# Patient Record
Sex: MALE | Race: WHITE | NOT HISPANIC OR LATINO | Employment: OTHER | URBAN - METROPOLITAN AREA
[De-identification: names, ages, dates, MRNs, and addresses within clinical notes are randomized per-mention and may not be internally consistent; named-entity substitution may affect disease eponyms.]

---

## 2018-05-21 ENCOUNTER — PATIENT MESSAGE (OUTPATIENT)
Dept: INTERNAL MEDICINE | Facility: CLINIC | Age: 63
End: 2018-05-21

## 2018-05-23 ENCOUNTER — PATIENT MESSAGE (OUTPATIENT)
Dept: INTERNAL MEDICINE | Facility: CLINIC | Age: 63
End: 2018-05-23

## 2018-05-23 DIAGNOSIS — Z00.00 ANNUAL PHYSICAL EXAM: Primary | ICD-10-CM

## 2018-05-25 ENCOUNTER — LAB VISIT (OUTPATIENT)
Dept: LAB | Facility: HOSPITAL | Age: 63
End: 2018-05-25
Attending: INTERNAL MEDICINE
Payer: MEDICARE

## 2018-05-25 DIAGNOSIS — Z00.00 ANNUAL PHYSICAL EXAM: ICD-10-CM

## 2018-05-25 LAB
ALBUMIN SERPL BCP-MCNC: 3.9 G/DL
ALP SERPL-CCNC: 89 U/L
ALT SERPL W/O P-5'-P-CCNC: 18 U/L
ANION GAP SERPL CALC-SCNC: 9 MMOL/L
AST SERPL-CCNC: 16 U/L
BASOPHILS # BLD AUTO: 0.07 K/UL
BASOPHILS NFR BLD: 0.7 %
BILIRUB SERPL-MCNC: 0.7 MG/DL
BUN SERPL-MCNC: 18 MG/DL
CALCIUM SERPL-MCNC: 9.6 MG/DL
CHLORIDE SERPL-SCNC: 103 MMOL/L
CHOLEST SERPL-MCNC: 263 MG/DL
CHOLEST/HDLC SERPL: 6.4 {RATIO}
CO2 SERPL-SCNC: 26 MMOL/L
COMPLEXED PSA SERPL-MCNC: 1.6 NG/ML
CREAT SERPL-MCNC: 1.1 MG/DL
DIFFERENTIAL METHOD: NORMAL
EOSINOPHIL # BLD AUTO: 0.2 K/UL
EOSINOPHIL NFR BLD: 2.6 %
ERYTHROCYTE [DISTWIDTH] IN BLOOD BY AUTOMATED COUNT: 12.6 %
EST. GFR  (AFRICAN AMERICAN): >60 ML/MIN/1.73 M^2
EST. GFR  (NON AFRICAN AMERICAN): >60 ML/MIN/1.73 M^2
GLUCOSE SERPL-MCNC: 206 MG/DL
HCT VFR BLD AUTO: 50.6 %
HDLC SERPL-MCNC: 41 MG/DL
HDLC SERPL: 15.6 %
HGB BLD-MCNC: 16.4 G/DL
IMM GRANULOCYTES # BLD AUTO: 0.04 K/UL
IMM GRANULOCYTES NFR BLD AUTO: 0.4 %
LDLC SERPL CALC-MCNC: 171.8 MG/DL
LYMPHOCYTES # BLD AUTO: 3.3 K/UL
LYMPHOCYTES NFR BLD: 35.2 %
MCH RBC QN AUTO: 29.7 PG
MCHC RBC AUTO-ENTMCNC: 32.4 G/DL
MCV RBC AUTO: 92 FL
MONOCYTES # BLD AUTO: 0.8 K/UL
MONOCYTES NFR BLD: 8.3 %
NEUTROPHILS # BLD AUTO: 5 K/UL
NEUTROPHILS NFR BLD: 52.8 %
NONHDLC SERPL-MCNC: 222 MG/DL
NRBC BLD-RTO: 0 /100 WBC
PLATELET # BLD AUTO: 342 K/UL
PMV BLD AUTO: 9.9 FL
POTASSIUM SERPL-SCNC: 4 MMOL/L
PROT SERPL-MCNC: 7.4 G/DL
RBC # BLD AUTO: 5.52 M/UL
SODIUM SERPL-SCNC: 138 MMOL/L
TRIGL SERPL-MCNC: 251 MG/DL
TSH SERPL DL<=0.005 MIU/L-ACNC: 1.01 UIU/ML
WBC # BLD AUTO: 9.4 K/UL

## 2018-05-25 PROCEDURE — 80053 COMPREHEN METABOLIC PANEL: CPT

## 2018-05-25 PROCEDURE — 36415 COLL VENOUS BLD VENIPUNCTURE: CPT | Mod: PO

## 2018-05-25 PROCEDURE — 84443 ASSAY THYROID STIM HORMONE: CPT

## 2018-05-25 PROCEDURE — 80061 LIPID PANEL: CPT

## 2018-05-25 PROCEDURE — 84153 ASSAY OF PSA TOTAL: CPT

## 2018-05-25 PROCEDURE — 85025 COMPLETE CBC W/AUTO DIFF WBC: CPT

## 2018-05-29 ENCOUNTER — OFFICE VISIT (OUTPATIENT)
Dept: INTERNAL MEDICINE | Facility: CLINIC | Age: 63
End: 2018-05-29
Payer: MEDICARE

## 2018-05-29 ENCOUNTER — HOSPITAL ENCOUNTER (OUTPATIENT)
Dept: RADIOLOGY | Facility: HOSPITAL | Age: 63
Discharge: HOME | End: 2018-05-29
Attending: INTERNAL MEDICINE
Payer: MEDICARE

## 2018-05-29 VITALS
WEIGHT: 193.13 LBS | BODY MASS INDEX: 31.04 KG/M2 | SYSTOLIC BLOOD PRESSURE: 152 MMHG | HEIGHT: 66 IN | HEART RATE: 99 BPM | DIASTOLIC BLOOD PRESSURE: 93 MMHG | TEMPERATURE: 98 F | RESPIRATION RATE: 18 BRPM

## 2018-05-29 DIAGNOSIS — R73.9 BLOOD GLUCOSE ELEVATED: ICD-10-CM

## 2018-05-29 DIAGNOSIS — I10 ESSENTIAL HYPERTENSION: ICD-10-CM

## 2018-05-29 DIAGNOSIS — E78.00 HYPERCHOLESTEREMIA: ICD-10-CM

## 2018-05-29 DIAGNOSIS — E66.9 OBESITY (BMI 30-39.9): ICD-10-CM

## 2018-05-29 DIAGNOSIS — M25.552 PAIN OF LEFT HIP JOINT: ICD-10-CM

## 2018-05-29 DIAGNOSIS — Z00.00 ANNUAL PHYSICAL EXAM: Primary | ICD-10-CM

## 2018-05-29 PROCEDURE — 90732 PPSV23 VACC 2 YRS+ SUBQ/IM: CPT | Mod: S$GLB,,, | Performed by: INTERNAL MEDICINE

## 2018-05-29 PROCEDURE — G0009 ADMIN PNEUMOCOCCAL VACCINE: HCPCS | Mod: 59,S$GLB,, | Performed by: INTERNAL MEDICINE

## 2018-05-29 PROCEDURE — 3080F DIAST BP >= 90 MM HG: CPT | Mod: CPTII,S$GLB,, | Performed by: INTERNAL MEDICINE

## 2018-05-29 PROCEDURE — 99202 OFFICE O/P NEW SF 15 MIN: CPT | Mod: 25,S$GLB,, | Performed by: INTERNAL MEDICINE

## 2018-05-29 PROCEDURE — 99999 PR PBB SHADOW E&M-EST. PATIENT-LVL III: CPT | Mod: PBBFAC,,, | Performed by: INTERNAL MEDICINE

## 2018-05-29 PROCEDURE — 73502 X-RAY EXAM HIP UNI 2-3 VIEWS: CPT | Mod: 26,LT,, | Performed by: RADIOLOGY

## 2018-05-29 PROCEDURE — 90471 IMMUNIZATION ADMIN: CPT | Mod: S$GLB,,, | Performed by: INTERNAL MEDICINE

## 2018-05-29 PROCEDURE — 3077F SYST BP >= 140 MM HG: CPT | Mod: CPTII,S$GLB,, | Performed by: INTERNAL MEDICINE

## 2018-05-29 PROCEDURE — 73502 X-RAY EXAM HIP UNI 2-3 VIEWS: CPT | Mod: TC,PO,LT

## 2018-05-29 PROCEDURE — 90714 TD VACC NO PRESV 7 YRS+ IM: CPT | Mod: S$GLB,,, | Performed by: INTERNAL MEDICINE

## 2018-05-29 RX ORDER — LOSARTAN POTASSIUM AND HYDROCHLOROTHIAZIDE 12.5; 5 MG/1; MG/1
1 TABLET ORAL EVERY MORNING
Qty: 30 TABLET | Refills: 0 | Status: SHIPPED | OUTPATIENT
Start: 2018-05-29 | End: 2018-06-12 | Stop reason: SDUPTHER

## 2018-05-29 RX ORDER — IBUPROFEN 200 MG
200 TABLET ORAL EVERY 6 HOURS PRN
COMMUNITY
End: 2018-08-13

## 2018-05-29 RX ORDER — ASPIRIN 325 MG
81 TABLET ORAL DAILY
COMMUNITY
End: 2018-07-20

## 2018-05-29 RX ORDER — ATORVASTATIN CALCIUM 40 MG/1
40 TABLET, FILM COATED ORAL NIGHTLY
Qty: 90 TABLET | Refills: 3 | Status: SHIPPED | OUTPATIENT
Start: 2018-05-29 | End: 2018-11-14 | Stop reason: SDUPTHER

## 2018-05-29 RX ORDER — MELOXICAM 15 MG/1
15 TABLET ORAL DAILY
Qty: 30 TABLET | Refills: 3 | Status: SHIPPED | OUTPATIENT
Start: 2018-05-29 | End: 2018-06-28

## 2018-05-29 NOTE — PROGRESS NOTES
Subjective:       Patient ID: Mario Sanford is a 63 y.o. male.    Chief Complaint: Annual Exam and Hip Pain (left)    HPI   63 y.o. Male here for annual exam.     Cholesterol: (elevated TG's)  Vaccines: Influenza (declined); Tetanus (2018); Pneumovax (2018); Zoster (2018)  Eye exam: declined  Colonoscopy: needs    Exercise: walks   Diet: regular    Past Medical History:  No date: Obesity (BMI 30-39.9)  No date: HTN  No date: HLD  No past surgical history on file.  Social History    Marital status:              Spouse name:                       Years of education:                 Number of children: 1             Occupational History  Occupation          Employer            Comment               Retired from sales                          Social History Main Topics    Smoking status: Never Smoker                                                                Smokeless tobacco: Never Used                        Alcohol use: No              Drug use: No              Sexual activity: Yes               Partners with: Female    Review of patient's allergies indicates:  No Known Allergies  Mr. Sanford had no medications administered during this visit.    Review of Systems   Constitutional: Positive for activity change. Negative for appetite change, chills, diaphoresis, fatigue, fever and unexpected weight change.   HENT: Positive for hearing loss. Negative for congestion, mouth sores, postnasal drip, rhinorrhea, sinus pressure, sneezing, sore throat, trouble swallowing and voice change.    Eyes: Negative for discharge, itching and visual disturbance.   Respiratory: Negative for cough, chest tightness, shortness of breath and wheezing.    Cardiovascular: Negative for chest pain, palpitations and leg swelling.   Gastrointestinal: Negative for abdominal pain, blood in stool, constipation, diarrhea, nausea and vomiting.   Endocrine: Positive for polyuria. Negative for cold intolerance, heat intolerance and polydipsia.    Genitourinary: Positive for urgency. Negative for difficulty urinating, dysuria, flank pain and hematuria.   Musculoskeletal: Positive for arthralgias. Negative for back pain, joint swelling, myalgias and neck pain.   Skin: Negative for rash and wound.   Allergic/Immunologic: Negative for environmental allergies and food allergies.   Neurological: Negative for dizziness, tremors, seizures, syncope, weakness and headaches.   Hematological: Negative for adenopathy. Does not bruise/bleed easily.   Psychiatric/Behavioral: Negative for confusion, dysphoric mood, sleep disturbance and suicidal ideas. The patient is not nervous/anxious.        Objective:      Physical Exam   Constitutional: He is oriented to person, place, and time. He appears well-developed and well-nourished. No distress.   HENT:   Head: Normocephalic and atraumatic.   Right Ear: External ear normal.   Left Ear: External ear normal.   Nose: Nose normal.   Mouth/Throat: Oropharynx is clear and moist. No oropharyngeal exudate.   Eyes: Conjunctivae and EOM are normal. Pupils are equal, round, and reactive to light. Right eye exhibits no discharge. Left eye exhibits no discharge. No scleral icterus.   Neck: Normal range of motion. Neck supple. No JVD present. No thyromegaly present.   Cardiovascular: Normal rate, regular rhythm, normal heart sounds and intact distal pulses.    No murmur heard.  Pulmonary/Chest: Effort normal and breath sounds normal. No respiratory distress. He has no wheezes. He has no rales.   Abdominal: Soft. Bowel sounds are normal. He exhibits no distension. There is no tenderness. There is no guarding.   Musculoskeletal: He exhibits no edema.        Left hip: He exhibits tenderness (lateral hip).   Lymphadenopathy:     He has no cervical adenopathy.   Neurological: He is alert and oriented to person, place, and time. No cranial nerve deficit. Coordination normal.   Skin: Skin is warm and dry. No rash noted. He is not diaphoretic. No  pallor.   Psychiatric: He has a normal mood and affect. Judgment normal.       Assessment:       1. Annual physical exam    2. Hypercholesteremia    3. Essential hypertension    4. Blood glucose elevated    5. Obesity (BMI 30-39.9)    6. Pain of left hip joint        Plan:    1. Blood work reviewed with pt       Vaccines: Influenza (declined); Tetanus (2018); Pneumovax (2018); Zoster (2018)       Eye exam: declined       Colonoscopy: needs   2. HLD- Rx Lipitor 40 mg daily       Lipids in 3 months    3. HTN- Rx Hyzaar 50-12.5 mg daily     4. Elevated blood glucose- check HA1C   5. Obesity- pt advised on proper diet/exercise for weight loss   6. X-ray hip       Rx Mobic 15 mg daily   7. F/u in 2 weeks

## 2018-05-31 ENCOUNTER — TELEPHONE (OUTPATIENT)
Dept: ENDOSCOPY | Facility: HOSPITAL | Age: 63
End: 2018-05-31

## 2018-05-31 ENCOUNTER — TELEPHONE (OUTPATIENT)
Dept: INTERNAL MEDICINE | Facility: CLINIC | Age: 63
End: 2018-05-31

## 2018-05-31 DIAGNOSIS — E11.9 TYPE 2 DIABETES MELLITUS WITHOUT COMPLICATION, WITH LONG-TERM CURRENT USE OF INSULIN: Primary | ICD-10-CM

## 2018-05-31 DIAGNOSIS — Z79.4 TYPE 2 DIABETES MELLITUS WITHOUT COMPLICATION, WITH LONG-TERM CURRENT USE OF INSULIN: Primary | ICD-10-CM

## 2018-05-31 RX ORDER — DEXTROSE 4 G
TABLET,CHEWABLE ORAL
Qty: 1 EACH | Refills: 0 | Status: SHIPPED | OUTPATIENT
Start: 2018-05-31 | End: 2020-02-12 | Stop reason: SDUPTHER

## 2018-05-31 RX ORDER — METFORMIN HYDROCHLORIDE 1000 MG/1
1000 TABLET ORAL 2 TIMES DAILY WITH MEALS
Qty: 180 TABLET | Refills: 3 | Status: SHIPPED | OUTPATIENT
Start: 2018-05-31 | End: 2018-06-08

## 2018-05-31 RX ORDER — LANCETS
1 EACH MISCELLANEOUS DAILY
Qty: 200 EACH | Refills: 3 | Status: SHIPPED | OUTPATIENT
Start: 2018-05-31 | End: 2020-02-12 | Stop reason: SDUPTHER

## 2018-05-31 NOTE — TELEPHONE ENCOUNTER
Moris Whalen MD You 3 minutes ago (11:39 AM)     Yes (Routing comment)             You routed conversation to Moris Whalen MD 17 minutes ago (11:25 AM)          You 18 minutes ago (11:23 AM)        Dr. Whalen,   Patient is scheduled for a colonoscopy with you 6/19/18. Patient's wife stated she is a patient of yours and has done a Miralax prep, and requesting that for patient. Is Miralax prep okay? Please advise.   Thank you,   Dalila

## 2018-05-31 NOTE — TELEPHONE ENCOUNTER
----- Message from Bereket Rivera DO sent at 5/31/2018 12:32 PM CDT -----  Labs conforms you have diabetes  I will start you on a mew medication and will refer you to get diabetic education  We will send you a glucometer to measure your blood sugars 2x/daily(may an appt with my nurse if you need to be shown how to use it)  I want to see you back in 2 weeks to review the readings

## 2018-05-31 NOTE — TELEPHONE ENCOUNTER
Dr. Whalen,    Patient is scheduled for a colonoscopy with you 6/19/18. Patient's wife stated she is a patient of yours and has done a Miralax prep, and requesting that for patient. Is Miralax prep okay? Please advise.    Thank you,  Dalila

## 2018-05-31 NOTE — TELEPHONE ENCOUNTER
Wife has seen mychart msg.  They are booked with diab education already and 2 wk fol up..  Will be scheduling eye appt too.    She is diabetic and will work with him with diet and getting glucose checked.  I told her we'd send order to [employer] for the meter and supplies and they will ship out to them.    Please approve order. For meter./ to print so can fax to [employer]    Thanks charlie

## 2018-05-31 NOTE — TELEPHONE ENCOUNTER
Metformin sent  Referral for eye exam, re: diabetes and referral to Diabetic education   F/u in 2 weeks to review blood sugars  See result note

## 2018-06-08 ENCOUNTER — PATIENT MESSAGE (OUTPATIENT)
Dept: INTERNAL MEDICINE | Facility: CLINIC | Age: 63
End: 2018-06-08

## 2018-06-08 ENCOUNTER — TELEPHONE (OUTPATIENT)
Dept: INTERNAL MEDICINE | Facility: CLINIC | Age: 63
End: 2018-06-08

## 2018-06-08 RX ORDER — GLIMEPIRIDE 2 MG/1
2 TABLET ORAL
Qty: 30 TABLET | Refills: 0 | Status: ON HOLD | OUTPATIENT
Start: 2018-06-08 | End: 2018-07-05 | Stop reason: SDUPTHER

## 2018-06-08 NOTE — TELEPHONE ENCOUNTER
----- Message from Breanna Cerna sent at 6/7/2018  3:55 PM CDT -----  Contact: Wife Brittaney [phone]  The patients wife is calling in regards to a few medications thats making the patient sick. Patient symptoms are nausea, fatigue, vomiting and diarrhea. Patient would like a call back as soon as possible.  They are unaware of which medication is making the patient have these symptoms.

## 2018-06-12 ENCOUNTER — OFFICE VISIT (OUTPATIENT)
Dept: INTERNAL MEDICINE | Facility: CLINIC | Age: 63
End: 2018-06-12
Payer: MEDICARE

## 2018-06-12 ENCOUNTER — CLINICAL SUPPORT (OUTPATIENT)
Dept: DIABETES | Facility: CLINIC | Age: 63
End: 2018-06-12
Payer: MEDICARE

## 2018-06-12 VITALS
BODY MASS INDEX: 29.8 KG/M2 | HEIGHT: 66 IN | TEMPERATURE: 99 F | HEART RATE: 70 BPM | WEIGHT: 185.44 LBS | SYSTOLIC BLOOD PRESSURE: 108 MMHG | RESPIRATION RATE: 16 BRPM | DIASTOLIC BLOOD PRESSURE: 64 MMHG

## 2018-06-12 DIAGNOSIS — Z79.4 TYPE 2 DIABETES MELLITUS WITHOUT COMPLICATION, WITH LONG-TERM CURRENT USE OF INSULIN: ICD-10-CM

## 2018-06-12 DIAGNOSIS — E11.9 DIABETES MELLITUS, NEW ONSET: Primary | ICD-10-CM

## 2018-06-12 DIAGNOSIS — I10 ESSENTIAL HYPERTENSION: Primary | ICD-10-CM

## 2018-06-12 DIAGNOSIS — E11.9 TYPE 2 DIABETES MELLITUS WITHOUT COMPLICATION, WITH LONG-TERM CURRENT USE OF INSULIN: ICD-10-CM

## 2018-06-12 PROCEDURE — 3045F PR MOST RECENT HEMOGLOBIN A1C LEVEL 7.0-9.0%: CPT | Mod: CPTII,S$GLB,, | Performed by: INTERNAL MEDICINE

## 2018-06-12 PROCEDURE — G0108 DIAB MANAGE TRN  PER INDIV: HCPCS | Mod: S$GLB,,, | Performed by: INTERNAL MEDICINE

## 2018-06-12 PROCEDURE — 99999 PR PBB SHADOW E&M-EST. PATIENT-LVL II: CPT | Mod: PBBFAC,,,

## 2018-06-12 PROCEDURE — 3074F SYST BP LT 130 MM HG: CPT | Mod: CPTII,S$GLB,, | Performed by: INTERNAL MEDICINE

## 2018-06-12 PROCEDURE — 3008F BODY MASS INDEX DOCD: CPT | Mod: CPTII,S$GLB,, | Performed by: INTERNAL MEDICINE

## 2018-06-12 PROCEDURE — 99999 PR PBB SHADOW E&M-EST. PATIENT-LVL III: CPT | Mod: PBBFAC,,, | Performed by: INTERNAL MEDICINE

## 2018-06-12 PROCEDURE — 99499 UNLISTED E&M SERVICE: CPT | Mod: S$GLB,,, | Performed by: INTERNAL MEDICINE

## 2018-06-12 PROCEDURE — 3078F DIAST BP <80 MM HG: CPT | Mod: CPTII,S$GLB,, | Performed by: INTERNAL MEDICINE

## 2018-06-12 PROCEDURE — 99214 OFFICE O/P EST MOD 30 MIN: CPT | Mod: S$GLB,,, | Performed by: INTERNAL MEDICINE

## 2018-06-12 RX ORDER — LOSARTAN POTASSIUM AND HYDROCHLOROTHIAZIDE 12.5; 5 MG/1; MG/1
1 TABLET ORAL EVERY MORNING
Qty: 90 TABLET | Refills: 1 | Status: ON HOLD | OUTPATIENT
Start: 2018-06-12 | End: 2018-07-09 | Stop reason: HOSPADM

## 2018-06-12 NOTE — PROGRESS NOTES
Subjective:       Patient ID: Mario Sanford is a 63 y.o. male.    Chief Complaint: Follow-up    HPI   Pt with T2DM/HTN is here for 2 week f/u. He was started on Hyzaar which has controlled his BP at home without any SE's. He was also started on Metformin which he had to stop 2/2 N/V/D. He was changed to Amaryl. Pt's blood sugars are running < 200s.   Review of Systems   Constitutional: Positive for fatigue. Negative for activity change, appetite change, chills, diaphoresis, fever and unexpected weight change.   HENT: Negative for postnasal drip, rhinorrhea, sinus pressure, sneezing, sore throat, trouble swallowing and voice change.    Respiratory: Negative for cough, shortness of breath and wheezing.    Cardiovascular: Negative for chest pain, palpitations and leg swelling.   Gastrointestinal: Negative for abdominal pain, blood in stool, constipation, diarrhea, nausea and vomiting.   Endocrine: Negative for polydipsia, polyphagia and polyuria.   Genitourinary: Negative for dysuria.   Musculoskeletal: Negative for arthralgias and myalgias.   Skin: Negative for pallor, rash and wound.   Allergic/Immunologic: Negative for environmental allergies and food allergies.   Neurological: Positive for weakness. Negative for dizziness, tremors, seizures, speech difficulty and headaches.   Hematological: Negative for adenopathy. Does not bruise/bleed easily.   Psychiatric/Behavioral: Negative for confusion. The patient is not nervous/anxious.        Objective:      Physical Exam   Constitutional: He is oriented to person, place, and time. He appears well-developed and well-nourished. No distress.   HENT:   Head: Normocephalic and atraumatic.   Eyes: Conjunctivae and EOM are normal. Pupils are equal, round, and reactive to light. Right eye exhibits no discharge. Left eye exhibits no discharge. No scleral icterus.   Neck: Normal range of motion. Neck supple. No JVD present.   Cardiovascular: Normal rate, regular rhythm and normal  heart sounds.    No murmur heard.  Pulses:       Dorsalis pedis pulses are 2+ on the right side, and 2+ on the left side.        Posterior tibial pulses are 2+ on the right side, and 2+ on the left side.   Pulmonary/Chest: Effort normal and breath sounds normal. No respiratory distress. He has no wheezes. He has no rales.   Abdominal: Soft. Bowel sounds are normal. There is no tenderness.   Musculoskeletal: He exhibits no edema.   Feet:   Right Foot:   Protective Sensation: 4 sites tested. 4 sites sensed.   Skin Integrity: Negative for ulcer, blister or skin breakdown.   Left Foot:   Protective Sensation: 4 sites tested. 4 sites sensed.   Skin Integrity: Negative for ulcer, blister or skin breakdown.   Lymphadenopathy:     He has no cervical adenopathy.   Neurological: He is alert and oriented to person, place, and time.   Skin: Skin is warm and dry. No rash noted. He is not diaphoretic. No pallor.       Assessment:       1. Essential hypertension    2. Type 2 diabetes mellitus without complication, with long-term current use of insulin        Plan:    1. HTN- controlled on Hyzaar 50-12.5 mg daily   2. T2DM- last HA1C of 8.7(5/18)       Continue Amaryl 2 mg qam       Monitor sugars BID(Will send results in 1 week)       HA1C in 3 months   3. F/u in 6 months

## 2018-06-12 NOTE — PROGRESS NOTES
Diabetes Education  Author: Pita Nugent RD, CDE  Date: 6/12/2018    Diabetes Education Visit  Diabetes Education Record Assessment/Progress: Initial    Diabetes Type  Diabetes Type : Type II    Diabetes History  Diabetes Diagnosis: 0-1 year (New Onset)    Nutrition  Meal Planning: 3 meals per day, skipping meals, snacks between meal, water, diet drinks, artificial sweeteners, eats out seldom  What type of sweetener do you use?: Stevia/Truvia  What type of beverages do you drink?: juice, water, other (see comments), diet soda/tea (Coffee w/ stevia)  Meal Plan 24 Hour Recall - Breakfast:  (Eggs, oatmeal, water, coffee w/ stevia, juice)  Meal Plan 24 Hour Recall - Lunch:  ([place])  Meal Plan 24 Hour Recall - Dinner:  (Homecooked)  Meal Plan 24 Hour Recall - Snack:  (Cut back on snacking on sweets)    Monitoring   Monitoring:  (None - waiting on meter and supplies from )  Blood Glucose Logs: No  Do you use a personal glucose monitor?: No  In the last month, how often have you had a low blood sugar reaction?: never  What are your symptoms of low blood sugar?:  (N/A)  How do you treat low blood sugar?:  (N/A)  Can you tell when your blood sugar is too high?: no  How do you treat high blood sugar?:  (N/A)    Exercise   Exercise Type: none (Arthritis in left hip so is somewhat restricted)    Current Diabetes Treatment   Current Treatment: Oral Medication (Glimepiride daily (had tried Metformin, but was not able to tolerate))    Social History  Preferred Learning Method: Face to Face  Primary Support: Self, Spouse  Smoking Status: Never a Smoker  Alcohol Use: Never    Barriers to Change  Barriers to Change: None  Learning Challenges : None    Readiness to Learn   Readiness to Learn : Acceptance    Cultural Influences  Cultural Influences: No    Diabetes Education Assessment/Progress  Diabetes Disease Process (diabetes disease process and treatment options): Instructed, Discussion, Individual Session, Written Materials  Provided  Nutrition (Incorporating nutritional management into one's lifestyle): Instructed, Discussion, Individual Session, Written Materials Provided (Instructed patient on CHO counting, label reading and addt'l resources to assist w/ CHO counting; plate method discussed as well)  Physical Activity (incorporating physical activity into one's lifestyle): Instructed, Discussion, Individual Session, Written Materials Provided (Reviewed goals and benefits)  Medications (states correct name, dose, onset, peak, duration, side effects & timing of meds): Instructed, Discussion, Individual Session, Written Materials Provided (Reviewed medication regimen)  Monitoring (monitoring blood glucose/other parameters & using results): Instructed, Discussion, Individual Session, Written Materials Provided (Reviewed SMBG schedule and BG goals)  Acute Complications (preventing, detecting, and treating acute complications): Instructed, Discussion, Individual Session, Written Materials Provided (Reviewed s/s and treatment of hypoglycemia)  Chronic Complications (preventing, detecting, and treating chronic complications): Instructed, Discussion, Individual Session, Written Materials Provided (Reviewed standards of care; did not wish to schedule eye exam (states only 2 eye MDs under insurance plan))  Clinical (diabetes, other pertinent medical history, and relevant comorbidities reviewed during visit): Instructed, Discussion, Individual Session  Cognitive (knowledge of self-management skills, functional health literacy): Instructed, Discussion, Individual Session  Psychosocial (emotional response to diabetes): Instructed, Discussion, Individual Session  Diabetes Distress and Support Systems: Instructed, Discussion, Individual Session (Distress Scale Score 1.23)  Behavioral (readiness for change, lifestyle practices, self-care behaviors): Instructed, Discussion, Individual Session    Goals  Patient has selected/evaluated goals during  today's session: Yes, selected  Monitoring: Set (Check BG once a day)    Diabetes Care Plan/Intervention  Education Plan/Intervention: Individual Follow-Up DSMT    Diabetes Meal Plan  Carbohydrate Per Meal: 45-60g  Carbohydrate Per Snack : 7-15g    Education Units of Time   Time Spent: 60 min    Health Maintenance was reviewed today with patient. Discussed with patient importance of routine eye exams, foot exams/foot care, blood work (i.e.: A1c, microalbumin, and lipid), dental visits, yearly flu vaccine, and pneumonia vaccine as indicated by PCP. Patient verbalized understanding.     Health Maintenance Topics with due status: Not Due       Topic Last Completion Date    Lipid Panel 05/25/2018    TETANUS VACCINE 05/29/2018    Pneumococcal (65+) 05/29/2018    Hemoglobin A1c 05/29/2018    Low Dose Statin 06/12/2018    Influenza Vaccine Not Due     Health Maintenance Due   Topic Date Due    Hepatitis C Screening  1955    Foot Exam  05/22/1963    Eye Exam  05/22/1963    Colonoscopy  05/22/2003    Zoster Vaccine  05/22/2013

## 2018-06-19 ENCOUNTER — SURGERY (OUTPATIENT)
Age: 63
End: 2018-06-19

## 2018-06-19 ENCOUNTER — HOSPITAL ENCOUNTER (OUTPATIENT)
Facility: HOSPITAL | Age: 63
Discharge: HOME | End: 2018-06-19
Attending: INTERNAL MEDICINE | Admitting: INTERNAL MEDICINE
Payer: MEDICARE

## 2018-06-19 ENCOUNTER — ANESTHESIA EVENT (OUTPATIENT)
Dept: ENDOSCOPY | Facility: HOSPITAL | Age: 63
End: 2018-06-19
Payer: MEDICARE

## 2018-06-19 ENCOUNTER — ANESTHESIA (OUTPATIENT)
Dept: ENDOSCOPY | Facility: HOSPITAL | Age: 63
End: 2018-06-19
Payer: MEDICARE

## 2018-06-19 VITALS
SYSTOLIC BLOOD PRESSURE: 140 MMHG | OXYGEN SATURATION: 99 % | RESPIRATION RATE: 18 BRPM | DIASTOLIC BLOOD PRESSURE: 86 MMHG | HEART RATE: 95 BPM | TEMPERATURE: 98 F | HEIGHT: 66 IN | WEIGHT: 180 LBS | BODY MASS INDEX: 28.93 KG/M2

## 2018-06-19 DIAGNOSIS — E11.9 TYPE 2 DIABETES MELLITUS WITHOUT COMPLICATION, WITH LONG-TERM CURRENT USE OF INSULIN: Primary | ICD-10-CM

## 2018-06-19 DIAGNOSIS — Z12.11 COLON CANCER SCREENING: ICD-10-CM

## 2018-06-19 DIAGNOSIS — Z79.4 TYPE 2 DIABETES MELLITUS WITHOUT COMPLICATION, WITH LONG-TERM CURRENT USE OF INSULIN: Primary | ICD-10-CM

## 2018-06-19 LAB
GLUCOSE SERPL-MCNC: 157 MG/DL (ref 70–110)
POCT GLUCOSE: 157 MG/DL (ref 70–110)

## 2018-06-19 PROCEDURE — 27201012 HC FORCEPS, HOT/COLD, DISP: Performed by: INTERNAL MEDICINE

## 2018-06-19 PROCEDURE — 27201089 HC SNARE, DISP (ANY): Performed by: INTERNAL MEDICINE

## 2018-06-19 PROCEDURE — 25000003 PHARM REV CODE 250: Performed by: INTERNAL MEDICINE

## 2018-06-19 PROCEDURE — E9220 PRA ENDO ANESTHESIA: HCPCS | Mod: PT,,, | Performed by: NURSE ANESTHETIST, CERTIFIED REGISTERED

## 2018-06-19 PROCEDURE — 37000009 HC ANESTHESIA EA ADD 15 MINS: Performed by: INTERNAL MEDICINE

## 2018-06-19 PROCEDURE — 45385 COLONOSCOPY W/LESION REMOVAL: CPT | Performed by: INTERNAL MEDICINE

## 2018-06-19 PROCEDURE — 82962 GLUCOSE BLOOD TEST: CPT | Performed by: INTERNAL MEDICINE

## 2018-06-19 PROCEDURE — 88305 TISSUE EXAM BY PATHOLOGIST: CPT | Mod: 26,,, | Performed by: PATHOLOGY

## 2018-06-19 PROCEDURE — 88305 TISSUE EXAM BY PATHOLOGIST: CPT | Mod: 59 | Performed by: PATHOLOGY

## 2018-06-19 PROCEDURE — 37000008 HC ANESTHESIA 1ST 15 MINUTES: Performed by: INTERNAL MEDICINE

## 2018-06-19 PROCEDURE — 63600175 PHARM REV CODE 636 W HCPCS: Performed by: NURSE ANESTHETIST, CERTIFIED REGISTERED

## 2018-06-19 PROCEDURE — 45385 COLONOSCOPY W/LESION REMOVAL: CPT | Mod: PT,,, | Performed by: INTERNAL MEDICINE

## 2018-06-19 PROCEDURE — 45380 COLONOSCOPY AND BIOPSY: CPT | Mod: 59,,, | Performed by: INTERNAL MEDICINE

## 2018-06-19 PROCEDURE — 45380 COLONOSCOPY AND BIOPSY: CPT | Performed by: INTERNAL MEDICINE

## 2018-06-19 RX ORDER — SODIUM CHLORIDE 9 MG/ML
INJECTION, SOLUTION INTRAVENOUS CONTINUOUS
Status: DISCONTINUED | OUTPATIENT
Start: 2018-06-19 | End: 2018-06-19 | Stop reason: HOSPADM

## 2018-06-19 RX ORDER — PROPOFOL 10 MG/ML
VIAL (ML) INTRAVENOUS CONTINUOUS PRN
Status: DISCONTINUED | OUTPATIENT
Start: 2018-06-19 | End: 2018-06-19

## 2018-06-19 RX ORDER — PROPOFOL 10 MG/ML
VIAL (ML) INTRAVENOUS
Status: DISCONTINUED | OUTPATIENT
Start: 2018-06-19 | End: 2018-06-19

## 2018-06-19 RX ORDER — LIDOCAINE HCL/PF 100 MG/5ML
SYRINGE (ML) INTRAVENOUS
Status: DISCONTINUED | OUTPATIENT
Start: 2018-06-19 | End: 2018-06-19

## 2018-06-19 RX ADMIN — LIDOCAINE HYDROCHLORIDE 100 MG: 20 INJECTION, SOLUTION INTRAVENOUS at 09:06

## 2018-06-19 RX ADMIN — PROPOFOL 150 MCG/KG/MIN: 10 INJECTION, EMULSION INTRAVENOUS at 09:06

## 2018-06-19 RX ADMIN — SODIUM CHLORIDE: 0.9 INJECTION, SOLUTION INTRAVENOUS at 09:06

## 2018-06-19 RX ADMIN — PROPOFOL 80 MG: 10 INJECTION, EMULSION INTRAVENOUS at 09:06

## 2018-06-19 NOTE — ANESTHESIA PREPROCEDURE EVALUATION
06/19/2018  Mario Sanford is a 63 y.o., male.  Past Medical History:   Diagnosis Date    Hyperlipidemia     Hypertension     Obesity (BMI 30-39.9)    History reviewed. No pertinent surgical history.      Anesthesia Evaluation    I have reviewed the Patient Summary Reports.    I have reviewed the Nursing Notes.   I have reviewed the Medications.     Review of Systems  Anesthesia Hx:  No problems with previous Anesthesia  Denies Family Hx of Anesthesia complications.   Denies Personal Hx of Anesthesia complications.   Hematology/Oncology:  Hematology Normal   Oncology Normal     EENT/Dental:EENT/Dental Normal   Cardiovascular:   Exercise tolerance: good Hypertension    Pulmonary:  Pulmonary Normal    Renal/:  Renal/ Normal     Hepatic/GI:  Hepatic/GI Normal    Musculoskeletal:  Musculoskeletal Normal    Neurological:  Neurology Normal    Endocrine:   Diabetes, poorly controlled, type 2  Diabetes, Type 2 Diabetes    Dermatological:  Skin Normal    Psych:  Psychiatric Normal           Physical Exam  General:  Well nourished, Obesity    Airway/Jaw/Neck:  Airway Findings: Mouth Opening: Normal Tongue: Normal  General Airway Assessment: Adult  Mallampati: II  Improves to I with phonation.  TM Distance: Normal, at least 6 cm      Dental:  Dental Findings: In tact   Chest/Lungs:  Chest/Lungs Findings: Clear to auscultation, Normal Respiratory Rate     Heart/Vascular:  Heart Findings: Rate: Normal  Rhythm: Regular Rhythm  Sounds: Normal             Anesthesia Plan  Type of Anesthesia, risks & benefits discussed:  Anesthesia Type:  general  Patient's Preference:   Intra-op Monitoring Plan: standard ASA monitors  Intra-op Monitoring Plan Comments:   Post Op Pain Control Plan: per primary service following discharge from PACU  Post Op Pain Control Plan Comments:   Induction:   IV  Beta Blocker:  Patient is not  currently on a Beta-Blocker (No further documentation required).       Informed Consent: Patient understands risks and agrees with Anesthesia plan.  Questions answered. Anesthesia consent signed with patient.  ASA Score: 3     Day of Surgery Review of History & Physical: I have interviewed and examined the patient. I have reviewed the patient's H&P dated:    H&P update referred to the provider.         Ready For Surgery From Anesthesia Perspective.

## 2018-06-19 NOTE — [employer] PATIENT INSTRUCTIONS
Discharge Summary/Instructions after an Endoscopic Procedure  Patient Name: Mario Sanford  Patient MRN: [id number]  Patient YOB: 1955 Tuesday, June 19, 2018  Moris Whalen MD  RESTRICTIONS:  During your procedure today, you received medications for sedation.  These   medications may affect your judgment, balance and coordination.  Therefore,   for 24 hours, you have the following restrictions:   - DO NOT drive a car, operate machinery, make legal/financial decisions,   sign important papers or drink alcohol.    ACTIVITY:  Today: no heavy lifting, straining or running due to procedural   sedation/anesthesia.  The following day: return to full activity including work.  DIET:  Eat and drink normally unless instructed otherwise.     TREATMENT FOR COMMON SIDE EFFECTS:  - Mild abdominal pain, nausea, belching, bloating or excessive gas:  rest,   eat lightly and use a heating pad.  - Sore Throat: treat with throat lozenges and/or gargle with warm salt   water.  - Because air was used during the procedure, expelling large amounts of air   from your rectum or belching is normal.  - If a bowel prep was taken, you may not have a bowel movement for 1-3 days.    This is normal.  SYMPTOMS TO WATCH FOR AND REPORT TO YOUR PHYSICIAN:  1. Abdominal pain or bloating, other than gas cramps.  2. Chest pain.  3. Back pain.  4. Signs of infection such as: chills or fever occurring within 24 hours   after the procedure.  5. Rectal bleeding, which would show as bright red, maroon, or black stools.   (A tablespoon of blood from the rectum is not serious, especially if   hemorrhoids are present.)  6. Vomiting.  7. Weakness or dizziness.  GO DIRECTLY TO THE NEAREST EMERGENCY ROOM IF YOU HAVE ANY OF THE FOLLOWING:      Difficulty breathing              Chills and/or fever over 101 F   Persistent vomiting and/or vomiting blood   Severe abdominal pain   Severe chest pain   Black, tarry stools   Bleeding- more than one tablespoon   Any  other symptom or condition that you feel may need urgent attention  Your doctor recommends these additional instructions:  If any biopsies were taken, your doctors clinic will contact you in 1 to 2   weeks with any results.  - Patient has a contact number available for emergencies.  The signs and   symptoms of potential delayed complications were discussed with the   patient.  Return to normal activities tomorrow.  Written discharge   instructions were provided to the patient.   - Discharge patient to home.   - Await pathology results.   - Repeat colonoscopy in 5 years for surveillance. Sooner if IC valve shows   adenoma (possible AES referral)  - The findings and recommendations were discussed with the designated   responsible adult.   For questions, problems or results please call your physician - Moris Whalen MD at Work:  [phone].  [facility], EMERGENCY ROOM PHONE NUMBER: [phone]  IF A COMPLICATION OR EMERGENCY SITUATION ARISES AND YOU ARE UNABLE TO REACH   YOUR PHYSICIAN - GO DIRECTLY TO THE EMERGENCY ROOM.  Moris Whalen MD  6/19/2018 9:55:15 AM  This report has been verified and signed electronically.  [employer]

## 2018-06-19 NOTE — ANESTHESIA POSTPROCEDURE EVALUATION
"Anesthesia Post Evaluation    Patient: Mario Sanford    Procedure(s) Performed: Procedure(s) (LRB):  COLONOSCOPY (N/A)    Final Anesthesia Type: general  Patient location during evaluation: PACU  Patient participation: Yes- Able to Participate  Level of consciousness: awake and alert  Post-procedure vital signs: reviewed and stable  Pain management: adequate  Airway patency: patent  PONV status at discharge: No PONV  Anesthetic complications: no      Cardiovascular status: blood pressure returned to baseline  Respiratory status: unassisted  Hydration status: euvolemic  Follow-up not needed.        Visit Vitals  BP (!) 140/86 (BP Location: Left arm, Patient Position: Lying)   Pulse 95   Temp 36.8 °C (98.2 °F) (Temporal)   Resp 18   Ht 5' 6" (1.676 m)   Wt 81.6 kg (180 lb)   SpO2 99%   BMI 29.05 kg/m²       Pain/Renate Score: Pain Assessment Performed: Yes (6/19/2018 10:26 AM)  Presence of Pain: denies (6/19/2018 10:26 AM)  Renate Score: 10 (6/19/2018  9:58 AM)      "

## 2018-06-19 NOTE — INTERVAL H&P NOTE
The patient has been examined and the H&P has been reviewed:    I concur with the findings and no changes have occurred since H&P was written.     History and Exam unchanged from visit.    Procedure - Colonoscopy  Neck - supple  Plan of anesthesia - General  ASA - per anesthesia  Mallampati - per anesthesia  Anesthesia problems - no  Family history of anesthesia problems - no      Anesthesia/Surgery risks, benefits and alternative options discussed and understood by patient/family.          Active Hospital Problems    Diagnosis  POA    Colon cancer screening [Z12.11]  Not Applicable      Resolved Hospital Problems    Diagnosis Date Resolved POA   No resolved problems to display.     
Patent

## 2018-06-19 NOTE — TRANSFER OF CARE
"Anesthesia Transfer of Care Note    Patient: Mario Sanford    Procedure(s) Performed: Procedure(s) (LRB):  COLONOSCOPY (N/A)    Patient location: PACU    Anesthesia Type: general    Transport from OR: Transported from OR on room air with adequate spontaneous ventilation    Post pain: adequate analgesia    Post assessment: no apparent anesthetic complications    Post vital signs: stable    Level of consciousness: sedated    Nausea/Vomiting: no nausea/vomiting    Complications: none    Transfer of care protocol was followed      Last vitals:   Visit Vitals  BP (!) 140/80 (BP Location: Left arm, Patient Position: Lying)   Pulse 102   Temp 36.6 °C (97.8 °F)   Resp 14   Ht 5' 6" (1.676 m)   Wt 81.6 kg (180 lb)   SpO2 98%   BMI 29.05 kg/m²     "

## 2018-06-21 ENCOUNTER — PATIENT MESSAGE (OUTPATIENT)
Dept: GASTROENTEROLOGY | Facility: CLINIC | Age: 63
End: 2018-06-21

## 2018-06-26 ENCOUNTER — TELEPHONE (OUTPATIENT)
Dept: ENDOSCOPY | Facility: HOSPITAL | Age: 63
End: 2018-06-26

## 2018-06-29 ENCOUNTER — PATIENT MESSAGE (OUTPATIENT)
Dept: INTERNAL MEDICINE | Facility: CLINIC | Age: 63
End: 2018-06-29

## 2018-06-30 ENCOUNTER — HOSPITAL ENCOUNTER (INPATIENT)
Facility: HOSPITAL | Age: 63
LOS: 11 days | Discharge: HOME | DRG: 378 | End: 2018-07-11
Attending: EMERGENCY MEDICINE | Admitting: HOSPITALIST
Payer: MEDICARE

## 2018-06-30 ENCOUNTER — NURSE TRIAGE (OUTPATIENT)
Dept: ADMINISTRATIVE | Facility: CLINIC | Age: 63
End: 2018-06-30

## 2018-06-30 DIAGNOSIS — K85.90 ACUTE PANCREATITIS, UNSPECIFIED COMPLICATION STATUS, UNSPECIFIED PANCREATITIS TYPE: ICD-10-CM

## 2018-06-30 DIAGNOSIS — R10.11 RUQ ABDOMINAL PAIN: Primary | ICD-10-CM

## 2018-06-30 DIAGNOSIS — N17.9 AKI (ACUTE KIDNEY INJURY): ICD-10-CM

## 2018-06-30 DIAGNOSIS — I10 ESSENTIAL HYPERTENSION: ICD-10-CM

## 2018-06-30 DIAGNOSIS — N40.1 BPH WITH URINARY OBSTRUCTION: ICD-10-CM

## 2018-06-30 DIAGNOSIS — K92.1 MELENA: ICD-10-CM

## 2018-06-30 DIAGNOSIS — D62 ACUTE POSTHEMORRHAGIC ANEMIA: ICD-10-CM

## 2018-06-30 DIAGNOSIS — E78.00 HYPERCHOLESTEREMIA: ICD-10-CM

## 2018-06-30 DIAGNOSIS — R07.9 CHEST PAIN: ICD-10-CM

## 2018-06-30 DIAGNOSIS — D64.9 NORMOCYTIC ANEMIA: ICD-10-CM

## 2018-06-30 DIAGNOSIS — R74.8 ELEVATED LIVER ENZYMES: ICD-10-CM

## 2018-06-30 DIAGNOSIS — E11.9 TYPE 2 DIABETES MELLITUS WITHOUT COMPLICATION, WITHOUT LONG-TERM CURRENT USE OF INSULIN: ICD-10-CM

## 2018-06-30 DIAGNOSIS — K26.4 DUODENAL ULCER WITH HEMORRHAGE BUT WITHOUT OBSTRUCTION: ICD-10-CM

## 2018-06-30 DIAGNOSIS — N13.8 BPH WITH URINARY OBSTRUCTION: ICD-10-CM

## 2018-06-30 PROBLEM — R65.10 SIRS (SYSTEMIC INFLAMMATORY RESPONSE SYNDROME): Status: ACTIVE | Noted: 2018-06-30

## 2018-06-30 LAB
ALBUMIN SERPL BCP-MCNC: 2.6 G/DL
ALBUMIN SERPL BCP-MCNC: 3.3 G/DL
ALP SERPL-CCNC: 101 U/L
ALP SERPL-CCNC: 186 U/L
ALT SERPL W/O P-5'-P-CCNC: 115 U/L
ALT SERPL W/O P-5'-P-CCNC: 20 U/L
AMYLASE SERPL-CCNC: 49 U/L
ANION GAP SERPL CALC-SCNC: 11 MMOL/L
ANION GAP SERPL CALC-SCNC: 18 MMOL/L
APTT BLDCRRT: 24 SEC
AST SERPL-CCNC: 20 U/L
AST SERPL-CCNC: 203 U/L
BASOPHILS # BLD AUTO: 0.06 K/UL
BASOPHILS # BLD AUTO: 0.07 K/UL
BASOPHILS NFR BLD: 0.4 %
BASOPHILS NFR BLD: 0.5 %
BILIRUB SERPL-MCNC: 1.2 MG/DL
BILIRUB SERPL-MCNC: 1.6 MG/DL
BILIRUB UR QL STRIP: NEGATIVE
BNP SERPL-MCNC: <10 PG/ML
BUN SERPL-MCNC: 36 MG/DL
BUN SERPL-MCNC: 41 MG/DL
CALCIUM SERPL-MCNC: 8.7 MG/DL
CALCIUM SERPL-MCNC: 9.9 MG/DL
CHLORIDE SERPL-SCNC: 102 MMOL/L
CHLORIDE SERPL-SCNC: 95 MMOL/L
CLARITY UR REFRACT.AUTO: ABNORMAL
CO2 SERPL-SCNC: 21 MMOL/L
CO2 SERPL-SCNC: 25 MMOL/L
COLOR UR AUTO: YELLOW
CREAT SERPL-MCNC: 1.7 MG/DL
CREAT SERPL-MCNC: 2 MG/DL
DIFFERENTIAL METHOD: ABNORMAL
DIFFERENTIAL METHOD: ABNORMAL
EOSINOPHIL # BLD AUTO: 0.1 K/UL
EOSINOPHIL # BLD AUTO: 0.1 K/UL
EOSINOPHIL NFR BLD: 0.6 %
EOSINOPHIL NFR BLD: 0.7 %
ERYTHROCYTE [DISTWIDTH] IN BLOOD BY AUTOMATED COUNT: 12.3 %
ERYTHROCYTE [DISTWIDTH] IN BLOOD BY AUTOMATED COUNT: 12.3 %
EST. GFR  (AFRICAN AMERICAN): 39.3 ML/MIN/1.73 M^2
EST. GFR  (AFRICAN AMERICAN): 47.9 ML/MIN/1.73 M^2
EST. GFR  (NON AFRICAN AMERICAN): 34 ML/MIN/1.73 M^2
EST. GFR  (NON AFRICAN AMERICAN): 41.4 ML/MIN/1.73 M^2
GLUCOSE SERPL-MCNC: 125 MG/DL
GLUCOSE SERPL-MCNC: 134 MG/DL
GLUCOSE UR QL STRIP: NEGATIVE
HCT VFR BLD AUTO: 39.2 %
HCT VFR BLD AUTO: 43.3 %
HGB BLD-MCNC: 13 G/DL
HGB BLD-MCNC: 14.3 G/DL
HGB UR QL STRIP: ABNORMAL
IMM GRANULOCYTES # BLD AUTO: 0.09 K/UL
IMM GRANULOCYTES # BLD AUTO: 0.09 K/UL
IMM GRANULOCYTES NFR BLD AUTO: 0.5 %
IMM GRANULOCYTES NFR BLD AUTO: 0.7 %
INR PPP: 1.1
KETONES UR QL STRIP: ABNORMAL
LACTATE SERPL-SCNC: 0.7 MMOL/L
LACTATE SERPL-SCNC: 1.5 MMOL/L
LEUKOCYTE ESTERASE UR QL STRIP: ABNORMAL
LIPASE SERPL-CCNC: 56 U/L
LIPASE SERPL-CCNC: 61 U/L
LYMPHOCYTES # BLD AUTO: 2.1 K/UL
LYMPHOCYTES # BLD AUTO: 2.7 K/UL
LYMPHOCYTES NFR BLD: 14.8 %
LYMPHOCYTES NFR BLD: 16.9 %
MAGNESIUM SERPL-MCNC: 1.7 MG/DL
MCH RBC QN AUTO: 29.6 PG
MCH RBC QN AUTO: 29.7 PG
MCHC RBC AUTO-ENTMCNC: 33 G/DL
MCHC RBC AUTO-ENTMCNC: 33.2 G/DL
MCV RBC AUTO: 89 FL
MCV RBC AUTO: 90 FL
MICROSCOPIC COMMENT: ABNORMAL
MONOCYTES # BLD AUTO: 1 K/UL
MONOCYTES # BLD AUTO: 1.4 K/UL
MONOCYTES NFR BLD: 7.8 %
MONOCYTES NFR BLD: 8.4 %
NEUTROPHILS # BLD AUTO: 13.6 K/UL
NEUTROPHILS # BLD AUTO: 8.9 K/UL
NEUTROPHILS NFR BLD: 72.9 %
NEUTROPHILS NFR BLD: 75.8 %
NITRITE UR QL STRIP: NEGATIVE
NRBC BLD-RTO: 0 /100 WBC
NRBC BLD-RTO: 0 /100 WBC
PH UR STRIP: 5 [PH] (ref 5–8)
PHOSPHATE SERPL-MCNC: 3.7 MG/DL
PLATELET # BLD AUTO: 359 K/UL
PLATELET # BLD AUTO: 484 K/UL
PMV BLD AUTO: 9.4 FL
PMV BLD AUTO: 9.7 FL
POCT GLUCOSE: 102 MG/DL (ref 70–110)
POCT GLUCOSE: 105 MG/DL (ref 70–110)
POCT GLUCOSE: 84 MG/DL (ref 70–110)
POTASSIUM SERPL-SCNC: 3.5 MMOL/L
POTASSIUM SERPL-SCNC: 3.6 MMOL/L
PROCALCITONIN SERPL IA-MCNC: 0.22 NG/ML
PROT SERPL-MCNC: 6.2 G/DL
PROT SERPL-MCNC: 7.8 G/DL
PROT UR QL STRIP: NEGATIVE
PROTHROMBIN TIME: 11.7 SEC
RBC # BLD AUTO: 4.39 M/UL
RBC # BLD AUTO: 4.82 M/UL
RBC #/AREA URNS AUTO: 3 /HPF (ref 0–4)
SODIUM SERPL-SCNC: 134 MMOL/L
SODIUM SERPL-SCNC: 138 MMOL/L
SP GR UR STRIP: 1.01 (ref 1–1.03)
SQUAMOUS #/AREA URNS AUTO: 0 /HPF
TROPONIN I SERPL DL<=0.01 NG/ML-MCNC: <0.006 NG/ML
URN SPEC COLLECT METH UR: ABNORMAL
UROBILINOGEN UR STRIP-ACNC: 4 EU/DL
WBC # BLD AUTO: 12.2 K/UL
WBC # BLD AUTO: 17.93 K/UL
WBC #/AREA URNS AUTO: 31 /HPF (ref 0–5)

## 2018-06-30 PROCEDURE — 85610 PROTHROMBIN TIME: CPT

## 2018-06-30 PROCEDURE — 84484 ASSAY OF TROPONIN QUANT: CPT

## 2018-06-30 PROCEDURE — G0378 HOSPITAL OBSERVATION PER HR: HCPCS

## 2018-06-30 PROCEDURE — 25000003 PHARM REV CODE 250: Performed by: INTERNAL MEDICINE

## 2018-06-30 PROCEDURE — 83880 ASSAY OF NATRIURETIC PEPTIDE: CPT

## 2018-06-30 PROCEDURE — 81001 URINALYSIS AUTO W/SCOPE: CPT

## 2018-06-30 PROCEDURE — 93010 ELECTROCARDIOGRAM REPORT: CPT | Mod: ,,, | Performed by: INTERNAL MEDICINE

## 2018-06-30 PROCEDURE — 11000001 HC ACUTE MED/SURG PRIVATE ROOM

## 2018-06-30 PROCEDURE — 85025 COMPLETE CBC W/AUTO DIFF WBC: CPT | Mod: 91

## 2018-06-30 PROCEDURE — 93005 ELECTROCARDIOGRAM TRACING: CPT

## 2018-06-30 PROCEDURE — 63600175 PHARM REV CODE 636 W HCPCS: Performed by: EMERGENCY MEDICINE

## 2018-06-30 PROCEDURE — 83605 ASSAY OF LACTIC ACID: CPT | Mod: 91

## 2018-06-30 PROCEDURE — 83690 ASSAY OF LIPASE: CPT | Mod: 91

## 2018-06-30 PROCEDURE — 82150 ASSAY OF AMYLASE: CPT

## 2018-06-30 PROCEDURE — 83735 ASSAY OF MAGNESIUM: CPT

## 2018-06-30 PROCEDURE — 84100 ASSAY OF PHOSPHORUS: CPT

## 2018-06-30 PROCEDURE — 83605 ASSAY OF LACTIC ACID: CPT

## 2018-06-30 PROCEDURE — 96374 THER/PROPH/DIAG INJ IV PUSH: CPT

## 2018-06-30 PROCEDURE — 96375 TX/PRO/DX INJ NEW DRUG ADDON: CPT

## 2018-06-30 PROCEDURE — 99285 EMERGENCY DEPT VISIT HI MDM: CPT | Mod: ,,, | Performed by: EMERGENCY MEDICINE

## 2018-06-30 PROCEDURE — 25000003 PHARM REV CODE 250: Performed by: EMERGENCY MEDICINE

## 2018-06-30 PROCEDURE — 99285 EMERGENCY DEPT VISIT HI MDM: CPT | Mod: 25

## 2018-06-30 PROCEDURE — 87040 BLOOD CULTURE FOR BACTERIA: CPT | Mod: 59

## 2018-06-30 PROCEDURE — 87086 URINE CULTURE/COLONY COUNT: CPT

## 2018-06-30 PROCEDURE — 80053 COMPREHEN METABOLIC PANEL: CPT | Mod: 91

## 2018-06-30 PROCEDURE — 63600175 PHARM REV CODE 636 W HCPCS: Performed by: INTERNAL MEDICINE

## 2018-06-30 PROCEDURE — 85730 THROMBOPLASTIN TIME PARTIAL: CPT

## 2018-06-30 PROCEDURE — 83690 ASSAY OF LIPASE: CPT

## 2018-06-30 PROCEDURE — 84145 PROCALCITONIN (PCT): CPT

## 2018-06-30 PROCEDURE — 96361 HYDRATE IV INFUSION ADD-ON: CPT

## 2018-06-30 PROCEDURE — 99220 PR INITIAL OBSERVATION CARE,LEVL III: CPT | Mod: ,,, | Performed by: INTERNAL MEDICINE

## 2018-06-30 PROCEDURE — 36415 COLL VENOUS BLD VENIPUNCTURE: CPT

## 2018-06-30 PROCEDURE — 80053 COMPREHEN METABOLIC PANEL: CPT

## 2018-06-30 RX ORDER — HYDROMORPHONE HYDROCHLORIDE 1 MG/ML
0.5 INJECTION, SOLUTION INTRAMUSCULAR; INTRAVENOUS; SUBCUTANEOUS EVERY 4 HOURS PRN
Status: DISCONTINUED | OUTPATIENT
Start: 2018-06-30 | End: 2018-07-04

## 2018-06-30 RX ORDER — HYDROMORPHONE HYDROCHLORIDE 1 MG/ML
1 INJECTION, SOLUTION INTRAMUSCULAR; INTRAVENOUS; SUBCUTANEOUS EVERY 4 HOURS PRN
Status: DISCONTINUED | OUTPATIENT
Start: 2018-06-30 | End: 2018-07-04

## 2018-06-30 RX ORDER — MORPHINE SULFATE 4 MG/ML
4 INJECTION, SOLUTION INTRAMUSCULAR; INTRAVENOUS
Status: COMPLETED | OUTPATIENT
Start: 2018-06-30 | End: 2018-06-30

## 2018-06-30 RX ORDER — IBUPROFEN 200 MG
16 TABLET ORAL
Status: DISCONTINUED | OUTPATIENT
Start: 2018-06-30 | End: 2018-07-03

## 2018-06-30 RX ORDER — ASPIRIN 325 MG
325 TABLET ORAL DAILY
Status: CANCELLED | OUTPATIENT
Start: 2018-06-30

## 2018-06-30 RX ORDER — IBUPROFEN 200 MG
24 TABLET ORAL
Status: DISCONTINUED | OUTPATIENT
Start: 2018-06-30 | End: 2018-07-03

## 2018-06-30 RX ORDER — ONDANSETRON 2 MG/ML
4 INJECTION INTRAMUSCULAR; INTRAVENOUS EVERY 12 HOURS PRN
Status: DISCONTINUED | OUTPATIENT
Start: 2018-06-30 | End: 2018-06-30

## 2018-06-30 RX ORDER — GLUCAGON 1 MG
1 KIT INJECTION
Status: DISCONTINUED | OUTPATIENT
Start: 2018-06-30 | End: 2018-07-03

## 2018-06-30 RX ORDER — ONDANSETRON 4 MG/1
8 TABLET, ORALLY DISINTEGRATING ORAL EVERY 8 HOURS PRN
Status: DISCONTINUED | OUTPATIENT
Start: 2018-06-30 | End: 2018-06-30

## 2018-06-30 RX ORDER — ACETAMINOPHEN 500 MG
500 TABLET ORAL EVERY 6 HOURS PRN
Status: DISCONTINUED | OUTPATIENT
Start: 2018-06-30 | End: 2018-07-11 | Stop reason: HOSPADM

## 2018-06-30 RX ORDER — ATORVASTATIN CALCIUM 20 MG/1
40 TABLET, FILM COATED ORAL NIGHTLY
Status: DISCONTINUED | OUTPATIENT
Start: 2018-06-30 | End: 2018-06-30

## 2018-06-30 RX ORDER — SODIUM CHLORIDE, SODIUM LACTATE, POTASSIUM CHLORIDE, CALCIUM CHLORIDE 600; 310; 30; 20 MG/100ML; MG/100ML; MG/100ML; MG/100ML
INJECTION, SOLUTION INTRAVENOUS CONTINUOUS
Status: DISCONTINUED | OUTPATIENT
Start: 2018-06-30 | End: 2018-07-01

## 2018-06-30 RX ORDER — ONDANSETRON 2 MG/ML
4 INJECTION INTRAMUSCULAR; INTRAVENOUS
Status: COMPLETED | OUTPATIENT
Start: 2018-06-30 | End: 2018-06-30

## 2018-06-30 RX ORDER — MORPHINE SULFATE 4 MG/ML
4 INJECTION, SOLUTION INTRAMUSCULAR; INTRAVENOUS EVERY 4 HOURS PRN
Status: DISCONTINUED | OUTPATIENT
Start: 2018-06-30 | End: 2018-06-30

## 2018-06-30 RX ORDER — INSULIN ASPART 100 [IU]/ML
0-5 INJECTION, SOLUTION INTRAVENOUS; SUBCUTANEOUS
Status: DISCONTINUED | OUTPATIENT
Start: 2018-06-30 | End: 2018-07-03

## 2018-06-30 RX ORDER — HEPARIN SODIUM 5000 [USP'U]/ML
5000 INJECTION, SOLUTION INTRAVENOUS; SUBCUTANEOUS EVERY 12 HOURS
Status: DISCONTINUED | OUTPATIENT
Start: 2018-06-30 | End: 2018-07-02

## 2018-06-30 RX ORDER — SODIUM CHLORIDE 9 MG/ML
INJECTION, SOLUTION INTRAVENOUS CONTINUOUS
Status: DISCONTINUED | OUTPATIENT
Start: 2018-06-30 | End: 2018-06-30

## 2018-06-30 RX ORDER — HYDRALAZINE HYDROCHLORIDE 25 MG/1
25 TABLET, FILM COATED ORAL EVERY 8 HOURS PRN
Status: DISCONTINUED | OUTPATIENT
Start: 2018-06-30 | End: 2018-07-11 | Stop reason: HOSPADM

## 2018-06-30 RX ORDER — ONDANSETRON 2 MG/ML
4 INJECTION INTRAMUSCULAR; INTRAVENOUS EVERY 8 HOURS PRN
Status: DISCONTINUED | OUTPATIENT
Start: 2018-06-30 | End: 2018-07-04

## 2018-06-30 RX ORDER — ONDANSETRON 4 MG/1
8 TABLET, ORALLY DISINTEGRATING ORAL EVERY 8 HOURS PRN
Status: DISCONTINUED | OUTPATIENT
Start: 2018-06-30 | End: 2018-07-11 | Stop reason: HOSPADM

## 2018-06-30 RX ORDER — SODIUM CHLORIDE 0.9 % (FLUSH) 0.9 %
5 SYRINGE (ML) INJECTION
Status: DISCONTINUED | OUTPATIENT
Start: 2018-06-30 | End: 2018-07-11 | Stop reason: HOSPADM

## 2018-06-30 RX ORDER — LOSARTAN POTASSIUM AND HYDROCHLOROTHIAZIDE 12.5; 5 MG/1; MG/1
1 TABLET ORAL EVERY MORNING
Status: CANCELLED | OUTPATIENT
Start: 2018-06-30

## 2018-06-30 RX ADMIN — SODIUM CHLORIDE 1000 ML: 0.9 INJECTION, SOLUTION INTRAVENOUS at 03:06

## 2018-06-30 RX ADMIN — HYDROMORPHONE HYDROCHLORIDE 0.5 MG: 1 INJECTION, SOLUTION INTRAMUSCULAR; INTRAVENOUS; SUBCUTANEOUS at 06:06

## 2018-06-30 RX ADMIN — SODIUM CHLORIDE, SODIUM LACTATE, POTASSIUM CHLORIDE, AND CALCIUM CHLORIDE: .6; .31; .03; .02 INJECTION, SOLUTION INTRAVENOUS at 08:06

## 2018-06-30 RX ADMIN — ONDANSETRON 4 MG: 2 INJECTION INTRAMUSCULAR; INTRAVENOUS at 03:06

## 2018-06-30 RX ADMIN — HEPARIN SODIUM 5000 UNITS: 5000 INJECTION, SOLUTION INTRAVENOUS; SUBCUTANEOUS at 09:06

## 2018-06-30 RX ADMIN — SODIUM CHLORIDE, SODIUM LACTATE, POTASSIUM CHLORIDE, AND CALCIUM CHLORIDE: .6; .31; .03; .02 INJECTION, SOLUTION INTRAVENOUS at 06:06

## 2018-06-30 RX ADMIN — MORPHINE SULFATE 4 MG: 4 INJECTION INTRAVENOUS at 04:06

## 2018-06-30 RX ADMIN — HEPARIN SODIUM 5000 UNITS: 5000 INJECTION, SOLUTION INTRAVENOUS; SUBCUTANEOUS at 01:06

## 2018-06-30 RX ADMIN — SODIUM CHLORIDE, PRESERVATIVE FREE 1000 ML: 5 INJECTION INTRAVENOUS at 05:06

## 2018-06-30 RX ADMIN — HYDROMORPHONE HYDROCHLORIDE 0.5 MG: 1 INJECTION, SOLUTION INTRAMUSCULAR; INTRAVENOUS; SUBCUTANEOUS at 08:06

## 2018-06-30 NOTE — ED PROVIDER NOTES
Encounter Date: 6/30/2018    SCRIBE #1 NOTE: I, Nazario Horowitz, am scribing for, and in the presence of, Dr. Duke.       History     Chief Complaint   Patient presents with    Chest Pain     right chest wall pain started tonight at rest, NV x 1 week, unable to tolerate PO. denies pain similar in the past.      Time patient was seen by the provider: 2:51 AM    The patient is a 63 y.o. male with co-morbidities including: HTN, obesity, and colonoscopy (6/19/2018) who presents to the ED with a complaint of midepigastric and RUQ abdominal pain that has been constant and worsening for three hours. He reports severe pain that is burning and throbbing. He has had similar pain intermittently since undergoing routine screen colonoscopy on 6/19.  The pain is worse with lying supine. Associated symptoms include: chills, vomiting, and lightheadedness. Denies headaches, urinary complications, and SOB.         The history is provided by the patient and medical records.     Review of patient's allergies indicates:  No Known Allergies  Past Medical History:   Diagnosis Date    Hyperlipidemia     Hypertension     Obesity (BMI 30-39.9)      Past Surgical History:   Procedure Laterality Date    COLONOSCOPY N/A 6/19/2018    Procedure: COLONOSCOPY;  Surgeon: Moris Whalen MD;  Location: [facility] ([address] [place] [address]);  Service: Endoscopy;  Laterality: N/A;  Miralax prep, per Dr. Whalen    TONSILLECTOMY       Family History   Problem Relation Age of Onset    Heart disease Mother     Hypertension Mother     Hyperlipidemia Mother     Diabetes Father     Hypertension Father     Hyperlipidemia Sister     Cancer Neg Hx     Stroke Neg Hx      Social History   Substance Use Topics    Smoking status: Never Smoker    Smokeless tobacco: Never Used    Alcohol use No     Review of Systems   Constitutional: Positive for chills and fever. Negative for diaphoresis and fatigue.   HENT: Negative for sore throat and trouble swallowing.    Respiratory:  Negative for cough and shortness of breath.    Cardiovascular: Negative for chest pain and palpitations.   Gastrointestinal: Positive for abdominal pain (RUQ) and vomiting. Negative for nausea.   Genitourinary: Negative for dysuria.   Musculoskeletal: Negative for back pain.   Skin: Negative for rash.   Neurological: Positive for light-headedness. Negative for weakness and headaches.   Hematological: Does not bruise/bleed easily.       Physical Exam     Initial Vitals [06/30/18 0159]   BP Pulse Resp Temp SpO2   109/62 (!) 120 18 98.4 °F (36.9 °C) 99 %      MAP       --         Physical Exam    Nursing note and vitals reviewed.  Constitutional: He appears well-developed and well-nourished. He is not diaphoretic.  Non-toxic appearance. He appears distressed.   HENT:   Head: Normocephalic and atraumatic.   Mouth/Throat: Oropharynx is clear and moist. No oropharyngeal exudate.   Eyes: Conjunctivae and EOM are normal. Pupils are equal, round, and reactive to light. No scleral icterus.   Neck: Normal range of motion and full passive range of motion without pain. Neck supple. No tracheal deviation present. No neck rigidity. No JVD present.   Cardiovascular: Regular rhythm and normal heart sounds. Tachycardia present.  Exam reveals no gallop and no friction rub.    No murmur heard.  Pulses:       Radial pulses are 2+ on the right side, and 2+ on the left side.        Dorsalis pedis pulses are 2+ on the right side, and 2+ on the left side.        Posterior tibial pulses are 2+ on the right side, and 2+ on the left side.   Pulmonary/Chest: Breath sounds normal. No stridor. No respiratory distress. He has no decreased breath sounds. He has no wheezes. He has no rhonchi. He has no rales. He exhibits no tenderness.   Abdominal: Soft. Bowel sounds are normal. He exhibits no distension, no pulsatile midline mass and no mass. There is tenderness in the right upper quadrant and epigastric area. There is guarding. There is no  rigidity, no rebound and no CVA tenderness.   Musculoskeletal: Normal range of motion. He exhibits no edema or tenderness.   Lymphadenopathy:     He has no cervical adenopathy.   Neurological: He is alert and oriented to person, place, and time. He has normal strength. No cranial nerve deficit or sensory deficit. Coordination normal. GCS eye subscore is 4. GCS verbal subscore is 5. GCS motor subscore is 6.   Skin: Skin is warm and dry. No pallor.         ED Course   Procedures  Labs Reviewed   CBC W/ AUTO DIFFERENTIAL - Abnormal; Notable for the following:        Result Value    WBC 17.93 (*)     Platelets 484 (*)     Gran # (ANC) 13.6 (*)     Immature Grans (Abs) 0.09 (*)     Mono # 1.4 (*)     Gran% 75.8 (*)     Lymph% 14.8 (*)     All other components within normal limits   COMPREHENSIVE METABOLIC PANEL - Abnormal; Notable for the following:     Sodium 134 (*)     CO2 21 (*)     Glucose 134 (*)     BUN, Bld 41 (*)     Creatinine 2.0 (*)     Albumin 3.3 (*)     Total Bilirubin 1.2 (*)     Anion Gap 18 (*)     eGFR if  39.3 (*)     eGFR if non  34.0 (*)     All other components within normal limits   LIPASE - Abnormal; Notable for the following:     Lipase 61 (*)     All other components within normal limits   CULTURE, BLOOD   CULTURE, BLOOD   LACTIC ACID, PLASMA   PROTIME-INR   APTT   TROPONIN I   B-TYPE NATRIURETIC PEPTIDE   URINALYSIS, REFLEX TO URINE CULTURE          Imaging Results          US Abdomen Limited (Final result)  Result time 06/30/18 04:47:58    Final result by Mario Sanchez MD (06/30/18 04:47:58)                 Impression:      No significant sonographic abnormality.    Electronically signed by resident: Albert Johnson  Date:    06/30/2018  Time:    04:40    Electronically signed by: Mario Sanchez MD  Date:    06/30/2018  Time:    04:47             Narrative:    EXAMINATION:  US ABDOMEN LIMITED    CLINICAL HISTORY:  Abdominal pain;    TECHNIQUE:  Limited  ultrasound of the right upper quadrant of the abdomen focused on the biliary system was performed.    COMPARISON:  None    FINDINGS:  Gallbladder: No calculi, wall thickening, or pericholecystic fluid.  No sonographic Mccall's sign.    Biliary system: The common duct is not dilated, measuring 3 mm.  No intrahepatic ductal dilatation.    Pancreas: The visualized portions of pancreas appear normal.    Miscellaneous: No upper abdominal ascites and no abnormalities of the visualized liver.                               X-Ray Chest AP Portable (Final result)  Result time 06/30/18 03:44:53    Final result by Mario Sanchez MD (06/30/18 03:44:53)                 Impression:      No acute findings in the chest.      Electronically signed by: Mario Sanchez MD  Date:    06/30/2018  Time:    03:44             Narrative:    EXAMINATION:  XR CHEST AP PORTABLE    CLINICAL HISTORY:  Chest Pain;    TECHNIQUE:  Single frontal view of the chest was performed.    COMPARISON:  None    FINDINGS:  No consolidation, pleural effusion or pneumothorax.    Cardiomediastinal silhouette is unremarkable.                                 Medical Decision Making:   History:   Old Medical Records: I decided to obtain old medical records.  Clinical Tests:   Lab Tests: Ordered and Reviewed  Radiological Study: Ordered and Reviewed  Medical Tests: Ordered and Reviewed  ED Management:  6/30/18 05:50 - Consult - Dr. Bob.  We discussed patient's presentation, exam, lab and imaging findings.  Agrees to admission.  Medical decision making:  This was an emergent evaluation of this patient presented with acute abdominal pain, nausea vomiting.  The patient is afebrile.  Blood pressure is normal. He was significantly tachycardic on arrival.  He was found to have significant leukocytosis.  He also has acute kidney injury. The patient had significant midepigastric and right upper quadrant tenderness which were very concerning for pancreatitis and possibly  acute cholecystitis.  Right upper quadrant ultrasound is negative for acute processes.  Lipase was also elevated on lab review and consistent with acute pancreatitis.  The patient was treated with aggressive IV fluid hydration, pain medication, and nausea medication responded well to these therapies.  He is being admitted for further evaluation and management.            Scribe Attestation:   Scribe #1: I performed the above scribed service and the documentation accurately describes the services I performed. I attest to the accuracy of the note.              Clinical Impression:   The primary encounter diagnosis was Acute pancreatitis, unspecified complication status, unspecified pancreatitis type.Disposition:   Disposition: Placed in Observation  Condition: Grecia Duke III, MD  06/30/18 0606

## 2018-06-30 NOTE — PROGRESS NOTES
Pt was out of the bed using the bathroom and received a call from tele room that pt's HR is up to 150 , looking to the monitor screen pt's HR was 136 . Pt is back to his bed resting and HR comes down to 113 and now it is 107 . Pt denies any chest pain he reported he feels weak . Spoke with Dr. Hayes and notified her of the pt's situation and she ordered stat EKG . No more orders at this time will keep monitoring .

## 2018-06-30 NOTE — H&P
Hospital Medicine  History and Physical Exam    Patient Name; Mario Sanford  MRN: [id number]  Team: [facility] [place] [facility] MED D Kacey Hayes MD  Admit Date: 6/30/2018  NAA: 7/4/2018  Principal Problem:  Acute pancreatitis   Patient information was obtained from patient, past medical records and ER records.   Primary care Physician: Bereket Rivera DO  Code status: Full Code    HPI: 63 y.o. male presented to the ER with past medical history of hypertension, dyslipidemia, and recently diagnosed DM-2.  He was in his usual state of stable health until the acute onset of severe non-radiating RUQ pain beginning a few hours prior to admission with unchanged course. Pertinent associated symptoms include progressively worsening nausea and vomiting x 1 week. Patient had a routine colonoscopy on 6/19 and has not felt well since that time. He has had pain similar to that today, intermittently for the past week.      Past Medical History: Patient has a past medical history of Hyperlipidemia; Hypertension; and Obesity (BMI 30-39.9).    Past Surgical History: Patient has a past surgical history that includes Tonsillectomy and Colonoscopy (N/A, 6/19/2018).    Social History: Patient reports that he has never smoked. He has never used smokeless tobacco. He reports that he does not drink alcohol or use drugs.    Family History: family history includes Diabetes in his father; Heart disease in his mother; Hyperlipidemia in his mother and sister; Hypertension in his father and mother.    Medications:   No current facility-administered medications on file prior to encounter.      Current Outpatient Prescriptions on File Prior to Encounter   Medication Sig Dispense Refill    aspirin 325 MG tablet Take 81 mg by mouth once daily.       atorvastatin (LIPITOR) 40 MG tablet Take 1 tablet (40 mg total) by mouth nightly. 90 tablet 3    glimepiride (AMARYL) 2 MG tablet Take 1 tablet (2 mg total) by mouth before breakfast. 30 tablet 0    ibuprofen  (ADVIL,MOTRIN) 200 MG tablet Take 200 mg by mouth every 6 (six) hours as needed for Pain.      losartan-hydrochlorothiazide 50-12.5 mg (HYZAAR) 50-12.5 mg per tablet Take 1 tablet by mouth every morning. 90 tablet 1    blood sugar diagnostic Strp 1 strip by Misc.(Non-Drug; Combo Route) route once daily. Test strips for glucometer insurance covers to check blood sugar twice daily. 200 strip 3    blood-glucose meter Misc Glucometer that insurance covers to check blood sugar twice daily 1 each 0    lancets Misc 1 Device by Misc.(Non-Drug; Combo Route) route once daily. Lancets for glucometer insurance covers to check blood sugar twice daily. 200 each 3       Allergies: Patient has No Known Allergies.    Review of Systems   Constitutional: Positive for chills and malaise/fatigue. Negative for fever.   HENT: Negative.    Eyes: Negative.    Respiratory: Negative.  Negative for shortness of breath.    Cardiovascular: Negative.  Negative for leg swelling.   Gastrointestinal: Positive for abdominal pain, heartburn, nausea and vomiting.   Genitourinary: Negative.  Negative for dysuria.   Musculoskeletal: Negative.    Skin: Negative.    Neurological: Negative.        Physical Exam:  Temp:  [98.4 °F (36.9 °C)-99 °F (37.2 °C)]   Pulse:  [102-120]   Resp:  [18-20]   BP: (109-159)/(62-86)   SpO2:  [98 %-99 %]   Body mass index is 29.05 kg/m².     Physical Exam   Constitutional: He is well-developed, well-nourished, and in no distress.  Non-toxic appearance.   HENT:   Head: Normocephalic.   Eyes: Conjunctivae and lids are normal. Pupils are equal.   Neck: Neck supple.   Cardiovascular: Normal rate, regular rhythm, S1 normal and S2 normal.    Pulmonary/Chest: Effort normal and breath sounds normal.   Abdominal: Soft. Normal appearance and bowel sounds are normal. There is tenderness in the right upper quadrant.   Musculoskeletal: He exhibits no edema.   Neurological: He is alert. He is not disoriented.   Skin: Skin is warm and  dry. He is not diaphoretic. No cyanosis.   Psychiatric: Mood and affect normal.       Significant Labs and Imaging:      Recent Labs  Lab 06/30/18  0302 06/30/18  0907   WBC 17.93* 12.20   HGB 14.3 13.0*   HCT 43.3 39.2*   * 359*       Recent Labs  Lab 06/30/18  0302 06/30/18  0907   * 138   K 3.5 3.6   CL 95 102   CO2 21* 25   BUN 41* 36*   CREATININE 2.0* 1.7*   * 125*   CALCIUM 9.9 8.7   MG  --  1.7   PHOS  --  3.7   ALKPHOS 101 186*   ALT 20 115*   AST 20 203*   ALBUMIN 3.3* 2.6*   PROT 7.8 6.2   BILITOT 1.2* 1.6*   INR 1.1  --    LIPASE 61* 56   AMYLASE  --  49     A1C:   Recent Labs  Lab 05/29/18  0958   HGBA1C 8.7*       Recent Labs  Lab 06/30/18  0302   TROPONINI <0.006     Lactic Acid:   Recent Labs  Lab 06/30/18  0302 06/30/18  0907   LACTATE 1.5 0.7     TSH:   Recent Labs  Lab 05/25/18  0842   TSH 1.009     Baselines:  Hemoglobin   Date Value Ref Range Status   06/30/2018 13.0 (L) 14.0 - 18.0 g/dL Final   06/30/2018 14.3 14.0 - 18.0 g/dL Final   05/25/2018 16.4 14.0 - 18.0 g/dL Final     Creatinine   Date Value Ref Range Status   06/30/2018 1.7 (H) 0.5 - 1.4 mg/dL Final   06/30/2018 2.0 (H) 0.5 - 1.4 mg/dL Final   05/25/2018 1.1 0.5 - 1.4 mg/dL Final       Radiology/Cardiac:  CXR (personally interpreted): No infiltrate  Radiographic tests reviewed chest xray and RUQ US  EKG (personally interpreted): sinus tachycardia, nonischemic    Inpatient Medications prescribed for management of current Problems:   Scheduled Meds: HOLDING ALL HOME MEDICATIONS    Continuous Infusions:    lactated ringers 125 mL/hr at 06/30/18 0858     As Needed: acetaminophen, dextrose 50%, dextrose 50%, glucagon (human recombinant), glucose, glucose, hydrALAZINE, HYDROmorphone, HYDROmorphone, insulin aspart U-100, ondansetron, ondansetron, sodium chloride 0.9%    Active Hospital Problems    Diagnosis  POA    *RUQ abdominal pain [R10.11]  Yes    SIRS (systemic inflammatory response syndrome) [R65.10]  Yes     Elevated liver enzymes [R74.8]  Yes    PREM (acute kidney injury) [N17.9]  Yes    Essential hypertension [I10]  Yes    Type 2 diabetes mellitus without complication, without long-term current use of insulin [E11.9]  Yes    Hypercholesteremia [E78.00]  Yes      Resolved Hospital Problems    Diagnosis Date Resolved POA   No resolved problems to display.       Overview:  Patient is a 63 y.o. male with past medical history of hypertension, dyslipidemia, and recently diagnosed DM-2 admitted to hospital for RUQ pain associated with SIRS and worsening liver enzymes and T.bili.       RUQ abdominal pain  SIRS (systemic inflammatory response syndrome)  Elevated liver enzymes  Acute pancreatitis was initially suspected but lipase and RUQ US are normal. Pain persists and T.bili / liver enzymes are trending upward. SIRS rapidly resolved with fluid resuscitation. Consulting Hepatology.     PREM  Probably due to poor oral intake / volume depletion. Continuing IV fluids.  Holding ARB/HCTZ, NSAIDs.     Essential hypertension  Holding home medications for now. May need hydralazine PRN.     Type 2 diabetes mellitus  Hypercholesteremia  Non-insulin diabetes medications are held and the patient's hyperglycemia is managed with a SQ correction dose insulin regimen. Holding ASA and statin for now.      DVT Prophylaxis:   Anticoagulants   Medication Route Frequency    heparin (porcine) injection 5,000 Units Subcutaneous Q12H       Discharge plan and follow up  Home or Self Care      Provider  Kacey Hayes MD  Department of Hospital Medicine

## 2018-06-30 NOTE — TELEPHONE ENCOUNTER
Reason for Disposition   Already left for the hospital/clinic.    Protocols used: ST NO CONTACT OR DUPLICATE CONTACT CALL-A-AH  Patient is in the ED

## 2018-06-30 NOTE — ED TRIAGE NOTES
Pt arrived to ED from waiting area c/o c/p and upper abdominal pain, that start around the 19th of June after the pt had a colonoscopy, but has increased in severity around midnight tonight. Pt states he was laying down when symptoms started. Pt states to having c/p but on exam points to more of an upper abdominal location. States abdominal pain to be constant stinging in quality. Rates abdominal pain 6/10. Pt denies taking any medication for GI symptoms. Pt states last BM to be about 2x days ago. Endorses n/v/d, states to have vomited several times with last emesis being greenish brown in color. Endorses chills. Denies, HA, fever, constipation, trauma.

## 2018-07-01 LAB
ALBUMIN SERPL BCP-MCNC: 2.6 G/DL
ALP SERPL-CCNC: 299 U/L
ALT SERPL W/O P-5'-P-CCNC: 319 U/L
ANION GAP SERPL CALC-SCNC: 13 MMOL/L
AST SERPL-CCNC: 290 U/L
BACTERIA #/AREA URNS AUTO: ABNORMAL /HPF
BACTERIA UR CULT: NORMAL
BACTERIA UR CULT: NORMAL
BASOPHILS # BLD AUTO: 0.06 K/UL
BASOPHILS NFR BLD: 0.5 %
BILIRUB SERPL-MCNC: 3.8 MG/DL
BILIRUB UR QL STRIP: NEGATIVE
BUN SERPL-MCNC: 27 MG/DL
CALCIUM SERPL-MCNC: 9 MG/DL
CHLORIDE SERPL-SCNC: 102 MMOL/L
CLARITY UR REFRACT.AUTO: ABNORMAL
CO2 SERPL-SCNC: 23 MMOL/L
COLOR UR AUTO: ABNORMAL
CREAT SERPL-MCNC: 1.4 MG/DL
DIFFERENTIAL METHOD: ABNORMAL
EOSINOPHIL # BLD AUTO: 0.1 K/UL
EOSINOPHIL NFR BLD: 1.2 %
ERYTHROCYTE [DISTWIDTH] IN BLOOD BY AUTOMATED COUNT: 12.3 %
EST. GFR  (AFRICAN AMERICAN): >60 ML/MIN/1.73 M^2
EST. GFR  (NON AFRICAN AMERICAN): 52.4 ML/MIN/1.73 M^2
GLUCOSE SERPL-MCNC: 89 MG/DL
GLUCOSE UR QL STRIP: NEGATIVE
HCT VFR BLD AUTO: 37.5 %
HGB BLD-MCNC: 12.5 G/DL
HGB UR QL STRIP: ABNORMAL
HIV1+2 IGG SERPL QL IA.RAPID: NEGATIVE
HYALINE CASTS UR QL AUTO: 0 /LPF
IMM GRANULOCYTES # BLD AUTO: 0.06 K/UL
IMM GRANULOCYTES NFR BLD AUTO: 0.5 %
KETONES UR QL STRIP: ABNORMAL
LEUKOCYTE ESTERASE UR QL STRIP: ABNORMAL
LIPASE SERPL-CCNC: 58 U/L
LYMPHOCYTES # BLD AUTO: 2.3 K/UL
LYMPHOCYTES NFR BLD: 21.4 %
MAGNESIUM SERPL-MCNC: 1.4 MG/DL
MCH RBC QN AUTO: 30 PG
MCHC RBC AUTO-ENTMCNC: 33.3 G/DL
MCV RBC AUTO: 90 FL
MICROSCOPIC COMMENT: ABNORMAL
MONOCYTES # BLD AUTO: 0.9 K/UL
MONOCYTES NFR BLD: 8.1 %
NEUTROPHILS # BLD AUTO: 7.5 K/UL
NEUTROPHILS NFR BLD: 68.3 %
NITRITE UR QL STRIP: NEGATIVE
NRBC BLD-RTO: 0 /100 WBC
PH UR STRIP: 5 [PH] (ref 5–8)
PHOSPHATE SERPL-MCNC: 3 MG/DL
PLATELET # BLD AUTO: 351 K/UL
PMV BLD AUTO: 9.8 FL
POCT GLUCOSE: 110 MG/DL (ref 70–110)
POCT GLUCOSE: 141 MG/DL (ref 70–110)
POCT GLUCOSE: 87 MG/DL (ref 70–110)
POCT GLUCOSE: 96 MG/DL (ref 70–110)
POTASSIUM SERPL-SCNC: 3.5 MMOL/L
PROT SERPL-MCNC: 6 G/DL
PROT UR QL STRIP: ABNORMAL
RBC # BLD AUTO: 4.17 M/UL
RBC #/AREA URNS AUTO: 47 /HPF (ref 0–4)
SODIUM SERPL-SCNC: 138 MMOL/L
SP GR UR STRIP: 1.01 (ref 1–1.03)
SQUAMOUS #/AREA URNS AUTO: 0 /HPF
URN SPEC COLLECT METH UR: ABNORMAL
UROBILINOGEN UR STRIP-ACNC: 4 EU/DL
WBC # BLD AUTO: 10.94 K/UL
WBC #/AREA URNS AUTO: 17 /HPF (ref 0–5)

## 2018-07-01 PROCEDURE — 87086 URINE CULTURE/COLONY COUNT: CPT

## 2018-07-01 PROCEDURE — 85025 COMPLETE CBC W/AUTO DIFF WBC: CPT

## 2018-07-01 PROCEDURE — 83735 ASSAY OF MAGNESIUM: CPT

## 2018-07-01 PROCEDURE — 11000001 HC ACUTE MED/SURG PRIVATE ROOM

## 2018-07-01 PROCEDURE — 81001 URINALYSIS AUTO W/SCOPE: CPT

## 2018-07-01 PROCEDURE — 86703 HIV-1/HIV-2 1 RESULT ANTBDY: CPT

## 2018-07-01 PROCEDURE — 84100 ASSAY OF PHOSPHORUS: CPT

## 2018-07-01 PROCEDURE — 25000003 PHARM REV CODE 250: Performed by: INTERNAL MEDICINE

## 2018-07-01 PROCEDURE — 99204 OFFICE O/P NEW MOD 45 MIN: CPT | Mod: GC,,, | Performed by: INTERNAL MEDICINE

## 2018-07-01 PROCEDURE — 36415 COLL VENOUS BLD VENIPUNCTURE: CPT

## 2018-07-01 PROCEDURE — 80053 COMPREHEN METABOLIC PANEL: CPT

## 2018-07-01 PROCEDURE — 63600175 PHARM REV CODE 636 W HCPCS: Performed by: EMERGENCY MEDICINE

## 2018-07-01 PROCEDURE — 63600175 PHARM REV CODE 636 W HCPCS: Performed by: INTERNAL MEDICINE

## 2018-07-01 PROCEDURE — 25000003 PHARM REV CODE 250: Performed by: HOSPITALIST

## 2018-07-01 PROCEDURE — 87088 URINE BACTERIA CULTURE: CPT

## 2018-07-01 PROCEDURE — 86706 HEP B SURFACE ANTIBODY: CPT

## 2018-07-01 PROCEDURE — 80074 ACUTE HEPATITIS PANEL: CPT

## 2018-07-01 PROCEDURE — 83690 ASSAY OF LIPASE: CPT

## 2018-07-01 PROCEDURE — G0378 HOSPITAL OBSERVATION PER HR: HCPCS

## 2018-07-01 PROCEDURE — 99225 PR SUBSEQUENT OBSERVATION CARE,LEVEL II: CPT | Mod: ,,, | Performed by: INTERNAL MEDICINE

## 2018-07-01 RX ORDER — CEFTRIAXONE 1 G/1
1 INJECTION, POWDER, FOR SOLUTION INTRAMUSCULAR; INTRAVENOUS
Status: DISCONTINUED | OUTPATIENT
Start: 2018-07-01 | End: 2018-07-05

## 2018-07-01 RX ORDER — ALPRAZOLAM 1 MG/1
1 TABLET ORAL ONCE
Status: COMPLETED | OUTPATIENT
Start: 2018-07-01 | End: 2018-07-01

## 2018-07-01 RX ORDER — MAGNESIUM SULFATE HEPTAHYDRATE 40 MG/ML
2 INJECTION, SOLUTION INTRAVENOUS ONCE
Status: COMPLETED | OUTPATIENT
Start: 2018-07-01 | End: 2018-07-01

## 2018-07-01 RX ORDER — TAMSULOSIN HYDROCHLORIDE 0.4 MG/1
0.4 CAPSULE ORAL DAILY
Status: DISCONTINUED | OUTPATIENT
Start: 2018-07-01 | End: 2018-07-01

## 2018-07-01 RX ORDER — SODIUM CHLORIDE 9 MG/ML
INJECTION, SOLUTION INTRAVENOUS CONTINUOUS
Status: DISCONTINUED | OUTPATIENT
Start: 2018-07-01 | End: 2018-07-02

## 2018-07-01 RX ORDER — LORAZEPAM 2 MG/ML
1 INJECTION INTRAMUSCULAR
Status: COMPLETED | OUTPATIENT
Start: 2018-07-01 | End: 2018-07-09

## 2018-07-01 RX ADMIN — HEPARIN SODIUM 5000 UNITS: 5000 INJECTION, SOLUTION INTRAVENOUS; SUBCUTANEOUS at 09:07

## 2018-07-01 RX ADMIN — LORAZEPAM 1 MG: 2 INJECTION INTRAMUSCULAR; INTRAVENOUS at 12:07

## 2018-07-01 RX ADMIN — HEPARIN SODIUM 5000 UNITS: 5000 INJECTION, SOLUTION INTRAVENOUS; SUBCUTANEOUS at 08:07

## 2018-07-01 RX ADMIN — SODIUM CHLORIDE: 0.9 INJECTION, SOLUTION INTRAVENOUS at 09:07

## 2018-07-01 RX ADMIN — TAMSULOSIN HYDROCHLORIDE 0.4 MG: 0.4 CAPSULE ORAL at 08:07

## 2018-07-01 RX ADMIN — HYDROMORPHONE HYDROCHLORIDE 1 MG: 1 INJECTION, SOLUTION INTRAMUSCULAR; INTRAVENOUS; SUBCUTANEOUS at 12:07

## 2018-07-01 RX ADMIN — CEFTRIAXONE SODIUM 1 G: 1 INJECTION, POWDER, FOR SOLUTION INTRAMUSCULAR; INTRAVENOUS at 06:07

## 2018-07-01 RX ADMIN — HYDROMORPHONE HYDROCHLORIDE 1 MG: 1 INJECTION, SOLUTION INTRAMUSCULAR; INTRAVENOUS; SUBCUTANEOUS at 04:07

## 2018-07-01 RX ADMIN — ALPRAZOLAM 1 MG: 1 TABLET ORAL at 10:07

## 2018-07-01 RX ADMIN — MAGNESIUM SULFATE IN WATER 2 G: 40 INJECTION, SOLUTION INTRAVENOUS at 06:07

## 2018-07-01 RX ADMIN — HYDRALAZINE HYDROCHLORIDE 25 MG: 25 TABLET, FILM COATED ORAL at 03:07

## 2018-07-01 NOTE — PROGRESS NOTES
Spoke with Dr. Hayes and notified her that pt's HR is 120 , pt is resting in bed denies chest pain or SOB . MD order stat EKG . Will keep monitoring .

## 2018-07-01 NOTE — PROGRESS NOTES
Physician Attestation for Scribe:  I, Kacey Hayes MD, personally performed the services described in this documentation. All medical record entries made by the scribe were at my direction and in my presence.  I have reviewed this note and agree that the record reflects my personal performance and is accurate and complete.     Hospital Medicine  Progress Note     Patient Name: Mario Sanford  MRN: [id number]  Team: [facility] D Kacey Hayes MD  Admit Date: 6/30/2018  NAA 7/4/2018  Code status: Full Code     Principal Problem:  RUQ abdominal pain     Interval history: Continued to have abdominal pain overnight; liver enzymes and T. Bili worsening. Required anxiolytics for MRCP.       Review of Systems   Constitutional: Negative for fever.   Respiratory: Negative for shortness of breath.          Physical Exam:  Temp:  [98 °F (36.7 °C)-99 °F (37.2 °C)]   Pulse:  []   Resp:  [14-20]   BP: (130-182)/(75-93)   SpO2:  [95 %-98 %]       Temp: 99 °F (37.2 °C) (07/01/18 0725)  Pulse: 109 (07/01/18 0725)  Resp: 17 (07/01/18 0725)  BP: 130/75 (07/01/18 0725)  SpO2: 95 % (07/01/18 0725)     Intake/Output Summary (Last 24 hours) at 07/01/18 0839  Last data filed at 07/01/18 0329    Gross per 24 hour   Intake              440 ml   Output              465 ml   Net              -25 ml      Weight: 83.5 kg (184 lb)  Body mass index is 27.17 kg/m².     Physical Exam   Eyes: Conjunctivae and lids are normal.   Cardiovascular: S1 normal and S2 normal.    Pulmonary/Chest: Effort normal and breath sounds normal.   Abdominal: Soft. Bowel sounds are normal. There is tenderness in the right upper quadrant.   Musculoskeletal: He exhibits no edema.         Significant Labs:     Recent Labs  Lab 06/30/18  0302 06/30/18  0907 07/01/18  0359   WBC 17.93* 12.20 10.94   HGB 14.3 13.0* 12.5*   HCT 43.3 39.2* 37.5*   * 359* 351*         Recent Labs  Lab 06/30/18  0302 06/30/18  0907 07/01/18  0359   * 138 138   K 3.5 3.6  3.5   CL 95 102 102   CO2 21* 25 23   BUN 41* 36* 27*   CREATININE 2.0* 1.7* 1.4   * 125* 89   CALCIUM 9.9 8.7 9.0   MG  --  1.7 1.4*   PHOS  --  3.7 3.0   ALKPHOS 101 186* 299*   ALT 20 115* 319*   AST 20 203* 290*   ALBUMIN 3.3* 2.6* 2.6*   PROT 7.8 6.2 6.0   BILITOT 1.2* 1.6* 3.8*   INR 1.1  --   --    LIPASE 61* 56 58   AMYLASE  --  49  --          Recent Labs  Lab 06/30/18  1307 06/30/18  1837 06/30/18  2338 07/01/18  0624 07/01/18  1238 07/01/18  1722   POCTGLUCOSE 84 102 105 96 110 141*      A1C:   Recent Labs  Lab 05/29/18  0958   HGBA1C 8.7*        Recent Labs  Lab 06/30/18  0302   TROPONINI <0.006     Lactic Acid:   Recent Labs  Lab 06/30/18  0302 06/30/18  0907   LACTATE 1.5 0.7      TSH:   Recent Labs  Lab 05/25/18  0842   TSH 1.009      Urine Studies:      Recent Labs  Lab 07/01/18  0915   COLORU Nicci   APPEARANCEUA Hazy*   PHUR 5.0   SPECGRAV 1.015   PROTEINUA 1+*   GLUCUA Negative   KETONESU 1+*   BILIRUBINUA Negative   OCCULTUA 3+*   NITRITE Negative   UROBILINOGEN 4.0   LEUKOCYTESUR Trace*   RBCUA 47*   WBCUA 17*   BACTERIA Few*   SQUAMEPITHEL 0   HYALINECASTS 0         Inpatient Medications prescribed for management of current Problems:   Scheduled Meds:    cefTRIAXone (ROCEPHIN) IVPB  1 g Intravenous Q24H    heparin (porcine)  5,000 Units Subcutaneous Q12H      Continuous Infusions:    sodium chloride 0.9% 100 mL/hr at 07/01/18 0941      As Needed: acetaminophen, dextrose 50%, dextrose 50%, glucagon (human recombinant), glucose, glucose, hydrALAZINE, HYDROmorphone, HYDROmorphone, insulin aspart U-100, lorazepam, ondansetron, ondansetron, sodium chloride 0.9%           Active Hospital Problems     Diagnosis   POA    *RUQ abdominal pain [R10.11]   Yes    SIRS (systemic inflammatory response syndrome) [R65.10]   Yes    Elevated liver enzymes [R74.8]   Yes    PREM (acute kidney injury) [N17.9]   Yes    Essential hypertension [I10]   Yes    Type 2 diabetes mellitus without complication,  "without long-term current use of insulin [E11.9]   Yes    Hypercholesteremia [E78.00]   Yes       Resolved Hospital Problems     Diagnosis Date Resolved POA   No resolved problems to display.         Overview: Patient is a 63 y.o. male with past medical history of hypertension, dyslipidemia, and recently diagnosed DM-2 admitted to hospital for RUQ pain associated with SIRS and worsening liver enzymes and T.bili.     Assessment and Plan for Problems addressed today:     RUQ abdominal pain  SIRS (systemic inflammatory response syndrome)  Elevated liver enzymes  · Acute pancreatitis was initially suspected but lipase and RUQ US are normal. Pain persisted and T.bili / liver enzymes trending upward. Blood and urine cx, pending. SIRS rapidly resolved with fluid resuscitation. Consulted Hepatology.  · Patient remains tachycardic; EKG NSR   · For analgesia: PRN Hydromorphone 0.5-1mg Q4hr  · Liver U/S (7/1): "Hyperechoic right lobe mass measures 1.0 x 1.0 x 1.0 cm which may represent a hemangioma". MRCP, pending  · Ordered Hepatitis panel   · UA positive for WBC and a few bacteria, Urine cx pending. Initiating ceftriaxone pending culture.     PREM  · Probably due to poor oral intake / volume depletion. Continuing IV fluids.  · Holding ARB/HCTZ, NSAIDs.     Essential hypertension  · Holding home medications for now. PRN Hydralazine 25mg Q8hr.     Type 2 diabetes mellitus  Hypercholesteremia  · Non-insulin diabetes medications are held and the patient's hyperglycemia is managed with a SQ correction dose insulin regimen. Holding ASA and statin for now.     Hypomagnesemia  · Replete orally, as needed     Diet: NPO except sips with meds  GI Prophylaxis: None  DVT Prophylaxis:         Anticoagulants   Medication Route Frequency    heparin (porcine) injection 5,000 Units Subcutaneous Q12H   Lines/Drains/Airway: Peripheral IV- Left AC  Wounds: None      HIGH RISK CONDITION(S):   Patient is currently receiving parenteral " controlled substances: Dilaudid      Discharge plan and follow up  Home or Self Care        Provider  Kacey Hayes MD  [facility] [place] [facility] [place]   Department of Hospital Medicine     Scribotis Attestation: I personally scribed for Kacey Hayes MD on 07/01/2018 at 1:14 PM. Electronically signed by renzo Das on 07/01/2018 at 1:14 PM.

## 2018-07-01 NOTE — MEDICAL/APP STUDENT
Hospital Medicine  Progress Note    Patient Name: Mario Sanford  MRN: [id number]  Team: [facility] MED D Kacey Hayes MD  Admit Date: 6/30/2018  NAA 7/4/2018  Code status: Full Code    Principal Problem:  RUQ abdominal pain    Interval history: Continued to have abdominal pain overnight; liver enzymes and T. Bili worsening. Required anxiolytics for MRCP.      Review of Systems   Constitutional: Negative for fever.   Respiratory: Negative for shortness of breath.        Physical Exam:  Temp:  [98 °F (36.7 °C)-99 °F (37.2 °C)]   Pulse:  []   Resp:  [14-20]   BP: (130-182)/(75-93)   SpO2:  [95 %-98 %]      Temp: 99 °F (37.2 °C) (07/01/18 0725)  Pulse: 109 (07/01/18 0725)  Resp: 17 (07/01/18 0725)  BP: 130/75 (07/01/18 0725)  SpO2: 95 % (07/01/18 0725)    Intake/Output Summary (Last 24 hours) at 07/01/18 0839  Last data filed at 07/01/18 0329   Gross per 24 hour   Intake              440 ml   Output              465 ml   Net              -25 ml     Weight: 83.5 kg (184 lb)  Body mass index is 27.17 kg/m².    Physical Exam   Eyes: Conjunctivae and lids are normal.   Cardiovascular: S1 normal and S2 normal.    Pulmonary/Chest: Effort normal and breath sounds normal.   Abdominal: Soft. Bowel sounds are normal. There is tenderness in the right upper quadrant.   Musculoskeletal: He exhibits no edema.       Significant Labs:    Recent Labs  Lab 06/30/18  0302 06/30/18  0907 07/01/18  0359   WBC 17.93* 12.20 10.94   HGB 14.3 13.0* 12.5*   HCT 43.3 39.2* 37.5*   * 359* 351*       Recent Labs  Lab 06/30/18  0302 06/30/18  0907 07/01/18  0359   * 138 138   K 3.5 3.6 3.5   CL 95 102 102   CO2 21* 25 23   BUN 41* 36* 27*   CREATININE 2.0* 1.7* 1.4   * 125* 89   CALCIUM 9.9 8.7 9.0   MG  --  1.7 1.4*   PHOS  --  3.7 3.0   ALKPHOS 101 186* 299*   ALT 20 115* 319*   AST 20 203* 290*   ALBUMIN 3.3* 2.6* 2.6*   PROT 7.8 6.2 6.0   BILITOT 1.2* 1.6* 3.8*   INR 1.1  --   --    LIPASE 61* 56 58   AMYLASE  --  49   --        Recent Labs  Lab 06/30/18  1307 06/30/18  1837 06/30/18  2338 07/01/18  0624 07/01/18  1238 07/01/18  1722   POCTGLUCOSE 84 102 105 96 110 141*     A1C:   Recent Labs  Lab 05/29/18  0958   HGBA1C 8.7*       Recent Labs  Lab 06/30/18  0302   TROPONINI <0.006     Lactic Acid:   Recent Labs  Lab 06/30/18  0302 06/30/18  0907   LACTATE 1.5 0.7     TSH:   Recent Labs  Lab 05/25/18  0842   TSH 1.009     Urine Studies:     Recent Labs  Lab 07/01/18  0915   COLORU Nicci   APPEARANCEUA Hazy*   PHUR 5.0   SPECGRAV 1.015   PROTEINUA 1+*   GLUCUA Negative   KETONESU 1+*   BILIRUBINUA Negative   OCCULTUA 3+*   NITRITE Negative   UROBILINOGEN 4.0   LEUKOCYTESUR Trace*   RBCUA 47*   WBCUA 17*   BACTERIA Few*   SQUAMEPITHEL 0   HYALINECASTS 0       Inpatient Medications prescribed for management of current Problems:   Scheduled Meds:    cefTRIAXone (ROCEPHIN) IVPB  1 g Intravenous Q24H    heparin (porcine)  5,000 Units Subcutaneous Q12H     Continuous Infusions:    sodium chloride 0.9% 100 mL/hr at 07/01/18 0941     As Needed: acetaminophen, dextrose 50%, dextrose 50%, glucagon (human recombinant), glucose, glucose, hydrALAZINE, HYDROmorphone, HYDROmorphone, insulin aspart U-100, lorazepam, ondansetron, ondansetron, sodium chloride 0.9%    Active Hospital Problems    Diagnosis  POA    *RUQ abdominal pain [R10.11]  Yes    SIRS (systemic inflammatory response syndrome) [R65.10]  Yes    Elevated liver enzymes [R74.8]  Yes    PREM (acute kidney injury) [N17.9]  Yes    Essential hypertension [I10]  Yes    Type 2 diabetes mellitus without complication, without long-term current use of insulin [E11.9]  Yes    Hypercholesteremia [E78.00]  Yes      Resolved Hospital Problems    Diagnosis Date Resolved POA   No resolved problems to display.       Overview: Patient is a 63 y.o. male with past medical history of hypertension, dyslipidemia, and recently diagnosed DM-2 admitted to hospital for RUQ pain associated with SIRS and  "worsening liver enzymes and T.bili.    Assessment and Plan for Problems addressed today:    RUQ abdominal pain  SIRS (systemic inflammatory response syndrome)  Elevated liver enzymes  · Acute pancreatitis was initially suspected but lipase and RUQ US are normal. Pain persisted and T.bili / liver enzymes trending upward. Blood and urine cx, pending. SIRS rapidly resolved with fluid resuscitation. Consulted Hepatology.  · Patient remains tachycardic; EKG NSR   · For analgesia: PRN Hydromorphone 0.5-1mg Q4hr  · Liver U/S (7/1): "Hyperechoic right lobe mass measures 1.0 x 1.0 x 1.0 cm which may represent a hemangioma". MRCP, pending  · Ordered Hepatitis panel   · UA positive for WBC and a few bacteria, Urine cx pending. Initiating ceftriaxone pending culture.     PREM  · Probably due to poor oral intake / volume depletion. Continuing IV fluids.  · Holding ARB/HCTZ, NSAIDs.     Essential hypertension  · Holding home medications for now. PRN Hydralazine 25mg Q8hr.     Type 2 diabetes mellitus  Hypercholesteremia  · Non-insulin diabetes medications are held and the patient's hyperglycemia is managed with a SQ correction dose insulin regimen. Holding ASA and statin for now.    Hypomagnesemia  · Replete orally, as needed    Diet: NPO except sips with meds  GI Prophylaxis: None  DVT Prophylaxis:   Anticoagulants   Medication Route Frequency    heparin (porcine) injection 5,000 Units Subcutaneous Q12H   Lines/Drains/Airway: Peripheral IV- Left AC  Wounds: None     HIGH RISK CONDITION(S):   Patient is currently receiving parenteral controlled substances: Dilaudid     Discharge plan and follow up  Home or Self Care      Provider  Kacey Hayes MD  [facility] MED D   Department of Hospital Medicine    Renzo Attestation: I personally scribed for Kacey Hayes MD on 07/01/2018 at 1:14 PM. Electronically signed by renzo Das on 07/01/2018 at 1:14 PM.      "

## 2018-07-01 NOTE — SUBJECTIVE & OBJECTIVE
Review of Systems   Constitutional: Negative for activity change and appetite change.   HENT: Negative for ear discharge and facial swelling.    Eyes: Negative for photophobia and redness.   Respiratory: Negative for wheezing and stridor.    Cardiovascular: Negative for chest pain and palpitations.   Gastrointestinal: Positive for abdominal pain. Negative for abdominal distention, anal bleeding, blood in stool and constipation.   Endocrine: Negative for cold intolerance and heat intolerance.   Genitourinary: Negative for dysuria and frequency.   Skin: Negative for color change and rash.   Allergic/Immunologic: Negative for food allergies.   Neurological: Negative for seizures and numbness.   Hematological: Does not bruise/bleed easily.   Psychiatric/Behavioral: Negative for agitation.       Past Medical History:   Diagnosis Date    Hyperlipidemia     Hypertension     Obesity (BMI 30-39.9)        Past Surgical History:   Procedure Laterality Date    COLONOSCOPY N/A 6/19/2018    Procedure: COLONOSCOPY;  Surgeon: Moris Whalen MD;  Location: [address];  Service: Endoscopy;  Laterality: N/A;  Miralax prep, per Dr. Whalen    TONSILLECTOMY         Family history of liver disease: No    Review of patient's allergies indicates:  No Known Allergies    Social History Main Topics    Smoking status: Never Smoker    Smokeless tobacco: Never Used    Alcohol use No    Drug use: No    Sexual activity: Yes     Partners: Female       Prescriptions Prior to Admission   Medication Sig Dispense Refill Last Dose    aspirin 325 MG tablet Take 81 mg by mouth once daily.    6/29/2018    atorvastatin (LIPITOR) 40 MG tablet Take 1 tablet (40 mg total) by mouth nightly. 90 tablet 3 6/30/2018    glimepiride (AMARYL) 2 MG tablet Take 1 tablet (2 mg total) by mouth before breakfast. 30 tablet 0 6/29/2018    ibuprofen (ADVIL,MOTRIN) 200 MG tablet Take 200 mg by mouth every 6 (six) hours as needed for Pain.   6/29/2018     losartan-hydrochlorothiazide 50-12.5 mg (HYZAAR) 50-12.5 mg per tablet Take 1 tablet by mouth every morning. 90 tablet 1 6/29/2018    blood sugar diagnostic Strp 1 strip by Misc.(Non-Drug; Combo Route) route once daily. Test strips for glucometer insurance covers to check blood sugar twice daily. 200 strip 3 6/18/2018    blood-glucose meter Misc Glucometer that insurance covers to check blood sugar twice daily 1 each 0 6/18/2018 at Unknown time    lancets Misc 1 Device by Misc.(Non-Drug; Combo Route) route once daily. Lancets for glucometer insurance covers to check blood sugar twice daily. 200 each 3 6/18/2018 at Unknown time       Objective:     Vital Signs (Most Recent):  Temp: 98.4 °F (36.9 °C) (07/01/18 1236)  Pulse: (!) 120 (07/01/18 1300)  Resp: 18 (07/01/18 1236)  BP: (!) 153/86 (07/01/18 1236)  SpO2: 95 % (07/01/18 1236) Vital Signs (24h Range):  Temp:  [98 °F (36.7 °C)-99 °F (37.2 °C)] 98.4 °F (36.9 °C)  Pulse:  [] 120  Resp:  [14-20] 18  SpO2:  [95 %-98 %] 95 %  BP: (130-182)/(75-90) 153/86     Weight: 83.5 kg (184 lb) (07/01/18 0400)  Body mass index is 27.17 kg/m².    Physical Exam   Constitutional: He is oriented to person, place, and time. He appears well-developed.   HENT:   Head: Normocephalic and atraumatic.   Eyes: No scleral icterus.   Neck: Neck supple.   Cardiovascular: Normal rate.  Exam reveals no gallop and no friction rub.    Pulmonary/Chest: Effort normal and breath sounds normal.   Abdominal: Soft. Bowel sounds are normal. He exhibits no distension and no mass. There is no tenderness. There is no rebound and no guarding. No hernia.   Musculoskeletal: He exhibits no edema or tenderness.   Neurological: He is alert and oriented to person, place, and time.   Skin: Skin is warm.   Psychiatric: He has a normal mood and affect.   Vitals reviewed.      MELD-Na score: 16 at 7/1/2018  3:59 AM  MELD score: 16 at 7/1/2018  3:59 AM  Calculated from:  Serum Creatinine: 1.4 mg/dL at 7/1/2018   3:59 AM  Serum Sodium: 138 mmol/L (Rounded to 137) at 7/1/2018  3:59 AM  Total Bilirubin: 3.8 mg/dL at 7/1/2018  3:59 AM  INR(ratio): 1.1 at 6/30/2018  3:02 AM  Age: 63 years    Lab Results   Component Value Date    WBC 10.94 07/01/2018    HGB 12.5 (L) 07/01/2018    HCT 37.5 (L) 07/01/2018    MCV 90 07/01/2018     (H) 07/01/2018     Lab Results   Component Value Date    INR 1.1 06/30/2018     Lab Results   Component Value Date     07/01/2018    K 3.5 07/01/2018    CREATININE 1.4 07/01/2018     Lab Results   Component Value Date    ALBUMIN 2.6 (L) 07/01/2018     (H) 07/01/2018     (H) 07/01/2018    ALKPHOS 299 (H) 07/01/2018    BILITOT 3.8 (H) 07/01/2018     No results found for: AFP  Lab Results   Component Value Date    LIPASE 58 07/01/2018    AMYLASE 49 06/30/2018     No results found for: TACROLIMUS    Imaging:  Reviewed and as noted in HPI.

## 2018-07-01 NOTE — PLAN OF CARE
Problem: Patient Care Overview  Goal: Plan of Care Review  Outcome: Ongoing (interventions implemented as appropriate)   06/30/18 1948   Coping/Psychosocial   Plan Of Care Reviewed With patient;spouse   Pt is A,A,O x 4 . Stable V/S . Pt was tachycardic during the shift MD notified and EKG done . Pt denies chest pain . Pt is able to ambulate to the bathroom independently . Pt is free of falls and injuries per shift , family at bedside and involved in pt's care . Pt is NPo most of the day and start on clear liquids a the end of the shift . No N/V during the day . Pt on continuous IVF LR @ 100 cc/hr . TEDs and SCDs on .

## 2018-07-01 NOTE — CONSULTS
[facility]  Hepatology  Consult Note    Patient Name: Mario Sanford  MRN: [id number]  Admission Date: 6/30/2018  Hospital Length of Stay: 0 days  Attending Provider: Alissa Hayes MD   Primary Care Physician: Bereket Rivera DO  Principal Problem:RUQ abdominal pain    Inpatient consult to Hepatology  Consult performed by: THIAGO IBRAHIM  Consult ordered by: ALISSA HAYES  Reason for consult: LFTS        Subjective:     Transplant status: No    HPI:  Mario Sanford is a 63 y.o. male with hypertension, dyslipidemia, and recently diagnosed DM-2 (recenlty started on Losartan, HCTZ, Gilimepiride) who presented t hospital on 6/30/2018 with acute onset of severe non-radiating RUQ pain beginning a few hours prior to admission with unchanged course. Pertinent associated symptoms include progressively worsening nausea and vomiting x 1 week. Patient had a routine colonoscopy on 6/19 and has not felt well since that time. He has had pain similar to that today, intermittently for the past week. During hospital course, he was found to have normal lipase, mild leukocytosis, US showed no significant biliary pathologies and MRCP done today showed mild prominence of CBD of 7mm without any filling defects. Hepatology consulted for further evaluation.     He denies having any recent travel, blood transfusion, prior liver disease, family history of liver disease, high risk sexual activity, recent tattoos.      Review of Systems   Constitutional: Negative for activity change and appetite change.   HENT: Negative for ear discharge and facial swelling.    Eyes: Negative for photophobia and redness.   Respiratory: Negative for wheezing and stridor.    Cardiovascular: Negative for chest pain and palpitations.   Gastrointestinal: Positive for abdominal pain. Negative for abdominal distention, anal bleeding, blood in stool and constipation.   Endocrine: Negative for cold intolerance and heat  intolerance.   Genitourinary: Negative for dysuria and frequency.   Skin: Negative for color change and rash.   Allergic/Immunologic: Negative for food allergies.   Neurological: Negative for seizures and numbness.   Hematological: Does not bruise/bleed easily.   Psychiatric/Behavioral: Negative for agitation.       Past Medical History:   Diagnosis Date    Hyperlipidemia     Hypertension     Obesity (BMI 30-39.9)        Past Surgical History:   Procedure Laterality Date    COLONOSCOPY N/A 6/19/2018    Procedure: COLONOSCOPY;  Surgeon: Moris Whalen MD;  Location: [address];  Service: Endoscopy;  Laterality: N/A;  Miralax prep, per Dr. Whalen    TONSILLECTOMY         Family history of liver disease: No    Review of patient's allergies indicates:  No Known Allergies    Social History Main Topics    Smoking status: Never Smoker    Smokeless tobacco: Never Used    Alcohol use No    Drug use: No    Sexual activity: Yes     Partners: Female       Prescriptions Prior to Admission   Medication Sig Dispense Refill Last Dose    aspirin 325 MG tablet Take 81 mg by mouth once daily.    6/29/2018    atorvastatin (LIPITOR) 40 MG tablet Take 1 tablet (40 mg total) by mouth nightly. 90 tablet 3 6/30/2018    glimepiride (AMARYL) 2 MG tablet Take 1 tablet (2 mg total) by mouth before breakfast. 30 tablet 0 6/29/2018    ibuprofen (ADVIL,MOTRIN) 200 MG tablet Take 200 mg by mouth every 6 (six) hours as needed for Pain.   6/29/2018    losartan-hydrochlorothiazide 50-12.5 mg (HYZAAR) 50-12.5 mg per tablet Take 1 tablet by mouth every morning. 90 tablet 1 6/29/2018    blood sugar diagnostic Strp 1 strip by Misc.(Non-Drug; Combo Route) route once daily. Test strips for glucometer insurance covers to check blood sugar twice daily. 200 strip 3 6/18/2018    blood-glucose meter Misc Glucometer that insurance covers to check blood sugar twice daily 1 each 0 6/18/2018 at Unknown time    lancets Misc 1 Device by  Misc.(Non-Drug; Combo Route) route once daily. Lancets for glucometer insurance covers to check blood sugar twice daily. 200 each 3 6/18/2018 at Unknown time       Objective:     Vital Signs (Most Recent):  Temp: 98.4 °F (36.9 °C) (07/01/18 1236)  Pulse: (!) 120 (07/01/18 1300)  Resp: 18 (07/01/18 1236)  BP: (!) 153/86 (07/01/18 1236)  SpO2: 95 % (07/01/18 1236) Vital Signs (24h Range):  Temp:  [98 °F (36.7 °C)-99 °F (37.2 °C)] 98.4 °F (36.9 °C)  Pulse:  [] 120  Resp:  [14-20] 18  SpO2:  [95 %-98 %] 95 %  BP: (130-182)/(75-90) 153/86     Weight: 83.5 kg (184 lb) (07/01/18 0400)  Body mass index is 27.17 kg/m².    Physical Exam   Constitutional: He is oriented to person, place, and time. He appears well-developed.   HENT:   Head: Normocephalic and atraumatic.   Eyes: No scleral icterus.   Neck: Neck supple.   Cardiovascular: Normal rate.  Exam reveals no gallop and no friction rub.    Pulmonary/Chest: Effort normal and breath sounds normal.   Abdominal: Soft. Bowel sounds are normal. He exhibits no distension and no mass. There is no tenderness. There is no rebound and no guarding. No hernia.   Musculoskeletal: He exhibits no edema or tenderness.   Neurological: He is alert and oriented to person, place, and time.   Skin: Skin is warm.   Psychiatric: He has a normal mood and affect.   Vitals reviewed.      MELD-Na score: 16 at 7/1/2018  3:59 AM  MELD score: 16 at 7/1/2018  3:59 AM  Calculated from:  Serum Creatinine: 1.4 mg/dL at 7/1/2018  3:59 AM  Serum Sodium: 138 mmol/L (Rounded to 137) at 7/1/2018  3:59 AM  Total Bilirubin: 3.8 mg/dL at 7/1/2018  3:59 AM  INR(ratio): 1.1 at 6/30/2018  3:02 AM  Age: 63 years    Lab Results   Component Value Date    WBC 10.94 07/01/2018    HGB 12.5 (L) 07/01/2018    HCT 37.5 (L) 07/01/2018    MCV 90 07/01/2018     (H) 07/01/2018     Lab Results   Component Value Date    INR 1.1 06/30/2018     Lab Results   Component Value Date     07/01/2018    K 3.5 07/01/2018     CREATININE 1.4 07/01/2018     Lab Results   Component Value Date    ALBUMIN 2.6 (L) 07/01/2018     (H) 07/01/2018     (H) 07/01/2018    ALKPHOS 299 (H) 07/01/2018    BILITOT 3.8 (H) 07/01/2018     No results found for: AFP  Lab Results   Component Value Date    LIPASE 58 07/01/2018    AMYLASE 49 06/30/2018     No results found for: TACROLIMUS    Imaging:  Reviewed and as noted in HPI.         Assessment/Plan:     * RUQ abdominal pain    So far evidence is concerning for biliary pathology but imaging is not supportive.   Liver lesion suggestive of hemangioma, we will monitor as an outpatient.  We recommend obtaining viral serologies, HIV, autoimmune markers and checking ceruloplasmin and iron studies.   He may benefit from EUS. We will discuss with them and schedule if needed.  Continue supportive care and await serologies            Thank you for your consult. I will follow-up with patient. Please contact us if you have any additional questions.    Caleb Rodriges MD  Hepatology  [facility]

## 2018-07-01 NOTE — PROGRESS NOTES
Pt left the floor to U/S then to MRI , on stretcher with transporter . Pt is A,A,O x 4 . Stable V/S pt is NPO Tele D/C and IVF held per MD order tell pt finish his tests . Pt received a dose of po xanax for anxiety .

## 2018-07-01 NOTE — PLAN OF CARE
Patient had no falls. Patient passed about 365 ml of pink tinged urine. Blood pressure was elevated and patient received PRN med. Pain medication was given twice. Medication and nursing care was completed per MD order.

## 2018-07-01 NOTE — NURSING
Unable to reach doctor for team D due to pager system being down and  did not have a spectralink.  So Spoke to doctor Lisbeth regarding the patient's bladder scan being over 600 and pain. I straight cath the patient and got back 350 yellow with blood tinged urine. This is a new occurs per patient. MD ordered Flomax. Patient stated that he feels better after the cath.

## 2018-07-01 NOTE — ASSESSMENT & PLAN NOTE
So far evidence is concerning for biliary pathology but imaging is not supportive.   Liver lesion suggestive of hemangioma, we will monitor as an outpatient.  We recommend obtaining viral serologies, HIV, autoimmune markers and checking ceruloplasmin and iron studies.   He may benefit from EUS. We will discuss with them and schedule if needed.  Continue supportive care and await serologies

## 2018-07-01 NOTE — HPI
Mario Sanford is a 63 y.o. male with hypertension, dyslipidemia, and recently diagnosed DM-2 (recenlty started on Losartan, HCTZ, Gilimepiride) who presented t hospital on 6/30/2018 with acute onset of severe non-radiating RUQ pain beginning a few hours prior to admission with unchanged course. Pertinent associated symptoms include progressively worsening nausea and vomiting x 1 week. Patient had a routine colonoscopy on 6/19 and has not felt well since that time. He has had pain similar to that today, intermittently for the past week. During hospital course, he was found to have normal lipase, mild leukocytosis, US showed no significant biliary pathologies and MRCP done today showed mild prominence of CBD of 7mm without any filling defects. Hepatology consulted for further evaluation.     He denies having any recent travel, blood transfusion, prior liver disease, family history of liver disease, high risk sexual activity, recent tattoos.

## 2018-07-01 NOTE — NURSING
MD notified about the patients elvateted blood pressure and the 15mL of blood tinged urine. MD verbalized understanding. Will continue to monitor.

## 2018-07-02 ENCOUNTER — TELEPHONE (OUTPATIENT)
Dept: GASTROENTEROLOGY | Facility: CLINIC | Age: 63
End: 2018-07-02

## 2018-07-02 PROBLEM — K92.1 MELENA: Status: ACTIVE | Noted: 2018-07-02

## 2018-07-02 PROBLEM — D64.9 NORMOCYTIC ANEMIA: Status: ACTIVE | Noted: 2018-07-02

## 2018-07-02 PROBLEM — R31.9 HEMATURIA: Status: ACTIVE | Noted: 2018-07-02

## 2018-07-02 LAB
ABO + RH BLD: NORMAL
ALBUMIN SERPL BCP-MCNC: 2.2 G/DL
ALP SERPL-CCNC: 265 U/L
ALT SERPL W/O P-5'-P-CCNC: 195 U/L
ANA SER QL IF: NORMAL
ANION GAP SERPL CALC-SCNC: 10 MMOL/L
AST SERPL-CCNC: 94 U/L
BASOPHILS # BLD AUTO: 0.04 K/UL
BASOPHILS NFR BLD: 0.3 %
BILIRUB SERPL-MCNC: 1.1 MG/DL
BLD GP AB SCN CELLS X3 SERPL QL: NORMAL
BUN SERPL-MCNC: 37 MG/DL
CALCIUM SERPL-MCNC: 8.1 MG/DL
CERULOPLASMIN SERPL-MCNC: 31 MG/DL
CHLORIDE SERPL-SCNC: 105 MMOL/L
CO2 SERPL-SCNC: 23 MMOL/L
CREAT SERPL-MCNC: 1.1 MG/DL
DIFFERENTIAL METHOD: ABNORMAL
EOSINOPHIL # BLD AUTO: 0.2 K/UL
EOSINOPHIL NFR BLD: 1.4 %
ERYTHROCYTE [DISTWIDTH] IN BLOOD BY AUTOMATED COUNT: 12.3 %
EST. GFR  (AFRICAN AMERICAN): >60 ML/MIN/1.73 M^2
EST. GFR  (NON AFRICAN AMERICAN): >60 ML/MIN/1.73 M^2
FERRITIN SERPL-MCNC: 985 NG/ML
GLUCOSE SERPL-MCNC: 89 MG/DL
HAV IGM SERPL QL IA: NEGATIVE
HBV CORE IGM SERPL QL IA: NEGATIVE
HBV SURFACE AB SER-ACNC: NEGATIVE M[IU]/ML
HBV SURFACE AG SERPL QL IA: NEGATIVE
HBV SURFACE AG SERPL QL IA: NEGATIVE
HCT VFR BLD AUTO: 29.4 %
HCT VFR BLD AUTO: 31 %
HCV AB SERPL QL IA: NEGATIVE
HCV AB SERPL QL IA: NEGATIVE
HGB BLD-MCNC: 10.5 G/DL
HGB BLD-MCNC: 9.9 G/DL
IMM GRANULOCYTES # BLD AUTO: 0.08 K/UL
IMM GRANULOCYTES NFR BLD AUTO: 0.7 %
IRON SERPL-MCNC: 45 UG/DL
LYMPHOCYTES # BLD AUTO: 2.8 K/UL
LYMPHOCYTES NFR BLD: 23 %
MAGNESIUM SERPL-MCNC: 1.7 MG/DL
MCH RBC QN AUTO: 29.8 PG
MCHC RBC AUTO-ENTMCNC: 33.7 G/DL
MCV RBC AUTO: 89 FL
MONOCYTES # BLD AUTO: 0.9 K/UL
MONOCYTES NFR BLD: 7.1 %
NEUTROPHILS # BLD AUTO: 8.2 K/UL
NEUTROPHILS NFR BLD: 67.5 %
NRBC BLD-RTO: 0 /100 WBC
PHOSPHATE SERPL-MCNC: 2.2 MG/DL
PLATELET # BLD AUTO: 324 K/UL
PMV BLD AUTO: 10.1 FL
POCT GLUCOSE: 103 MG/DL (ref 70–110)
POCT GLUCOSE: 132 MG/DL (ref 70–110)
POCT GLUCOSE: 98 MG/DL (ref 70–110)
POTASSIUM SERPL-SCNC: 3.2 MMOL/L
PREALB SERPL-MCNC: 11 MG/DL
PROT SERPL-MCNC: 5 G/DL
RBC # BLD AUTO: 3.32 M/UL
SATURATED IRON: 24 %
SMOOTH MUSCLE AB TITR SER IF: NORMAL {TITER}
SODIUM SERPL-SCNC: 138 MMOL/L
TOTAL IRON BINDING CAPACITY: 189 UG/DL
TRANSFERRIN SERPL-MCNC: 128 MG/DL
WBC # BLD AUTO: 12.19 K/UL

## 2018-07-02 PROCEDURE — 99232 SBSQ HOSP IP/OBS MODERATE 35: CPT | Mod: GC,,, | Performed by: INTERNAL MEDICINE

## 2018-07-02 PROCEDURE — 84100 ASSAY OF PHOSPHORUS: CPT

## 2018-07-02 PROCEDURE — 83540 ASSAY OF IRON: CPT

## 2018-07-02 PROCEDURE — 25000003 PHARM REV CODE 250: Performed by: INTERNAL MEDICINE

## 2018-07-02 PROCEDURE — 99233 SBSQ HOSP IP/OBS HIGH 50: CPT | Mod: ,,, | Performed by: INTERNAL MEDICINE

## 2018-07-02 PROCEDURE — 86255 FLUORESCENT ANTIBODY SCREEN: CPT | Mod: 91

## 2018-07-02 PROCEDURE — 11000001 HC ACUTE MED/SURG PRIVATE ROOM

## 2018-07-02 PROCEDURE — 86376 MICROSOMAL ANTIBODY EACH: CPT

## 2018-07-02 PROCEDURE — 86850 RBC ANTIBODY SCREEN: CPT

## 2018-07-02 PROCEDURE — 63600175 PHARM REV CODE 636 W HCPCS: Performed by: INTERNAL MEDICINE

## 2018-07-02 PROCEDURE — 36415 COLL VENOUS BLD VENIPUNCTURE: CPT

## 2018-07-02 PROCEDURE — 86256 FLUORESCENT ANTIBODY TITER: CPT

## 2018-07-02 PROCEDURE — C9113 INJ PANTOPRAZOLE SODIUM, VIA: HCPCS | Performed by: INTERNAL MEDICINE

## 2018-07-02 PROCEDURE — 85025 COMPLETE CBC W/AUTO DIFF WBC: CPT

## 2018-07-02 PROCEDURE — 85014 HEMATOCRIT: CPT

## 2018-07-02 PROCEDURE — 83735 ASSAY OF MAGNESIUM: CPT

## 2018-07-02 PROCEDURE — 82728 ASSAY OF FERRITIN: CPT

## 2018-07-02 PROCEDURE — 86038 ANTINUCLEAR ANTIBODIES: CPT

## 2018-07-02 PROCEDURE — 84134 ASSAY OF PREALBUMIN: CPT

## 2018-07-02 PROCEDURE — 82390 ASSAY OF CERULOPLASMIN: CPT

## 2018-07-02 PROCEDURE — 85018 HEMOGLOBIN: CPT

## 2018-07-02 PROCEDURE — 80053 COMPREHEN METABOLIC PANEL: CPT

## 2018-07-02 RX ORDER — POTASSIUM CHLORIDE 20 MEQ/1
40 TABLET, EXTENDED RELEASE ORAL 3 TIMES DAILY
Status: COMPLETED | OUTPATIENT
Start: 2018-07-02 | End: 2018-07-02

## 2018-07-02 RX ORDER — PANTOPRAZOLE SODIUM 40 MG/1
40 TABLET, DELAYED RELEASE ORAL
Status: DISCONTINUED | OUTPATIENT
Start: 2018-07-02 | End: 2018-07-02

## 2018-07-02 RX ADMIN — POTASSIUM CHLORIDE 40 MEQ: 1500 TABLET, EXTENDED RELEASE ORAL at 03:07

## 2018-07-02 RX ADMIN — HEPARIN SODIUM 5000 UNITS: 5000 INJECTION, SOLUTION INTRAVENOUS; SUBCUTANEOUS at 09:07

## 2018-07-02 RX ADMIN — CEFTRIAXONE SODIUM 1 G: 1 INJECTION, POWDER, FOR SOLUTION INTRAMUSCULAR; INTRAVENOUS at 05:07

## 2018-07-02 RX ADMIN — PANTOPRAZOLE SODIUM 40 MG: 40 TABLET, DELAYED RELEASE ORAL at 03:07

## 2018-07-02 RX ADMIN — POTASSIUM CHLORIDE 40 MEQ: 1500 TABLET, EXTENDED RELEASE ORAL at 09:07

## 2018-07-02 RX ADMIN — DEXTROSE 40 MG: 50 INJECTION, SOLUTION INTRAVENOUS at 08:07

## 2018-07-02 NOTE — TREATMENT PLAN
Case review and discussed with Hepatology and [facility] staff.   We will plan for outpatient EUS. Our care coordinator will contact Mr. Sanford after discharge.   Please feel free to call if any questions.     Caleb Rodriges MD  Gastroenterology & Hepatology Fellow  Pager: [phone]

## 2018-07-02 NOTE — TELEPHONE ENCOUNTER
----- Message from Issa Biggs MD sent at 7/2/2018  2:20 PM CDT -----  yep  ----- Message -----  From: Angle Zuñiga MA  Sent: 7/2/2018   1:38 PM  To: Issa Biggs MD        ----- Message -----  From: Caleb Rodriges MD  Sent: 7/2/2018   9:33 AM  To: Angle Zuñiga MA    Can we schedule EUS as outpatient first available after his discharge.     Thanks,   ESD

## 2018-07-02 NOTE — MEDICAL/APP STUDENT
Hospital Medicine  Progress Note    Patient Name: Mario Sanford  MRN: [id number]  Team: [facility] HOSP MED D Kacey Hayes MD  Admit Date: 6/30/2018  NAA 7/4/2018  Code status: Full Code    Principal Problem:  RUQ abdominal pain    Interval history: Feels much better. No significant abdominal pain today. Tolerating diet. Remains tachycardic, but asymptomatic. Patient reported melena to Hepatology team this PM.    Review of Systems   Constitutional: Negative for fever.   Respiratory: Negative for shortness of breath.    Gastrointestinal: Positive for melena. Negative for abdominal pain.       Physical Exam:  Temp:  [97.8 °F (36.6 °C)-98.9 °F (37.2 °C)]   Pulse:  [113-130]   Resp:  [18]   BP: (103-153)/(57-86)   SpO2:  [94 %-97 %]      Temp: 98.8 °F (37.1 °C) (07/02/18 0817)  Pulse: (!) 113 (07/02/18 0817)  Resp: 18 (07/02/18 0817)  BP: 119/72 (07/02/18 0817)  SpO2: 97 % (07/02/18 0817)    Intake/Output Summary (Last 24 hours) at 07/02/18 0901  Last data filed at 07/01/18 1846   Gross per 24 hour   Intake              300 ml   Output              800 ml   Net             -500 ml     Weight: 84.8 kg (186 lb 14.4 oz)  Body mass index is 27.6 kg/m².    Physical Exam   Eyes: Conjunctivae and lids are normal.   Cardiovascular: S1 normal and S2 normal.  Tachycardia present.    Pulmonary/Chest: Effort normal and breath sounds normal.   Abdominal: Soft. Bowel sounds are normal. There is no tenderness.   Musculoskeletal: He exhibits no edema.       Significant Labs:    Recent Labs  Lab 06/30/18  0302 06/30/18  0907 07/01/18  0359 07/02/18  0429   WBC 17.93* 12.20 10.94 12.19   HGB 14.3 13.0* 12.5* 9.9*   HCT 43.3 39.2* 37.5* 29.4*   * 359* 351* 324       Recent Labs  Lab 06/30/18  0302 06/30/18  0907 07/01/18  0359 07/02/18  0428   * 138 138 138   K 3.5 3.6 3.5 3.2*   CL 95 102 102 105   CO2 21* 25 23 23   BUN 41* 36* 27* 37*   CREATININE 2.0* 1.7* 1.4 1.1   * 125* 89 89   CALCIUM 9.9 8.7 9.0 8.1*   MG  --  1.7  1.4* 1.7   PHOS  --  3.7 3.0 2.2*   ALKPHOS 101 186* 299* 265*   ALT 20 115* 319* 195*   AST 20 203* 290* 94*   ALBUMIN 3.3* 2.6* 2.6* 2.2*   PROT 7.8 6.2 6.0 5.0*   BILITOT 1.2* 1.6* 3.8* 1.1*   INR 1.1  --   --   --    LIPASE 61* 56 58  --    AMYLASE  --  49  --   --        Recent Labs  Lab 06/30/18  2338 07/01/18  0624 07/01/18  1238 07/01/18  1722 07/01/18  2255 07/02/18  0842   POCTGLUCOSE 105 96 110 141* 87 103     A1C:     Recent Labs  Lab 05/29/18  0958   HGBA1C 8.7*       Recent Labs  Lab 06/30/18  0302   TROPONINI <0.006     Lactic Acid:     Recent Labs  Lab 06/30/18  0907   LACTATE 0.7     TSH:     Recent Labs  Lab 05/25/18  0842   TSH 1.009     Urine Studies:     Recent Labs  Lab 07/01/18  0915   COLORU Nicci   APPEARANCEUA Hazy*   PHUR 5.0   SPECGRAV 1.015   PROTEINUA 1+*   GLUCUA Negative   KETONESU 1+*   BILIRUBINUA Negative   OCCULTUA 3+*   NITRITE Negative   UROBILINOGEN 4.0   LEUKOCYTESUR Trace*   RBCUA 47*   WBCUA 17*   BACTERIA Few*   SQUAMEPITHEL 0   HYALINECASTS 0       Inpatient Medications prescribed for management of current Problems:   Scheduled Meds:    cefTRIAXone (ROCEPHIN) IVPB  1 g Intravenous Q24H    pantoprazole  40 mg Oral BID AC     Continuous Infusions:     As Needed: acetaminophen, dextrose 50%, dextrose 50%, glucagon (human recombinant), glucose, glucose, hydrALAZINE, HYDROmorphone, HYDROmorphone, insulin aspart U-100, lorazepam, ondansetron, ondansetron, sodium chloride 0.9%    Active Hospital Problems    Diagnosis  POA    *RUQ abdominal pain [R10.11]  Yes    SIRS (systemic inflammatory response syndrome) [R65.10]  Yes    Elevated liver enzymes [R74.8]  Yes    PREM (acute kidney injury) [N17.9]  Yes    Essential hypertension [I10]  Yes    Type 2 diabetes mellitus without complication, without long-term current use of insulin [E11.9]  Yes    Hypercholesteremia [E78.00]  Yes      Resolved Hospital Problems    Diagnosis Date Resolved POA   No resolved problems to display.  "      Overview: Patient is a 63 y.o. male with past medical history of hypertension, dyslipidemia, and recently diagnosed DM-2 admitted to hospital for RUQ pain associated with SIRS, worsening liver enzymes and T.bili. The patient received IVF and his PREM, believed to be due to poor intake, resolved. Urine and blood cultures show NGTD and RUQ U/S was normal. Liver U/S showed a hyperechoic right lobe mass, consistent with hemangioma and an MRCP revealed significant narrowing of the common bile duct near the pancreatic head. Further work-up included Ceruloplasmin which was wnl; Hepatitis viral serologies and autoimmune markers are pending. Iron studies were done to evaluate the patient's normocytic anemia, which was most consistent with anemia of chronic inflammation. Hepatology was consulted and discussed EUS/ERCP with AES who plans to do EUS as outpatient.    Assessment and Plan for Problems addressed today:    RUQ abdominal pain  SIRS (systemic inflammatory response syndrome)  Elevated liver enzymes  · Acute pancreatitis was initially suspected but lipase and RUQ US are normal. Pain persisted and T.bili / liver enzymes trended upward.   · Blood and urine cultures obtained. SIRS rapidly resolved with fluid resuscitation.   · Patient remains tachycardic; EKG NSR   · For analgesia: PRN Hydromorphone 0.5-1mg Q4hr  · Liver U/S (7/1): "Hyperechoic right lobe mass measures 1.0 x 1.0 x 1.0 cm which may represent a hemangioma". MRCP (7/1): "Common bile duct narrows significantly just proximal to the pancreatic head. A 9 mm right hepatic lobe lesion, most consistent with a hemangioma".   · Iron studies with normocytic anemia most consistent with anemia of chronic inflammation   · Ceruloplasmin wnl; Hepatitis panel negative; autoimmune markers pending.    · Hepatology consulted: Suspect biliary etiology. Plan for EUS with AES as OP.       PREM  · Probably due to poor oral intake / volume depletion. Continued IV fluids until " 7/2/2018 AM.  · Holding ARB/HCTZ, NSAIDs.    Anemia, concern for GI blood loss  · Hgb in May was > 16g/dL; it was 14.3 on admission with volume depletion. Now down to about 10 g/dL -- partially due to hemodilution but report of melena concerning for UGI bleeding.  · Starting PPI BID, back to clear liquid diet, repeat H&H, Type and Screen, consulting GI, NPO after MN.     Hematuria  · UA positive for WBC and a few bacteria, Initiated ceftriaxone pending urine culture.  · Urine cultures growing multiple organisms from 6/30 and > 100K Coag-Neg Staph from 7/1. Continuing ceftriaxone for now.    Essential hypertension  · Holding home medications for now. PRN Hydralazine 25mg Q8hr.     Type 2 diabetes mellitus  Hypercholesteremia  · Non-insulin diabetes medications are held and the patient's hyperglycemia is managed with a SQ correction dose insulin regimen.   · Holding ASA and statin for now.    Hypomagnesemia  · Replete as needed    Diet: Clear  GI Prophylaxis: None  DVT Prophylaxis:   Anticoagulants   Medication Route Frequency    heparin (porcine) injection 5,000 Units Subcutaneous Q12H   Lines/Drains/Airway: Peripheral IV- Left AC  Wounds: None     HIGH RISK CONDITION(S):   Patient is currently receiving parenteral controlled substances: Dilaudid     Discharge plan and follow up  Home or Self Care      Provider  Kacey Hayes MD  [facility] [place] HOSP MED D   Department of Hospital Medicine    Scribe Attestation: I personally scribed for Kacey Hayes MD on 07/02/2018 at 2:11 PM. Electronically signed by renzo Das on 07/02/2018 at 2:11 PM.

## 2018-07-02 NOTE — PLAN OF CARE
07/02/18 1311   Discharge Assessment   Assessment Type Discharge Planning Assessment   Confirmed/corrected address and phone number on facesheet? Yes   Assessment information obtained from? Patient   Expected Length of Stay (days) 2   Communicated expected length of stay with patient/caregiver yes   Prior to hospitilization cognitive status: Alert/Oriented   Prior to hospitalization functional status: Independent   Current cognitive status: Alert/Oriented   Current Functional Status: Independent   Lives With spouse   Able to Return to Prior Arrangements yes   Is patient able to care for self after discharge? Yes   Readmission Within The Last 30 Days no previous admission in last 30 days   Patient currently being followed by outpatient case management? No   Patient currently receives any other outside agency services? No   Equipment Currently Used at Home none   Do you have any problems affording any of your prescribed medications? No   Is the patient taking medications as prescribed? yes   Does the patient have transportation home? Yes   Transportation Available car  (wife)   Does the patient receive services at the [facility]? No   Discharge Plan A Home   Discharge Plan B Home with family   Patient/Family In Agreement With Plan yes

## 2018-07-02 NOTE — TREATMENT PLAN
Treatment Plan  07/02/2018  4:20 PM    FERNANDO performed at bedside. Patient has exterior skin tag (no signs of bleeding) with brown stool in the rectal vault. Would still monitor BM as well as keep NPO after MN and start a PPI.  In the morning will check up on patient and determine need for inpatient EGD.    Rachel Barron M.D.  Gastroenterology Fellow, PGY-IV  Pager: [phone]  [facility]

## 2018-07-02 NOTE — SUBJECTIVE & OBJECTIVE
"Interval History:   Patient feels "better" and denied any chest pain, shortness of breath, nausea, emesis, or abdominal pain. However reported 1 dark BM prior to me seeing him which has not happened to him before.     Current Facility-Administered Medications   Medication    acetaminophen tablet 500 mg    cefTRIAXone injection 1 g    dextrose 50% injection 12.5 g    dextrose 50% injection 25 g    glucagon (human recombinant) injection 1 mg    glucose chewable tablet 16 g    glucose chewable tablet 24 g    hydrALAZINE tablet 25 mg    HYDROmorphone injection 0.5 mg    HYDROmorphone injection 1 mg    insulin aspart U-100 pen 0-5 Units    lorazepam injection 1 mg    ondansetron disintegrating tablet 8 mg    ondansetron injection 4 mg    pantoprazole EC tablet 40 mg    sodium chloride 0.9% flush 5 mL       Objective:     Vital Signs (Most Recent):  Temp: 98.9 °F (37.2 °C) (07/02/18 1126)  Pulse: (!) 112 (07/02/18 1126)  Resp: 18 (07/02/18 0817)  BP: 121/73 (07/02/18 1126)  SpO2: (!) 94 % (07/02/18 1126) Vital Signs (24h Range):  Temp:  [97.8 °F (36.6 °C)-98.9 °F (37.2 °C)] 98.9 °F (37.2 °C)  Pulse:  [112-129] 112  Resp:  [18] 18  SpO2:  [94 %-97 %] 94 %  BP: (103-129)/(57-79) 121/73     Weight: 84.8 kg (186 lb 14.4 oz) (07/02/18 0400)  Body mass index is 27.6 kg/m².    Physical Exam   Constitutional: He is oriented to person, place, and time. No distress.   HENT:   Head: Normocephalic and atraumatic.   Eyes: No scleral icterus.   Cardiovascular:   Sinus tachycardia   Pulmonary/Chest: Effort normal and breath sounds normal.   Abdominal: Soft. Bowel sounds are normal. He exhibits no distension. There is no tenderness.   Musculoskeletal: He exhibits no edema.   Neurological: He is alert and oriented to person, place, and time.   Skin: He is not diaphoretic.       MELD-Na score: 16 at 7/1/2018  3:59 AM  MELD score: 16 at 7/1/2018  3:59 AM  Calculated from:  Serum Creatinine: 1.4 mg/dL at 7/1/2018  3:59 " AM  Serum Sodium: 138 mmol/L (Rounded to 137) at 7/1/2018  3:59 AM  Total Bilirubin: 3.8 mg/dL at 7/1/2018  3:59 AM  INR(ratio): 1.1 at 6/30/2018  3:02 AM  Age: 63 years    Significant Labs:  CBC:   Recent Labs  Lab 07/02/18  0429   WBC 12.19   RBC 3.32*   HGB 9.9*   HCT 29.4*        CMP:   Recent Labs  Lab 07/02/18 0428   GLU 89   CALCIUM 8.1*   ALBUMIN 2.2*   PROT 5.0*      K 3.2*   CO2 23      BUN 37*   CREATININE 1.1   ALKPHOS 265*   *   AST 94*   BILITOT 1.1*     Coagulation:   Recent Labs  Lab 06/30/18  0302   INR 1.1   APTT 24.0       Significant Imaging:  MRI: Reviewed

## 2018-07-02 NOTE — PLAN OF CARE
Problem: Diabetes, Type 2 (Adult)  Goal: Signs and Symptoms of Listed Potential Problems Will be Absent, Minimized or Managed (Diabetes, Type 2)  Signs and symptoms of listed potential problems will be absent, minimized or managed by discharge/transition of care (reference Diabetes, Type 2 (Adult) CPG).   Outcome: Ongoing (interventions implemented as appropriate)   07/02/18 1745   Diabetes, Type 2   Problems Assessed (Type 2 Diabetes) all   Problems Present (Type 2 Diabetes) none       Problem: Fall Risk (Adult)  Goal: Absence of Falls  Patient will demonstrate the desired outcomes by discharge/transition of care.   Outcome: Ongoing (interventions implemented as appropriate)   07/02/18 1745   Fall Risk (Adult)   Absence of Falls making progress toward outcome       Problem: Patient Care Overview  Goal: Plan of Care Review  Outcome: Ongoing (interventions implemented as appropriate)   07/02/18 1745   Coping/Psychosocial   Plan Of Care Reviewed With patient;spouse       Problem: Infection, Risk/Actual (Adult)  Goal: Identify Related Risk Factors and Signs and Symptoms  Related risk factors and signs and symptoms are identified upon initiation of Human Response Clinical Practice Guideline (CPG)    07/02/18 6484   Infection, Risk/Actual   Related Risk Factors (Infection, Risk/Actual) treatment plan;chronic illness/condition;prolonged hospitalization;exposure to microbes   Signs and Symptoms (Infection, Risk/Actual) blood glucose changes;body temperature changes;chills;weakness;cultures positive;pain

## 2018-07-02 NOTE — ASSESSMENT & PLAN NOTE
63 year old male with hypertension, dyslipidemia, and recently diagnosed DM-2 (recenlty started on Losartan, HCTZ, Gilimepiride) who Hepatology was following for elevated LFT and RUQ pain. Today patient feels better and LFT's are trending down. MRCP done yesterday was remarkable for a mildly prominent common bile duct (7 mm) which narrows significantly just proximal to the pancreatic head, no obstructing stones identified, and adjacent duodenal diverticulum which may be contributing to the bile duct narrowing.    Recommendations:  --EUS as outpatient

## 2018-07-02 NOTE — PLAN OF CARE
Problem: Patient Care Overview  Goal: Plan of Care Review  Outcome: Ongoing (interventions implemented as appropriate)   07/01/18 1942   Coping/Psychosocial   Plan Of Care Reviewed With patient;spouse   Pt is A,A,O x 4 . Stable V/S. No C/O pain during the day . Pt is able to ambulate in the room independently , family at bedside . Abd U/S and Abd MRI done today . Pt  On continuous IVF N/S @ 100 cc/hr .  Pt has very dark and concentrated urine MD notified and Urine analysis and culture sent today .  No N/V during the day .

## 2018-07-02 NOTE — PROGRESS NOTES
"[facility]  Hepatology  Progress Note    Patient Name: Mario Sanford  MRN: [id number]  Admission Date: 6/30/2018  Hospital Length of Stay: 0 days  Attending Provider: Kacey Hayes MD   Primary Care Physician: Bereket Rivera DO  Principal Problem:RUQ abdominal pain    Subjective:     Transplant status: No    HPI: Mario Sanford is a 63 y.o. male with hypertension, dyslipidemia, and recently diagnosed DM-2 (recenlty started on Losartan, HCTZ, Gilimepiride) who presented t hospital on 6/30/2018 with acute onset of severe non-radiating RUQ pain beginning a few hours prior to admission with unchanged course. Pertinent associated symptoms include progressively worsening nausea and vomiting x 1 week. Patient had a routine colonoscopy on 6/19 and has not felt well since that time. He has had pain similar to that today, intermittently for the past week. During hospital course, he was found to have normal lipase, mild leukocytosis, US showed no significant biliary pathologies and MRCP done today showed mild prominence of CBD of 7mm without any filling defects. Hepatology consulted for further evaluation.     He denies having any recent travel, blood transfusion, prior liver disease, family history of liver disease, high risk sexual activity, recent tattoos.      Interval History:   Patient feels "better" and denied any chest pain, shortness of breath, nausea, emesis, or abdominal pain. However reported 1 dark BM prior to me seeing him which has not happened to him before.     Current Facility-Administered Medications   Medication    acetaminophen tablet 500 mg    cefTRIAXone injection 1 g    dextrose 50% injection 12.5 g    dextrose 50% injection 25 g    glucagon (human recombinant) injection 1 mg    glucose chewable tablet 16 g    glucose chewable tablet 24 g    hydrALAZINE tablet 25 mg    HYDROmorphone injection 0.5 mg    HYDROmorphone injection 1 mg    insulin aspart U-100 pen 0-5 " Units    lorazepam injection 1 mg    ondansetron disintegrating tablet 8 mg    ondansetron injection 4 mg    pantoprazole EC tablet 40 mg    sodium chloride 0.9% flush 5 mL       Objective:     Vital Signs (Most Recent):  Temp: 98.9 °F (37.2 °C) (07/02/18 1126)  Pulse: (!) 112 (07/02/18 1126)  Resp: 18 (07/02/18 0817)  BP: 121/73 (07/02/18 1126)  SpO2: (!) 94 % (07/02/18 1126) Vital Signs (24h Range):  Temp:  [97.8 °F (36.6 °C)-98.9 °F (37.2 °C)] 98.9 °F (37.2 °C)  Pulse:  [112-129] 112  Resp:  [18] 18  SpO2:  [94 %-97 %] 94 %  BP: (103-129)/(57-79) 121/73     Weight: 84.8 kg (186 lb 14.4 oz) (07/02/18 0400)  Body mass index is 27.6 kg/m².    Physical Exam   Constitutional: He is oriented to person, place, and time. No distress.   HENT:   Head: Normocephalic and atraumatic.   Eyes: No scleral icterus.   Cardiovascular:   Sinus tachycardia   Pulmonary/Chest: Effort normal and breath sounds normal.   Abdominal: Soft. Bowel sounds are normal. He exhibits no distension. There is no tenderness.   Musculoskeletal: He exhibits no edema.   Neurological: He is alert and oriented to person, place, and time.   Skin: He is not diaphoretic.       MELD-Na score: 16 at 7/1/2018  3:59 AM  MELD score: 16 at 7/1/2018  3:59 AM  Calculated from:  Serum Creatinine: 1.4 mg/dL at 7/1/2018  3:59 AM  Serum Sodium: 138 mmol/L (Rounded to 137) at 7/1/2018  3:59 AM  Total Bilirubin: 3.8 mg/dL at 7/1/2018  3:59 AM  INR(ratio): 1.1 at 6/30/2018  3:02 AM  Age: 63 years    Significant Labs:  CBC:   Recent Labs  Lab 07/02/18  0429   WBC 12.19   RBC 3.32*   HGB 9.9*   HCT 29.4*        CMP:   Recent Labs  Lab 07/02/18  0428   GLU 89   CALCIUM 8.1*   ALBUMIN 2.2*   PROT 5.0*      K 3.2*   CO2 23      BUN 37*   CREATININE 1.1   ALKPHOS 265*   *   AST 94*   BILITOT 1.1*     Coagulation:   Recent Labs  Lab 06/30/18  0302   INR 1.1   APTT 24.0       Significant Imaging:  MRI: Reviewed    Assessment/Plan:     Normocytic  anemia    Patient with decreasing Hgb and report of 1 dark BM, in an abundance of caution would recommend placing patient NPO and starting a PPI. Will perform rectal exam and discuss case with GI attending.    Recommendations:  --NPO after MN   --Monitor BM  --PPI IV BID  --Will perform rectal and discuss case with GI         Elevated liver enzymes    63 year old male with hypertension, dyslipidemia, and recently diagnosed DM-2 (recenlty started on Losartan, HCTZ, Gilimepiride) who Hepatology was following for elevated LFT and RUQ pain. Today patient feels better and LFT's are trending down. MRCP done yesterday was remarkable for a mildly prominent common bile duct (7 mm) which narrows significantly just proximal to the pancreatic head, no obstructing stones identified, and adjacent duodenal diverticulum which may be contributing to the bile duct narrowing.    Recommendations:  --EUS as outpatient            Thank you for your consult. I will follow-up with patient. Please contact us if you have any additional questions.    Rachel Barron M.D.  Gastroenterology Fellow, PGY-IV  Pager: [phone]  [facility]

## 2018-07-02 NOTE — PROGRESS NOTES
Physician Attestation for Scribe:  I, Kacey Hayes MD, personally performed the services described in this documentation. All medical record entries made by the scribe were at my direction and in my presence.  I have reviewed this note and agree that the record reflects my personal performance and is accurate and complete.     Hospital Medicine  Progress Note     Patient Name: Mario Sanford  MRN: [id number]  Team: [facility] MED D Kacey Hayes MD  Admit Date: 6/30/2018  NAA 7/4/2018  Code status: Full Code     Principal Problem:  RUQ abdominal pain     Interval history: Feels much better. No significant abdominal pain today. Tolerating diet. Remains tachycardic, but asymptomatic. Patient reported melena to Hepatology team this PM.     Review of Systems   Constitutional: Negative for fever.   Respiratory: Negative for shortness of breath.    Gastrointestinal: Positive for melena. Negative for abdominal pain.         Physical Exam:  Temp:  [97.8 °F (36.6 °C)-98.9 °F (37.2 °C)]   Pulse:  [113-130]   Resp:  [18]   BP: (103-153)/(57-86)   SpO2:  [94 %-97 %]       Temp: 98.8 °F (37.1 °C) (07/02/18 0817)  Pulse: (!) 113 (07/02/18 0817)  Resp: 18 (07/02/18 0817)  BP: 119/72 (07/02/18 0817)  SpO2: 97 % (07/02/18 0817)     Intake/Output Summary (Last 24 hours) at 07/02/18 0901  Last data filed at 07/01/18 1846    Gross per 24 hour   Intake              300 ml   Output              800 ml   Net             -500 ml      Weight: 84.8 kg (186 lb 14.4 oz)  Body mass index is 27.6 kg/m².     Physical Exam   Eyes: Conjunctivae and lids are normal.   Cardiovascular: S1 normal and S2 normal.  Tachycardia present.    Pulmonary/Chest: Effort normal and breath sounds normal.   Abdominal: Soft. Bowel sounds are normal. There is no tenderness.   Musculoskeletal: He exhibits no edema.         Significant Labs:     Recent Labs  Lab 06/30/18  0302 06/30/18  0907 07/01/18  0359 07/02/18  0429   WBC 17.93* 12.20 10.94 12.19   HGB 14.3  13.0* 12.5* 9.9*   HCT 43.3 39.2* 37.5* 29.4*   * 359* 351* 324         Recent Labs  Lab 06/30/18  0302 06/30/18  0907 07/01/18  0359 07/02/18  0428   * 138 138 138   K 3.5 3.6 3.5 3.2*   CL 95 102 102 105   CO2 21* 25 23 23   BUN 41* 36* 27* 37*   CREATININE 2.0* 1.7* 1.4 1.1   * 125* 89 89   CALCIUM 9.9 8.7 9.0 8.1*   MG  --  1.7 1.4* 1.7   PHOS  --  3.7 3.0 2.2*   ALKPHOS 101 186* 299* 265*   ALT 20 115* 319* 195*   AST 20 203* 290* 94*   ALBUMIN 3.3* 2.6* 2.6* 2.2*   PROT 7.8 6.2 6.0 5.0*   BILITOT 1.2* 1.6* 3.8* 1.1*   INR 1.1  --   --   --    LIPASE 61* 56 58  --    AMYLASE  --  49  --   --          Recent Labs  Lab 06/30/18  2338 07/01/18  0624 07/01/18  1238 07/01/18  1722 07/01/18  2255 07/02/18  0842   POCTGLUCOSE 105 96 110 141* 87 103      A1C:      Recent Labs  Lab 05/29/18  0958   HGBA1C 8.7*        Recent Labs  Lab 06/30/18  0302   TROPONINI <0.006     Lactic Acid:      Recent Labs  Lab 06/30/18  0907   LACTATE 0.7      TSH:      Recent Labs  Lab 05/25/18  0842   TSH 1.009      Urine Studies:      Recent Labs  Lab 07/01/18  0915   COLORU Nicci   APPEARANCEUA Hazy*   PHUR 5.0   SPECGRAV 1.015   PROTEINUA 1+*   GLUCUA Negative   KETONESU 1+*   BILIRUBINUA Negative   OCCULTUA 3+*   NITRITE Negative   UROBILINOGEN 4.0   LEUKOCYTESUR Trace*   RBCUA 47*   WBCUA 17*   BACTERIA Few*   SQUAMEPITHEL 0   HYALINECASTS 0         Inpatient Medications prescribed for management of current Problems:   Scheduled Meds:    cefTRIAXone (ROCEPHIN) IVPB  1 g Intravenous Q24H    pantoprazole  40 mg Oral BID AC      Continuous Infusions:   As Needed: acetaminophen, dextrose 50%, dextrose 50%, glucagon (human recombinant), glucose, glucose, hydrALAZINE, HYDROmorphone, HYDROmorphone, insulin aspart U-100, lorazepam, ondansetron, ondansetron, sodium chloride 0.9%           Active Hospital Problems     Diagnosis   POA    *RUQ abdominal pain [R10.11]   Yes    SIRS (systemic inflammatory response syndrome)  "[R65.10]   Yes    Elevated liver enzymes [R74.8]   Yes    PREM (acute kidney injury) [N17.9]   Yes    Essential hypertension [I10]   Yes    Type 2 diabetes mellitus without complication, without long-term current use of insulin [E11.9]   Yes    Hypercholesteremia [E78.00]   Yes       Resolved Hospital Problems     Diagnosis Date Resolved POA   No resolved problems to display.         Overview: Patient is a 63 y.o. male with past medical history of hypertension, dyslipidemia, and recently diagnosed DM-2 admitted to hospital for RUQ pain associated with SIRS, worsening liver enzymes and T.bili. The patient received IVF and his PREM, believed to be due to poor intake, resolved. Urine and blood cultures show NGTD and RUQ U/S was normal. Liver U/S showed a hyperechoic right lobe mass, consistent with hemangioma and an MRCP revealed significant narrowing of the common bile duct near the pancreatic head. Further work-up included Ceruloplasmin which was wnl; Hepatitis viral serologies and autoimmune markers are pending. Iron studies were done to evaluate the patient's normocytic anemia, which was most consistent with anemia of chronic inflammation. Hepatology was consulted and discussed EUS/ERCP with AES who plans to do EUS as outpatient.     Assessment and Plan for Problems addressed today:     RUQ abdominal pain  SIRS (systemic inflammatory response syndrome)  Elevated liver enzymes  · Acute pancreatitis was initially suspected but lipase and RUQ US are normal. Pain persisted and T.bili / liver enzymes trended upward.   · Blood and urine cultures obtained. SIRS rapidly resolved with fluid resuscitation.   · Patient remains tachycardic; EKG NSR   · For analgesia: PRN Hydromorphone 0.5-1mg Q4hr  · Liver U/S (7/1): "Hyperechoic right lobe mass measures 1.0 x 1.0 x 1.0 cm which may represent a hemangioma". MRCP (7/1): "Common bile duct narrows significantly just proximal to the pancreatic head. A 9 mm right hepatic lobe " "lesion, most consistent with a hemangioma".   · Iron studies with normocytic anemia most consistent with anemia of chronic inflammation   · Ceruloplasmin wnl; Hepatitis panel negative; autoimmune markers pending.    · Hepatology consulted: Suspect biliary etiology. Plan for EUS with [facility] as OP.       PREM  · Probably due to poor oral intake / volume depletion. Continued IV fluids until 7/2/2018 AM.  · Holding ARB/HCTZ, NSAIDs.     Anemia, concern for GI blood loss  · Hgb in May was > 16g/dL; it was 14.3 on admission with volume depletion. Now down to about 10 g/dL -- partially due to hemodilution but report of melena concerning for UGI bleeding.  · Starting PPI BID, back to clear liquid diet, repeat H&H, Type and Screen, consulting GI, NPO after MN.     Hematuria  · UA positive for WBC and a few bacteria, Initiated ceftriaxone pending urine culture.  · Urine cultures growing multiple organisms from 6/30 and > 100K Coag-Neg Staph from 7/1. Continuing ceftriaxone for now.     Essential hypertension  · Holding home medications for now. PRN Hydralazine 25mg Q8hr.     Type 2 diabetes mellitus  Hypercholesteremia  · Non-insulin diabetes medications are held and the patient's hyperglycemia is managed with a SQ correction dose insulin regimen.   · Holding ASA and statin for now.     Hypomagnesemia  · Replete as needed     Diet: Clear  GI Prophylaxis: None  DVT Prophylaxis:         Anticoagulants   Medication Route Frequency    heparin (porcine) injection 5,000 Units Subcutaneous Q12H   Lines/Drains/Airway: Peripheral IV- Left AC  Wounds: None      HIGH RISK CONDITION(S):   Patient is currently receiving parenteral controlled substances: Dilaudid      Discharge plan and follow up  Home or Self Care        Provider  Kacey Hayes MD  [facility] MED D   Department of Hospital Medicine     Scribotis Attestation: I personally scribed for Kacey Hayes MD on 07/02/2018 at 2:11 PM. Electronically signed by renzo Das on " 07/02/2018 at 2:11 PM.

## 2018-07-02 NOTE — ASSESSMENT & PLAN NOTE
Patient with decreasing Hgb and report of 1 dark BM, in an abundance of caution would recommend placing patient NPO and starting a PPI. Will perform rectal exam and discuss case with GI attending.    Recommendations:  --NPO after MN   --Monitor BM  --PPI IV BID  --Will perform rectal and discuss case with GI

## 2018-07-02 NOTE — PLAN OF CARE
Problem: Patient Care Overview  Goal: Plan of Care Review  Outcome: Ongoing (interventions implemented as appropriate)  Pt remains free of falls and injury. Pt spouse stayed in room overnight. Pt makes statement of no pain. Ambulates to BR. Bed low and locked, Call light within reach.

## 2018-07-03 ENCOUNTER — ANESTHESIA (OUTPATIENT)
Dept: ENDOSCOPY | Facility: HOSPITAL | Age: 63
DRG: 378 | End: 2018-07-03
Payer: MEDICARE

## 2018-07-03 ENCOUNTER — ANESTHESIA EVENT (OUTPATIENT)
Dept: ENDOSCOPY | Facility: HOSPITAL | Age: 63
DRG: 378 | End: 2018-07-03
Payer: MEDICARE

## 2018-07-03 ENCOUNTER — TELEPHONE (OUTPATIENT)
Dept: GASTROENTEROLOGY | Facility: CLINIC | Age: 63
End: 2018-07-03

## 2018-07-03 DIAGNOSIS — R93.89 ABNORMAL FINDING ON IMAGING: Primary | ICD-10-CM

## 2018-07-03 LAB
ALBUMIN SERPL BCP-MCNC: 2.5 G/DL
ALP SERPL-CCNC: 232 U/L
ALT SERPL W/O P-5'-P-CCNC: 135 U/L
ANCA AB TITR SER IF: NORMAL TITER
ANION GAP SERPL CALC-SCNC: 10 MMOL/L
AST SERPL-CCNC: 41 U/L
BACTERIA UR CULT: NORMAL
BASOPHILS # BLD AUTO: 0.06 K/UL
BASOPHILS NFR BLD: 0.5 %
BILIRUB SERPL-MCNC: 0.9 MG/DL
BUN SERPL-MCNC: 25 MG/DL
CALCIUM SERPL-MCNC: 8.7 MG/DL
CHLORIDE SERPL-SCNC: 109 MMOL/L
CO2 SERPL-SCNC: 21 MMOL/L
CREAT SERPL-MCNC: 1.3 MG/DL
DIFFERENTIAL METHOD: ABNORMAL
EOSINOPHIL # BLD AUTO: 0.4 K/UL
EOSINOPHIL NFR BLD: 3.3 %
ERYTHROCYTE [DISTWIDTH] IN BLOOD BY AUTOMATED COUNT: 12.8 %
EST. GFR  (AFRICAN AMERICAN): >60 ML/MIN/1.73 M^2
EST. GFR  (NON AFRICAN AMERICAN): 57.3 ML/MIN/1.73 M^2
GLUCOSE SERPL-MCNC: 108 MG/DL
HCT VFR BLD AUTO: 30.6 %
HGB BLD-MCNC: 10.1 G/DL
IMM GRANULOCYTES # BLD AUTO: 0.05 K/UL
IMM GRANULOCYTES NFR BLD AUTO: 0.5 %
LKM AB SER QL: 1.2 UNITS
LYMPHOCYTES # BLD AUTO: 4 K/UL
LYMPHOCYTES NFR BLD: 36.3 %
MAGNESIUM SERPL-MCNC: 2 MG/DL
MCH RBC QN AUTO: 30 PG
MCHC RBC AUTO-ENTMCNC: 33 G/DL
MCV RBC AUTO: 91 FL
MONOCYTES # BLD AUTO: 0.9 K/UL
MONOCYTES NFR BLD: 8.5 %
NEUTROPHILS # BLD AUTO: 5.6 K/UL
NEUTROPHILS NFR BLD: 50.9 %
NRBC BLD-RTO: 0 /100 WBC
OB PNL STL: POSITIVE
P-ANCA TITR SER IF: NORMAL TITER
PHOSPHATE SERPL-MCNC: 2 MG/DL
PLATELET # BLD AUTO: 377 K/UL
PMV BLD AUTO: 10.2 FL
POCT GLUCOSE: 115 MG/DL (ref 70–110)
POCT GLUCOSE: 142 MG/DL (ref 70–110)
POCT GLUCOSE: 157 MG/DL (ref 70–110)
POCT GLUCOSE: 177 MG/DL (ref 70–110)
POCT GLUCOSE: 99 MG/DL (ref 70–110)
POTASSIUM SERPL-SCNC: 4.1 MMOL/L
PROT SERPL-MCNC: 5.8 G/DL
RBC # BLD AUTO: 3.37 M/UL
SODIUM SERPL-SCNC: 140 MMOL/L
WBC # BLD AUTO: 11.07 K/UL

## 2018-07-03 PROCEDURE — 82272 OCCULT BLD FECES 1-3 TESTS: CPT

## 2018-07-03 PROCEDURE — 25000003 PHARM REV CODE 250: Performed by: INTERNAL MEDICINE

## 2018-07-03 PROCEDURE — D9220A PRA ANESTHESIA: Mod: CRNA,,, | Performed by: NURSE ANESTHETIST, CERTIFIED REGISTERED

## 2018-07-03 PROCEDURE — 63600175 PHARM REV CODE 636 W HCPCS: Performed by: INTERNAL MEDICINE

## 2018-07-03 PROCEDURE — 11000001 HC ACUTE MED/SURG PRIVATE ROOM

## 2018-07-03 PROCEDURE — 63600175 PHARM REV CODE 636 W HCPCS: Performed by: NURSE ANESTHETIST, CERTIFIED REGISTERED

## 2018-07-03 PROCEDURE — D9220A PRA ANESTHESIA: Mod: ANES,,, | Performed by: ANESTHESIOLOGY

## 2018-07-03 PROCEDURE — 82962 GLUCOSE BLOOD TEST: CPT | Performed by: INTERNAL MEDICINE

## 2018-07-03 PROCEDURE — 36415 COLL VENOUS BLD VENIPUNCTURE: CPT

## 2018-07-03 PROCEDURE — 37000008 HC ANESTHESIA 1ST 15 MINUTES: Performed by: INTERNAL MEDICINE

## 2018-07-03 PROCEDURE — 86677 HELICOBACTER PYLORI ANTIBODY: CPT

## 2018-07-03 PROCEDURE — C9113 INJ PANTOPRAZOLE SODIUM, VIA: HCPCS | Performed by: INTERNAL MEDICINE

## 2018-07-03 PROCEDURE — 43235 EGD DIAGNOSTIC BRUSH WASH: CPT | Performed by: INTERNAL MEDICINE

## 2018-07-03 PROCEDURE — 84100 ASSAY OF PHOSPHORUS: CPT

## 2018-07-03 PROCEDURE — 37000009 HC ANESTHESIA EA ADD 15 MINS: Performed by: INTERNAL MEDICINE

## 2018-07-03 PROCEDURE — 43235 EGD DIAGNOSTIC BRUSH WASH: CPT | Mod: ,,, | Performed by: INTERNAL MEDICINE

## 2018-07-03 PROCEDURE — 99232 SBSQ HOSP IP/OBS MODERATE 35: CPT | Mod: ,,, | Performed by: INTERNAL MEDICINE

## 2018-07-03 PROCEDURE — 0DJ08ZZ INSPECTION OF UPPER INTESTINAL TRACT, VIA NATURAL OR ARTIFICIAL OPENING ENDOSCOPIC: ICD-10-PCS | Performed by: INTERNAL MEDICINE

## 2018-07-03 PROCEDURE — 80053 COMPREHEN METABOLIC PANEL: CPT

## 2018-07-03 PROCEDURE — 85025 COMPLETE CBC W/AUTO DIFF WBC: CPT

## 2018-07-03 PROCEDURE — 83735 ASSAY OF MAGNESIUM: CPT

## 2018-07-03 PROCEDURE — 25000003 PHARM REV CODE 250: Performed by: NURSE ANESTHETIST, CERTIFIED REGISTERED

## 2018-07-03 RX ORDER — PROPOFOL 10 MG/ML
VIAL (ML) INTRAVENOUS
Status: DISCONTINUED | OUTPATIENT
Start: 2018-07-03 | End: 2018-07-03

## 2018-07-03 RX ORDER — CYANOCOBALAMIN 1000 UG/ML
1000 INJECTION, SOLUTION INTRAMUSCULAR; SUBCUTANEOUS ONCE
Status: COMPLETED | OUTPATIENT
Start: 2018-07-03 | End: 2018-07-03

## 2018-07-03 RX ORDER — IBUPROFEN 200 MG
16 TABLET ORAL
Status: DISCONTINUED | OUTPATIENT
Start: 2018-07-03 | End: 2018-07-11 | Stop reason: HOSPADM

## 2018-07-03 RX ORDER — FENTANYL CITRATE 50 UG/ML
INJECTION, SOLUTION INTRAMUSCULAR; INTRAVENOUS
Status: DISCONTINUED | OUTPATIENT
Start: 2018-07-03 | End: 2018-07-03

## 2018-07-03 RX ORDER — SODIUM CHLORIDE 0.9 % (FLUSH) 0.9 %
3 SYRINGE (ML) INJECTION
Status: DISCONTINUED | OUTPATIENT
Start: 2018-07-03 | End: 2018-07-03 | Stop reason: HOSPADM

## 2018-07-03 RX ORDER — IBUPROFEN 200 MG
24 TABLET ORAL
Status: DISCONTINUED | OUTPATIENT
Start: 2018-07-03 | End: 2018-07-11 | Stop reason: HOSPADM

## 2018-07-03 RX ORDER — HYDROMORPHONE HYDROCHLORIDE 1 MG/ML
0.2 INJECTION, SOLUTION INTRAMUSCULAR; INTRAVENOUS; SUBCUTANEOUS EVERY 5 MIN PRN
Status: DISCONTINUED | OUTPATIENT
Start: 2018-07-03 | End: 2018-07-03 | Stop reason: HOSPADM

## 2018-07-03 RX ORDER — GLUCAGON 1 MG
1 KIT INJECTION
Status: DISCONTINUED | OUTPATIENT
Start: 2018-07-03 | End: 2018-07-11 | Stop reason: HOSPADM

## 2018-07-03 RX ORDER — IRON POLYSACCHARIDE COMPLEX 150 MG
150 CAPSULE ORAL DAILY
Status: DISCONTINUED | OUTPATIENT
Start: 2018-07-03 | End: 2018-07-11 | Stop reason: HOSPADM

## 2018-07-03 RX ORDER — FOLIC ACID 1 MG/1
1 TABLET ORAL DAILY
Status: DISCONTINUED | OUTPATIENT
Start: 2018-07-03 | End: 2018-07-11 | Stop reason: HOSPADM

## 2018-07-03 RX ORDER — INSULIN ASPART 100 [IU]/ML
0-5 INJECTION, SOLUTION INTRAVENOUS; SUBCUTANEOUS
Status: DISCONTINUED | OUTPATIENT
Start: 2018-07-03 | End: 2018-07-11 | Stop reason: HOSPADM

## 2018-07-03 RX ORDER — SODIUM CHLORIDE 9 MG/ML
INJECTION, SOLUTION INTRAVENOUS CONTINUOUS PRN
Status: DISCONTINUED | OUTPATIENT
Start: 2018-07-03 | End: 2018-07-03

## 2018-07-03 RX ORDER — SODIUM,POTASSIUM PHOSPHATES 280-250MG
2 POWDER IN PACKET (EA) ORAL
Status: DISCONTINUED | OUTPATIENT
Start: 2018-07-03 | End: 2018-07-08

## 2018-07-03 RX ORDER — LIDOCAINE HCL/PF 100 MG/5ML
SYRINGE (ML) INTRAVENOUS
Status: DISCONTINUED | OUTPATIENT
Start: 2018-07-03 | End: 2018-07-03

## 2018-07-03 RX ADMIN — CYANOCOBALAMIN 1000 MCG: 1000 INJECTION INTRAMUSCULAR; SUBCUTANEOUS at 05:07

## 2018-07-03 RX ADMIN — SODIUM CHLORIDE: 0.9 INJECTION, SOLUTION INTRAVENOUS at 09:07

## 2018-07-03 RX ADMIN — POTASSIUM & SODIUM PHOSPHATES POWDER PACK 280-160-250 MG 2 PACKET: 280-160-250 PACK at 09:07

## 2018-07-03 RX ADMIN — Medication 150 MG: at 05:07

## 2018-07-03 RX ADMIN — DEXTROSE 8 MG/HR: 50 INJECTION, SOLUTION INTRAVENOUS at 05:07

## 2018-07-03 RX ADMIN — PROPOFOL 50 MG: 10 INJECTION, EMULSION INTRAVENOUS at 09:07

## 2018-07-03 RX ADMIN — FENTANYL CITRATE 100 MCG: 50 INJECTION, SOLUTION INTRAMUSCULAR; INTRAVENOUS at 09:07

## 2018-07-03 RX ADMIN — POTASSIUM & SODIUM PHOSPHATES POWDER PACK 280-160-250 MG 2 PACKET: 280-160-250 PACK at 05:07

## 2018-07-03 RX ADMIN — DEXTROSE 8 MG/HR: 50 INJECTION, SOLUTION INTRAVENOUS at 09:07

## 2018-07-03 RX ADMIN — CEFTRIAXONE SODIUM 1 G: 1 INJECTION, POWDER, FOR SOLUTION INTRAMUSCULAR; INTRAVENOUS at 05:07

## 2018-07-03 RX ADMIN — PROPOFOL 20 MG: 10 INJECTION, EMULSION INTRAVENOUS at 09:07

## 2018-07-03 RX ADMIN — DEXTROSE 40 MG: 50 INJECTION, SOLUTION INTRAVENOUS at 11:07

## 2018-07-03 RX ADMIN — LIDOCAINE HYDROCHLORIDE 100 MG: 20 INJECTION, SOLUTION INTRAVENOUS at 09:07

## 2018-07-03 RX ADMIN — PROPOFOL 10 MG: 10 INJECTION, EMULSION INTRAVENOUS at 09:07

## 2018-07-03 RX ADMIN — FOLIC ACID 1 MG: 1 TABLET ORAL at 05:07

## 2018-07-03 NOTE — PLAN OF CARE
Problem: Diabetes, Type 2 (Adult)  Goal: Signs and Symptoms of Listed Potential Problems Will be Absent, Minimized or Managed (Diabetes, Type 2)  Signs and symptoms of listed potential problems will be absent, minimized or managed by discharge/transition of care (reference Diabetes, Type 2 (Adult) CPG).   Outcome: Ongoing (interventions implemented as appropriate)   07/03/18 1538   Diabetes, Type 2   Problems Assessed (Type 2 Diabetes) all   Problems Present (Type 2 Diabetes) none       Problem: Fall Risk (Adult)  Goal: Absence of Falls  Patient will demonstrate the desired outcomes by discharge/transition of care.   Outcome: Ongoing (interventions implemented as appropriate)   07/03/18 1538   Fall Risk (Adult)   Absence of Falls making progress toward outcome       Problem: Patient Care Overview  Goal: Plan of Care Review  Outcome: Ongoing (interventions implemented as appropriate)   07/03/18 1538   Coping/Psychosocial   Plan Of Care Reviewed With patient;spouse       Problem: Infection, Risk/Actual (Adult)  Goal: Identify Related Risk Factors and Signs and Symptoms  Related risk factors and signs and symptoms are identified upon initiation of Human Response Clinical Practice Guideline (CPG)   Outcome: Ongoing (interventions implemented as appropriate)   07/03/18 1538   Infection, Risk/Actual   Related Risk Factors (Infection, Risk/Actual) chronic illness/condition;exposure to microbes   Signs and Symptoms (Infection, Risk/Actual) blood glucose changes;body temperature changes;chills;pain;weakness;respiratory rate increase

## 2018-07-03 NOTE — H&P (VIEW-ONLY)
[facility]  Hepatology  Consult Note    Patient Name: Mario Sanford  MRN: [id number]  Admission Date: 6/30/2018  Hospital Length of Stay: 0 days  Attending Provider: Alissa Hayes MD   Primary Care Physician: Bereket Rivera DO  Principal Problem:RUQ abdominal pain    Inpatient consult to Hepatology  Consult performed by: THIAGO IBRAHIM  Consult ordered by: ALISSA HAYES  Reason for consult: LFTS        Subjective:     Transplant status: No    HPI:  Mario Sanford is a 63 y.o. male with hypertension, dyslipidemia, and recently diagnosed DM-2 (recenlty started on Losartan, HCTZ, Gilimepiride) who presented t hospital on 6/30/2018 with acute onset of severe non-radiating RUQ pain beginning a few hours prior to admission with unchanged course. Pertinent associated symptoms include progressively worsening nausea and vomiting x 1 week. Patient had a routine colonoscopy on 6/19 and has not felt well since that time. He has had pain similar to that today, intermittently for the past week. During hospital course, he was found to have normal lipase, mild leukocytosis, US showed no significant biliary pathologies and MRCP done today showed mild prominence of CBD of 7mm without any filling defects. Hepatology consulted for further evaluation.     He denies having any recent travel, blood transfusion, prior liver disease, family history of liver disease, high risk sexual activity, recent tattoos.      Review of Systems   Constitutional: Negative for activity change and appetite change.   HENT: Negative for ear discharge and facial swelling.    Eyes: Negative for photophobia and redness.   Respiratory: Negative for wheezing and stridor.    Cardiovascular: Negative for chest pain and palpitations.   Gastrointestinal: Positive for abdominal pain. Negative for abdominal distention, anal bleeding, blood in stool and constipation.   Endocrine: Negative for cold intolerance and heat  intolerance.   Genitourinary: Negative for dysuria and frequency.   Skin: Negative for color change and rash.   Allergic/Immunologic: Negative for food allergies.   Neurological: Negative for seizures and numbness.   Hematological: Does not bruise/bleed easily.   Psychiatric/Behavioral: Negative for agitation.       Past Medical History:   Diagnosis Date    Hyperlipidemia     Hypertension     Obesity (BMI 30-39.9)        Past Surgical History:   Procedure Laterality Date    COLONOSCOPY N/A 6/19/2018    Procedure: COLONOSCOPY;  Surgeon: Moris Whalen MD;  Location: [address];  Service: Endoscopy;  Laterality: N/A;  Miralax prep, per Dr. Whalen    TONSILLECTOMY         Family history of liver disease: No    Review of patient's allergies indicates:  No Known Allergies    Social History Main Topics    Smoking status: Never Smoker    Smokeless tobacco: Never Used    Alcohol use No    Drug use: No    Sexual activity: Yes     Partners: Female       Prescriptions Prior to Admission   Medication Sig Dispense Refill Last Dose    aspirin 325 MG tablet Take 81 mg by mouth once daily.    6/29/2018    atorvastatin (LIPITOR) 40 MG tablet Take 1 tablet (40 mg total) by mouth nightly. 90 tablet 3 6/30/2018    glimepiride (AMARYL) 2 MG tablet Take 1 tablet (2 mg total) by mouth before breakfast. 30 tablet 0 6/29/2018    ibuprofen (ADVIL,MOTRIN) 200 MG tablet Take 200 mg by mouth every 6 (six) hours as needed for Pain.   6/29/2018    losartan-hydrochlorothiazide 50-12.5 mg (HYZAAR) 50-12.5 mg per tablet Take 1 tablet by mouth every morning. 90 tablet 1 6/29/2018    blood sugar diagnostic Strp 1 strip by Misc.(Non-Drug; Combo Route) route once daily. Test strips for glucometer insurance covers to check blood sugar twice daily. 200 strip 3 6/18/2018    blood-glucose meter Misc Glucometer that insurance covers to check blood sugar twice daily 1 each 0 6/18/2018 at Unknown time    lancets Misc 1 Device by  Misc.(Non-Drug; Combo Route) route once daily. Lancets for glucometer insurance covers to check blood sugar twice daily. 200 each 3 6/18/2018 at Unknown time       Objective:     Vital Signs (Most Recent):  Temp: 98.4 °F (36.9 °C) (07/01/18 1236)  Pulse: (!) 120 (07/01/18 1300)  Resp: 18 (07/01/18 1236)  BP: (!) 153/86 (07/01/18 1236)  SpO2: 95 % (07/01/18 1236) Vital Signs (24h Range):  Temp:  [98 °F (36.7 °C)-99 °F (37.2 °C)] 98.4 °F (36.9 °C)  Pulse:  [] 120  Resp:  [14-20] 18  SpO2:  [95 %-98 %] 95 %  BP: (130-182)/(75-90) 153/86     Weight: 83.5 kg (184 lb) (07/01/18 0400)  Body mass index is 27.17 kg/m².    Physical Exam   Constitutional: He is oriented to person, place, and time. He appears well-developed.   HENT:   Head: Normocephalic and atraumatic.   Eyes: No scleral icterus.   Neck: Neck supple.   Cardiovascular: Normal rate.  Exam reveals no gallop and no friction rub.    Pulmonary/Chest: Effort normal and breath sounds normal.   Abdominal: Soft. Bowel sounds are normal. He exhibits no distension and no mass. There is no tenderness. There is no rebound and no guarding. No hernia.   Musculoskeletal: He exhibits no edema or tenderness.   Neurological: He is alert and oriented to person, place, and time.   Skin: Skin is warm.   Psychiatric: He has a normal mood and affect.   Vitals reviewed.      MELD-Na score: 16 at 7/1/2018  3:59 AM  MELD score: 16 at 7/1/2018  3:59 AM  Calculated from:  Serum Creatinine: 1.4 mg/dL at 7/1/2018  3:59 AM  Serum Sodium: 138 mmol/L (Rounded to 137) at 7/1/2018  3:59 AM  Total Bilirubin: 3.8 mg/dL at 7/1/2018  3:59 AM  INR(ratio): 1.1 at 6/30/2018  3:02 AM  Age: 63 years    Lab Results   Component Value Date    WBC 10.94 07/01/2018    HGB 12.5 (L) 07/01/2018    HCT 37.5 (L) 07/01/2018    MCV 90 07/01/2018     (H) 07/01/2018     Lab Results   Component Value Date    INR 1.1 06/30/2018     Lab Results   Component Value Date     07/01/2018    K 3.5 07/01/2018     CREATININE 1.4 07/01/2018     Lab Results   Component Value Date    ALBUMIN 2.6 (L) 07/01/2018     (H) 07/01/2018     (H) 07/01/2018    ALKPHOS 299 (H) 07/01/2018    BILITOT 3.8 (H) 07/01/2018     No results found for: AFP  Lab Results   Component Value Date    LIPASE 58 07/01/2018    AMYLASE 49 06/30/2018     No results found for: TACROLIMUS    Imaging:  Reviewed and as noted in HPI.         Assessment/Plan:     * RUQ abdominal pain    So far evidence is concerning for biliary pathology but imaging is not supportive.   Liver lesion suggestive of hemangioma, we will monitor as an outpatient.  We recommend obtaining viral serologies, HIV, autoimmune markers and checking ceruloplasmin and iron studies.   He may benefit from EUS. We will discuss with them and schedule if needed.  Continue supportive care and await serologies            Thank you for your consult. I will follow-up with patient. Please contact us if you have any additional questions.    Caleb Rodriges MD  Hepatology  [facility]

## 2018-07-03 NOTE — PLAN OF CARE
Problem: Patient Care Overview  Goal: Plan of Care Review  Outcome: Ongoing (interventions implemented as appropriate)  Pt remains free from falls and injury. NPO since MN. Spouse remained in room overnight. Pt makes statement of no pain. Bed low and locked, Call light within reach.

## 2018-07-03 NOTE — TREATMENT PLAN
Treatment Plan  07/03/2018  8:21 AM    Patient seen this morning and chart review.  He reports no additional BM and labs show a stable Hgb with continued improvement in LF's.  Please keep NPO for EGD today, will provide further after procedure. Will still need EUS as outpatient.    Rachel Barron M.D.  Gastroenterology Fellow, PGY-V  Pager: [phone]  [facility]

## 2018-07-03 NOTE — PROGRESS NOTES
[facility] Hospital Medicine  Progress Note    Patient Name: Mario Sanford  MRN: [id number]  Patient Class: IP- Inpatient   Admission Date: 6/30/2018  Length of Stay: 1 days  Attending Physician: BRIANNA Ruffin MD  Primary Care Provider: Bereket Rivera DO    [facility] Medicine Team: [facility] MED D RAJINDER Ruffin MD    Subjective:     Principal Problem:RUQ abdominal pain    Overview: Patient is a 63 y.o. male with past medical history of hypertension, dyslipidemia, and recently diagnosed DM-2 admitted to hospital for RUQ pain associated with SIRS, worsening liver enzymes and T.bili. The patient received IVF and his PREM, believed to be due to poor intake, resolved. Urine and blood cultures show NGTD and RUQ U/S was normal. Liver U/S showed a hyperechoic right lobe mass, consistent with hemangioma and an MRCP revealed significant narrowing of the common bile duct near the pancreatic head. Further work-up included Ceruloplasmin which was wnl; Hepatitis viral serologies and autoimmune markers are pending. Iron studies were done to evaluate the patient's normocytic anemia, which was most consistent with anemia of chronic inflammation. Hepatology was consulted and discussed EUS/ERCP with AES who plans to do EUS as outpatient.     Interval History: Mr. Sanford states that he feels well.  His abdominal pain has improved.  He has had one black, tarry stool today.  He is hungry after his EGD and is asking to eat, then also to go home.      Review of Systems   Constitutional: Positive for appetite change. Negative for chills, fatigue and fever.   HENT: Negative for congestion.    Respiratory: Negative for cough and shortness of breath.    Cardiovascular: Negative for chest pain.   Gastrointestinal: Positive for blood in stool. Negative for abdominal pain, constipation, diarrhea, nausea and vomiting.   Neurological: Negative for dizziness and weakness.   Psychiatric/Behavioral: Negative for confusion.  The patient is not nervous/anxious.      Objective:     Vital Signs (Most Recent):  Temp: 98.4 °F (36.9 °C) (07/03/18 0441)  Pulse: 105 (07/03/18 0441)  Resp: 18 (07/03/18 0441)  BP: 108/68 (07/03/18 0441)  SpO2: 98 % (07/03/18 0441) Vital Signs (24h Range):  Temp:  [98.1 °F (36.7 °C)-98.9 °F (37.2 °C)] 98.4 °F (36.9 °C)  Pulse:  [105-118] 105  Resp:  [14-18] 18  SpO2:  [94 %-99 %] 98 %  BP: ()/(51-76) 108/68     Weight: 84.8 kg (186 lb 14.4 oz)  Body mass index is 27.6 kg/m².    Intake/Output Summary (Last 24 hours) at 07/03/18 0802  Last data filed at 07/02/18 1800   Gross per 24 hour   Intake             1560 ml   Output                0 ml   Net             1560 ml      Physical Exam   Constitutional: He is oriented to person, place, and time. He appears well-developed and well-nourished.  Non-toxic appearance. He does not have a sickly appearance. He does not appear ill. No distress.   HENT:   Head: Normocephalic and atraumatic.   Nose: Nose normal.   Mouth/Throat: Oropharynx is clear and moist. No oropharyngeal exudate.   Eyes: Conjunctivae and EOM are normal. Pupils are equal, round, and reactive to light. Right eye exhibits no discharge. Left eye exhibits no discharge. No scleral icterus.   Senile changes   Neck: Normal range of motion. Neck supple. No JVD present. No tracheal deviation present. No thyromegaly present.   Cardiovascular: Normal rate, regular rhythm, normal heart sounds and intact distal pulses.  Exam reveals no gallop and no friction rub.    No murmur heard.  Pulmonary/Chest: Effort normal and breath sounds normal. No accessory muscle usage or stridor. No tachypnea and no bradypnea. No respiratory distress. He has no decreased breath sounds. He has no wheezes. He has no rhonchi. He has no rales. He exhibits no tenderness.   Abdominal: Soft. Bowel sounds are normal. He exhibits no distension and no mass. There is no tenderness. There is no rebound and no guarding.   Truncal obesity    Genitourinary:   Genitourinary Comments: No hill in place   Musculoskeletal: Normal range of motion. He exhibits no edema or tenderness.   Lymphadenopathy:     He has no cervical adenopathy.   No peripheral edema   Neurological: He is alert and oriented to person, place, and time. He displays normal reflexes. No cranial nerve deficit. He exhibits normal muscle tone. Coordination normal. GCS eye subscore is 4. GCS verbal subscore is 5. GCS motor subscore is 6.   Skin: Skin is warm and dry. No rash noted. No erythema. No pallor.   Pale and sallow   Psychiatric: He has a normal mood and affect. His speech is normal and behavior is normal. Judgment and thought content normal. Cognition and memory are normal.   Nursing note and vitals reviewed.      Significant Labs:   Recent Results (from the past 24 hour(s))   POCT glucose    Collection Time: 07/02/18  8:42 AM   Result Value Ref Range    POCT Glucose 103 70 - 110 mg/dL   POCT glucose    Collection Time: 07/02/18 12:51 PM   Result Value Ref Range    POCT Glucose 98 70 - 110 mg/dL   Hemoglobin    Collection Time: 07/02/18  4:44 PM   Result Value Ref Range    Hemoglobin 10.5 (L) 14.0 - 18.0 g/dL   Hematocrit    Collection Time: 07/02/18  4:44 PM   Result Value Ref Range    Hematocrit 31.0 (L) 40.0 - 54.0 %   Type & Screen    Collection Time: 07/02/18  4:44 PM   Result Value Ref Range    Group & Rh B POS     Indirect Yoana NEG    POCT glucose    Collection Time: 07/02/18  5:54 PM   Result Value Ref Range    POCT Glucose 132 (H) 70 - 110 mg/dL   POCT glucose    Collection Time: 07/03/18 12:41 AM   Result Value Ref Range    POCT Glucose 99 70 - 110 mg/dL   Comprehensive metabolic panel    Collection Time: 07/03/18  4:15 AM   Result Value Ref Range    Sodium 140 136 - 145 mmol/L    Potassium 4.1 3.5 - 5.1 mmol/L    Chloride 109 95 - 110 mmol/L    CO2 21 (L) 23 - 29 mmol/L    Glucose 108 70 - 110 mg/dL    BUN, Bld 25 (H) 8 - 23 mg/dL    Creatinine 1.3 0.5 - 1.4 mg/dL     Calcium 8.7 8.7 - 10.5 mg/dL    Total Protein 5.8 (L) 6.0 - 8.4 g/dL    Albumin 2.5 (L) 3.5 - 5.2 g/dL    Total Bilirubin 0.9 0.1 - 1.0 mg/dL    Alkaline Phosphatase 232 (H) 55 - 135 U/L    AST 41 (H) 10 - 40 U/L     (H) 10 - 44 U/L    Anion Gap 10 8 - 16 mmol/L    eGFR if African American >60.0 >60 mL/min/1.73 m^2    eGFR if non  57.3 (A) >60 mL/min/1.73 m^2   CBC auto differential    Collection Time: 07/03/18  4:15 AM   Result Value Ref Range    WBC 11.07 3.90 - 12.70 K/uL    RBC 3.37 (L) 4.60 - 6.20 M/uL    Hemoglobin 10.1 (L) 14.0 - 18.0 g/dL    Hematocrit 30.6 (L) 40.0 - 54.0 %    MCV 91 82 - 98 fL    MCH 30.0 27.0 - 31.0 pg    MCHC 33.0 32.0 - 36.0 g/dL    RDW 12.8 11.5 - 14.5 %    Platelets 377 (H) 150 - 350 K/uL    MPV 10.2 9.2 - 12.9 fL    Immature Granulocytes 0.5 0.0 - 0.5 %    Gran # (ANC) 5.6 1.8 - 7.7 K/uL    Immature Grans (Abs) 0.05 (H) 0.00 - 0.04 K/uL    Lymph # 4.0 1.0 - 4.8 K/uL    Mono # 0.9 0.3 - 1.0 K/uL    Eos # 0.4 0.0 - 0.5 K/uL    Baso # 0.06 0.00 - 0.20 K/uL    nRBC 0 0 /100 WBC    Gran% 50.9 38.0 - 73.0 %    Lymph% 36.3 18.0 - 48.0 %    Mono% 8.5 4.0 - 15.0 %    Eosinophil% 3.3 0.0 - 8.0 %    Basophil% 0.5 0.0 - 1.9 %    Differential Method Automated    Magnesium    Collection Time: 07/03/18  4:15 AM   Result Value Ref Range    Magnesium 2.0 1.6 - 2.6 mg/dL   Phosphorus    Collection Time: 07/03/18  4:15 AM   Result Value Ref Range    Phosphorus 2.0 (L) 2.7 - 4.5 mg/dL   Results for KERRIE GUAJARDO (MRN [id number]) as of 7/3/2018 14:12   Ref. Range 6/30/2018 09:07 7/1/2018 03:59 7/2/2018 04:28 7/2/2018 04:29 7/3/2018 04:15   Creatinine Latest Ref Range: 0.5 - 1.4 mg/dL 1.7 (H) 1.4 1.1  1.3   eGFR if non African American Latest Ref Range: >60 mL/min/1.73 m^2 41.4 (A) 52.4 (A) >60.0  57.3 (A)   eGFR if African American Latest Ref Range: >60 mL/min/1.73 m^2 47.9 (A) >60.0 >60.0  >60.0         Significant Imaging:   EGD:  Impression:           - LA Grade D reflux  "esophagitis. No specimens                         collected.                        - Normal stomach, gastroesophageal junction, cardia                         and gastric fundus.                        - No esophageal or gastric varices.                        - Large 20mm ulcer with adherent clot in second                         part of duodenumm.  Recommendation:       - [facility] to [facility] for [facility] care.                        - Advance diet as tolerated.                        - Use a proton pump inhibitor IV BID for 3 days.                        - The findings and recommendations were discussed                         with the designated responsible adult.  Attending Participation:       I was present and participated during the entire procedure,        including non-contreras portions.  Newton Iglesias MD  7/3/2018 10:19:29 AM      Assessment/Plan:     Duodenal ulcer from peptic ulcer disease  -Counseled the patient to avoid all NSAIDs, ASA, meloxicam until follow up with GI  -EGD confirmed large duodenal ulcer:   -20mm ulcer with adherent clot in second part of duodenum  -Check H. Pylori sab  -Plan Protonix 8mg/hr x 72 hours due to large size of the ulcer per GI    Grade D reflux esophagitis  -No biopsy was performed  -Will need close follow up to rule out Acosta's esophagus    Acute post-hemorrhagic anemia  -Stable to monitor  -Add Niferex, b12 and folate    Narrowing of the common bile duct on MRCP  Elevated liver enzymes  · Acute pancreatitis was initially suspected but lipase and RUQ US are normal. Pain persisted and T.bili / liver enzymes trended upward.   · Blood and urine cultures obtained. SIRS rapidly resolved with fluid resuscitation.   · Patient remains tachycardic; EKG NSR   · For analgesia: PRN Hydromorphone 0.5-1mg Q4hr  · Liver U/S (7/1): "Hyperechoic right lobe mass measures 1.0 x 1.0 x 1.0 cm which may represent a hemangioma". MRCP (7/1): "Common bile duct narrows significantly just " "proximal to the pancreatic head. A 9 mm right hepatic lobe lesion, most consistent with a hemangioma".   · Iron studies with normocytic anemia most consistent with anemia of chronic inflammation   · Ceruloplasmin wnl; Hepatitis panel negative; autoimmune markers pending.    · Hepatology consulted: Suspect biliary etiology. Plan for EUS with AES as OP.       PREM  -Probably due to poor oral intake / volume depletion. Continued IV fluids until 7/2/2018 AM.  -He does admit to heavy Ibuprofen use for his hip pain   -I counseled him against this and the Meloxicam going forward  -Holding ARB/HCTZ, NSAIDs.     Hematuria  · UA positive for WBC and a few bacteria, Initiated ceftriaxone pending urine culture.  · Urine cultures growing multiple organisms from 6/30 and > 100K Coag-Neg Staph from 7/1. Continuing ceftriaxone for now.  · Consider therapy complete at discharge     Essential hypertension  · Holding home medications for now. PRN Hydralazine 25mg Q8hr.     Type 2 diabetes mellitus  Hypercholesteremia  · Non-insulin diabetes medications are held and the patient's hyperglycemia is managed with a SQ correction dose insulin regimen.   · Holding ASA and statin for now.     Hypophosphatemia  · Replete as needed: started Neutraphos AC and HS today     Diet: Clear  GI Prophylaxis: Protonix iv 8mg/hr  DVT Prophylaxis: SCD/TEDs: no heparins due to GIB  Lines/Drains/Airway: Peripheral IV- Left AC    Wounds: None      HIGH RISK CONDITION(S):   Patient is currently receiving parenteral controlled substances: Dilaudid     VTE Risk Mitigation         Ordered     IP VTE HIGH RISK PATIENT  Once      06/30/18 0845     Reason for No Pharmacological VTE Prophylaxis  Once      06/30/18 0845     Place sequential compression device  Until discontinued      06/30/18 0814     Place JOAO hose  Until discontinued      06/30/18 0812             RAJINDER Ruffin MD  Department of Hospital Medicine   [facility]  "

## 2018-07-03 NOTE — INTERVAL H&P NOTE
The patient has been examined and the H&P has been reviewed:    I concur with the findings and no changes have occurred since H&P was written.    Anesthesia/Surgery risks, benefits and alternative options discussed and understood by patient/family.          Active Hospital Problems    Diagnosis  POA    *RUQ abdominal pain [R10.11]  Yes    Normocytic anemia [D64.9]  Yes    Melena [K92.1]  Yes    Hematuria [R31.9]  Yes    SIRS (systemic inflammatory response syndrome) [R65.10]  Yes    Elevated liver enzymes [R74.8]  Yes    PREM (acute kidney injury) [N17.9]  Yes    Essential hypertension [I10]  Yes    Type 2 diabetes mellitus without complication, without long-term current use of insulin [E11.9]  Yes    Hypercholesteremia [E78.00]  Yes      Resolved Hospital Problems    Diagnosis Date Resolved POA   No resolved problems to display.

## 2018-07-03 NOTE — H&P
Short Stay Endoscopy History and Physical    PCP - Bereket Rivera,      Procedure - EGD  ASA - per anesthesia  Mallampati - per anesthesia  History of Anesthesia problems - no  Family history Anesthesia problems -  no   Plan of anesthesia - General    HPI:  This is 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 Hemodynamically stable and FERNANDO with brown stool yesterday. No previous history of EGD.       Reflux - no  Dysphagia - no  Abdominal pain - no  Diarrhea - no    ROS:  Constitutional: No fevers, chills, No weight loss  CV: No chest pain  Pulm: No cough, No shortness of breath  Ophtho: No vision changes  GI: see HPI  Derm: No rash    Medical History:  has a past medical history of Diabetes mellitus; Hyperlipidemia; Hypertension; and Obesity (BMI 30-39.9).    Surgical History:  has a past surgical history that includes Tonsillectomy and Colonoscopy (N/A, 6/19/2018).    Family History: family history includes Diabetes in his father; Heart disease in his mother; Hyperlipidemia in his mother and sister; Hypertension in his father and mother.. Otherwise no colon cancer, inflammatory bowel disease, or GI malignancies.    Social History:  reports that he has never smoked. He has never used smokeless tobacco. He reports that he does not drink alcohol or use drugs.    Review of patient's allergies indicates:  No Known Allergies    Medications:   Prescriptions Prior to Admission   Medication Sig Dispense Refill Last Dose    aspirin 325 MG tablet Take 81 mg by mouth once daily.    6/29/2018    atorvastatin (LIPITOR) 40 MG tablet Take 1 tablet (40 mg total) by mouth nightly. 90 tablet 3 6/30/2018    glimepiride (AMARYL) 2 MG tablet Take 1 tablet (2 mg total) by mouth before breakfast. 30 tablet 0 6/29/2018    ibuprofen (ADVIL,MOTRIN) 200 MG tablet Take 200 mg by mouth every 6 (six) hours as needed for Pain.   6/29/2018     losartan-hydrochlorothiazide 50-12.5 mg (HYZAAR) 50-12.5 mg per tablet Take 1 tablet by mouth every morning. 90 tablet 1 6/29/2018    blood sugar diagnostic Strp 1 strip by Misc.(Non-Drug; Combo Route) route once daily. Test strips for glucometer insurance covers to check blood sugar twice daily. 200 strip 3 6/18/2018    blood-glucose meter Misc Glucometer that insurance covers to check blood sugar twice daily 1 each 0 6/18/2018 at Unknown time    lancets Misc 1 Device by Misc.(Non-Drug; Combo Route) route once daily. Lancets for glucometer insurance covers to check blood sugar twice daily. 200 each 3 6/18/2018 at Unknown time       Physical Exam:    Vital Signs:   Vitals:    07/03/18 0859   BP: 120/81   Pulse: (!) 113   Resp: 20   Temp: 98.1 °F (36.7 °C)       General Appearance: Well appearing in no acute distress  Eyes:    No scleral icterus  ENT: Neck supple, Lips, mucosa, and tongue normal; teeth and gums normal  Lungs: CTA anteriorly  Heart:  Regular rate, S1, S2 normal, no murmurs heard.  Abdomen: Soft, non tender, non distended with normal bowel sounds. No hepatosplenomegaly, ascites, or mass.  Extremities: No edema  Skin: No rash    Labs:  Lab Results   Component Value Date    WBC 11.07 07/03/2018    HGB 10.1 (L) 07/03/2018    HCT 30.6 (L) 07/03/2018     (H) 07/03/2018    CHOL 263 (H) 05/25/2018    TRIG 251 (H) 05/25/2018    HDL 41 05/25/2018     (H) 07/03/2018    AST 41 (H) 07/03/2018     07/03/2018    K 4.1 07/03/2018     07/03/2018    CREATININE 1.3 07/03/2018    BUN 25 (H) 07/03/2018    CO2 21 (L) 07/03/2018    TSH 1.009 05/25/2018    PSA 1.6 05/25/2018    INR 1.1 06/30/2018    HGBA1C 8.7 (H) 05/29/2018       I have explained the risks and benefits of endoscopy procedures to the patient including but not limited to bleeding, perforation, infection, and death.      Yobany Oden MD PGY-IV  Gastroenterology Fellow  [facility]  [phone]

## 2018-07-03 NOTE — ANESTHESIA POSTPROCEDURE EVALUATION
"Anesthesia Post Evaluation    Patient: Mario Sanford    Procedure(s) Performed: Procedure(s) (LRB):  EGD (ESOPHAGOGASTRODUODENOSCOPY) (N/A)    Final Anesthesia Type: general  Patient location during evaluation: [facility]  Patient participation: Yes- Able to Participate  Level of consciousness: awake and alert  Post-procedure vital signs: reviewed and stable  Pain management: adequate  Airway patency: patent  PONV status at discharge: No PONV  Anesthetic complications: no      Cardiovascular status: blood pressure returned to baseline  Respiratory status: unassisted  Hydration status: euvolemic  Follow-up not needed.        Visit Vitals  /61   Pulse 103   Temp 36.3 °C (97.3 °F) (Temporal)   Resp 15   Ht 5' 9" (1.753 m)   Wt 84.8 kg (186 lb 14.4 oz)   SpO2 98%   BMI 27.60 kg/m²       Pain/Renate Score: Pain Assessment Performed: Yes (7/3/2018 10:03 AM)  Presence of Pain: denies (7/3/2018 10:03 AM)  Renate Score: 10 (7/3/2018 10:03 AM)      "

## 2018-07-03 NOTE — TRANSFER OF CARE
"Anesthesia Transfer of Care Note    Patient: Mario Sanford    Procedure(s) Performed: Procedure(s) (LRB):  EGD (ESOPHAGOGASTRODUODENOSCOPY) (N/A)    Patient location: [facility]    Anesthesia Type: general    Transport from OR: Transported from OR on room air with adequate spontaneous ventilation    Post pain: adequate analgesia    Post assessment: no apparent anesthetic complications    Post vital signs: stable    Level of consciousness: awake    Nausea/Vomiting: no nausea/vomiting    Complications: none    Transfer of care protocol was followed      Last vitals:   Visit Vitals  /64   Pulse 103   Temp 36.3 °C (97.3 °F) (Temporal)   Resp 16   Ht 5' 9" (1.753 m)   Wt 84.8 kg (186 lb 14.4 oz)   SpO2 99%   BMI 27.60 kg/m²     "

## 2018-07-03 NOTE — TREATMENT PLAN
Treatment Plan  07/03/2018  3:31 PM    Patient s/p EGD which found:  - LA Grade D reflux esophagitis. No specimens collected.  - Normal stomach, gastroesophageal junction, cardia and gastric fundus.  - No esophageal or gastric varices.  - Large 20mm ulcer with adherent clot in second part of duodenum.    Recommendations are to advance diet as tolerated and to place patient on a PPI ggt for 72 hours.    Patient will need outpatient EUS and EGD and we will give additional recommendations as to the timing.    Rachel Barron M.D.  Gastroenterology Fellow, PGY-V  Pager: [phone]  [facility]

## 2018-07-03 NOTE — [employer] PATIENT INSTRUCTIONS
Discharge Summary/Instructions after an Endoscopic Procedure  Patient Name: Mario Sanford  Patient MRN: [id number]  Patient YOB: 1955 Tuesday, July 03, 2018  Newton Iglesias MD  RESTRICTIONS:  During your procedure today, you received medications for sedation.  These   medications may affect your judgment, balance and coordination.  Therefore,   for 24 hours, you have the following restrictions:   - DO NOT drive a car, operate machinery, make legal/financial decisions,   sign important papers or drink alcohol.    ACTIVITY:  Today: no heavy lifting, straining or running due to procedural   sedation/anesthesia.  The following day: return to full activity including work.  DIET:  Eat and drink normally unless instructed otherwise.     TREATMENT FOR COMMON SIDE EFFECTS:  - Mild abdominal pain, nausea, belching, bloating or excessive gas:  rest,   eat lightly and use a heating pad.  - Sore Throat: treat with throat lozenges and/or gargle with warm salt   water.  - Because air was used during the procedure, expelling large amounts of air   from your rectum or belching is normal.  - If a bowel prep was taken, you may not have a bowel movement for 1-3 days.    This is normal.  SYMPTOMS TO WATCH FOR AND REPORT TO YOUR PHYSICIAN:  1. Abdominal pain or bloating, other than gas cramps.  2. Chest pain.  3. Back pain.  4. Signs of infection such as: chills or fever occurring within 24 hours   after the procedure.  5. Rectal bleeding, which would show as bright red, maroon, or black stools.   (A tablespoon of blood from the rectum is not serious, especially if   hemorrhoids are present.)  6. Vomiting.  7. Weakness or dizziness.  GO DIRECTLY TO THE NEAREST EMERGENCY ROOM IF YOU HAVE ANY OF THE FOLLOWING:      Difficulty breathing              Chills and/or fever over 101 F   Persistent vomiting and/or vomiting blood   Severe abdominal pain   Severe chest pain   Black, tarry stools   Bleeding- more than one tablespoon   Any  other symptom or condition that you feel may need urgent attention  Your doctor recommends these additional instructions:  If any biopsies were taken, your doctors clinic will contact you in 1 to 2   weeks with any results.  - Return patient to [facility] for ongoing care.   - Advance diet as tolerated.   - Use a proton pump inhibitor IV BID for 3 days.   - The findings and recommendations were discussed with the designated   responsible adult.   For questions, problems or results please call your physician - Newton Iglesias MD at Work:  [phone].  [facility], EMERGENCY ROOM PHONE NUMBER: [phone]  IF A COMPLICATION OR EMERGENCY SITUATION ARISES AND YOU ARE UNABLE TO REACH   YOUR PHYSICIAN - GO DIRECTLY TO THE EMERGENCY ROOM.  Newton Iglesias MD  7/3/2018 10:19:29 AM  This report has been verified and signed electronically.  [employer]

## 2018-07-03 NOTE — CONSULTS
Nursing Transfer Note      7/3/2018     Transfer To: 940 from [facility] 33     Transfer via stretcher    Transfer with none    Transported by pct    Medicines sent: none    Chart send with patient: Yes    Notified: KENNETH sun    Patient reassessed at: 7/3/18 at 1034    Upon arrival to floor: patient oriented to room, call bell in reach and bed in lowest position

## 2018-07-03 NOTE — PLAN OF CARE
Pt received from endoscopy - a/o, vss.  Pt denies pain and nausea.  Tolerating po fluids.  Pt will be d/c to floor once 30 minutes has been completed in recovery.

## 2018-07-03 NOTE — ANESTHESIA PREPROCEDURE EVALUATION
07/03/2018  Mario Sanford is a 63 y.o., male.  Pre-operative evaluation for Procedure(s) (LRB):  EGD (ESOPHAGOGASTRODUODENOSCOPY) (N/A)    Mario Sanford is a 63 y.o. male     Patient Active Problem List   Diagnosis    Hypercholesteremia    Obesity (BMI 30-39.9)    Essential hypertension    Type 2 diabetes mellitus without complication, without long-term current use of insulin    Colon cancer screening    RUQ abdominal pain    SIRS (systemic inflammatory response syndrome)    Elevated liver enzymes    PREM (acute kidney injury)    Normocytic anemia    Melena    Hematuria       Review of patient's allergies indicates:  No Known Allergies    No current facility-administered medications on file prior to encounter.      Current Outpatient Prescriptions on File Prior to Encounter   Medication Sig Dispense Refill    aspirin 325 MG tablet Take 81 mg by mouth once daily.       atorvastatin (LIPITOR) 40 MG tablet Take 1 tablet (40 mg total) by mouth nightly. 90 tablet 3    glimepiride (AMARYL) 2 MG tablet Take 1 tablet (2 mg total) by mouth before breakfast. 30 tablet 0    ibuprofen (ADVIL,MOTRIN) 200 MG tablet Take 200 mg by mouth every 6 (six) hours as needed for Pain.      losartan-hydrochlorothiazide 50-12.5 mg (HYZAAR) 50-12.5 mg per tablet Take 1 tablet by mouth every morning. 90 tablet 1    blood sugar diagnostic Strp 1 strip by Misc.(Non-Drug; Combo Route) route once daily. Test strips for glucometer insurance covers to check blood sugar twice daily. 200 strip 3    blood-glucose meter Misc Glucometer that insurance covers to check blood sugar twice daily 1 each 0    lancets Misc 1 Device by Misc.(Non-Drug; Combo Route) route once daily. Lancets for glucometer insurance covers to check blood sugar twice daily. 200 each 3       Past Surgical History:   Procedure Laterality Date    COLONOSCOPY  N/A 6/19/2018    Procedure: COLONOSCOPY;  Surgeon: Moris Whalen MD;  Location: [facility] ([address] [place] [address]);  Service: Endoscopy;  Laterality: N/A;  Miralax prep, per Dr. Whalen    TONSILLECTOMY         Social History     Social History    Marital status:      Spouse name: N/A    Number of children: 1    Years of education: N/A     Occupational History    Retired from sales       Social History Main Topics    Smoking status: Never Smoker    Smokeless tobacco: Never Used    Alcohol use No    Drug use: No    Sexual activity: Yes     Partners: Female     Other Topics Concern    Not on file     Social History Narrative    No narrative on file         CBC:   Recent Labs      07/02/18   0429  07/02/18   1644  07/03/18   0415   WBC  12.19   --   11.07   RBC  3.32*   --   3.37*   HGB  9.9*  10.5*  10.1*   HCT  29.4*  31.0*  30.6*   PLT  324   --   377*   MCV  89   --   91   MCH  29.8   --   30.0   MCHC  33.7   --   33.0       CMP:   Recent Labs      07/02/18   0428  07/03/18   0415   NA  138  140   K  3.2*  4.1   CL  105  109   CO2  23  21*   BUN  37*  25*   CREATININE  1.1  1.3   GLU  89  108   MG  1.7  2.0   PHOS  2.2*  2.0*   CALCIUM  8.1*  8.7   ALBUMIN  2.2*  2.5*   PROT  5.0*  5.8*   ALKPHOS  265*  232*   ALT  195*  135*   AST  94*  41*   BILITOT  1.1*  0.9       2D Echo:  No results found for this or any previous visit.    Anesthesia Evaluation    I have reviewed the Patient Summary Reports.    I have reviewed the Nursing Notes.   I have reviewed the Medications.     Review of Systems  Anesthesia Hx:  No problems with previous Anesthesia  History of prior surgery of interest to airway management or planning: Previous anesthesia: General Denies Family Hx of Anesthesia complications.   Denies Personal Hx of Anesthesia complications.   Cardiovascular:   Exercise tolerance: good Hypertension    Endocrine:   Diabetes        Physical Exam  General:  Well nourished    Airway/Jaw/Neck:  Airway Findings: Mouth Opening:  Normal Tongue: Normal  General Airway Assessment: Adult  Mallampati: III  Improves to II with phonation.  TM Distance: Normal, at least 6 cm  Jaw/Neck Findings:  Neck ROM: Normal ROM      Dental:  Dental Findings: In tact   Chest/Lungs:  Chest/Lungs Findings: Clear to auscultation, Normal Respiratory Rate     Heart/Vascular:  Heart Findings: Rate: Normal  Rhythm: Regular Rhythm  Sounds: Normal        Mental Status:  Mental Status Findings:  Cooperative, Alert and Oriented         Anesthesia Plan  Type of Anesthesia, risks & benefits discussed:  Anesthesia Type:  general  Patient's Preference:   Intra-op Monitoring Plan: standard ASA monitors  Intra-op Monitoring Plan Comments:   Post Op Pain Control Plan: multimodal analgesia  Post Op Pain Control Plan Comments:   Induction:   IV  Beta Blocker:         Informed Consent:  Anesthesia consent signed with patient.  ASA Score: 2     Day of Surgery Review of History & Physical:    H&P update referred to the surgeon.         Ready For Surgery From Anesthesia Perspective.

## 2018-07-04 LAB
ALBUMIN SERPL BCP-MCNC: 2.4 G/DL
ALP SERPL-CCNC: 190 U/L
ALT SERPL W/O P-5'-P-CCNC: 93 U/L
ANION GAP SERPL CALC-SCNC: 9 MMOL/L
AST SERPL-CCNC: 26 U/L
BASOPHILS # BLD AUTO: 0.08 K/UL
BASOPHILS NFR BLD: 0.7 %
BILIRUB SERPL-MCNC: 0.6 MG/DL
BUN SERPL-MCNC: 15 MG/DL
CALCIUM SERPL-MCNC: 8.6 MG/DL
CHLORIDE SERPL-SCNC: 107 MMOL/L
CO2 SERPL-SCNC: 23 MMOL/L
CREAT SERPL-MCNC: 1.1 MG/DL
DIFFERENTIAL METHOD: ABNORMAL
EOSINOPHIL # BLD AUTO: 0.3 K/UL
EOSINOPHIL NFR BLD: 3.1 %
ERYTHROCYTE [DISTWIDTH] IN BLOOD BY AUTOMATED COUNT: 12.7 %
EST. GFR  (AFRICAN AMERICAN): >60 ML/MIN/1.73 M^2
EST. GFR  (NON AFRICAN AMERICAN): >60 ML/MIN/1.73 M^2
GLUCOSE SERPL-MCNC: 126 MG/DL
HCT VFR BLD AUTO: 29.6 %
HGB BLD-MCNC: 9.6 G/DL
IMM GRANULOCYTES # BLD AUTO: 0.07 K/UL
IMM GRANULOCYTES NFR BLD AUTO: 0.6 %
LYMPHOCYTES # BLD AUTO: 3 K/UL
LYMPHOCYTES NFR BLD: 27.3 %
MAGNESIUM SERPL-MCNC: 1.5 MG/DL
MCH RBC QN AUTO: 29.7 PG
MCHC RBC AUTO-ENTMCNC: 32.4 G/DL
MCV RBC AUTO: 92 FL
MONOCYTES # BLD AUTO: 0.8 K/UL
MONOCYTES NFR BLD: 7.7 %
NEUTROPHILS # BLD AUTO: 6.7 K/UL
NEUTROPHILS NFR BLD: 60.6 %
NRBC BLD-RTO: 0 /100 WBC
PHOSPHATE SERPL-MCNC: 3 MG/DL
PLATELET # BLD AUTO: 353 K/UL
PMV BLD AUTO: 10.2 FL
POCT GLUCOSE: 130 MG/DL (ref 70–110)
POCT GLUCOSE: 134 MG/DL (ref 70–110)
POCT GLUCOSE: 153 MG/DL (ref 70–110)
POCT GLUCOSE: 164 MG/DL (ref 70–110)
POTASSIUM SERPL-SCNC: 3.8 MMOL/L
PROT SERPL-MCNC: 5.5 G/DL
RBC # BLD AUTO: 3.23 M/UL
SODIUM SERPL-SCNC: 139 MMOL/L
WBC # BLD AUTO: 10.98 K/UL

## 2018-07-04 PROCEDURE — 93010 ELECTROCARDIOGRAM REPORT: CPT | Mod: ,,, | Performed by: INTERNAL MEDICINE

## 2018-07-04 PROCEDURE — 99232 SBSQ HOSP IP/OBS MODERATE 35: CPT | Mod: ,,, | Performed by: INTERNAL MEDICINE

## 2018-07-04 PROCEDURE — 80053 COMPREHEN METABOLIC PANEL: CPT

## 2018-07-04 PROCEDURE — 63600175 PHARM REV CODE 636 W HCPCS: Performed by: INTERNAL MEDICINE

## 2018-07-04 PROCEDURE — 93005 ELECTROCARDIOGRAM TRACING: CPT

## 2018-07-04 PROCEDURE — 11000001 HC ACUTE MED/SURG PRIVATE ROOM

## 2018-07-04 PROCEDURE — C9113 INJ PANTOPRAZOLE SODIUM, VIA: HCPCS | Performed by: INTERNAL MEDICINE

## 2018-07-04 PROCEDURE — 36415 COLL VENOUS BLD VENIPUNCTURE: CPT

## 2018-07-04 PROCEDURE — 83735 ASSAY OF MAGNESIUM: CPT

## 2018-07-04 PROCEDURE — 84100 ASSAY OF PHOSPHORUS: CPT

## 2018-07-04 PROCEDURE — 25000003 PHARM REV CODE 250: Performed by: INTERNAL MEDICINE

## 2018-07-04 PROCEDURE — 85025 COMPLETE CBC W/AUTO DIFF WBC: CPT

## 2018-07-04 RX ORDER — ATORVASTATIN CALCIUM 20 MG/1
40 TABLET, FILM COATED ORAL DAILY
Status: DISCONTINUED | OUTPATIENT
Start: 2018-07-04 | End: 2018-07-11 | Stop reason: HOSPADM

## 2018-07-04 RX ORDER — ONDANSETRON 4 MG/1
8 TABLET, ORALLY DISINTEGRATING ORAL EVERY 6 HOURS PRN
Status: DISCONTINUED | OUTPATIENT
Start: 2018-07-04 | End: 2018-07-11 | Stop reason: HOSPADM

## 2018-07-04 RX ORDER — OXYCODONE HYDROCHLORIDE 5 MG/1
5 TABLET ORAL EVERY 4 HOURS PRN
Status: DISCONTINUED | OUTPATIENT
Start: 2018-07-04 | End: 2018-07-11 | Stop reason: HOSPADM

## 2018-07-04 RX ORDER — OXYCODONE HYDROCHLORIDE 5 MG/1
10 TABLET ORAL EVERY 4 HOURS PRN
Status: DISCONTINUED | OUTPATIENT
Start: 2018-07-04 | End: 2018-07-11 | Stop reason: HOSPADM

## 2018-07-04 RX ORDER — LANOLIN ALCOHOL/MO/W.PET/CERES
400 CREAM (GRAM) TOPICAL DAILY
Status: DISCONTINUED | OUTPATIENT
Start: 2018-07-04 | End: 2018-07-11 | Stop reason: HOSPADM

## 2018-07-04 RX ADMIN — POTASSIUM BICARBONATE 25 MEQ: 25 TABLET, EFFERVESCENT ORAL at 11:07

## 2018-07-04 RX ADMIN — ATORVASTATIN CALCIUM 40 MG: 20 TABLET, FILM COATED ORAL at 11:07

## 2018-07-04 RX ADMIN — SODIUM CHLORIDE 500 ML: 0.9 INJECTION, SOLUTION INTRAVENOUS at 09:07

## 2018-07-04 RX ADMIN — DEXTROSE 8 MG/HR: 50 INJECTION, SOLUTION INTRAVENOUS at 02:07

## 2018-07-04 RX ADMIN — ONDANSETRON 8 MG: 4 TABLET, ORALLY DISINTEGRATING ORAL at 09:07

## 2018-07-04 RX ADMIN — POTASSIUM & SODIUM PHOSPHATES POWDER PACK 280-160-250 MG 2 PACKET: 280-160-250 PACK at 05:07

## 2018-07-04 RX ADMIN — CEFTRIAXONE SODIUM 1 G: 1 INJECTION, POWDER, FOR SOLUTION INTRAMUSCULAR; INTRAVENOUS at 05:07

## 2018-07-04 RX ADMIN — FOLIC ACID 1 MG: 1 TABLET ORAL at 08:07

## 2018-07-04 RX ADMIN — Medication 400 MG: at 11:07

## 2018-07-04 RX ADMIN — DEXTROSE 8 MG/HR: 50 INJECTION, SOLUTION INTRAVENOUS at 09:07

## 2018-07-04 RX ADMIN — DEXTROSE 8 MG/HR: 50 INJECTION, SOLUTION INTRAVENOUS at 08:07

## 2018-07-04 RX ADMIN — POTASSIUM & SODIUM PHOSPHATES POWDER PACK 280-160-250 MG 2 PACKET: 280-160-250 PACK at 11:07

## 2018-07-04 RX ADMIN — Medication 150 MG: at 08:07

## 2018-07-04 RX ADMIN — POTASSIUM & SODIUM PHOSPHATES POWDER PACK 280-160-250 MG 2 PACKET: 280-160-250 PACK at 09:07

## 2018-07-04 RX ADMIN — POTASSIUM & SODIUM PHOSPHATES POWDER PACK 280-160-250 MG 2 PACKET: 280-160-250 PACK at 06:07

## 2018-07-04 NOTE — PROGRESS NOTES
[facility] Hospital Medicine  Progress Note    Patient Name: Mario Sanford  MRN: [id number]  Patient Class: IP- Inpatient   Admission Date: 6/30/2018  Length of Stay: 2 days  Attending Physician: BRIANNA Ruffin MD  Primary Care Provider: Bereket Rivera [facility] Medicine Team: [facility] HOSP MED D RAJINDER Ruffin MD    Subjective:     Principal Problem:RUQ abdominal pain    Overview: Patient is a 63 y.o. male with past medical history of hypertension, dyslipidemia, and recently diagnosed DM-2 admitted to hospital for RUQ pain associated with SIRS, worsening liver enzymes and T.bili. The patient received IVF and his PREM, believed to be due to poor intake, resolved. Urine and blood cultures show NGTD and RUQ U/S was normal. Liver U/S showed a hyperechoic right lobe mass, consistent with hemangioma and an MRCP revealed significant narrowing of the common bile duct near the pancreatic head. Further work-up included Ceruloplasmin which was wnl; Hepatitis viral serologies and autoimmune markers are pending. Iron studies were done to evaluate the patient's normocytic anemia, which was most consistent with anemia of chronic inflammation. Hepatology was consulted and discussed EUS/ERCP with [facility] who plans to do EUS as outpatient.    Interval History: Mr. Sanford feels well today.  He has no abdominal pains.  He denies nausea or vomiting.    Review of Systems   Constitutional: Positive for appetite change. Negative for chills, fatigue and fever.   HENT: Negative for congestion.    Respiratory: Negative for cough and shortness of breath.    Cardiovascular: Negative for chest pain.   Gastrointestinal: Positive for blood in stool. Negative for abdominal pain, constipation, diarrhea, nausea and vomiting.   Neurological: Negative for dizziness and weakness.   Psychiatric/Behavioral: Negative for confusion. The patient is not nervous/anxious.      Objective:     Vital Signs (Most Recent):  Temp: 98.6 °F (37  °C) (07/04/18 0714)  Pulse: (!) 119 (07/04/18 0714)  Resp: 15 (07/04/18 0714)  BP: 135/77 (07/04/18 0714)  SpO2: 98 % (07/04/18 0714) Vital Signs (24h Range):  Temp:  [97.3 °F (36.3 °C)-99.1 °F (37.3 °C)] 98.6 °F (37 °C)  Pulse:  [100-119] 119  Resp:  [15-20] 15  SpO2:  [95 %-99 %] 98 %  BP: (107-137)/(60-82) 135/77     Weight: 84.8 kg (186 lb 14.4 oz)  Body mass index is 27.6 kg/m².    Intake/Output Summary (Last 24 hours) at 07/04/18 0929  Last data filed at 07/04/18 0600   Gross per 24 hour   Intake             1750 ml   Output              850 ml   Net              900 ml      Physical Exam   Constitutional: He is oriented to person, place, and time. He appears well-developed and well-nourished.  Non-toxic appearance. He does not have a sickly appearance. He does not appear ill. No distress.   HENT:   Head: Normocephalic and atraumatic.   Nose: Nose normal.   Mouth/Throat: Oropharynx is clear and moist. No oropharyngeal exudate.   Eyes: Conjunctivae and EOM are normal. Pupils are equal, round, and reactive to light. Right eye exhibits no discharge. Left eye exhibits no discharge. No scleral icterus.   Senile changes   Neck: Normal range of motion. Neck supple. No JVD present. No tracheal deviation present. No thyromegaly present.   Cardiovascular: Normal rate, regular rhythm, normal heart sounds and intact distal pulses.  Exam reveals no gallop and no friction rub.    No murmur heard.  Pulmonary/Chest: Effort normal and breath sounds normal. No accessory muscle usage or stridor. No tachypnea and no bradypnea. No respiratory distress. He has no decreased breath sounds. He has no wheezes. He has no rhonchi. He has no rales. He exhibits no tenderness.   Abdominal: Soft. Bowel sounds are normal. He exhibits no distension and no mass. There is no tenderness. There is no rebound and no guarding.   Truncal obesity   Genitourinary:   Genitourinary Comments: No hill in place   Musculoskeletal: Normal range of motion. He  exhibits no edema or tenderness.   Lymphadenopathy:     He has no cervical adenopathy.   No peripheral edema   Neurological: He is alert and oriented to person, place, and time. He has normal reflexes. He displays normal reflexes. No cranial nerve deficit. He exhibits normal muscle tone. Coordination normal. GCS eye subscore is 4. GCS verbal subscore is 5. GCS motor subscore is 6.   Skin: Skin is warm and dry. No rash noted. No erythema. No pallor.   Pale and sallow   Psychiatric: He has a normal mood and affect. His speech is normal and behavior is normal. Judgment and thought content normal. Cognition and memory are normal.   Nursing note and vitals reviewed.      Significant Labs:   Recent Results (from the past 24 hour(s))   POCT glucose    Collection Time: 07/03/18 10:07 AM   Result Value Ref Range    POCT Glucose 115 (H) 70 - 110 mg/dL   POCT glucose    Collection Time: 07/03/18 12:21 PM   Result Value Ref Range    POCT Glucose 142 (H) 70 - 110 mg/dL   POCT glucose    Collection Time: 07/03/18  5:37 PM   Result Value Ref Range    POCT Glucose 157 (H) 70 - 110 mg/dL   POCT glucose    Collection Time: 07/03/18  9:38 PM   Result Value Ref Range    POCT Glucose 177 (H) 70 - 110 mg/dL   Comprehensive metabolic panel    Collection Time: 07/04/18  4:25 AM   Result Value Ref Range    Sodium 139 136 - 145 mmol/L    Potassium 3.8 3.5 - 5.1 mmol/L    Chloride 107 95 - 110 mmol/L    CO2 23 23 - 29 mmol/L    Glucose 126 (H) 70 - 110 mg/dL    BUN, Bld 15 8 - 23 mg/dL    Creatinine 1.1 0.5 - 1.4 mg/dL    Calcium 8.6 (L) 8.7 - 10.5 mg/dL    Total Protein 5.5 (L) 6.0 - 8.4 g/dL    Albumin 2.4 (L) 3.5 - 5.2 g/dL    Total Bilirubin 0.6 0.1 - 1.0 mg/dL    Alkaline Phosphatase 190 (H) 55 - 135 U/L    AST 26 10 - 40 U/L    ALT 93 (H) 10 - 44 U/L    Anion Gap 9 8 - 16 mmol/L    eGFR if African American >60.0 >60 mL/min/1.73 m^2    eGFR if non African American >60.0 >60 mL/min/1.73 m^2   CBC auto differential    Collection Time:  07/04/18  4:25 AM   Result Value Ref Range    WBC 10.98 3.90 - 12.70 K/uL    RBC 3.23 (L) 4.60 - 6.20 M/uL    Hemoglobin 9.6 (L) 14.0 - 18.0 g/dL    Hematocrit 29.6 (L) 40.0 - 54.0 %    MCV 92 82 - 98 fL    MCH 29.7 27.0 - 31.0 pg    MCHC 32.4 32.0 - 36.0 g/dL    RDW 12.7 11.5 - 14.5 %    Platelets 353 (H) 150 - 350 K/uL    MPV 10.2 9.2 - 12.9 fL    Immature Granulocytes 0.6 (H) 0.0 - 0.5 %    Gran # (ANC) 6.7 1.8 - 7.7 K/uL    Immature Grans (Abs) 0.07 (H) 0.00 - 0.04 K/uL    Lymph # 3.0 1.0 - 4.8 K/uL    Mono # 0.8 0.3 - 1.0 K/uL    Eos # 0.3 0.0 - 0.5 K/uL    Baso # 0.08 0.00 - 0.20 K/uL    nRBC 0 0 /100 WBC    Gran% 60.6 38.0 - 73.0 %    Lymph% 27.3 18.0 - 48.0 %    Mono% 7.7 4.0 - 15.0 %    Eosinophil% 3.1 0.0 - 8.0 %    Basophil% 0.7 0.0 - 1.9 %    Differential Method Automated    Magnesium    Collection Time: 07/04/18  4:25 AM   Result Value Ref Range    Magnesium 1.5 (L) 1.6 - 2.6 mg/dL   Phosphorus    Collection Time: 07/04/18  4:25 AM   Result Value Ref Range    Phosphorus 3.0 2.7 - 4.5 mg/dL   POCT glucose    Collection Time: 07/04/18  7:16 AM   Result Value Ref Range    POCT Glucose 153 (H) 70 - 110 mg/dL   Results for CASANDRA KERRIE (MRN [id number]) as of 7/4/2018 09:13   Ref. Range 7/2/2018 04:28   Smooth Muscle Ab Latest Ref Range: Negative  Negative 1:40   Anti-Liver/Kidney Microsome Ab Latest Ref Range: <=20 UNITS 1.2   Cytoplasmic Neutrophilic Ab Latest Ref Range: <1:20 Titer <1:20   Perinuclear (P-ANCA) Latest Ref Range: <1:20 Titer <1:20   Results for KERRIE GUAJARDO (MRN [id number]) as of 7/4/2018 09:13   Ref. Range 6/30/2018 03:02 6/30/2018 09:07 7/1/2018 03:59 7/2/2018 04:28 7/2/2018 04:29 7/2/2018 16:44 7/3/2018 04:15 7/4/2018 04:25   Hemoglobin Latest Ref Range: 14.0 - 18.0 g/dL 14.3 13.0 (L) 12.5 (L)  9.9 (L) 10.5 (L) 10.1 (L) 9.6 (L)   Hematocrit Latest Ref Range: 40.0 - 54.0 % 43.3 39.2 (L) 37.5 (L)  29.4 (L) 31.0 (L) 30.6 (L) 29.6 (L)     04-JUL-2018 09:26:30 EKG   Sinus  "tachycardia  Otherwise normal ECG  When compared with ECG of 01-JUL-2018 15:43,  No significant change was found    Assessment/Plan:      Duodenal ulcer from peptic ulcer disease  -Counseled the patient to avoid all NSAIDs, ASA, meloxicam until follow up with GI  -EGD on 7/3/18 confirmed large duodenal ulcer:   -20mm ulcer with adherent clot in second part of duodenum  -pantoprazole (PROTONIX) 40 mg in dextrose 5 % 100 mL infusion, 8 mg/hr, Intravenous, Continuous x 72 hours  -Checked H. Pylori sab - pending     Grade D reflux esophagitis  -No biopsy was performed during EGD  -Will need close follow up to rule out Acosta's esophagus     Acute post-hemorrhagic anemia  -Stable to monitor  -folic acid tablet 1 mg, 1 mg, Oral, Daily  -iron polysaccharides capsule 150 mg, 150 mg, Oral, Daily    Sustained sinus tachycardia  -Odd this is not resolving with fluids   -Check TSH and EKG  -NS 500cc bolus    Narrowing of the common bile duct on MRCP  Elevated liver enzymes  -Acute pancreatitis was initially suspected but lipase and RUQ US are normal.   -Pain persisted and T.bili / liver enzymes trended upward.   -SIRS rapidly resolved with fluid resuscitation.   -Liver U/S (7/1): "Hyperechoic right lobe mass measures 1.0 x 1.0 x 1.0 cm which may represent a hemangioma".   -MRCP (7/1): "Common bile duct narrows significantly just proximal to the pancreatic head. A 9 mm right hepatic lobe lesion, most consistent with a hemangioma".   -Iron studies with normocytic anemia most consistent with anemia of chronic inflammation   -Ceruloplasmin wnl; Hepatitis panel negative, auto-immune panels negative as well  -Hepatology consulted: Suspect biliary etiology. Plan for EUS with [facility] as outpatient  -Continuing his Lipitor with care and close LFT follow up      Acute kidney injury  -Creatinine today: 1.1 (Max of 2 on 6/30)  -Probably due to poor oral intake / volume depletion.   -He does admit to heavy Ibuprofen use for his hip pain        " -I counseled him against this and the Meloxicam going forward  -Holding ARB/HCTZ, NSAIDs currently     Acute cystitis with hematuria  -UA positive for WBC and a few bacteria, Initiated ceftriaxone pending urine culture.  -Urine cultures growing multiple organisms from 6/30 and > 100K Coag-Neg Staph from7/1.   -Continuing cefTRIAXone injection 1 g, 1 g, Intravenous, Q24H for now.  -Will consider therapy complete at discharge     Essential hypertension  -Holding home Hyzaar 50/12.5mg po qday currently due to resolving PREM  -hydrALAZINE tablet 25 mg, 25 mg, Oral, Q8H PRN SBP>170     Type 2 diabetes mellitus, controlled, no complications  -Currently maintaining well on 2000 calorie ada diet and SSI  -At home he takes Amaryl 2mg po qam    Hypercholesteremia  -Lipitor 40mg po qday     Hypophosphatemia  -started potassium, sodium phosphates 280-160-250 mg packet 2 packet, 2 packet, Oral, QID (AC & HS) on 7/3    Hypomagnesemia  -Added Mg Oxide 400mg po qday on 7/4    Diet: Clear  GI Prophylaxis: Protonix iv 8mg/hr  DVT Prophylaxis: SCD/TEDs: no heparins due to GIB  Lines/Drains/Airway: Peripheral IV- Left AC     Wounds: None      HIGH RISK CONDITION(S):   Patient is currently receiving parenteral controlled    VTE Risk Mitigation         Ordered     IP VTE HIGH RISK PATIENT  Once      06/30/18 0845     Reason for No Pharmacological VTE Prophylaxis  Once      06/30/18 0845     Place sequential compression device  Until discontinued      06/30/18 0814     Place JOAO hose  Until discontinued      06/30/18 0812             RAJINDER Ruffin MD  Department of Hospital Medicine   [facility]

## 2018-07-04 NOTE — TREATMENT PLAN
"Treatment Plan  07/04/2018  9:16 AM    Interval history:  Patient s/p EGD with large duodenal ulcer as well as esophagitis.  He reports 1 dark BM overnight but otherwise no lightheadedness, near syncope, shortness of breath, or chest pain.    Focused physical exam:  /77   Pulse (!) 119   Temp 98.6 °F (37 °C)   Resp 15   Ht 5' 9" (1.753 m)   Wt 84.8 kg (186 lb 14.4 oz)   SpO2 98%   BMI 27.60 kg/m²   Abdomen: soft and non-tender    Labs:   7/4/2018 04:25   Sodium 139   Potassium 3.8   Chloride 107   CO2 23   Anion Gap 9   BUN, Bld 15   Creatinine 1.1   eGFR if non African American >60.0   eGFR if African American >60.0   Glucose 126 (H)   Calcium 8.6 (L)   Phosphorus 3.0   Magnesium 1.5 (L)   Alkaline Phosphatase 190 (H)   Total Protein 5.5 (L)   Albumin 2.4 (L)   Total Bilirubin 0.6   AST 26   ALT 93 (H)      7/4/2018 04:25   WBC 10.98   RBC 3.23 (L)   Hemoglobin 9.6 (L)   Hematocrit 29.6 (L)   MCV 92   MCH 29.7   MCHC 32.4   RDW 12.7   Platelets 353 (H)       Recommendations:  63 year old male with a history of HTN, HLD, and DM Type 2 on who Hepatology was consulted for further recommendations on his RUQ pain/elevated LFT's. MRCP with narrowing at the CBD as well as duodenal diverticulum however LFTs have normalized and patient will therefore need EUS as outpatient. D/C held on Monday due to a reported dark stool which prompted an EGD. EGD remarkable for esophagitis and a duodenal ulcer. From a liver perspective we have continued to see improvement in his LFT's, he does have a persistent elevated HR (which to me is sinus) and I cannot simply explain from hypovolemia from ulcer and therefore notified team in order to monitor closely.    Summary of serology:  ANCA (-), ALK (-), ASMA (-), NORA (-), Ceruloplasmin (within normal range)  HIV (-), Hep C Ab (-), Hep B S Ab (-), Hep B S Ant (-)    --Advance diet as tolerated  --PPI gtt 72 hours from scope, on D/C continue on oral PPI  --NO NSAID use for pain " patient should only use Tylenol  --Will arrange EUS/EGD-we initially wanted an EUS as an outpatient but will work on coordinating for both to be done at the same time  --Hepatology to sign off, please call us if there are any additional questions.    Rachel Barron M.D.  Gastroenterology Fellow, PGY-V  Pager: [phone]  [facility]

## 2018-07-05 LAB
ALBUMIN SERPL BCP-MCNC: 2.4 G/DL
ALP SERPL-CCNC: 157 U/L
ALT SERPL W/O P-5'-P-CCNC: 67 U/L
ANION GAP SERPL CALC-SCNC: 9 MMOL/L
AST SERPL-CCNC: 19 U/L
BACTERIA BLD CULT: NORMAL
BACTERIA BLD CULT: NORMAL
BASOPHILS # BLD AUTO: 0.05 K/UL
BASOPHILS NFR BLD: 0.5 %
BILIRUB SERPL-MCNC: 0.7 MG/DL
BUN SERPL-MCNC: 17 MG/DL
CALCIUM SERPL-MCNC: 8.3 MG/DL
CHLORIDE SERPL-SCNC: 107 MMOL/L
CO2 SERPL-SCNC: 26 MMOL/L
CREAT SERPL-MCNC: 1.4 MG/DL
DIFFERENTIAL METHOD: ABNORMAL
EOSINOPHIL # BLD AUTO: 0.2 K/UL
EOSINOPHIL NFR BLD: 2 %
ERYTHROCYTE [DISTWIDTH] IN BLOOD BY AUTOMATED COUNT: 13.1 %
EST. GFR  (AFRICAN AMERICAN): >60 ML/MIN/1.73 M^2
EST. GFR  (NON AFRICAN AMERICAN): 52.4 ML/MIN/1.73 M^2
GLUCOSE SERPL-MCNC: 119 MG/DL
H PYLORI IGG SERPL QL IA: NEGATIVE
HCT VFR BLD AUTO: 26.4 %
HGB BLD-MCNC: 8.4 G/DL
IMM GRANULOCYTES # BLD AUTO: 0.05 K/UL
IMM GRANULOCYTES NFR BLD AUTO: 0.5 %
LYMPHOCYTES # BLD AUTO: 2.5 K/UL
LYMPHOCYTES NFR BLD: 26.3 %
MAGNESIUM SERPL-MCNC: 1.3 MG/DL
MCH RBC QN AUTO: 30.1 PG
MCHC RBC AUTO-ENTMCNC: 31.8 G/DL
MCV RBC AUTO: 95 FL
MONOCYTES # BLD AUTO: 0.9 K/UL
MONOCYTES NFR BLD: 9 %
NEUTROPHILS # BLD AUTO: 5.9 K/UL
NEUTROPHILS NFR BLD: 61.7 %
NRBC BLD-RTO: 0 /100 WBC
PHOSPHATE SERPL-MCNC: 4 MG/DL
PLATELET # BLD AUTO: 341 K/UL
PMV BLD AUTO: 10.3 FL
POCT GLUCOSE: 127 MG/DL (ref 70–110)
POCT GLUCOSE: 135 MG/DL (ref 70–110)
POCT GLUCOSE: 150 MG/DL (ref 70–110)
POCT GLUCOSE: 166 MG/DL (ref 70–110)
POTASSIUM SERPL-SCNC: 4.3 MMOL/L
PROT SERPL-MCNC: 5.4 G/DL
RBC # BLD AUTO: 2.79 M/UL
SODIUM SERPL-SCNC: 142 MMOL/L
TSH SERPL DL<=0.005 MIU/L-ACNC: 0.91 UIU/ML
WBC # BLD AUTO: 9.55 K/UL

## 2018-07-05 PROCEDURE — 84100 ASSAY OF PHOSPHORUS: CPT

## 2018-07-05 PROCEDURE — 83735 ASSAY OF MAGNESIUM: CPT

## 2018-07-05 PROCEDURE — 84443 ASSAY THYROID STIM HORMONE: CPT

## 2018-07-05 PROCEDURE — 85025 COMPLETE CBC W/AUTO DIFF WBC: CPT

## 2018-07-05 PROCEDURE — C9113 INJ PANTOPRAZOLE SODIUM, VIA: HCPCS | Performed by: INTERNAL MEDICINE

## 2018-07-05 PROCEDURE — 11000001 HC ACUTE MED/SURG PRIVATE ROOM

## 2018-07-05 PROCEDURE — 63600175 PHARM REV CODE 636 W HCPCS: Performed by: INTERNAL MEDICINE

## 2018-07-05 PROCEDURE — 36415 COLL VENOUS BLD VENIPUNCTURE: CPT

## 2018-07-05 PROCEDURE — 99232 SBSQ HOSP IP/OBS MODERATE 35: CPT | Mod: ,,, | Performed by: INTERNAL MEDICINE

## 2018-07-05 PROCEDURE — 80053 COMPREHEN METABOLIC PANEL: CPT

## 2018-07-05 PROCEDURE — 25000003 PHARM REV CODE 250: Performed by: INTERNAL MEDICINE

## 2018-07-05 RX ORDER — CEFTRIAXONE 1 G/1
1 INJECTION, POWDER, FOR SOLUTION INTRAMUSCULAR; INTRAVENOUS
Status: COMPLETED | OUTPATIENT
Start: 2018-07-05 | End: 2018-07-06

## 2018-07-05 RX ORDER — MAGNESIUM SULFATE HEPTAHYDRATE 40 MG/ML
2 INJECTION, SOLUTION INTRAVENOUS ONCE
Status: COMPLETED | OUTPATIENT
Start: 2018-07-05 | End: 2018-07-05

## 2018-07-05 RX ADMIN — POTASSIUM & SODIUM PHOSPHATES POWDER PACK 280-160-250 MG 2 PACKET: 280-160-250 PACK at 06:07

## 2018-07-05 RX ADMIN — FOLIC ACID 1 MG: 1 TABLET ORAL at 08:07

## 2018-07-05 RX ADMIN — ATORVASTATIN CALCIUM 40 MG: 20 TABLET, FILM COATED ORAL at 08:07

## 2018-07-05 RX ADMIN — DEXTROSE 8 MG/HR: 50 INJECTION, SOLUTION INTRAVENOUS at 09:07

## 2018-07-05 RX ADMIN — DEXTROSE 8 MG/HR: 50 INJECTION, SOLUTION INTRAVENOUS at 02:07

## 2018-07-05 RX ADMIN — Medication 400 MG: at 08:07

## 2018-07-05 RX ADMIN — POTASSIUM & SODIUM PHOSPHATES POWDER PACK 280-160-250 MG 2 PACKET: 280-160-250 PACK at 11:07

## 2018-07-05 RX ADMIN — DEXTROSE 8 MG/HR: 50 INJECTION, SOLUTION INTRAVENOUS at 03:07

## 2018-07-05 RX ADMIN — MAGNESIUM SULFATE IN WATER 2 G: 40 INJECTION, SOLUTION INTRAVENOUS at 11:07

## 2018-07-05 RX ADMIN — POTASSIUM & SODIUM PHOSPHATES POWDER PACK 280-160-250 MG 2 PACKET: 280-160-250 PACK at 05:07

## 2018-07-05 RX ADMIN — CEFTRIAXONE SODIUM 1 G: 1 INJECTION, POWDER, FOR SOLUTION INTRAMUSCULAR; INTRAVENOUS at 02:07

## 2018-07-05 RX ADMIN — Medication 150 MG: at 08:07

## 2018-07-05 RX ADMIN — POTASSIUM & SODIUM PHOSPHATES POWDER PACK 280-160-250 MG 2 PACKET: 280-160-250 PACK at 09:07

## 2018-07-05 NOTE — PROGRESS NOTES
[facility] Hospital Medicine  Progress Note    Patient Name: Mario Sanford  MRN: [id number]  Patient Class: IP- Inpatient   Admission Date: 6/30/2018  Length of Stay: 3 days  Attending Physician: BRIANNA Ruffin MD  Primary Care Provider: Bereket Rivera DO    [facility] Medicine Team: [facility] HOSP MED D RAJINDER Ruffin MD    Subjective:     Principal Problem:RUQ abdominal pain     Overview: Patient is a 63 y.o. male with past medical history of hypertension, dyslipidemia, and recently diagnosed DM-2 admitted to hospital for RUQ pain associated with SIRS, worsening liver enzymes and T.bili. The patient received IVF and his PREM, believed to be due to poor intake, resolved. Urine and blood cultures show NGTD and RUQ U/S was normal. Liver U/S showed a hyperechoic right lobe mass, consistent with hemangioma and an MRCP revealed significant narrowing of the common bile duct near the pancreatic head. Further work-up included Ceruloplasmin which was wnl; Hepatitis viral serologies and autoimmune markers are pending. Iron studies were done to evaluate the patient's normocytic anemia, which was most consistent with anemia of chronic inflammation. Hepatology was consulted and discussed EUS/ERCP with AES who plans to do EUS as outpatient.    Interval History: Mr. Sanford is doing well today.  He has minimal dizziness.  He is able to sit up and walk around unassisted.  He still notes some melanotic stools passing.  He has no abdominal pain.    Review of Systems   Constitutional: Positive for appetite change. Negative for chills, fatigue and fever.   HENT: Negative for congestion.    Respiratory: Negative for cough and shortness of breath.    Cardiovascular: Negative for chest pain.   Gastrointestinal: Positive for blood in stool. Negative for abdominal pain, constipation, diarrhea, nausea and vomiting.   Neurological: Negative for dizziness and weakness.   Psychiatric/Behavioral: Negative for confusion. The  patient is not nervous/anxious.      Objective:     Vital Signs (Most Recent):  Temp: 98.3 °F (36.8 °C) (07/05/18 0722)  Pulse: 107 (07/05/18 0722)  Resp: 15 (07/05/18 0722)  BP: 136/86 (07/05/18 0722)  SpO2: 98 % (07/05/18 0722) Vital Signs (24h Range):  Temp:  [97.8 °F (36.6 °C)-99.3 °F (37.4 °C)] 98.3 °F (36.8 °C)  Pulse:  [101-117] 107  Resp:  [15-20] 15  SpO2:  [94 %-99 %] 98 %  BP: (110-136)/(57-86) 136/86     Weight: 84.8 kg (186 lb 15.2 oz)  Body mass index is 27.61 kg/m².    Intake/Output Summary (Last 24 hours) at 07/05/18 0937  Last data filed at 07/04/18 1300   Gross per 24 hour   Intake              360 ml   Output                0 ml   Net              360 ml      Physical Exam   Constitutional: He is oriented to person, place, and time. He appears well-developed and well-nourished.  Non-toxic appearance. He does not have a sickly appearance. He does not appear ill. No distress.   HENT:   Head: Normocephalic and atraumatic.   Nose: Nose normal.   Mouth/Throat: Oropharynx is clear and moist. No oropharyngeal exudate.   Eyes: Conjunctivae and EOM are normal. Pupils are equal, round, and reactive to light. Right eye exhibits no discharge. Left eye exhibits no discharge. No scleral icterus.   Senile changes   Neck: Normal range of motion. Neck supple. No JVD present. No tracheal deviation present. No thyromegaly present.   Cardiovascular: Normal rate, regular rhythm, normal heart sounds and intact distal pulses.  Exam reveals no gallop and no friction rub.    No murmur heard.  Pulmonary/Chest: Effort normal and breath sounds normal. No accessory muscle usage or stridor. No tachypnea and no bradypnea. No respiratory distress. He has no decreased breath sounds. He has no wheezes. He has no rhonchi. He has no rales. He exhibits no tenderness.   Abdominal: Soft. Bowel sounds are normal. He exhibits no distension and no mass. There is no tenderness. There is no rebound and no guarding.   Truncal obesity    Genitourinary:   Genitourinary Comments: No hill in place   Musculoskeletal: Normal range of motion. He exhibits no edema or tenderness.   Lymphadenopathy:     He has no cervical adenopathy.   No peripheral edema   Neurological: He is alert and oriented to person, place, and time. He has normal reflexes. He displays normal reflexes. No cranial nerve deficit. He exhibits normal muscle tone. Coordination normal. GCS eye subscore is 4. GCS verbal subscore is 5. GCS motor subscore is 6.   Skin: Skin is warm and dry. No rash noted. No erythema. No pallor.   Pale and sallow   Psychiatric: He has a normal mood and affect. His speech is normal and behavior is normal. Judgment and thought content normal. Cognition and memory are normal.   Nursing note and vitals reviewed.      Significant Labs:   Recent Results (from the past 24 hour(s))   POCT glucose    Collection Time: 07/04/18 12:08 PM   Result Value Ref Range    POCT Glucose 134 (H) 70 - 110 mg/dL   POCT glucose    Collection Time: 07/04/18  5:32 PM   Result Value Ref Range    POCT Glucose 164 (H) 70 - 110 mg/dL   POCT glucose    Collection Time: 07/04/18  9:21 PM   Result Value Ref Range    POCT Glucose 130 (H) 70 - 110 mg/dL   Comprehensive metabolic panel    Collection Time: 07/05/18  4:03 AM   Result Value Ref Range    Sodium 142 136 - 145 mmol/L    Potassium 4.3 3.5 - 5.1 mmol/L    Chloride 107 95 - 110 mmol/L    CO2 26 23 - 29 mmol/L    Glucose 119 (H) 70 - 110 mg/dL    BUN, Bld 17 8 - 23 mg/dL    Creatinine 1.4 0.5 - 1.4 mg/dL    Calcium 8.3 (L) 8.7 - 10.5 mg/dL    Total Protein 5.4 (L) 6.0 - 8.4 g/dL    Albumin 2.4 (L) 3.5 - 5.2 g/dL    Total Bilirubin 0.7 0.1 - 1.0 mg/dL    Alkaline Phosphatase 157 (H) 55 - 135 U/L    AST 19 10 - 40 U/L    ALT 67 (H) 10 - 44 U/L    Anion Gap 9 8 - 16 mmol/L    eGFR if African American >60.0 >60 mL/min/1.73 m^2    eGFR if non African American 52.4 (A) >60 mL/min/1.73 m^2   CBC auto differential    Collection Time: 07/05/18   4:03 AM   Result Value Ref Range    WBC 9.55 3.90 - 12.70 K/uL    RBC 2.79 (L) 4.60 - 6.20 M/uL    Hemoglobin 8.4 (L) 14.0 - 18.0 g/dL    Hematocrit 26.4 (L) 40.0 - 54.0 %    MCV 95 82 - 98 fL    MCH 30.1 27.0 - 31.0 pg    MCHC 31.8 (L) 32.0 - 36.0 g/dL    RDW 13.1 11.5 - 14.5 %    Platelets 341 150 - 350 K/uL    MPV 10.3 9.2 - 12.9 fL    Immature Granulocytes 0.5 0.0 - 0.5 %    Gran # (ANC) 5.9 1.8 - 7.7 K/uL    Immature Grans (Abs) 0.05 (H) 0.00 - 0.04 K/uL    Lymph # 2.5 1.0 - 4.8 K/uL    Mono # 0.9 0.3 - 1.0 K/uL    Eos # 0.2 0.0 - 0.5 K/uL    Baso # 0.05 0.00 - 0.20 K/uL    nRBC 0 0 /100 WBC    Gran% 61.7 38.0 - 73.0 %    Lymph% 26.3 18.0 - 48.0 %    Mono% 9.0 4.0 - 15.0 %    Eosinophil% 2.0 0.0 - 8.0 %    Basophil% 0.5 0.0 - 1.9 %    Differential Method Automated    Magnesium    Collection Time: 07/05/18  4:03 AM   Result Value Ref Range    Magnesium 1.3 (L) 1.6 - 2.6 mg/dL   Phosphorus    Collection Time: 07/05/18  4:03 AM   Result Value Ref Range    Phosphorus 4.0 2.7 - 4.5 mg/dL   TSH    Collection Time: 07/05/18  4:03 AM   Result Value Ref Range    TSH 0.913 0.400 - 4.000 uIU/mL   POCT glucose    Collection Time: 07/05/18  7:24 AM   Result Value Ref Range    POCT Glucose 150 (H) 70 - 110 mg/dL   Results for KERRIE GUAJARDO (MRN [id number]) as of 7/5/2018 09:41   Ref. Range 7/2/2018 16:44 7/3/2018 04:15 7/4/2018 04:25 7/5/2018 04:03   Hemoglobin Latest Ref Range: 14.0 - 18.0 g/dL 10.5 (L) 10.1 (L) 9.6 (L) 8.4 (L)   Hematocrit Latest Ref Range: 40.0 - 54.0 % 31.0 (L) 30.6 (L) 29.6 (L) 26.4 (L)         Significant Imaging:     Assessment/Plan:      Acute duodenal ulcer from peptic ulcer disease with hemorrhage  -Counseled the patient to avoid all NSAIDs, ASA, meloxicam until follow up with GI  -EGD on 7/3/18 confirmed large duodenal ulcer:   -20mm ulcer with adherent clot in second part of duodenum  -pantoprazole (PROTONIX) 40 mg in dextrose 5 % 100 mL infusion, 8 mg/hr, Intravenous, Continuous x 72 hours   -Due  "to end tomorrow  -Checked H. Pylori IgG - pending  -His Hg is trending down and he still notes some melena, so GI planning to re-EGD tomorrow   -NPO after midnight     Grade D reflux esophagitis  -No biopsy was performed during EGD  -Will need close follow up to rule out Acosta's esophagus     Acute post-hemorrhagic anemia  -Stable to monitor, though very slow trend down  -folic acid tablet 1 mg, 1 mg, Oral, Daily  -iron polysaccharides capsule 150 mg, 150 mg, Oral, Daily     Sustained sinus tachycardia  -Improved today after bolus of IVF's 7/4  -Checked TSH (0.913 on 7/5)and EKG (just sinus tach)  -NS 500cc bolus     Narrowing of the common bile duct on MRCP  Elevated liver enzymes  -Acute pancreatitis was initially suspected but lipase and RUQ US are normal.   -Pain persisted and T.bili / liver enzymes trended upward.   -SIRS rapidly resolved with fluid resuscitation.   -Liver U/S (7/1): "Hyperechoic right lobe mass measures 1.0 x 1.0 x 1.0 cm which may represent a hemangioma".   -MRCP (7/1): "Common bile duct narrows significantly just proximal to the pancreatic head. A 9 mm right hepatic lobe lesion, most consistent with a hemangioma".   -Iron studies with normocytic anemia most consistent with anemia of chronic inflammation   -Ceruloplasmin wnl; Hepatitis panel negative, auto-immune panels negative as well  -Hepatology consulted: Suspect biliary etiology. Plan for EUS with [facility] as outpatient  -Continuing his Lipitor with care and close LFT follow up     Acute kidney injury  -Creatinine today: 1.1 (Max of 2 on 6/30)  -Probably due to poor oral intake / volume depletion.   -He does admit to heavy Ibuprofen use for his hip pain        -I counseled him against this and the Meloxicam going forward  -Holding ARB/HCTZ, NSAIDs currently     Acute cystitis with hematuria  -UA positive for WBC and a few bacteria, Initiated ceftriaxone pending urine culture.  -Urine cultures growing multiple organisms from 6/30 and > 100K " Coag-Neg Staph from7/1.   -Continuing cefTRIAXone injection 1 g, 1 g, Intravenous, Q24H for now, end date is 7/6/18  -Will consider therapy complete at discharge     Essential hypertension  -Holding home Hyzaar 50/12.5mg po qday currently due to resolving PREM  -hydrALAZINE tablet 25 mg, 25 mg, Oral, Q8H PRN SBP>170     Type 2 diabetes mellitus, controlled, no complications  -Currently maintaining well on 2000 calorie ada diet and SSI  -At home he takes Amaryl 2mg po qam     Hypercholesteremia  -Lipitor 40mg po qday     Hypophosphatemia  -started potassium, sodium phosphates 280-160-250 mg packet 2 packet, 2 packet, Oral, QID (AC & HS) on 7/3     Hypomagnesemia  -Added Mg Oxide 400mg po qday on 7/4  -MgSO4 2g iv today     Diet: Clear  GI Prophylaxis: Protonix iv 8mg/hr  DVT Prophylaxis: SCD/TEDs: no heparins due to GIB  Lines/Drains/Airway: Peripheral IV- Left AC     Wounds: None      HIGH RISK CONDITION(S):   Patient is currently receiving parenteral controlled    VTE Risk Mitigation         Ordered     IP VTE HIGH RISK PATIENT  Once      06/30/18 0845     Reason for No Pharmacological VTE Prophylaxis  Once      06/30/18 0845     Place sequential compression device  Until discontinued      06/30/18 0814     Place JOAO hose  Until discontinued      06/30/18 0812             RAJINDER Ruffin MD  Department of Hospital Medicine   [facility]

## 2018-07-05 NOTE — PHYSICIAN QUERY
PT Name: Mario Sanford  MR #: [id number]     Physician Query Form - Documentation Clarification      Jameel Kumar RN CDS    Contact Information: [phone]    This form is a permanent document in the medical record.     Query Date: July 5, 2018    By submitting this query, we are merely seeking further clarification of documentation. Please utilize your independent clinical judgment when addressing the question(s) below.    The Medical record reflects the following:    Supporting Clinical Findings Location in Medical Record   This was an emergent evaluation of this patient presented with acute abdominal pain, nausea vomiting    Pain persists and T.bili / liver enzymes are trending upward. SIRS rapidly resolved with fluid resuscitation. Consulting Hepatology.   Positive for chills and malaise/fatigue.    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      Given acute nature of patient's pain with leukocytosis, elevated LFTs; suspicion for biliary etiology  Liver lesion suggestive of hemangioma, we will monitor as an outpatient    Anemia, concern for GI blood loss    Hgb in May was > 16g/dL; it was 14.3 on admission with volume depletion. Now down to about 10 g/dL -- partially due to hemodilution but report of melena concerning for UGI     Acute post-hemorrhagic anemia   -Stable to monitor   -Add Niferex, b12 and folate    6/30 ED Prov Note      6/30 H&P          7/3 H&P          7/1 Hepatology Consult        7/2 Hospital Medicine PN          7/3 Hospital Medicine PN     WBC 17.93     Grade D (one or more mucosal breaks involving at least 75% of   esophageal circumference) esophagitis with bleeding was found  Duodenitis noted in duodenal bulb.   Large 20mm ulcer with adherent clot noted in second part of   duodenum, just past duodenal bulb       6/30 Lab      7/3 Upper GI endoscopy                                                                                Doctor, Please specify diagnosis or diagnoses associated with above clinical findings.    Provider Use Only      [  x] Acute Duodenal ulcers with hemorrhage and no perforation    [  ] Acute Duodenal ulcers without hemorrhage or perforation    [  ] Chronic Duodenal ulcers without hemorrhage or perforation    [  ] Other Conditions (Specify)_______________________                                                                                                                 [  ] Clinically undetermined

## 2018-07-05 NOTE — TREATMENT PLAN
GI Treatment Plan    Interval History  - s/p EGD on 7/3 with reflux esophagitis, large 20mm ulcer with adherent clot in D2  - remains on PPI gtt since scope  - notes persistent ~6/day small volume melena  - Hgb drift from 10.5 -> 10.1 -> 9.6 -> 8.4    Examination  - alert and oriented x3, no distress, ambulating around room  - abdomen: soft, non-tender, non-distended    Imp/Plan  - given large ulcer with adherent clot on 7/3 and continued melena and down-trending H&H will repeat EGD on 7/6  - NPO at MN  - EGD in AM  - primary team updated of plan

## 2018-07-05 NOTE — PLAN OF CARE
Problem: Diabetes, Type 2 (Adult)  Goal: Signs and Symptoms of Listed Potential Problems Will be Absent, Minimized or Managed (Diabetes, Type 2)  Signs and symptoms of listed potential problems will be absent, minimized or managed by discharge/transition of care (reference Diabetes, Type 2 (Adult) CPG).   Outcome: Ongoing (interventions implemented as appropriate)   07/05/18 0239   Diabetes, Type 2   Problems Assessed (Type 2 Diabetes) all   Problems Present (Type 2 Diabetes) none       Problem: Fall Risk (Adult)  Goal: Absence of Falls  Patient will demonstrate the desired outcomes by discharge/transition of care.   Outcome: Ongoing (interventions implemented as appropriate)   07/05/18 0239   Fall Risk (Adult)   Absence of Falls making progress toward outcome       Problem: Patient Care Overview  Goal: Plan of Care Review  Outcome: Ongoing (interventions implemented as appropriate)   07/05/18 0239   Coping/Psychosocial   Plan Of Care Reviewed With patient;spouse

## 2018-07-06 ENCOUNTER — ANESTHESIA EVENT (OUTPATIENT)
Dept: ENDOSCOPY | Facility: HOSPITAL | Age: 63
DRG: 378 | End: 2018-07-06
Payer: MEDICARE

## 2018-07-06 ENCOUNTER — ANESTHESIA (OUTPATIENT)
Dept: ENDOSCOPY | Facility: HOSPITAL | Age: 63
DRG: 378 | End: 2018-07-06
Payer: MEDICARE

## 2018-07-06 LAB
ALBUMIN SERPL BCP-MCNC: 2.8 G/DL
ALP SERPL-CCNC: 156 U/L
ALT SERPL W/O P-5'-P-CCNC: 56 U/L
ANION GAP SERPL CALC-SCNC: 9 MMOL/L
AST SERPL-CCNC: 17 U/L
BASOPHILS # BLD AUTO: 0.08 K/UL
BASOPHILS NFR BLD: 0.8 %
BILIRUB SERPL-MCNC: 0.7 MG/DL
BUN SERPL-MCNC: 16 MG/DL
CALCIUM SERPL-MCNC: 8.7 MG/DL
CHLORIDE SERPL-SCNC: 105 MMOL/L
CO2 SERPL-SCNC: 26 MMOL/L
CREAT SERPL-MCNC: 1.4 MG/DL
DIFFERENTIAL METHOD: ABNORMAL
EOSINOPHIL # BLD AUTO: 0.2 K/UL
EOSINOPHIL NFR BLD: 2 %
ERYTHROCYTE [DISTWIDTH] IN BLOOD BY AUTOMATED COUNT: 13.1 %
EST. GFR  (AFRICAN AMERICAN): >60 ML/MIN/1.73 M^2
EST. GFR  (NON AFRICAN AMERICAN): 52.4 ML/MIN/1.73 M^2
GLUCOSE SERPL-MCNC: 146 MG/DL
HCT VFR BLD AUTO: 28.3 %
HGB BLD-MCNC: 8.8 G/DL
IMM GRANULOCYTES # BLD AUTO: 0.09 K/UL
IMM GRANULOCYTES NFR BLD AUTO: 0.9 %
LYMPHOCYTES # BLD AUTO: 2.9 K/UL
LYMPHOCYTES NFR BLD: 29.8 %
MAGNESIUM SERPL-MCNC: 1.8 MG/DL
MCH RBC QN AUTO: 29.1 PG
MCHC RBC AUTO-ENTMCNC: 31.1 G/DL
MCV RBC AUTO: 94 FL
MONOCYTES # BLD AUTO: 0.9 K/UL
MONOCYTES NFR BLD: 8.9 %
NEUTROPHILS # BLD AUTO: 5.6 K/UL
NEUTROPHILS NFR BLD: 57.6 %
NRBC BLD-RTO: 0 /100 WBC
PHOSPHATE SERPL-MCNC: 4.2 MG/DL
PLATELET # BLD AUTO: 411 K/UL
PMV BLD AUTO: 10 FL
POCT GLUCOSE: 138 MG/DL (ref 70–110)
POCT GLUCOSE: 163 MG/DL (ref 70–110)
POCT GLUCOSE: 168 MG/DL (ref 70–110)
POCT GLUCOSE: 169 MG/DL (ref 70–110)
POTASSIUM SERPL-SCNC: 4.8 MMOL/L
PROT SERPL-MCNC: 6.1 G/DL
RBC # BLD AUTO: 3.02 M/UL
SODIUM SERPL-SCNC: 140 MMOL/L
WBC # BLD AUTO: 9.76 K/UL

## 2018-07-06 PROCEDURE — 25000003 PHARM REV CODE 250: Performed by: INTERNAL MEDICINE

## 2018-07-06 PROCEDURE — C9113 INJ PANTOPRAZOLE SODIUM, VIA: HCPCS | Performed by: INTERNAL MEDICINE

## 2018-07-06 PROCEDURE — 82962 GLUCOSE BLOOD TEST: CPT | Performed by: INTERNAL MEDICINE

## 2018-07-06 PROCEDURE — 43270 EGD LESION ABLATION: CPT | Performed by: INTERNAL MEDICINE

## 2018-07-06 PROCEDURE — 37000009 HC ANESTHESIA EA ADD 15 MINS: Performed by: INTERNAL MEDICINE

## 2018-07-06 PROCEDURE — 43270 EGD LESION ABLATION: CPT | Mod: ,,, | Performed by: INTERNAL MEDICINE

## 2018-07-06 PROCEDURE — 80053 COMPREHEN METABOLIC PANEL: CPT

## 2018-07-06 PROCEDURE — 63600175 PHARM REV CODE 636 W HCPCS: Performed by: INTERNAL MEDICINE

## 2018-07-06 PROCEDURE — 84100 ASSAY OF PHOSPHORUS: CPT

## 2018-07-06 PROCEDURE — D9220A PRA ANESTHESIA: Mod: CRNA,,, | Performed by: NURSE ANESTHETIST, CERTIFIED REGISTERED

## 2018-07-06 PROCEDURE — 11000001 HC ACUTE MED/SURG PRIVATE ROOM

## 2018-07-06 PROCEDURE — 63600175 PHARM REV CODE 636 W HCPCS: Performed by: NURSE ANESTHETIST, CERTIFIED REGISTERED

## 2018-07-06 PROCEDURE — 83735 ASSAY OF MAGNESIUM: CPT

## 2018-07-06 PROCEDURE — 85025 COMPLETE CBC W/AUTO DIFF WBC: CPT

## 2018-07-06 PROCEDURE — 37000008 HC ANESTHESIA 1ST 15 MINUTES: Performed by: INTERNAL MEDICINE

## 2018-07-06 PROCEDURE — 36415 COLL VENOUS BLD VENIPUNCTURE: CPT

## 2018-07-06 PROCEDURE — 99232 SBSQ HOSP IP/OBS MODERATE 35: CPT | Mod: ,,, | Performed by: INTERNAL MEDICINE

## 2018-07-06 PROCEDURE — 25000003 PHARM REV CODE 250: Performed by: NURSE ANESTHETIST, CERTIFIED REGISTERED

## 2018-07-06 PROCEDURE — 27201038 HC PROBE, BI-POLAR: Performed by: INTERNAL MEDICINE

## 2018-07-06 PROCEDURE — 87449 NOS EACH ORGANISM AG IA: CPT

## 2018-07-06 PROCEDURE — D9220A PRA ANESTHESIA: Mod: ANES,,, | Performed by: ANESTHESIOLOGY

## 2018-07-06 RX ORDER — GLIMEPIRIDE 2 MG/1
2 TABLET ORAL
Qty: 30 TABLET | Refills: 0 | Status: SHIPPED | OUTPATIENT
Start: 2018-07-06 | End: 2018-07-19 | Stop reason: SDUPTHER

## 2018-07-06 RX ORDER — PROPOFOL 10 MG/ML
VIAL (ML) INTRAVENOUS CONTINUOUS PRN
Status: DISCONTINUED | OUTPATIENT
Start: 2018-07-06 | End: 2018-07-06

## 2018-07-06 RX ORDER — LIDOCAINE HCL/PF 100 MG/5ML
SYRINGE (ML) INTRAVENOUS
Status: DISCONTINUED | OUTPATIENT
Start: 2018-07-06 | End: 2018-07-06

## 2018-07-06 RX ORDER — SODIUM CHLORIDE 9 MG/ML
INJECTION, SOLUTION INTRAVENOUS CONTINUOUS PRN
Status: DISCONTINUED | OUTPATIENT
Start: 2018-07-06 | End: 2018-07-06

## 2018-07-06 RX ORDER — PROPOFOL 10 MG/ML
VIAL (ML) INTRAVENOUS
Status: DISCONTINUED | OUTPATIENT
Start: 2018-07-06 | End: 2018-07-06

## 2018-07-06 RX ORDER — CARVEDILOL 3.12 MG/1
3.12 TABLET ORAL 2 TIMES DAILY
Status: DISCONTINUED | OUTPATIENT
Start: 2018-07-06 | End: 2018-07-11 | Stop reason: HOSPADM

## 2018-07-06 RX ADMIN — CEFTRIAXONE SODIUM 1 G: 1 INJECTION, POWDER, FOR SOLUTION INTRAMUSCULAR; INTRAVENOUS at 02:07

## 2018-07-06 RX ADMIN — PROPOFOL 50 MG: 10 INJECTION, EMULSION INTRAVENOUS at 10:07

## 2018-07-06 RX ADMIN — Medication 150 MG: at 08:07

## 2018-07-06 RX ADMIN — ATORVASTATIN CALCIUM 40 MG: 20 TABLET, FILM COATED ORAL at 08:07

## 2018-07-06 RX ADMIN — DEXTROSE 8 MG/HR: 50 INJECTION, SOLUTION INTRAVENOUS at 08:07

## 2018-07-06 RX ADMIN — FOLIC ACID 1 MG: 1 TABLET ORAL at 08:07

## 2018-07-06 RX ADMIN — PROPOFOL 30 MG: 10 INJECTION, EMULSION INTRAVENOUS at 10:07

## 2018-07-06 RX ADMIN — HYDRALAZINE HYDROCHLORIDE 25 MG: 25 TABLET, FILM COATED ORAL at 08:07

## 2018-07-06 RX ADMIN — PROPOFOL 100 MCG/KG/MIN: 10 INJECTION, EMULSION INTRAVENOUS at 10:07

## 2018-07-06 RX ADMIN — DEXTROSE 8 MG/HR: 50 INJECTION, SOLUTION INTRAVENOUS at 03:07

## 2018-07-06 RX ADMIN — POTASSIUM & SODIUM PHOSPHATES POWDER PACK 280-160-250 MG 2 PACKET: 280-160-250 PACK at 09:07

## 2018-07-06 RX ADMIN — POTASSIUM & SODIUM PHOSPHATES POWDER PACK 280-160-250 MG 2 PACKET: 280-160-250 PACK at 04:07

## 2018-07-06 RX ADMIN — SODIUM CHLORIDE: 0.9 INJECTION, SOLUTION INTRAVENOUS at 10:07

## 2018-07-06 RX ADMIN — CARVEDILOL 3.12 MG: 3.12 TABLET, FILM COATED ORAL at 01:07

## 2018-07-06 RX ADMIN — DEXTROSE 40 MG: 50 INJECTION, SOLUTION INTRAVENOUS at 09:07

## 2018-07-06 RX ADMIN — Medication 400 MG: at 08:07

## 2018-07-06 RX ADMIN — LIDOCAINE HYDROCHLORIDE 75 MG: 20 INJECTION, SOLUTION INTRAVENOUS at 10:07

## 2018-07-06 RX ADMIN — CARVEDILOL 3.12 MG: 3.12 TABLET, FILM COATED ORAL at 09:07

## 2018-07-06 NOTE — PLAN OF CARE
Patient meets all criteria for transfer back to [facility] on the [facility]th Floor, Room 945 A.  Report called to floor nurse, KANG Armijo RN

## 2018-07-06 NOTE — TRANSFER OF CARE
"Anesthesia Transfer of Care Note    Patient: Mario Sanford    Procedure(s) Performed: Procedure(s) (LRB):  EGD (ESOPHAGOGASTRODUODENOSCOPY) (N/A)    Patient location: [facility]    Anesthesia Type: general    Transport from OR: Transported from OR on room air with adequate spontaneous ventilation    Post pain: adequate analgesia    Post assessment: no apparent anesthetic complications    Post vital signs: stable    Level of consciousness: awake and alert    Nausea/Vomiting: no nausea/vomiting    Complications: none    Transfer of care protocol was followed      Last vitals:   Visit Vitals  BP (!) 142/72 (BP Location: Left arm, Patient Position: Lying)   Pulse (!) 115   Temp 36.5 °C (97.7 °F)   Resp 20   Ht 5' 9" (1.753 m)   Wt 77.1 kg (169 lb 14.4 oz)   SpO2 98%   BMI 25.09 kg/m²     "

## 2018-07-06 NOTE — PLAN OF CARE
Problem: Diabetes, Type 2 (Adult)  Goal: Signs and Symptoms of Listed Potential Problems Will be Absent, Minimized or Managed (Diabetes, Type 2)  Signs and symptoms of listed potential problems will be absent, minimized or managed by discharge/transition of care (reference Diabetes, Type 2 (Adult) CPG).   Outcome: Ongoing (interventions implemented as appropriate)   07/06/18 1848   Diabetes, Type 2   Problems Assessed (Type 2 Diabetes) all   Problems Present (Type 2 Diabetes) none       Problem: Fall Risk (Adult)  Goal: Absence of Falls  Patient will demonstrate the desired outcomes by discharge/transition of care.   Outcome: Ongoing (interventions implemented as appropriate)   07/06/18 1848   Fall Risk (Adult)   Absence of Falls making progress toward outcome       Problem: Patient Care Overview  Goal: Plan of Care Review  Outcome: Ongoing (interventions implemented as appropriate)   07/06/18 1848   Coping/Psychosocial   Plan Of Care Reviewed With patient;spouse   Pt is A,A,O x 4 . Stable V/s. No C/O pain . Pt is able to ambulate to the bathroom independently . EGD done today . Pt on clear liquid diet now . Pt has 2 small black BM after EGD . Protonix drip D/C .

## 2018-07-06 NOTE — TREATMENT PLAN
GI Treatment Plan    EGD today:  - LA Grade D reflux esophagitis  - Normal stomach  - one non-bleeding duodenal ulcer with visible vessel. Treated with cautery  - one clip placed to region for guidance if IR required (not placed for hemostasis)    Plan  - can d/c protonix gtt (completing 72h this afternoon)  - start protonix 40mg IV q12h  - continue to monitor H&H, stable over 24h and no active bleeding on scope  - will continue to follow  - will need interval EUS for evaluation of LFT (elevated on admission); will be done in several weeks as outpatient to allow ulcer to fully heal

## 2018-07-06 NOTE — PROGRESS NOTES
Pt is back to floor from Endoscopy . Pt is A,A,O x 4 . Stable V/s. No C/O pain . Family at bedside . Pt on continuous IV Protonix drip . Will keep monitoring .

## 2018-07-06 NOTE — PROGRESS NOTES
Notified Dr. EDURADO Vasquez that patient has met all criteria for release from Anesthesia's care.  Dr. EDUARDO Figueroaostated that he would release patient from Anesthesia's care.

## 2018-07-06 NOTE — ANESTHESIA PREPROCEDURE EVALUATION
07/06/2018  Mario Sanford is a 63 y.o., male.    Anesthesia Evaluation         Review of Systems  Anesthesia Hx:  No problems with previous Anesthesia   Cardiovascular:   Exercise tolerance: good Hypertension  Denies Angina. hyperlipidemia    Pulmonary:  Pulmonary Normal    Endocrine:   Diabetes        Physical Exam  General:  Obesity    Airway/Jaw/Neck:  Airway Findings: Mouth Opening: Normal Tongue: Normal  General Airway Assessment: Adult  Mallampati: II  TM Distance: Normal, at least 6 cm  Jaw/Neck Findings:     Neck ROM: Normal ROM      Dental:  Dental Findings: In tact   Chest/Lungs:  Chest/Lungs Findings: Clear to auscultation, Normal Respiratory Rate     Heart/Vascular:  Heart Findings: Rate: Normal  Rhythm: Regular Rhythm  Sounds: Normal        Mental Status:  Mental Status Findings:  Cooperative, Alert and Oriented         Anesthesia Plan  Type of Anesthesia, risks & benefits discussed:  Anesthesia Type:  general  Patient's Preference:   Intra-op Monitoring Plan: standard ASA monitors  Intra-op Monitoring Plan Comments:   Post Op Pain Control Plan: multimodal analgesia, IV/PO Opioids PRN and per primary service following discharge from PACU  Post Op Pain Control Plan Comments:   Induction:   IV  Beta Blocker:  Patient is on a Beta-Blocker and has received one dose within the past 24 hours (No further documentation required).       Informed Consent: Patient understands risks and agrees with Anesthesia plan.  Questions answered. Anesthesia consent signed with patient.  ASA Score: 2     Day of Surgery Review of History & Physical:            Ready For Surgery From Anesthesia Perspective.

## 2018-07-06 NOTE — ANESTHESIA POSTPROCEDURE EVALUATION
"Anesthesia Post Evaluation    Patient: Mario Sanford    Procedure(s) Performed: Procedure(s) (LRB):  EGD (ESOPHAGOGASTRODUODENOSCOPY) (N/A)    Final Anesthesia Type: general  Patient location during evaluation: PACU  Patient participation: Yes- Able to Participate  Level of consciousness: awake and alert  Post-procedure vital signs: reviewed and stable  Pain management: adequate  Airway patency: patent  PONV status at discharge: No PONV  Anesthetic complications: no      Cardiovascular status: hemodynamically stable and blood pressure returned to baseline  Respiratory status: unassisted and spontaneous ventilation  Hydration status: euvolemic  Follow-up not needed.        Visit Vitals  BP (!) 164/79 (BP Location: Left arm, Patient Position: Lying)   Pulse 106   Temp 36.5 °C (97.7 °F)   Resp 19   Ht 5' 9" (1.753 m)   Wt 77.1 kg (169 lb 14.4 oz)   SpO2 100%   BMI 25.09 kg/m²       Pain/Renate Score: Pain Assessment Performed: Yes (7/6/2018 11:15 AM)  Presence of Pain: non-verbal indicators absent (7/6/2018 11:15 AM)  Pain Rating Prior to Med Admin: 0 (7/6/2018  7:35 AM)  Renate Score: 7 (7/6/2018 11:15 AM)      "

## 2018-07-06 NOTE — PROGRESS NOTES
Dr. Iglesias at bedside to discuss procedure with Pt. Phone number for Pt's spouse, Brittaney, given to MD so that procedure can be discussed with her as well.

## 2018-07-06 NOTE — NURSING TRANSFER
Nursing Transfer Note      7/6/2018     Transfer To: Hospital, [facility], 9th Floor, Room 945A from [facility].    Transfer Via: Stretcher.    Transfer With: Nothing required.    Transported By: ALMA Mckeon.    Medicines Sent: Protonix Gtt.    Chart Sent with Patient: Yes.    Notified: KANG Armijo RN.    Patient Reassessed at: 1305on 07/05/2018    Upon arrival to floor: Patient oriented to room, bed in lowest position, and call bell within reach.

## 2018-07-06 NOTE — PROVATION PATIENT INSTRUCTIONS
Discharge Summary/Instructions after an Endoscopic Procedure  Patient Name: Mario Sanford  Patient MRN: [id number]  Patient YOB: 1955 Friday, July 06, 2018  Newton Iglesias MD  RESTRICTIONS:  During your procedure today, you received medications for sedation.  These   medications may affect your judgment, balance and coordination.  Therefore,   for 24 hours, you have the following restrictions:   - DO NOT drive a car, operate machinery, make legal/financial decisions,   sign important papers or drink alcohol.    ACTIVITY:  Today: no heavy lifting, straining or running due to procedural   sedation/anesthesia.  The following day: return to full activity including work.  DIET:  Eat and drink normally unless instructed otherwise.     TREATMENT FOR COMMON SIDE EFFECTS:  - Mild abdominal pain, nausea, belching, bloating or excessive gas:  rest,   eat lightly and use a heating pad.  - Sore Throat: treat with throat lozenges and/or gargle with warm salt   water.  - Because air was used during the procedure, expelling large amounts of air   from your rectum or belching is normal.  - If a bowel prep was taken, you may not have a bowel movement for 1-3 days.    This is normal.  SYMPTOMS TO WATCH FOR AND REPORT TO YOUR PHYSICIAN:  1. Abdominal pain or bloating, other than gas cramps.  2. Chest pain.  3. Back pain.  4. Signs of infection such as: chills or fever occurring within 24 hours   after the procedure.  5. Rectal bleeding, which would show as bright red, maroon, or black stools.   (A tablespoon of blood from the rectum is not serious, especially if   hemorrhoids are present.)  6. Vomiting.  7. Weakness or dizziness.  GO DIRECTLY TO THE NEAREST EMERGENCY ROOM IF YOU HAVE ANY OF THE FOLLOWING:      Difficulty breathing              Chills and/or fever over 101 F   Persistent vomiting and/or vomiting blood   Severe abdominal pain   Severe chest pain   Black, tarry stools   Bleeding- more than one tablespoon   Any  other symptom or condition that you feel may need urgent attention  Your doctor recommends these additional instructions:  If any biopsies were taken, your doctors clinic will contact you in 1 to 2   weeks with any results.  - Return patient to [facility] for ongoing care.   - Clear liquid diet.   - Continue present medications.  For questions, problems or results please call your physician - Newton Iglesias MD at Work:  [phone].  [facility], EMERGENCY ROOM PHONE NUMBER: [phone]  IF A COMPLICATION OR EMERGENCY SITUATION ARISES AND YOU ARE UNABLE TO REACH   YOUR PHYSICIAN - GO DIRECTLY TO THE EMERGENCY ROOM.  Newton Iglesias MD  7/6/2018 11:12:52 AM  This report has been verified and signed electronically.  [employer]

## 2018-07-06 NOTE — PROGRESS NOTES
[facility] Hospital Medicine  Progress Note    Patient Name: Mario Sanford  MRN: [id number]  Patient Class: IP- Inpatient   Admission Date: 6/30/2018  Length of Stay: 4 days  Attending Physician: BRIANNA Ruffin MD  Primary Care Provider: Bereket Rivera [facility] Medicine Team: [facility] [place] RAJINDER Ruffin MD    Subjective:     Principal Problem:RUQ abdominal pain    Overview: Patient is a 63 y.o. male with past medical history of hypertension, dyslipidemia, and recently diagnosed DM-2 admitted to hospital for RUQ pain associated with SIRS, worsening liver enzymes and T.bili. The patient received IVF and his PREM, believed to be due to poor intake, resolved. Urine and blood cultures show NGTD and RUQ U/S was normal. Liver U/S showed a hyperechoic right lobe mass, consistent with hemangioma and an MRCP revealed significant narrowing of the common bile duct near the pancreatic head. Further work-up included Ceruloplasmin which was wnl; Hepatitis viral serologies and autoimmune markers are pending. Iron studies were done to evaluate the patient's normocytic anemia, which was most consistent with anemia of chronic inflammation. Hepatology was consulted and discussed EUS/ERCP with [facility] who plans to do EUS as outpatient.    Interval History:     Review of Systems   Constitutional: Positive for appetite change. Negative for chills, fatigue and fever.   HENT: Negative for congestion.    Respiratory: Negative for cough and shortness of breath.    Cardiovascular: Negative for chest pain.   Gastrointestinal: Positive for blood in stool. Negative for abdominal pain, constipation, diarrhea, nausea and vomiting.   Neurological: Negative for dizziness and weakness.   Psychiatric/Behavioral: Negative for confusion. The patient is not nervous/anxious.      Objective:     Vital Signs (Most Recent):  Temp: 98.4 °F (36.9 °C) (07/06/18 0711)  Pulse: 99 (07/06/18 0711)  Resp: 15 (07/06/18 0711)  BP: (!)  161/81 (07/06/18 0711)  SpO2: 99 % (07/06/18 0711) Vital Signs (24h Range):  Temp:  [98.4 °F (36.9 °C)-100.1 °F (37.8 °C)] 98.4 °F (36.9 °C)  Pulse:  [] 99  Resp:  [15-20] 15  SpO2:  [95 %-99 %] 99 %  BP: (112-161)/(57-81) 161/81     Weight: 77.1 kg (169 lb 14.4 oz)  Body mass index is 25.09 kg/m².    Intake/Output Summary (Last 24 hours) at 07/06/18 0844  Last data filed at 07/05/18 1300   Gross per 24 hour   Intake              360 ml   Output                0 ml   Net              360 ml      Physical Exam   Constitutional: He is oriented to person, place, and time. He appears well-developed and well-nourished.  Non-toxic appearance. He does not have a sickly appearance. He does not appear ill. No distress.   HENT:   Head: Normocephalic and atraumatic.   Nose: Nose normal.   Mouth/Throat: Oropharynx is clear and moist. No oropharyngeal exudate.   Eyes: Conjunctivae and EOM are normal. Pupils are equal, round, and reactive to light. Right eye exhibits no discharge. Left eye exhibits no discharge. No scleral icterus.   Senile changes   Neck: Normal range of motion. Neck supple. No JVD present. No tracheal deviation present. No thyromegaly present.   Cardiovascular: Normal rate, regular rhythm, normal heart sounds and intact distal pulses.  Exam reveals no gallop and no friction rub.    No murmur heard.  Pulmonary/Chest: Effort normal and breath sounds normal. No accessory muscle usage or stridor. No tachypnea and no bradypnea. No respiratory distress. He has no decreased breath sounds. He has no wheezes. He has no rhonchi. He has no rales. He exhibits no tenderness.   Abdominal: Soft. Bowel sounds are normal. He exhibits no distension and no mass. There is no tenderness. There is no rebound and no guarding.   Truncal obesity   Genitourinary:   Genitourinary Comments: No hill in place   Musculoskeletal: Normal range of motion. He exhibits no edema or tenderness.   Lymphadenopathy:     He has no cervical  adenopathy.   No peripheral edema   Neurological: He is alert and oriented to person, place, and time. He has normal reflexes. He displays normal reflexes. No cranial nerve deficit. He exhibits normal muscle tone. Coordination normal. GCS eye subscore is 4. GCS verbal subscore is 5. GCS motor subscore is 6.   Skin: Skin is warm and dry. No rash noted. No erythema. No pallor.   Pale and sallow   Psychiatric: He has a normal mood and affect. His speech is normal and behavior is normal. Judgment and thought content normal. Cognition and memory are normal.   Nursing note and vitals reviewed.      Significant Labs:   Recent Results (from the past 24 hour(s))   POCT glucose    Collection Time: 07/05/18 12:51 PM   Result Value Ref Range    POCT Glucose 135 (H) 70 - 110 mg/dL   POCT glucose    Collection Time: 07/05/18  6:07 PM   Result Value Ref Range    POCT Glucose 127 (H) 70 - 110 mg/dL   POCT glucose    Collection Time: 07/05/18  9:06 PM   Result Value Ref Range    POCT Glucose 166 (H) 70 - 110 mg/dL   Comprehensive metabolic panel    Collection Time: 07/06/18  6:27 AM   Result Value Ref Range    Sodium 140 136 - 145 mmol/L    Potassium 4.8 3.5 - 5.1 mmol/L    Chloride 105 95 - 110 mmol/L    CO2 26 23 - 29 mmol/L    Glucose 146 (H) 70 - 110 mg/dL    BUN, Bld 16 8 - 23 mg/dL    Creatinine 1.4 0.5 - 1.4 mg/dL    Calcium 8.7 8.7 - 10.5 mg/dL    Total Protein 6.1 6.0 - 8.4 g/dL    Albumin 2.8 (L) 3.5 - 5.2 g/dL    Total Bilirubin 0.7 0.1 - 1.0 mg/dL    Alkaline Phosphatase 156 (H) 55 - 135 U/L    AST 17 10 - 40 U/L    ALT 56 (H) 10 - 44 U/L    Anion Gap 9 8 - 16 mmol/L    eGFR if African American >60.0 >60 mL/min/1.73 m^2    eGFR if non African American 52.4 (A) >60 mL/min/1.73 m^2   CBC auto differential    Collection Time: 07/06/18  6:27 AM   Result Value Ref Range    WBC 9.76 3.90 - 12.70 K/uL    RBC 3.02 (L) 4.60 - 6.20 M/uL    Hemoglobin 8.8 (L) 14.0 - 18.0 g/dL    Hematocrit 28.3 (L) 40.0 - 54.0 %    MCV 94 82 - 98  fL    MCH 29.1 27.0 - 31.0 pg    MCHC 31.1 (L) 32.0 - 36.0 g/dL    RDW 13.1 11.5 - 14.5 %    Platelets 411 (H) 150 - 350 K/uL    MPV 10.0 9.2 - 12.9 fL    Immature Granulocytes 0.9 (H) 0.0 - 0.5 %    Gran # (ANC) 5.6 1.8 - 7.7 K/uL    Immature Grans (Abs) 0.09 (H) 0.00 - 0.04 K/uL    Lymph # 2.9 1.0 - 4.8 K/uL    Mono # 0.9 0.3 - 1.0 K/uL    Eos # 0.2 0.0 - 0.5 K/uL    Baso # 0.08 0.00 - 0.20 K/uL    nRBC 0 0 /100 WBC    Gran% 57.6 38.0 - 73.0 %    Lymph% 29.8 18.0 - 48.0 %    Mono% 8.9 4.0 - 15.0 %    Eosinophil% 2.0 0.0 - 8.0 %    Basophil% 0.8 0.0 - 1.9 %    Differential Method Automated    Magnesium    Collection Time: 07/06/18  6:27 AM   Result Value Ref Range    Magnesium 1.8 1.6 - 2.6 mg/dL   Phosphorus    Collection Time: 07/06/18  6:27 AM   Result Value Ref Range    Phosphorus 4.2 2.7 - 4.5 mg/dL   POCT glucose    Collection Time: 07/06/18  7:13 AM   Result Value Ref Range    POCT Glucose 169 (H) 70 - 110 mg/dL       Significant Imaging:   Repeat EGD today 7/6/18       LA Grade D (one or more mucosal breaks involving at least 75% of        esophageal circumference) esophagitis with no bleeding was found 40        cm from the incisors.       The entire examined stomach was normal.       One non-bleeding cratered duodenal ulcer with a visible vessel was        found in the first portion of the duodenum. The lesion was 20 mm in        largest dimension. Coagulation for hemostasis using bipolar probe        was successful. one clip was placed adjacent to the ulcer, in case        of rebleeding to help with IR targeting  Impression:           - LA Grade D reflux esophagitis.                        - Normal stomach.                        - One non-bleeding duodenal ulcer with a visible                         vessel. Treated with bipolar cautery.                        - No specimens collected.  Recommendation:       - Return patient to [facility] for ongoing care.                        - Clear liquid  "diet.                        - Continue present medications.  Assessment/Plan:      Acute duodenal ulcer from peptic ulcer disease with hemorrhage  -Counseled the patient to avoid all NSAIDs, ASA, meloxicam until follow up with GI  -Checked H. Pylori IgG 7/3: Negative  -EGD on 7/3/18 confirmed large duodenal ulcer:   -20mm ulcer with adherent clot in second part of duodenum  -pantoprazole (PROTONIX) 40 mg in dextrose 5 % 100 mL infusion, 8 mg/hr, Intravenous, Continuous x 72 hours    -His Hg was trending down and he still noted some melena              -EGD was repeated on 7/6/18:    -One non-bleeding duodenal ulcer with a visible vessel.     -Treated with bipolar cautery    - one clip placed to region for guidance if IR required (not placed for hemostasis)   Plan:   - can d/c protonix gtt (completing 72h this afternoon)   - start protonix 40mg IV q12h   - continue to monitor H&H, stable over 24h and no active bleeding on scope     Grade D reflux esophagitis  -No biopsy was performed during EGD  -Will need close follow up to rule out Acosta's esophagus     Acute post-hemorrhagic anemia  -Stable to monitor, though very slow trend down  -folic acid tablet 1 mg, 1 mg, Oral, Daily  -iron polysaccharides capsule 150 mg, 150 mg, Oral, Daily     Sustained sinus tachycardia  -Improved today after bolus of IVF's 7/4  -Checked TSH (0.913 on 7/5)and EKG (just sinus tach)  -NS 500cc bolus     Narrowing of the common bile duct on MRCP  Elevated liver enzymes  -Acute pancreatitis was initially suspected but lipase and RUQ US are normal.   -Pain persisted and T.bili / liver enzymes trended upward.   -SIRS rapidly resolved with fluid resuscitation.   -Liver U/S (7/1): "Hyperechoic right lobe mass measures 1.0 x 1.0 x 1.0 cm which may represent a hemangioma".   -MRCP (7/1): "Common bile duct narrows significantly just proximal to the pancreatic head. A 9 mm right hepatic lobe lesion, most consistent with a hemangioma".   -Iron " studies with normocytic anemia most consistent with anemia of chronic inflammation   -Ceruloplasmin wnl; Hepatitis panel negative, auto-immune panels negative as well  -Hepatology consulted: Suspect biliary etiology. Plan for EUS with [facility] as outpatient  -Continuing his Lipitor with care and close LFT follow up     Acute kidney injury  -Creatinine today: 1.4 (Max of 2 on 6/30)  -Probably due to poor oral intake / volume depletion.   -He does admit to heavy Ibuprofen use for his hip pain        -I counseled him against this and the Meloxicam going forward  -Holding ARB/HCTZ, NSAIDs currently     Acute cystitis with hematuria  -UA positive for WBC and a few bacteria, Initiated ceftriaxone pending urine culture.  -Urine cultures growing multiple organisms from 6/30 and > 100K Coag-Neg Staph from7/1.   -Continuing cefTRIAXone injection 1 g, 1 g, Intravenous, Q24H for now, end date was 7/6/18  -Will consider therapy complete at discharge     Essential hypertension  -Holding home Hyzaar 50/12.5mg po qday currently due to resolving PREM  -hydrALAZINE tablet 25 mg, 25 mg, Oral, Q8H PRN SBP>170  -SBP maintaining in the 160's for 2 days:   -Started Coreg 3.125mg po bid 7/6     Type 2 diabetes mellitus, controlled, no complications  -Currently maintaining well on 2000 calorie ada diet and SSI  -At home he takes Amaryl 2mg po qam     Hypercholesteremia  -Lipitor 40mg po qday     Hypophosphatemia  -started potassium, sodium phosphates 280-160-250 mg packet 2 packet, 2 packet, Oral, QID (AC & HS) on 7/3     Hypomagnesemia  -Added Mg Oxide 400mg po qday on 7/4     Diet: Clear  GI Prophylaxis: Protonix iv 8mg/hr  DVT Prophylaxis: SCD/TEDs: no heparins due to GIB  Lines/Drains/Airway: Peripheral IV- Left AC     Wounds: None      HIGH RISK CONDITION(S):   Patient is currently receiving parenteral controlled     VTE Risk Mitigation         Ordered     IP VTE HIGH RISK PATIENT  Once      06/30/18 0845     Reason for No Pharmacological  VTE Prophylaxis  Once      06/30/18 0845     Place sequential compression device  Until discontinued      06/30/18 0814     Place JOAO hose  Until discontinued      06/30/18 0812             RAJINDER Ruffin MD  Department of Hospital Medicine   [facility]

## 2018-07-06 NOTE — PLAN OF CARE
Patient arrived from Endoscopy with KANG Botello RN and TALON Payne CRNA.  Patient stable.  Report received at this time.  Assumed care of patient at this time.

## 2018-07-06 NOTE — ANESTHESIA RELEASE NOTE
Anesthesia Release from PACU Note    Anesthesia Release from PACU note    Patient: Mario Sanford    Procedure(s) Performed: Procedure(s) (LRB):  EGD (ESOPHAGOGASTRODUODENOSCOPY) (N/A)    Anesthesia type: general    Post pain: Adequate analgesia    Post assessment: no apparent anesthetic complications, tolerated procedure well and no evidence of recall    Last Vitals:   Vitals:    07/06/18 1215   BP: (!) 164/79   Pulse: 106   Resp: 19   Temp:    SpO2: 100%       Post vital signs: stable    Level of consciousness: awake, alert  and oriented    Nausea/Vomiting: no nausea/no vomiting    Complications: none    Airway Patency: patent    Respiratory: unassisted    Cardiovascular: stable and blood pressure at baseline    Hydration: euvolemic

## 2018-07-07 LAB
ALBUMIN SERPL BCP-MCNC: 3.1 G/DL
ALP SERPL-CCNC: 158 U/L
ALT SERPL W/O P-5'-P-CCNC: 51 U/L
ANION GAP SERPL CALC-SCNC: 13 MMOL/L
AST SERPL-CCNC: 24 U/L
BASOPHILS # BLD AUTO: 0.06 K/UL
BASOPHILS NFR BLD: 0.5 %
BILIRUB SERPL-MCNC: 0.9 MG/DL
BUN SERPL-MCNC: 17 MG/DL
C DIFF GDH STL QL: NEGATIVE
C DIFF TOX A+B STL QL IA: NEGATIVE
CALCIUM SERPL-MCNC: 9 MG/DL
CHLORIDE SERPL-SCNC: 104 MMOL/L
CO2 SERPL-SCNC: 23 MMOL/L
CREAT SERPL-MCNC: 1.6 MG/DL
DIFFERENTIAL METHOD: ABNORMAL
EOSINOPHIL # BLD AUTO: 0.1 K/UL
EOSINOPHIL NFR BLD: 1 %
ERYTHROCYTE [DISTWIDTH] IN BLOOD BY AUTOMATED COUNT: 13.2 %
EST. GFR  (AFRICAN AMERICAN): 51.5 ML/MIN/1.73 M^2
EST. GFR  (NON AFRICAN AMERICAN): 44.5 ML/MIN/1.73 M^2
GLUCOSE SERPL-MCNC: 140 MG/DL
HCT VFR BLD AUTO: 30.3 %
HGB BLD-MCNC: 9.5 G/DL
IMM GRANULOCYTES # BLD AUTO: 0.09 K/UL
IMM GRANULOCYTES NFR BLD AUTO: 0.7 %
LYMPHOCYTES # BLD AUTO: 2.9 K/UL
LYMPHOCYTES NFR BLD: 22.4 %
MAGNESIUM SERPL-MCNC: 1.9 MG/DL
MCH RBC QN AUTO: 29.6 PG
MCHC RBC AUTO-ENTMCNC: 31.4 G/DL
MCV RBC AUTO: 94 FL
MONOCYTES # BLD AUTO: 1.1 K/UL
MONOCYTES NFR BLD: 8.8 %
NEUTROPHILS # BLD AUTO: 8.5 K/UL
NEUTROPHILS NFR BLD: 66.6 %
NRBC BLD-RTO: 0 /100 WBC
PHOSPHATE SERPL-MCNC: 5 MG/DL
PLATELET # BLD AUTO: 487 K/UL
PMV BLD AUTO: 10.2 FL
POCT GLUCOSE: 119 MG/DL (ref 70–110)
POCT GLUCOSE: 133 MG/DL (ref 70–110)
POCT GLUCOSE: 133 MG/DL (ref 70–110)
POCT GLUCOSE: 143 MG/DL (ref 70–110)
POTASSIUM SERPL-SCNC: 4.2 MMOL/L
PROT SERPL-MCNC: 6.8 G/DL
RBC # BLD AUTO: 3.21 M/UL
SODIUM SERPL-SCNC: 140 MMOL/L
WBC # BLD AUTO: 12.78 K/UL

## 2018-07-07 PROCEDURE — 36415 COLL VENOUS BLD VENIPUNCTURE: CPT

## 2018-07-07 PROCEDURE — 80053 COMPREHEN METABOLIC PANEL: CPT

## 2018-07-07 PROCEDURE — 85025 COMPLETE CBC W/AUTO DIFF WBC: CPT

## 2018-07-07 PROCEDURE — 51798 US URINE CAPACITY MEASURE: CPT

## 2018-07-07 PROCEDURE — 11000001 HC ACUTE MED/SURG PRIVATE ROOM

## 2018-07-07 PROCEDURE — 99232 SBSQ HOSP IP/OBS MODERATE 35: CPT | Mod: ,,, | Performed by: INTERNAL MEDICINE

## 2018-07-07 PROCEDURE — 94761 N-INVAS EAR/PLS OXIMETRY MLT: CPT

## 2018-07-07 PROCEDURE — C9113 INJ PANTOPRAZOLE SODIUM, VIA: HCPCS | Performed by: INTERNAL MEDICINE

## 2018-07-07 PROCEDURE — 84100 ASSAY OF PHOSPHORUS: CPT

## 2018-07-07 PROCEDURE — 63600175 PHARM REV CODE 636 W HCPCS: Performed by: INTERNAL MEDICINE

## 2018-07-07 PROCEDURE — 83735 ASSAY OF MAGNESIUM: CPT

## 2018-07-07 PROCEDURE — 25000003 PHARM REV CODE 250: Performed by: INTERNAL MEDICINE

## 2018-07-07 RX ORDER — TAMSULOSIN HYDROCHLORIDE 0.4 MG/1
0.4 CAPSULE ORAL DAILY
Status: DISCONTINUED | OUTPATIENT
Start: 2018-07-07 | End: 2018-07-08

## 2018-07-07 RX ADMIN — DEXTROSE 40 MG: 50 INJECTION, SOLUTION INTRAVENOUS at 08:07

## 2018-07-07 RX ADMIN — TAMSULOSIN HYDROCHLORIDE 0.4 MG: 0.4 CAPSULE ORAL at 05:07

## 2018-07-07 RX ADMIN — POTASSIUM & SODIUM PHOSPHATES POWDER PACK 280-160-250 MG 2 PACKET: 280-160-250 PACK at 06:07

## 2018-07-07 RX ADMIN — POTASSIUM & SODIUM PHOSPHATES POWDER PACK 280-160-250 MG 2 PACKET: 280-160-250 PACK at 11:07

## 2018-07-07 RX ADMIN — Medication 150 MG: at 08:07

## 2018-07-07 RX ADMIN — CARVEDILOL 3.12 MG: 3.12 TABLET, FILM COATED ORAL at 08:07

## 2018-07-07 RX ADMIN — FOLIC ACID 1 MG: 1 TABLET ORAL at 08:07

## 2018-07-07 RX ADMIN — POTASSIUM & SODIUM PHOSPHATES POWDER PACK 280-160-250 MG 2 PACKET: 280-160-250 PACK at 04:07

## 2018-07-07 RX ADMIN — ATORVASTATIN CALCIUM 40 MG: 20 TABLET, FILM COATED ORAL at 08:07

## 2018-07-07 RX ADMIN — POTASSIUM & SODIUM PHOSPHATES POWDER PACK 280-160-250 MG 2 PACKET: 280-160-250 PACK at 08:07

## 2018-07-07 RX ADMIN — Medication 400 MG: at 08:07

## 2018-07-07 NOTE — PLAN OF CARE
Problem: Diabetes, Type 2 (Adult)  Goal: Signs and Symptoms of Listed Potential Problems Will be Absent, Minimized or Managed (Diabetes, Type 2)  Signs and symptoms of listed potential problems will be absent, minimized or managed by discharge/transition of care (reference Diabetes, Type 2 (Adult) CPG).   Outcome: Ongoing (interventions implemented as appropriate)   07/06/18 2433   Diabetes, Type 2   Problems Assessed (Type 2 Diabetes) all   Problems Present (Type 2 Diabetes) none

## 2018-07-07 NOTE — PLAN OF CARE
Problem: Infection, Risk/Actual (Adult)  Goal: Identify Related Risk Factors and Signs and Symptoms  Related risk factors and signs and symptoms are identified upon initiation of Human Response Clinical Practice Guideline (CPG)   Outcome: Ongoing (interventions implemented as appropriate)   07/03/18 1538   Infection, Risk/Actual   Related Risk Factors (Infection, Risk/Actual) chronic illness/condition;exposure to microbes   Signs and Symptoms (Infection, Risk/Actual) blood glucose changes;body temperature changes;chills;pain;weakness;respiratory rate increase

## 2018-07-07 NOTE — PLAN OF CARE
Problem: Diabetes, Type 2 (Adult)  Goal: Signs and Symptoms of Listed Potential Problems Will be Absent, Minimized or Managed (Diabetes, Type 2)  Signs and symptoms of listed potential problems will be absent, minimized or managed by discharge/transition of care (reference Diabetes, Type 2 (Adult) CPG).   Outcome: Ongoing (interventions implemented as appropriate)   07/06/18 1848   Diabetes, Type 2   Problems Assessed (Type 2 Diabetes) all   Problems Present (Type 2 Diabetes) none       Problem: Fall Risk (Adult)  Goal: Identify Related Risk Factors and Signs and Symptoms  Related risk factors and signs and symptoms are identified upon initiation of Human Response Clinical Practice Guideline (CPG)   Outcome: Ongoing (interventions implemented as appropriate)   07/01/18 0444   Fall Risk   Related Risk Factors (Fall Risk) age-related changes;fatigue/slow reaction;gait/mobility problems;impaired vision;inadequate lighting;polypharmacy;environment unfamiliar     Goal: Absence of Falls  Patient will demonstrate the desired outcomes by discharge/transition of care.   Outcome: Ongoing (interventions implemented as appropriate)   07/06/18 1848   Fall Risk (Adult)   Absence of Falls making progress toward outcome       Problem: Patient Care Overview  Goal: Plan of Care Review  Outcome: Ongoing (interventions implemented as appropriate)   07/06/18 1848   Coping/Psychosocial   Plan Of Care Reviewed With patient;spouse

## 2018-07-07 NOTE — PROGRESS NOTES
[facility] Hospital Medicine  Progress Note    Patient Name: Mario Sanford  MRN: [id number]  Patient Class: IP- Inpatient   Admission Date: 6/30/2018  Length of Stay: 5 days  Attending Physician: BRIANNA Ruffin MD  Primary Care Provider: Bereket Rivera DO    [facility] Medicine Team: [facility]OSP MED D RAJINDER Ruffin MD    Subjective:     Principal Problem:RUQ abdominal pain     Overview: Patient is a 63 y.o. male with past medical history of hypertension, dyslipidemia, and recently diagnosed DM-2 admitted to hospital for RUQ pain associated with SIRS, worsening liver enzymes and T.bili. The patient received IVF and his PREM, believed to be due to poor intake, resolved. Urine and blood cultures show NGTD and RUQ U/S was normal. Liver U/S showed a hyperechoic right lobe mass, consistent with hemangioma and an MRCP revealed significant narrowing of the common bile duct near the pancreatic head. Further work-up included Ceruloplasmin which was wnl; Hepatitis viral serologies and autoimmune markers are pending. Iron studies were done to evaluate the patient's normocytic anemia, which was most consistent with anemia of chronic inflammation. Hepatology was consulted and discussed EUS/ERCP with [facility] who plans to do EUS as outpatient.    Interval History: Mr. Sanford is feeling well today.  He rested overnight with no problems.  He reports no further melena.  He has no abominal pain    Review of Systems   Constitutional: Positive for appetite change. Negative for chills, fatigue and fever.   HENT: Negative for congestion.    Respiratory: Negative for cough and shortness of breath.    Cardiovascular: Negative for chest pain.   Gastrointestinal: Positive for blood in stool. Negative for abdominal pain, constipation, diarrhea, nausea and vomiting.   Genitourinary: Positive for difficulty urinating.   Neurological: Negative for dizziness and weakness.   Psychiatric/Behavioral: Negative for confusion. The  patient is not nervous/anxious.      Objective:     Vital Signs (Most Recent):  Temp: 98.8 °F (37.1 °C) (07/07/18 1247)  Pulse: 99 (07/07/18 1247)  Resp: 18 (07/07/18 1247)  BP: 127/71 (07/07/18 1247)  SpO2: 95 % (07/07/18 1247) Vital Signs (24h Range):  Temp:  [98.1 °F (36.7 °C)-99 °F (37.2 °C)] 98.8 °F (37.1 °C)  Pulse:  [] 99  Resp:  [17-20] 18  SpO2:  [95 %-100 %] 95 %  BP: (115-149)/(63-82) 127/71     Weight: 77.1 kg (169 lb 14.4 oz)  Body mass index is 25.09 kg/m².    Intake/Output Summary (Last 24 hours) at 07/07/18 1300  Last data filed at 07/07/18 1100   Gross per 24 hour   Intake              500 ml   Output             1300 ml   Net             -800 ml      Physical Exam   Constitutional: He is oriented to person, place, and time. He appears well-developed and well-nourished.  Non-toxic appearance. He does not have a sickly appearance. He does not appear ill. No distress.   HENT:   Head: Normocephalic and atraumatic.   Nose: Nose normal.   Mouth/Throat: Oropharynx is clear and moist. No oropharyngeal exudate.   Eyes: Conjunctivae and EOM are normal. Pupils are equal, round, and reactive to light. Right eye exhibits no discharge. Left eye exhibits no discharge. No scleral icterus.   Senile changes   Neck: Normal range of motion. Neck supple. No JVD present. No tracheal deviation present. No thyromegaly present.   Cardiovascular: Normal rate, regular rhythm, normal heart sounds and intact distal pulses.  Exam reveals no gallop and no friction rub.    No murmur heard.  Pulmonary/Chest: Effort normal and breath sounds normal. No accessory muscle usage or stridor. No tachypnea and no bradypnea. No respiratory distress. He has no decreased breath sounds. He has no wheezes. He has no rhonchi. He has no rales. He exhibits no tenderness.   Abdominal: Soft. Bowel sounds are normal. He exhibits no distension and no mass. There is no tenderness. There is no rebound and no guarding.   Truncal obesity    Genitourinary:   Genitourinary Comments: No hill in place.  Largely distended bladder on exam.   Musculoskeletal: Normal range of motion. He exhibits no edema or tenderness.   Lymphadenopathy:     He has no cervical adenopathy.   No peripheral edema   Neurological: He is alert and oriented to person, place, and time. He has normal reflexes. He displays normal reflexes. No cranial nerve deficit. He exhibits normal muscle tone. Coordination normal. GCS eye subscore is 4. GCS verbal subscore is 5. GCS motor subscore is 6.   Skin: Skin is warm and dry. No rash noted. No erythema. No pallor.   Pale and sallow   Psychiatric: He has a normal mood and affect. His speech is normal and behavior is normal. Judgment and thought content normal. Cognition and memory are normal.   Nursing note and vitals reviewed.      Significant Labs:  Recent Results (from the past 24 hour(s))   POCT glucose    Collection Time: 07/06/18  5:44 PM   Result Value Ref Range    POCT Glucose 163 (H) 70 - 110 mg/dL   POCT glucose    Collection Time: 07/06/18  9:06 PM   Result Value Ref Range    POCT Glucose 168 (H) 70 - 110 mg/dL   Clostridium difficile EIA    Collection Time: 07/06/18 11:01 PM   Result Value Ref Range    C. diff Antigen Negative Negative    C difficile Toxins A+B, EIA Negative Negative   Comprehensive metabolic panel    Collection Time: 07/07/18  4:04 AM   Result Value Ref Range    Sodium 140 136 - 145 mmol/L    Potassium 4.2 3.5 - 5.1 mmol/L    Chloride 104 95 - 110 mmol/L    CO2 23 23 - 29 mmol/L    Glucose 140 (H) 70 - 110 mg/dL    BUN, Bld 17 8 - 23 mg/dL    Creatinine 1.6 (H) 0.5 - 1.4 mg/dL    Calcium 9.0 8.7 - 10.5 mg/dL    Total Protein 6.8 6.0 - 8.4 g/dL    Albumin 3.1 (L) 3.5 - 5.2 g/dL    Total Bilirubin 0.9 0.1 - 1.0 mg/dL    Alkaline Phosphatase 158 (H) 55 - 135 U/L    AST 24 10 - 40 U/L    ALT 51 (H) 10 - 44 U/L    Anion Gap 13 8 - 16 mmol/L    eGFR if African American 51.5 (A) >60 mL/min/1.73 m^2    eGFR if non   44.5 (A) >60 mL/min/1.73 m^2   CBC auto differential    Collection Time: 07/07/18  4:04 AM   Result Value Ref Range    WBC 12.78 (H) 3.90 - 12.70 K/uL    RBC 3.21 (L) 4.60 - 6.20 M/uL    Hemoglobin 9.5 (L) 14.0 - 18.0 g/dL    Hematocrit 30.3 (L) 40.0 - 54.0 %    MCV 94 82 - 98 fL    MCH 29.6 27.0 - 31.0 pg    MCHC 31.4 (L) 32.0 - 36.0 g/dL    RDW 13.2 11.5 - 14.5 %    Platelets 487 (H) 150 - 350 K/uL    MPV 10.2 9.2 - 12.9 fL    Immature Granulocytes 0.7 (H) 0.0 - 0.5 %    Gran # (ANC) 8.5 (H) 1.8 - 7.7 K/uL    Immature Grans (Abs) 0.09 (H) 0.00 - 0.04 K/uL    Lymph # 2.9 1.0 - 4.8 K/uL    Mono # 1.1 (H) 0.3 - 1.0 K/uL    Eos # 0.1 0.0 - 0.5 K/uL    Baso # 0.06 0.00 - 0.20 K/uL    nRBC 0 0 /100 WBC    Gran% 66.6 38.0 - 73.0 %    Lymph% 22.4 18.0 - 48.0 %    Mono% 8.8 4.0 - 15.0 %    Eosinophil% 1.0 0.0 - 8.0 %    Basophil% 0.5 0.0 - 1.9 %    Differential Method Automated    Magnesium    Collection Time: 07/07/18  4:04 AM   Result Value Ref Range    Magnesium 1.9 1.6 - 2.6 mg/dL   Phosphorus    Collection Time: 07/07/18  4:04 AM   Result Value Ref Range    Phosphorus 5.0 (H) 2.7 - 4.5 mg/dL   POCT glucose    Collection Time: 07/07/18  7:55 AM   Result Value Ref Range    POCT Glucose 133 (H) 70 - 110 mg/dL   POCT glucose    Collection Time: 07/07/18 11:45 AM   Result Value Ref Range    POCT Glucose 143 (H) 70 - 110 mg/dL       Significant Imaging:     Assessment/Plan:      Acute duodenal ulcer from peptic ulcer disease with hemorrhage  -Counseled the patient to avoid all NSAIDs, ASA, meloxicam until follow up with GI  -Checked H. Pylori IgG 7/3: Negative  -EGD on 7/3/18 confirmed large duodenal ulcer:   -20mm ulcer with adherent clot in second part of duodenum  -pantoprazole (PROTONIX) 40 mg in dextrose 5 % 100 mL infusion, 8 mg/hr, Intravenous, Continuous x 72 hours was completed   -His Hg was trending down and he still noted some melena              -EGD was repeated on 7/6/18:                        "   -One non-bleeding duodenal ulcer with a visible vessel.                           -Treated with bipolar cautery                          - one clip placed to region for guidance if IR required (not placed for hemostasis)  -Case was discussed with GI today: plan to monitor through tomorrow on the Protonix 40mg iv q12    Urinary retention  -Nurse performed in and out cath and got out 1300cc's  -Will monitor for now with no hill    Grade D reflux esophagitis  -No biopsy was performed during EGD  -Will need close follow up to rule out Acosta's esophagus     Acute post-hemorrhagic anemia  -Stable to monitor, though very slow trend down  -folic acid tablet 1 mg, 1 mg, Oral, Daily  -iron polysaccharides capsule 150 mg, 150 mg, Oral, Daily     Narrowing of the common bile duct on MRCP  Elevated liver enzymes  -Acute pancreatitis was initially suspected but lipase and RUQ US are normal.   -Pain persisted and T.bili / liver enzymes trended upward.   -SIRS rapidly resolved with fluid resuscitation.   -Liver U/S (7/1): "Hyperechoic right lobe mass measures 1.0 x 1.0 x 1.0 cm which may represent a hemangioma".   -MRCP (7/1): "Common bile duct narrows significantly just proximal to the pancreatic head. A 9 mm right hepatic lobe lesion, most consistent with a hemangioma".   -Iron studies with normocytic anemia most consistent with anemia of chronic inflammation   -Ceruloplasmin wnl; Hepatitis panel negative, auto-immune panels negative as well  -Hepatology consulted: Suspect biliary etiology. Plan for EUS with [facility] as outpatient  -Continuing his Lipitor with care and close LFT follow up     Acute kidney injury  -Creatinine today: 1.4 (Max of 2 on 6/30)  -Probably due to poor oral intake / volume depletion.   -He does admit to heavy Ibuprofen use for his hip pain        -I counseled him against this and the Meloxicam going forward  -Holding ARB/HCTZ, NSAIDs currently     Acute cystitis with hematuria  -UA positive for WBC and a " few bacteria, Initiated ceftriaxone pending urine culture.  -Urine cultures growing multiple organisms from 6/30 and > 100K Coag-Neg Staph from7/1.   -Continuing cefTRIAXone injection 1 g, 1 g, Intravenous, Q24H for now, end date was 7/6/18  -Will consider therapy complete at discharge     Essential hypertension  -Holding home Hyzaar 50/12.5mg po qday currently due to resolving PREM  -hydrALAZINE tablet 25 mg, 25 mg, Oral, Q8H PRN SBP>170  -SBP maintaining in the 160's for 2 days:              -Started Coreg 3.125mg po bid 7/6     Type 2 diabetes mellitus, controlled, no complications  -Currently maintaining well on 2000 calorie ada diet and SSI  -At home he takes Amaryl 2mg po qam     Hypercholesteremia  -Lipitor 40mg po qday     Hypophosphatemia  -started potassium, sodium phosphates 280-160-250 mg packet 2 packet, 2 packet, Oral, QID (AC & HS) on 7/3     Hypomagnesemia  -Added Mg Oxide 400mg po qday on 7/4     Diet: Clear  GI Prophylaxis: Protonix iv 8mg/hr  DVT Prophylaxis: SCD/TEDs: no heparins due to GIB  Lines/Drains/Airway: Peripheral IV- Left AC     Wounds: None      HIGH RISK CONDITION(S):   Patient is currently receiving parenteral controlled    VTE Risk Mitigation         Ordered     IP VTE HIGH RISK PATIENT  Once      06/30/18 0845     Reason for No Pharmacological VTE Prophylaxis  Once      06/30/18 0845     Place sequential compression device  Until discontinued      06/30/18 0814     Place JOAO hose  Until discontinued      06/30/18 0812             RAJINDER Ruffin MD  Department of Hospital Medicine   [facility]

## 2018-07-07 NOTE — PLAN OF CARE
Problem: Patient Care Overview  Goal: Plan of Care Review  Outcome: Ongoing (interventions implemented as appropriate)   07/06/18 0720   Coping/Psychosocial   Plan Of Care Reviewed With patient;spouse

## 2018-07-07 NOTE — PLAN OF CARE
Problem: Fall Risk (Adult)  Goal: Identify Related Risk Factors and Signs and Symptoms  Related risk factors and signs and symptoms are identified upon initiation of Human Response Clinical Practice Guideline (CPG)   Outcome: Ongoing (interventions implemented as appropriate)   07/01/18 4989   Fall Risk   Related Risk Factors (Fall Risk) age-related changes;fatigue/slow reaction;gait/mobility problems;impaired vision;inadequate lighting;polypharmacy;environment unfamiliar

## 2018-07-08 LAB
ALBUMIN SERPL BCP-MCNC: 2.6 G/DL
ALP SERPL-CCNC: 131 U/L
ALT SERPL W/O P-5'-P-CCNC: 33 U/L
ANION GAP SERPL CALC-SCNC: 12 MMOL/L
AST SERPL-CCNC: 18 U/L
BASOPHILS # BLD AUTO: 0.06 K/UL
BASOPHILS NFR BLD: 0.6 %
BILIRUB SERPL-MCNC: 0.8 MG/DL
BUN SERPL-MCNC: 15 MG/DL
CALCIUM SERPL-MCNC: 8.6 MG/DL
CHLORIDE SERPL-SCNC: 107 MMOL/L
CO2 SERPL-SCNC: 22 MMOL/L
CREAT SERPL-MCNC: 1.5 MG/DL
DIFFERENTIAL METHOD: ABNORMAL
EOSINOPHIL # BLD AUTO: 0.3 K/UL
EOSINOPHIL NFR BLD: 2.9 %
ERYTHROCYTE [DISTWIDTH] IN BLOOD BY AUTOMATED COUNT: 13.2 %
EST. GFR  (AFRICAN AMERICAN): 55.7 ML/MIN/1.73 M^2
EST. GFR  (NON AFRICAN AMERICAN): 48.2 ML/MIN/1.73 M^2
GLUCOSE SERPL-MCNC: 126 MG/DL
HCT VFR BLD AUTO: 27.8 %
HGB BLD-MCNC: 8.8 G/DL
IMM GRANULOCYTES # BLD AUTO: 0.08 K/UL
IMM GRANULOCYTES NFR BLD AUTO: 0.8 %
LYMPHOCYTES # BLD AUTO: 2.6 K/UL
LYMPHOCYTES NFR BLD: 24.5 %
MAGNESIUM SERPL-MCNC: 1.7 MG/DL
MCH RBC QN AUTO: 29.5 PG
MCHC RBC AUTO-ENTMCNC: 31.7 G/DL
MCV RBC AUTO: 93 FL
MONOCYTES # BLD AUTO: 0.9 K/UL
MONOCYTES NFR BLD: 8.7 %
NEUTROPHILS # BLD AUTO: 6.6 K/UL
NEUTROPHILS NFR BLD: 62.5 %
NRBC BLD-RTO: 0 /100 WBC
PHOSPHATE SERPL-MCNC: 5.1 MG/DL
PLATELET # BLD AUTO: 446 K/UL
PMV BLD AUTO: 10 FL
POCT GLUCOSE: 107 MG/DL (ref 70–110)
POCT GLUCOSE: 140 MG/DL (ref 70–110)
POCT GLUCOSE: 149 MG/DL (ref 70–110)
POCT GLUCOSE: 155 MG/DL (ref 70–110)
POTASSIUM SERPL-SCNC: 4 MMOL/L
PROT SERPL-MCNC: 5.7 G/DL
RBC # BLD AUTO: 2.98 M/UL
SODIUM SERPL-SCNC: 141 MMOL/L
WBC # BLD AUTO: 10.55 K/UL

## 2018-07-08 PROCEDURE — 36415 COLL VENOUS BLD VENIPUNCTURE: CPT

## 2018-07-08 PROCEDURE — 83735 ASSAY OF MAGNESIUM: CPT

## 2018-07-08 PROCEDURE — 25000003 PHARM REV CODE 250: Performed by: INTERNAL MEDICINE

## 2018-07-08 PROCEDURE — 99232 SBSQ HOSP IP/OBS MODERATE 35: CPT | Mod: ,,, | Performed by: INTERNAL MEDICINE

## 2018-07-08 PROCEDURE — 85025 COMPLETE CBC W/AUTO DIFF WBC: CPT

## 2018-07-08 PROCEDURE — 63600175 PHARM REV CODE 636 W HCPCS: Performed by: INTERNAL MEDICINE

## 2018-07-08 PROCEDURE — C9113 INJ PANTOPRAZOLE SODIUM, VIA: HCPCS | Performed by: INTERNAL MEDICINE

## 2018-07-08 PROCEDURE — 80053 COMPREHEN METABOLIC PANEL: CPT

## 2018-07-08 PROCEDURE — 84100 ASSAY OF PHOSPHORUS: CPT

## 2018-07-08 PROCEDURE — 11000001 HC ACUTE MED/SURG PRIVATE ROOM

## 2018-07-08 RX ORDER — TAMSULOSIN HYDROCHLORIDE 0.4 MG/1
0.4 CAPSULE ORAL ONCE
Status: COMPLETED | OUTPATIENT
Start: 2018-07-08 | End: 2018-07-08

## 2018-07-08 RX ORDER — TAMSULOSIN HYDROCHLORIDE 0.4 MG/1
0.8 CAPSULE ORAL DAILY
Status: DISCONTINUED | OUTPATIENT
Start: 2018-07-09 | End: 2018-07-11 | Stop reason: HOSPADM

## 2018-07-08 RX ADMIN — FOLIC ACID 1 MG: 1 TABLET ORAL at 08:07

## 2018-07-08 RX ADMIN — TAMSULOSIN HYDROCHLORIDE 0.4 MG: 0.4 CAPSULE ORAL at 02:07

## 2018-07-08 RX ADMIN — Medication 150 MG: at 08:07

## 2018-07-08 RX ADMIN — DEXTROSE 40 MG: 50 INJECTION, SOLUTION INTRAVENOUS at 09:07

## 2018-07-08 RX ADMIN — CARVEDILOL 3.12 MG: 3.12 TABLET, FILM COATED ORAL at 08:07

## 2018-07-08 RX ADMIN — TAMSULOSIN HYDROCHLORIDE 0.4 MG: 0.4 CAPSULE ORAL at 08:07

## 2018-07-08 RX ADMIN — POTASSIUM & SODIUM PHOSPHATES POWDER PACK 280-160-250 MG 2 PACKET: 280-160-250 PACK at 05:07

## 2018-07-08 RX ADMIN — DEXTROSE 40 MG: 50 INJECTION, SOLUTION INTRAVENOUS at 08:07

## 2018-07-08 RX ADMIN — Medication 400 MG: at 08:07

## 2018-07-08 RX ADMIN — ATORVASTATIN CALCIUM 40 MG: 20 TABLET, FILM COATED ORAL at 08:07

## 2018-07-08 NOTE — PLAN OF CARE
Problem: Diabetes, Type 2 (Adult)  Goal: Signs and Symptoms of Listed Potential Problems Will be Absent, Minimized or Managed (Diabetes, Type 2)  Signs and symptoms of listed potential problems will be absent, minimized or managed by discharge/transition of care (reference Diabetes, Type 2 (Adult) CPG).   Outcome: Ongoing (interventions implemented as appropriate)   07/06/18 1848   Diabetes, Type 2   Problems Assessed (Type 2 Diabetes) all   Problems Present (Type 2 Diabetes) none       Problem: Fall Risk (Adult)  Goal: Identify Related Risk Factors and Signs and Symptoms  Related risk factors and signs and symptoms are identified upon initiation of Human Response Clinical Practice Guideline (CPG)   Outcome: Ongoing (interventions implemented as appropriate)   07/01/18 0444   Fall Risk   Related Risk Factors (Fall Risk) age-related changes;fatigue/slow reaction;gait/mobility problems;impaired vision;inadequate lighting;polypharmacy;environment unfamiliar     Goal: Absence of Falls  Patient will demonstrate the desired outcomes by discharge/transition of care.   Outcome: Ongoing (interventions implemented as appropriate)   07/06/18 1848   Fall Risk (Adult)   Absence of Falls making progress toward outcome

## 2018-07-08 NOTE — PLAN OF CARE
Problem: Patient Care Overview  Goal: Plan of Care Review  Outcome: Ongoing (interventions implemented as appropriate)   07/06/18 2309   Coping/Psychosocial   Plan Of Care Reviewed With patient;spouse

## 2018-07-08 NOTE — PLAN OF CARE
Problem: Diabetes, Type 2 (Adult)  Goal: Signs and Symptoms of Listed Potential Problems Will be Absent, Minimized or Managed (Diabetes, Type 2)  Signs and symptoms of listed potential problems will be absent, minimized or managed by discharge/transition of care (reference Diabetes, Type 2 (Adult) CPG).   Outcome: Ongoing (interventions implemented as appropriate)   07/06/18 9310   Diabetes, Type 2   Problems Assessed (Type 2 Diabetes) all   Problems Present (Type 2 Diabetes) none

## 2018-07-08 NOTE — PLAN OF CARE
Problem: Fall Risk (Adult)  Goal: Identify Related Risk Factors and Signs and Symptoms  Related risk factors and signs and symptoms are identified upon initiation of Human Response Clinical Practice Guideline (CPG)   Outcome: Ongoing (interventions implemented as appropriate)   07/01/18 0444 07/08/18 1120   Fall Risk   Related Risk Factors (Fall Risk) age-related changes;fatigue/slow reaction;gait/mobility problems;impaired vision;inadequate lighting;polypharmacy;environment unfamiliar --    Signs and Symptoms (Fall Risk) --  presence of risk factors

## 2018-07-08 NOTE — PROGRESS NOTES
[facility] Hospital Medicine  Progress Note    Patient Name: Mario Sanford  MRN: [id number]  Patient Class: IP- Inpatient   Admission Date: 6/30/2018  Length of Stay: 6 days  Attending Physician: BRIANNA Ruffin MD  Primary Care Provider: Bereket Rivera DO    [facility] Medicine Team: [facility] [place] RAJINDER Ruffin MD    Subjective:     Principal Problem:RUQ abdominal pain    Overview: Patient is a 63 y.o. male with past medical history of hypertension, dyslipidemia, and recently diagnosed DM-2 admitted to hospital for RUQ pain associated with SIRS, worsening liver enzymes and T.bili. The patient received IVF and his PREM, believed to be due to poor intake, resolved. Urine and blood cultures show NGTD and RUQ U/S was normal. Liver U/S showed a hyperechoic right lobe mass, consistent with hemangioma and an MRCP revealed significant narrowing of the common bile duct near the pancreatic head. Further work-up included Ceruloplasmin which was wnl; Hepatitis viral serologies and autoimmune markers are pending. Iron studies were done to evaluate the patient's normocytic anemia, which was most consistent with anemia of chronic inflammation. Hepatology was consulted and discussed EUS/ERCP with [facility] who plans to do EUS as outpatient.    Interval History: Mr. Sanford still is having trouble passing his urine.  He had to be catheterized each shift over the past 24 hours with >700cc's each time.  He no longer notes any melena.  He has no abdominal pain.  He tolerated his clear liquid diet well.    Review of Systems   Constitutional: Positive for appetite change. Negative for chills, fatigue and fever.   HENT: Negative for congestion.    Respiratory: Negative for cough and shortness of breath.    Cardiovascular: Negative for chest pain.   Gastrointestinal: Positive for blood in stool. Negative for abdominal pain, constipation, diarrhea, nausea and vomiting.   Genitourinary: Positive for difficulty urinating.    Neurological: Negative for dizziness and weakness.   Psychiatric/Behavioral: Negative for confusion. The patient is not nervous/anxious.      Objective:     Vital Signs (Most Recent):  Temp: 97.9 °F (36.6 °C) (07/08/18 1211)  Pulse: 91 (07/08/18 1211)  Resp: 18 (07/08/18 1211)  BP: 138/75 (07/08/18 1211)  SpO2: 96 % (07/08/18 1211) Vital Signs (24h Range):  Temp:  [97.9 °F (36.6 °C)-99.3 °F (37.4 °C)] 97.9 °F (36.6 °C)  Pulse:  [91-99] 91  Resp:  [16-20] 18  SpO2:  [93 %-96 %] 96 %  BP: (119-146)/(57-78) 138/75     Weight: 77.1 kg (169 lb 14.4 oz)  Body mass index is 25.09 kg/m².    Intake/Output Summary (Last 24 hours) at 07/08/18 1508  Last data filed at 07/08/18 1300   Gross per 24 hour   Intake              900 ml   Output             3500 ml   Net            -2600 ml      Physical Exam   Constitutional: He is oriented to person, place, and time. He appears well-developed and well-nourished.  Non-toxic appearance. He does not have a sickly appearance. He does not appear ill. No distress.   HENT:   Head: Normocephalic and atraumatic.   Nose: Nose normal.   Mouth/Throat: Oropharynx is clear and moist. No oropharyngeal exudate.   Eyes: Conjunctivae and EOM are normal. Pupils are equal, round, and reactive to light. Right eye exhibits no discharge. Left eye exhibits no discharge. No scleral icterus.   Senile changes   Neck: Normal range of motion. Neck supple. No JVD present. No tracheal deviation present. No thyromegaly present.   Cardiovascular: Normal rate, regular rhythm, normal heart sounds and intact distal pulses.  Exam reveals no gallop and no friction rub.    No murmur heard.  Pulmonary/Chest: Effort normal and breath sounds normal. No accessory muscle usage or stridor. No tachypnea and no bradypnea. No respiratory distress. He has no decreased breath sounds. He has no wheezes. He has no rhonchi. He has no rales. He exhibits no tenderness.   Abdominal: Soft. Bowel sounds are normal. He exhibits no  distension and no mass. There is no tenderness. There is no rebound and no guarding.   Truncal obesity   Genitourinary:   Genitourinary Comments: No hill in place.  Largely distended bladder on exam.   Musculoskeletal: Normal range of motion. He exhibits no edema or tenderness.   Lymphadenopathy:     He has no cervical adenopathy.   No peripheral edema   Neurological: He is alert and oriented to person, place, and time. He has normal reflexes. He displays normal reflexes. No cranial nerve deficit. He exhibits normal muscle tone. Coordination normal. GCS eye subscore is 4. GCS verbal subscore is 5. GCS motor subscore is 6.   Skin: Skin is warm and dry. No rash noted. No erythema. No pallor.   Pale and sallow   Psychiatric: He has a normal mood and affect. His speech is normal and behavior is normal. Judgment and thought content normal. Cognition and memory are normal.   Nursing note and vitals reviewed.      Significant Labs:   Recent Results (from the past 24 hour(s))   POCT glucose    Collection Time: 07/07/18  5:31 PM   Result Value Ref Range    POCT Glucose 133 (H) 70 - 110 mg/dL   POCT glucose    Collection Time: 07/07/18  8:57 PM   Result Value Ref Range    POCT Glucose 119 (H) 70 - 110 mg/dL   Comprehensive metabolic panel    Collection Time: 07/08/18  5:39 AM   Result Value Ref Range    Sodium 141 136 - 145 mmol/L    Potassium 4.0 3.5 - 5.1 mmol/L    Chloride 107 95 - 110 mmol/L    CO2 22 (L) 23 - 29 mmol/L    Glucose 126 (H) 70 - 110 mg/dL    BUN, Bld 15 8 - 23 mg/dL    Creatinine 1.5 (H) 0.5 - 1.4 mg/dL    Calcium 8.6 (L) 8.7 - 10.5 mg/dL    Total Protein 5.7 (L) 6.0 - 8.4 g/dL    Albumin 2.6 (L) 3.5 - 5.2 g/dL    Total Bilirubin 0.8 0.1 - 1.0 mg/dL    Alkaline Phosphatase 131 55 - 135 U/L    AST 18 10 - 40 U/L    ALT 33 10 - 44 U/L    Anion Gap 12 8 - 16 mmol/L    eGFR if African American 55.7 (A) >60 mL/min/1.73 m^2    eGFR if non  48.2 (A) >60 mL/min/1.73 m^2   CBC auto differential     Collection Time: 07/08/18  5:39 AM   Result Value Ref Range    WBC 10.55 3.90 - 12.70 K/uL    RBC 2.98 (L) 4.60 - 6.20 M/uL    Hemoglobin 8.8 (L) 14.0 - 18.0 g/dL    Hematocrit 27.8 (L) 40.0 - 54.0 %    MCV 93 82 - 98 fL    MCH 29.5 27.0 - 31.0 pg    MCHC 31.7 (L) 32.0 - 36.0 g/dL    RDW 13.2 11.5 - 14.5 %    Platelets 446 (H) 150 - 350 K/uL    MPV 10.0 9.2 - 12.9 fL    Immature Granulocytes 0.8 (H) 0.0 - 0.5 %    Gran # (ANC) 6.6 1.8 - 7.7 K/uL    Immature Grans (Abs) 0.08 (H) 0.00 - 0.04 K/uL    Lymph # 2.6 1.0 - 4.8 K/uL    Mono # 0.9 0.3 - 1.0 K/uL    Eos # 0.3 0.0 - 0.5 K/uL    Baso # 0.06 0.00 - 0.20 K/uL    nRBC 0 0 /100 WBC    Gran% 62.5 38.0 - 73.0 %    Lymph% 24.5 18.0 - 48.0 %    Mono% 8.7 4.0 - 15.0 %    Eosinophil% 2.9 0.0 - 8.0 %    Basophil% 0.6 0.0 - 1.9 %    Differential Method Automated    Magnesium    Collection Time: 07/08/18  5:39 AM   Result Value Ref Range    Magnesium 1.7 1.6 - 2.6 mg/dL   Phosphorus    Collection Time: 07/08/18  5:39 AM   Result Value Ref Range    Phosphorus 5.1 (H) 2.7 - 4.5 mg/dL   POCT glucose    Collection Time: 07/08/18  7:31 AM   Result Value Ref Range    POCT Glucose 107 70 - 110 mg/dL   POCT glucose    Collection Time: 07/08/18 11:35 AM   Result Value Ref Range    POCT Glucose 140 (H) 70 - 110 mg/dL       Significant Imaging:     Assessment/Plan:      Acute duodenal ulcer from peptic ulcer disease with hemorrhage  -Counseled the patient to avoid all NSAIDs, ASA, meloxicam until follow up with GI  -Checked H. Pylori IgG 7/3: Negative  -EGD on 7/3/18 confirmed large duodenal ulcer:   -20mm ulcer with adherent clot in second part of duodenum  -pantoprazole (PROTONIX) 40 mg in dextrose 5 % 100 mL infusion, 8 mg/hr, Intravenous, Continuous x 72 hours was completed   -His Hg was trending down and he still noted some melena              -EGD was repeated on 7/6/18:                          -One non-bleeding duodenal ulcer with a visible vessel.  "                          -Treated with bipolar cautery                          - one clip placed to region for guidance if IR required (not placed for hemostasis)  -Case was discussed with GI today: plan to monitor through tomorrow on the Protonix 40mg iv q12     Urinary retention  -Nurse performed in and out cath and got out 1300cc's on 7/7   -Required cath's every shift since then  -Started Flomax 0.4mg po qday yesterday with little relief   -Increased to 0.8mg po qday today   -If still cannot void by tomorrow, will have to go out with hill in place and follow up with Urology  -Will monitor for now with no hill     Grade D reflux esophagitis  -No biopsy was performed during EGD  -Will need close follow up to rule out Acosta's esophagus     Acute post-hemorrhagic anemia  -Stable to monitor, though very slow trend down  -folic acid tablet 1 mg, 1 mg, Oral, Daily  -iron polysaccharides capsule 150 mg, 150 mg, Oral, Daily     Narrowing of the common bile duct on MRCP  Elevated liver enzymes  -Acute pancreatitis was initially suspected but lipase and RUQ US are normal.   -Pain persisted and T.bili / liver enzymes trended upward.   -SIRS rapidly resolved with fluid resuscitation.   -Liver U/S (7/1): "Hyperechoic right lobe mass measures 1.0 x 1.0 x 1.0 cm which may represent a hemangioma".   -MRCP (7/1): "Common bile duct narrows significantly just proximal to the pancreatic head. A 9 mm right hepatic lobe lesion, most consistent with a hemangioma".   -Iron studies with normocytic anemia most consistent with anemia of chronic inflammation   -Ceruloplasmin wnl; Hepatitis panel negative, auto-immune panels negative as well  -Hepatology consulted: Suspect biliary etiology. Plan for EUS with [facility] as outpatient  -Continuing his Lipitor with care and close LFT follow up     Acute kidney injury  -Creatinine today: 1.4 (Max of 2 on 6/30)  -Probably due to poor oral intake / volume depletion.   -He does admit to heavy " Ibuprofen use for his hip pain        -I counseled him against this and the Meloxicam going forward  -Holding ARB/HCTZ, NSAIDs currently: Will not restart going home as Cr not recovered to baseline     Acute cystitis with hematuria  -UA positive for WBC and a few bacteria, Initiated ceftriaxone pending urine culture.  -Urine cultures growing multiple organisms from 6/30 and > 100K Coag-Neg Staph from7/1.   -Continuing cefTRIAXone injection 1 g, 1 g, Intravenous, Q24H for now, end date was 7/6/18  -Will consider therapy complete at discharge     Essential hypertension  -Holding home Hyzaar 50/12.5mg po qday currently due to resolving PREM  -hydrALAZINE tablet 25 mg, 25 mg, Oral, Q8H PRN SBP>170  -SBP maintaining in the 160's for 2 days:              -Started Coreg 3.125mg po bid 7/6     Type 2 diabetes mellitus, controlled, no complications  -Currently maintaining well on 2000 calorie ada diet and SSI  -At home he takes Amaryl 2mg po qam     Hypercholesteremia  -Lipitor 40mg po qday      Hypomagnesemia  -Added Mg Oxide 400mg po qday on 7/4     Diet: Clear  GI Prophylaxis: Protonix iv 8mg/hr  DVT Prophylaxis: SCD/TEDs: no heparins due to GIB  Lines/Drains/Airway: Peripheral IV- Left AC     Wounds: None      HIGH RISK CONDITION(S):   Patient is currently receiving parenteral controlled           VTE Risk Mitigation        VTE Risk Mitigation         Ordered     IP VTE HIGH RISK PATIENT  Once      06/30/18 0845     Reason for No Pharmacological VTE Prophylaxis  Once      06/30/18 0845     Place sequential compression device  Until discontinued      06/30/18 0814     Place JOAO hose  Until discontinued      06/30/18 0812             RAJINDER Ruffin MD  Department of Hospital Medicine   [facility]

## 2018-07-09 ENCOUNTER — TELEPHONE (OUTPATIENT)
Dept: GASTROENTEROLOGY | Facility: CLINIC | Age: 63
End: 2018-07-09

## 2018-07-09 PROBLEM — K26.4 DUODENAL ULCER WITH HEMORRHAGE BUT WITHOUT OBSTRUCTION: Status: ACTIVE | Noted: 2018-07-09

## 2018-07-09 PROBLEM — D62 ACUTE POSTHEMORRHAGIC ANEMIA: Status: ACTIVE | Noted: 2018-07-09

## 2018-07-09 PROBLEM — N13.8 BPH WITH URINARY OBSTRUCTION: Status: ACTIVE | Noted: 2018-07-09

## 2018-07-09 PROBLEM — N40.1 BPH WITH URINARY OBSTRUCTION: Status: ACTIVE | Noted: 2018-07-09

## 2018-07-09 PROBLEM — R33.9 URINARY RETENTION: Status: ACTIVE | Noted: 2018-07-09

## 2018-07-09 LAB
ALBUMIN SERPL BCP-MCNC: 2.5 G/DL
ALP SERPL-CCNC: 118 U/L
ALT SERPL W/O P-5'-P-CCNC: 26 U/L
ANION GAP SERPL CALC-SCNC: 9 MMOL/L
AST SERPL-CCNC: 13 U/L
BACTERIA #/AREA URNS AUTO: ABNORMAL /HPF
BASOPHILS # BLD AUTO: 0.06 K/UL
BASOPHILS NFR BLD: 0.6 %
BILIRUB SERPL-MCNC: 0.5 MG/DL
BILIRUB UR QL STRIP: NEGATIVE
BUN SERPL-MCNC: 15 MG/DL
CALCIUM SERPL-MCNC: 8.2 MG/DL
CHLORIDE SERPL-SCNC: 108 MMOL/L
CLARITY UR REFRACT.AUTO: ABNORMAL
CO2 SERPL-SCNC: 24 MMOL/L
COLOR UR AUTO: ABNORMAL
CREAT SERPL-MCNC: 1.4 MG/DL
DIFFERENTIAL METHOD: ABNORMAL
EOSINOPHIL # BLD AUTO: 0.3 K/UL
EOSINOPHIL NFR BLD: 2.9 %
ERYTHROCYTE [DISTWIDTH] IN BLOOD BY AUTOMATED COUNT: 13.3 %
EST. GFR  (AFRICAN AMERICAN): >60 ML/MIN/1.73 M^2
EST. GFR  (NON AFRICAN AMERICAN): 52.4 ML/MIN/1.73 M^2
GLUCOSE SERPL-MCNC: 124 MG/DL
GLUCOSE UR QL STRIP: ABNORMAL
HCT VFR BLD AUTO: 27 %
HGB BLD-MCNC: 8.3 G/DL
HGB UR QL STRIP: ABNORMAL
HYALINE CASTS UR QL AUTO: 0 /LPF
IMM GRANULOCYTES # BLD AUTO: 0.07 K/UL
IMM GRANULOCYTES NFR BLD AUTO: 0.7 %
KETONES UR QL STRIP: NEGATIVE
LEUKOCYTE ESTERASE UR QL STRIP: ABNORMAL
LYMPHOCYTES # BLD AUTO: 2.9 K/UL
LYMPHOCYTES NFR BLD: 29.3 %
MAGNESIUM SERPL-MCNC: 1.6 MG/DL
MCH RBC QN AUTO: 29.1 PG
MCHC RBC AUTO-ENTMCNC: 30.7 G/DL
MCV RBC AUTO: 95 FL
MICROSCOPIC COMMENT: ABNORMAL
MONOCYTES # BLD AUTO: 1 K/UL
MONOCYTES NFR BLD: 9.5 %
NEUTROPHILS # BLD AUTO: 5.7 K/UL
NEUTROPHILS NFR BLD: 57 %
NITRITE UR QL STRIP: NEGATIVE
NRBC BLD-RTO: 0 /100 WBC
PH UR STRIP: 8 [PH] (ref 5–8)
PHOSPHATE SERPL-MCNC: 2.9 MG/DL
PLATELET # BLD AUTO: 414 K/UL
PMV BLD AUTO: 9.8 FL
POCT GLUCOSE: 118 MG/DL (ref 70–110)
POCT GLUCOSE: 138 MG/DL (ref 70–110)
POCT GLUCOSE: 194 MG/DL (ref 70–110)
POCT GLUCOSE: 195 MG/DL (ref 70–110)
POTASSIUM SERPL-SCNC: 4 MMOL/L
PROT SERPL-MCNC: 5.4 G/DL
PROT UR QL STRIP: ABNORMAL
RBC # BLD AUTO: 2.85 M/UL
RBC #/AREA URNS AUTO: >100 /HPF (ref 0–4)
SODIUM SERPL-SCNC: 141 MMOL/L
SP GR UR STRIP: 1.01 (ref 1–1.03)
URN SPEC COLLECT METH UR: ABNORMAL
UROBILINOGEN UR STRIP-ACNC: NEGATIVE EU/DL
WBC # BLD AUTO: 10.01 K/UL
WBC #/AREA URNS AUTO: 0 /HPF (ref 0–5)

## 2018-07-09 PROCEDURE — 94761 N-INVAS EAR/PLS OXIMETRY MLT: CPT

## 2018-07-09 PROCEDURE — 84100 ASSAY OF PHOSPHORUS: CPT

## 2018-07-09 PROCEDURE — 81001 URINALYSIS AUTO W/SCOPE: CPT

## 2018-07-09 PROCEDURE — 85025 COMPLETE CBC W/AUTO DIFF WBC: CPT

## 2018-07-09 PROCEDURE — 11000001 HC ACUTE MED/SURG PRIVATE ROOM

## 2018-07-09 PROCEDURE — C9113 INJ PANTOPRAZOLE SODIUM, VIA: HCPCS | Performed by: INTERNAL MEDICINE

## 2018-07-09 PROCEDURE — 36415 COLL VENOUS BLD VENIPUNCTURE: CPT

## 2018-07-09 PROCEDURE — 83735 ASSAY OF MAGNESIUM: CPT

## 2018-07-09 PROCEDURE — 25000003 PHARM REV CODE 250: Performed by: INTERNAL MEDICINE

## 2018-07-09 PROCEDURE — 63600175 PHARM REV CODE 636 W HCPCS: Performed by: INTERNAL MEDICINE

## 2018-07-09 PROCEDURE — 80053 COMPREHEN METABOLIC PANEL: CPT

## 2018-07-09 PROCEDURE — 99231 SBSQ HOSP IP/OBS SF/LOW 25: CPT | Mod: ,,, | Performed by: INTERNAL MEDICINE

## 2018-07-09 RX ORDER — TAMSULOSIN HYDROCHLORIDE 0.4 MG/1
0.8 CAPSULE ORAL DAILY
Qty: 60 CAPSULE | Refills: 11 | Status: SHIPPED | OUTPATIENT
Start: 2018-07-10 | End: 2019-05-03

## 2018-07-09 RX ORDER — CARVEDILOL 3.12 MG/1
3.12 TABLET ORAL 2 TIMES DAILY
Qty: 60 TABLET | Refills: 11 | Status: SHIPPED | OUTPATIENT
Start: 2018-07-09 | End: 2018-07-12

## 2018-07-09 RX ORDER — FOLIC ACID 1 MG/1
1 TABLET ORAL DAILY
Qty: 30 TABLET | Refills: 0 | Status: SHIPPED | OUTPATIENT
Start: 2018-07-10 | End: 2018-08-13

## 2018-07-09 RX ORDER — RAMELTEON 8 MG/1
8 TABLET ORAL NIGHTLY PRN
Status: DISCONTINUED | OUTPATIENT
Start: 2018-07-09 | End: 2018-07-11 | Stop reason: HOSPADM

## 2018-07-09 RX ORDER — PANTOPRAZOLE SODIUM 40 MG/1
40 TABLET, DELAYED RELEASE ORAL
Status: DISCONTINUED | OUTPATIENT
Start: 2018-07-09 | End: 2018-07-11 | Stop reason: HOSPADM

## 2018-07-09 RX ORDER — PANTOPRAZOLE SODIUM 40 MG/1
40 TABLET, DELAYED RELEASE ORAL
Qty: 60 TABLET | Refills: 11 | Status: SHIPPED | OUTPATIENT
Start: 2018-07-09 | End: 2018-07-19

## 2018-07-09 RX ORDER — IRON POLYSACCHARIDE COMPLEX 150 MG
150 CAPSULE ORAL DAILY
Refills: 0 | COMMUNITY
Start: 2018-07-10 | End: 2018-08-13

## 2018-07-09 RX ADMIN — TAMSULOSIN HYDROCHLORIDE 0.8 MG: 0.4 CAPSULE ORAL at 08:07

## 2018-07-09 RX ADMIN — Medication 150 MG: at 08:07

## 2018-07-09 RX ADMIN — CARVEDILOL 3.12 MG: 3.12 TABLET, FILM COATED ORAL at 08:07

## 2018-07-09 RX ADMIN — ATORVASTATIN CALCIUM 40 MG: 20 TABLET, FILM COATED ORAL at 08:07

## 2018-07-09 RX ADMIN — PANTOPRAZOLE SODIUM 40 MG: 40 TABLET, DELAYED RELEASE ORAL at 04:07

## 2018-07-09 RX ADMIN — DEXTROSE 40 MG: 50 INJECTION, SOLUTION INTRAVENOUS at 08:07

## 2018-07-09 RX ADMIN — Medication 400 MG: at 08:07

## 2018-07-09 RX ADMIN — LORAZEPAM 1 MG: 2 INJECTION INTRAMUSCULAR; INTRAVENOUS at 08:07

## 2018-07-09 RX ADMIN — FOLIC ACID 1 MG: 1 TABLET ORAL at 08:07

## 2018-07-09 NOTE — PLAN OF CARE
CM aware of plan for d/c on hold at this time.  Will f/u in morning.    Nancy Victor RN  Case Management  [id number]

## 2018-07-09 NOTE — TELEPHONE ENCOUNTER
Attempted to contact patient, but he did not answer. Left voicemail for patient to return call, in regards to scheduling an appointment with Dr. Barron on August 13.

## 2018-07-09 NOTE — TELEPHONE ENCOUNTER
Patient's wife returned call. GI appointment offered and accepted. Appointment mailed to address on file.

## 2018-07-09 NOTE — PLAN OF CARE
[facility]    HOME HEALTH ORDERS  FACE TO FACE ENCOUNTER    Patient Name: Mario Sanford  YOB: 1955    PCP: Bereket Rivera DO   PCP Address: [address] / [place] [address]  PCP Phone Number: [phone]  PCP Fax: [phone]    Encounter Date: 07/11/2018    Admit to Home Health    Diagnoses:  Active Hospital Problems    Diagnosis  POA    *Duodenal ulcer with hemorrhage but without obstruction [K26.4]  Yes    Esophagitis, [place] grade D [K20.8]  Yes    BPH with urinary obstruction [N40.1, N13.8]  Yes    Acute posthemorrhagic anemia [D62]  Yes    Urinary retention [R33.9]  Yes    Normocytic anemia [D64.9]  Yes    Melena [K92.1]  Yes    Hematuria [R31.9]  Yes    RUQ abdominal pain [R10.11]  Yes    SIRS (systemic inflammatory response syndrome) [R65.10]  Yes    Elevated liver enzymes [R74.8]  Yes    PREM (acute kidney injury) [N17.9]  Yes    Essential hypertension [I10]  Yes    Type 2 diabetes mellitus without complication, without long-term current use of insulin [E11.9]  Yes    Hypercholesteremia [E78.00]  Yes      Resolved Hospital Problems    Diagnosis Date Resolved POA   No resolved problems to display.       Future Appointments  Date Time Provider Department Center   7/20/2018 1:00 PM [facility] UROLOGY PHYSICIAN CLINIC [facility]   8/29/2018 9:00 AM [facility], [facility] [place]   9/4/2018 11:00 AM DIABETIC, [facility] [place]     Follow-up Information     Watson Glass MD In 2 weeks.    Specialty:  Gastroenterology  Contact information:  [address]  [place] [address]  [phone]             Bereket Rivera DO In 1 month.    Specialty:  Internal Medicine  Contact information:  [address]  [place] [address]  [phone]             Tyson viola - Urology 4th Floor On 7/20/2018.    Specialty:  Urology  Why:  1:00 pm voiding trial  Contact information:  [facility]  [place]  [place] [id number] [phone]  Additional information:  [facility] - 4th Floor                   I have seen and examined this patient face to face today. My clinical findings that support the need for the home health skilled services and home bound status are the following:  Weakness/numbness causing balance and gait disturbance due to Liver Disease, Weakness/Debility and Anemia making it taxing to leave home.  Requiring assistive device to leave home due to unsteady gait caused by  Liver Disease, Weakness/Debility and Anemia.  Patient with medication mismanagement issues requiring home bound status as evidenced by  Unstable vital signs (blood pressure, heart rate) and Poor understanding of medication regimen/dosage.  Medical restrictions requiring assistance of another human to leave home due to  Unstable ambulation, [place] and [place] for urinary retention.    Allergies:Review of patient's allergies indicates:  No Known Allergies    Diet: diabetic diet: 2000 calorie    Activities: activity as tolerated    Nursing:   SN to complete comprehensive assessment including routine vital signs. Instruct on disease process and s/s of complications to report to MD. Review/verify medication list sent home with the patient at time of discharge  and instruct patient/caregiver as needed. Frequency may be adjusted depending on start of care date.    Notify MD if SBP > 160 or < 90; DBP > 90 or < 50; HR > 120 or < 50; Temp > 101    CIC Q 6 hours      CONSULTS:    Physical Therapy to evaluate and treat. Evaluate for home safety and equipment needs; Establish/upgrade home exercise program. Perform / instruct on therapeutic exercises, gait training, transfer training, and Range of Motion.  Occupational Therapy to evaluate and treat. Evaluate home environment for safety and equipment needs. Perform/Instruct on transfers, ADL training, ROM, and therapeutic exercises.   to evaluate for community  resources/long-range planning.    MISCELLANEOUS CARE:  Diabetic Care:   SN to perform and educate Diabetic management with blood glucose monitoring: and Report CBG < 60 or > 350 to physician.  CIC Q 6 hours to keep PVR less than 300 mL    WOUND CARE ORDERS  n/a      Medications: Review discharge medications with patient and family and provide education.      Current Discharge Medication List      START taking these medications    Details   carvedilol (COREG) 3.125 MG tablet Take 1 tablet (3.125 mg total) by mouth 2 (two) times daily.  Qty: 60 tablet, Refills: 11      folic acid (FOLVITE) 1 MG tablet Take 1 tablet (1 mg total) by mouth once daily.  Qty: 30 tablet, Refills: 0      iron polysaccharides (NIFEREX) 150 mg iron Cap Take 1 capsule (150 mg total) by mouth once daily.  Refills: 0      pantoprazole (PROTONIX) 40 MG tablet Take 1 tablet (40 mg total) by mouth 2 (two) times daily before meals.  Qty: 60 tablet, Refills: 11      tamsulosin (FLOMAX) 0.4 mg Cp24 Take 2 capsules (0.8 mg total) by mouth once daily.  Qty: 60 capsule, Refills: 11         CONTINUE these medications which have NOT CHANGED    Details   aspirin 325 MG tablet Take 81 mg by mouth once daily.       atorvastatin (LIPITOR) 40 MG tablet Take 1 tablet (40 mg total) by mouth nightly.  Qty: 90 tablet, Refills: 3      ibuprofen (ADVIL,MOTRIN) 200 MG tablet Take 200 mg by mouth every 6 (six) hours as needed for Pain.      blood sugar diagnostic Strp 1 strip by Misc.(Non-Drug; Combo Route) route once daily. Test strips for glucometer insurance covers to check blood sugar twice daily.  Qty: 200 strip, Refills: 3      blood-glucose meter Misc Glucometer that insurance covers to check blood sugar twice daily  Qty: 1 each, Refills: 0      glimepiride (AMARYL) 2 MG tablet TAKE 1 TABLET (2 MG TOTAL) BY MOUTH BEFORE BREAKFAST.  Qty: 30 tablet, Refills: 0      lancets Misc 1 Device by Misc.(Non-Drug; Combo Route) route once daily. Lancets for glucometer  insurance covers to check blood sugar twice daily.  Qty: 200 each, Refills: 3         STOP taking these medications       losartan-hydrochlorothiazide 50-12.5 mg (HYZAAR) 50-12.5 mg per tablet Comments:   Reason for Stopping:               I certify that this patient is confined to his home and needs intermittent skilled nursing care, physical therapy and occupational therapy.

## 2018-07-09 NOTE — TELEPHONE ENCOUNTER
----- Message from Rachel Barron MD sent at 7/9/2018  3:18 PM CDT -----  Davey Gates    Can you schedule Mr. Sanford for a clinic follow with me on August 13?    Before scheduling him just give him a call first to make sure it works. Mirlande is helping me schedule his EUS and EGD.    Rachel

## 2018-07-09 NOTE — PROGRESS NOTES
[facility] Hospital Medicine  Progress Note    Patient Name: Mario Sanford  MRN: [id number]  Patient Class: IP- Inpatient   Admission Date: 6/30/2018  Length of Stay: 7 days  Attending Physician: BRIANNA Ruffin MD  Primary Care Provider: Bereket Rivera DO    [facility] Medicine Team: [facility] HOSP MED D RAJINDER Ruffin MD    Subjective:     Principal Problem:RUQ abdominal pain     Overview: Patient is a 63 y.o. male with past medical history of hypertension, dyslipidemia, and recently diagnosed DM-2 admitted to hospital for RUQ pain associated with SIRS, worsening liver enzymes and T.bili. The patient received IVF and his PREM, believed to be due to poor intake, resolved. Urine and blood cultures show NGTD and RUQ U/S was normal. Liver U/S showed a hyperechoic right lobe mass, consistent with hemangioma and an MRCP revealed significant narrowing of the common bile duct near the pancreatic head. Further work-up included Ceruloplasmin which was wnl; Hepatitis viral serologies and autoimmune markers are pending. Iron studies were done to evaluate the patient's normocytic anemia, which was most consistent with anemia of chronic inflammation. Hepatology was consulted and discussed EUS/ERCP with [facility] who plans to do EUS as outpatient.    Interval History: Mr. Sanford is frustrated that he still cannot pass all of his urine.  He required in and out cath every shift last night with about 700cc's each time.  He denies abdominal pain or more melena.    Review of Systems   Constitutional: Positive for appetite change. Negative for chills, fatigue and fever.   HENT: Negative for congestion.    Respiratory: Negative for cough and shortness of breath.    Cardiovascular: Negative for chest pain.   Gastrointestinal: Positive for blood in stool. Negative for abdominal pain, constipation, diarrhea, nausea and vomiting.   Genitourinary: Positive for difficulty urinating.   Neurological: Negative for dizziness and  weakness.   Psychiatric/Behavioral: Negative for confusion. The patient is not nervous/anxious.      Objective:     Vital Signs (Most Recent):  Temp: 98.5 °F (36.9 °C) (07/09/18 0724)  Pulse: 94 (07/09/18 0724)  Resp: 18 (07/09/18 0724)  BP: (!) 144/75 (07/09/18 0724)  SpO2: (!) 94 % (07/09/18 0724) Vital Signs (24h Range):  Temp:  [97.9 °F (36.6 °C)-99.2 °F (37.3 °C)] 98.5 °F (36.9 °C)  Pulse:  [91-98] 94  Resp:  [18-20] 18  SpO2:  [91 %-97 %] 94 %  BP: (106-144)/(57-76) 144/75     Weight: 77.1 kg (169 lb 14.4 oz)  Body mass index is 25.09 kg/m².    Intake/Output Summary (Last 24 hours) at 07/09/18 1041  Last data filed at 07/09/18 0500   Gross per 24 hour   Intake              480 ml   Output             2225 ml   Net            -1745 ml      Physical Exam   Constitutional: He is oriented to person, place, and time. He appears well-developed and well-nourished.  Non-toxic appearance. He does not have a sickly appearance. He does not appear ill. No distress.   HENT:   Head: Normocephalic and atraumatic.   Nose: Nose normal.   Mouth/Throat: Oropharynx is clear and moist. No oropharyngeal exudate.   Eyes: Conjunctivae and EOM are normal. Pupils are equal, round, and reactive to light. Right eye exhibits no discharge. Left eye exhibits no discharge. No scleral icterus.   Senile changes   Neck: Normal range of motion. Neck supple. No JVD present. No tracheal deviation present. No thyromegaly present.   Cardiovascular: Normal rate, regular rhythm, normal heart sounds and intact distal pulses.  Exam reveals no gallop and no friction rub.    No murmur heard.  Pulmonary/Chest: Effort normal and breath sounds normal. No accessory muscle usage or stridor. No tachypnea and no bradypnea. No respiratory distress. He has no decreased breath sounds. He has no wheezes. He has no rhonchi. He has no rales. He exhibits no tenderness.   Abdominal: Soft. Bowel sounds are normal. He exhibits no distension and no mass. There is no  tenderness. There is no rebound and no guarding.   Truncal obesity   Genitourinary:   Genitourinary Comments: No hill in place.  Largely distended bladder on exam.   Musculoskeletal: Normal range of motion. He exhibits no edema or tenderness.   Lymphadenopathy:     He has no cervical adenopathy.   No peripheral edema   Neurological: He is alert and oriented to person, place, and time. He has normal reflexes. He displays normal reflexes. No cranial nerve deficit. He exhibits normal muscle tone. Coordination normal. GCS eye subscore is 4. GCS verbal subscore is 5. GCS motor subscore is 6.   Skin: Skin is warm and dry. No rash noted. No erythema. No pallor.   Pale and sallow   Psychiatric: He has a normal mood and affect. His speech is normal and behavior is normal. Judgment and thought content normal. Cognition and memory are normal.   Nursing note and vitals reviewed.      Significant Labs:   Recent Results (from the past 24 hour(s))   POCT glucose    Collection Time: 07/08/18 11:35 AM   Result Value Ref Range    POCT Glucose 140 (H) 70 - 110 mg/dL   POCT glucose    Collection Time: 07/08/18  5:27 PM   Result Value Ref Range    POCT Glucose 149 (H) 70 - 110 mg/dL   POCT glucose    Collection Time: 07/08/18  8:52 PM   Result Value Ref Range    POCT Glucose 155 (H) 70 - 110 mg/dL   Comprehensive metabolic panel    Collection Time: 07/09/18  5:35 AM   Result Value Ref Range    Sodium 141 136 - 145 mmol/L    Potassium 4.0 3.5 - 5.1 mmol/L    Chloride 108 95 - 110 mmol/L    CO2 24 23 - 29 mmol/L    Glucose 124 (H) 70 - 110 mg/dL    BUN, Bld 15 8 - 23 mg/dL    Creatinine 1.4 0.5 - 1.4 mg/dL    Calcium 8.2 (L) 8.7 - 10.5 mg/dL    Total Protein 5.4 (L) 6.0 - 8.4 g/dL    Albumin 2.5 (L) 3.5 - 5.2 g/dL    Total Bilirubin 0.5 0.1 - 1.0 mg/dL    Alkaline Phosphatase 118 55 - 135 U/L    AST 13 10 - 40 U/L    ALT 26 10 - 44 U/L    Anion Gap 9 8 - 16 mmol/L    eGFR if African American >60.0 >60 mL/min/1.73 m^2    eGFR if non   52.4 (A) >60 mL/min/1.73 m^2   CBC auto differential    Collection Time: 07/09/18  5:35 AM   Result Value Ref Range    WBC 10.01 3.90 - 12.70 K/uL    RBC 2.85 (L) 4.60 - 6.20 M/uL    Hemoglobin 8.3 (L) 14.0 - 18.0 g/dL    Hematocrit 27.0 (L) 40.0 - 54.0 %    MCV 95 82 - 98 fL    MCH 29.1 27.0 - 31.0 pg    MCHC 30.7 (L) 32.0 - 36.0 g/dL    RDW 13.3 11.5 - 14.5 %    Platelets 414 (H) 150 - 350 K/uL    MPV 9.8 9.2 - 12.9 fL    Immature Granulocytes 0.7 (H) 0.0 - 0.5 %    Gran # (ANC) 5.7 1.8 - 7.7 K/uL    Immature Grans (Abs) 0.07 (H) 0.00 - 0.04 K/uL    Lymph # 2.9 1.0 - 4.8 K/uL    Mono # 1.0 0.3 - 1.0 K/uL    Eos # 0.3 0.0 - 0.5 K/uL    Baso # 0.06 0.00 - 0.20 K/uL    nRBC 0 0 /100 WBC    Gran% 57.0 38.0 - 73.0 %    Lymph% 29.3 18.0 - 48.0 %    Mono% 9.5 4.0 - 15.0 %    Eosinophil% 2.9 0.0 - 8.0 %    Basophil% 0.6 0.0 - 1.9 %    Differential Method Automated    Magnesium    Collection Time: 07/09/18  5:35 AM   Result Value Ref Range    Magnesium 1.6 1.6 - 2.6 mg/dL   Phosphorus    Collection Time: 07/09/18  5:35 AM   Result Value Ref Range    Phosphorus 2.9 2.7 - 4.5 mg/dL   POCT glucose    Collection Time: 07/09/18  8:01 AM   Result Value Ref Range    POCT Glucose 118 (H) 70 - 110 mg/dL         Significant Imaging:     Assessment/Plan:      Acute duodenal ulcer from peptic ulcer disease with hemorrhage  -Counseled the patient to avoid all NSAIDs, ASA, meloxicam until follow up with GI  -Checked H. Pylori IgG 7/3: Negative  -EGD on 7/3/18 confirmed large duodenal ulcer:   -20mm ulcer with adherent clot in second part of duodenum  -pantoprazole (PROTONIX) 40 mg in dextrose 5 % 100 mL infusion, 8 mg/hr, Intravenous, Continuous x 72 hours was completed   -His Hg was trending down and he still noted some melena              -EGD was repeated on 7/6/18:                          -One non-bleeding duodenal ulcer with a visible vessel.                           -Treated with bipolar  "cautery                          - one clip placed to region for guidance if IR required (not placed for hemostasis)  -He was monitored for 48 hours with Protonix 40mg iv q12 hours until discharge post procedure     Urinary retention  -Nurse performed in and out cath and got out 1300cc's on 7/7            -Required cath's every shift since then  -Started Flomax 0.4mg po qday yesterday with little relief            -Increased to 0.8mg po qday on 7/8/18  -He still required in and out cath's q shift his last night in the hospital, each with about 700cc's of urine     Grade D reflux esophagitis  -No biopsy was performed during EGD  -Will need close follow up to rule out Acosta's esophagus     Acute post-hemorrhagic anemia  -Stable to monitor, though very slow trend down  -folic acid tablet 1 mg, 1 mg, Oral, Daily  -iron polysaccharides capsule 150 mg, 150 mg, Oral, Daily     Narrowing of the common bile duct on MRCP  Elevated liver enzymes  -Acute pancreatitis was initially suspected but lipase and RUQ US are normal.   -Pain persisted and T.bili / liver enzymes trended upward.   -SIRS rapidly resolved with fluid resuscitation.   -Liver U/S (7/1): "Hyperechoic right lobe mass measures 1.0 x 1.0 x 1.0 cm which may represent a hemangioma".   -MRCP (7/1): "Common bile duct narrows significantly just proximal to the pancreatic head. A 9 mm right hepatic lobe lesion, most consistent with a hemangioma".   -Iron studies with normocytic anemia most consistent with anemia of chronic inflammation   -Ceruloplasmin wnl; Hepatitis panel negative, auto-immune panels negative as well  -Hepatology consulted: Suspect biliary etiology. Plan for EUS with [facility] as outpatient  -Continuing his Lipitor with care and close LFT follow up     Acute kidney injury  -Creatinine today: 1.4 (Max of 2 on 6/30)  -Probably due to poor oral intake / volume depletion.   -He does admit to heavy Ibuprofen use for his hip pain        -I counseled him against " this and the Meloxicam going forward  -Holding ARB/HCTZ, NSAIDs currently: Will not restart going home as Cr not recovered to baseline     Acute cystitis with hematuria  -UA positive for WBC and a few bacteria, Initiated ceftriaxone pending urine culture.  -Urine cultures growing multiple organisms from 6/30 and > 100K Coag-Neg Staph from7/1.   -Continuing cefTRIAXone injection 1 g, 1 g, Intravenous, Q24H for now, end date was 7/6/18  -Will consider therapy complete at discharge     Essential hypertension  -Holding home Hyzaar 50/12.5mg po qday currently due to resolving PREM  -hydrALAZINE tablet 25 mg, 25 mg, Oral, Q8H PRN SBP>170  -SBP maintaining in the 160's for 2 days:              -Started Coreg 3.125mg po bid 7/6     Type 2 diabetes mellitus, controlled, no complications  -Currently maintaining well on 2000 calorie ada diet and SSI  -At home he takes Amaryl 2mg po qam     Hypercholesteremia  -Lipitor 40mg po qday      Hypomagnesemia  -Added Mg Oxide 400mg po qday on 7/4     Diet: Clear  GI Prophylaxis: Protonix iv 8mg/hr  DVT Prophylaxis: SCD/TEDs: no heparins due to GIB  Lines/Drains/Airway: Peripheral IV- Left AC     Wounds: None      HIGH RISK CONDITION(S):   Patient is currently receiving parenteral controlled     VTE Risk Mitigation         Ordered     IP VTE HIGH RISK PATIENT  Once      06/30/18 0845     Reason for No Pharmacological VTE Prophylaxis  Once      06/30/18 0845     Place sequential compression device  Until discontinued      06/30/18 0814     Place JOAO hose  Until discontinued      06/30/18 0812             RAJINDER Ruffin MD  Department of Hospital Medicine   [facility]

## 2018-07-09 NOTE — SIGNIFICANT EVENT
"I was called at 4pm by the patient's nurse to notify me that right at the time of discharge, he began to develop hematuria with clots, which is new for him.   The urine is "Joshua-Aide" colored.       Gross hematuria:  -Will hold on discharge for now  -Urology consult in am  -Monitor for clotting and hill obstruction  -Check urine and follow up Hg    "

## 2018-07-09 NOTE — PROGRESS NOTES
Notified Dr. Ruffin pts urine is red with small clots. Stated he will cancel D/C for now and we will watch patient over night. Will continue to  Monitor pt.

## 2018-07-09 NOTE — PLAN OF CARE
Problem: Diabetes, Type 2 (Adult)  Goal: Signs and Symptoms of Listed Potential Problems Will be Absent, Minimized or Managed (Diabetes, Type 2)  Signs and symptoms of listed potential problems will be absent, minimized or managed by discharge/transition of care (reference Diabetes, Type 2 (Adult) CPG).   Outcome: Ongoing (interventions implemented as appropriate)   07/06/18 1848   Diabetes, Type 2   Problems Assessed (Type 2 Diabetes) all   Problems Present (Type 2 Diabetes) none       Problem: Fall Risk (Adult)  Goal: Identify Related Risk Factors and Signs and Symptoms  Related risk factors and signs and symptoms are identified upon initiation of Human Response Clinical Practice Guideline (CPG)   Outcome: Ongoing (interventions implemented as appropriate)   07/01/18 0444 07/08/18 1120   Fall Risk   Related Risk Factors (Fall Risk) age-related changes;fatigue/slow reaction;gait/mobility problems;impaired vision;inadequate lighting;polypharmacy;environment unfamiliar --    Signs and Symptoms (Fall Risk) --  presence of risk factors      07/01/18 0444 07/08/18 1120   Fall Risk   Related Risk Factors (Fall Risk) age-related changes;fatigue/slow reaction;gait/mobility problems;impaired vision;inadequate lighting;polypharmacy;environment unfamiliar --    Signs and Symptoms (Fall Risk) --  presence of risk factors       Problem: Patient Care Overview  Goal: Plan of Care Review  Outcome: Ongoing (interventions implemented as appropriate)   07/06/18 1848   Coping/Psychosocial   Plan Of Care Reviewed With patient;spouse

## 2018-07-09 NOTE — PLAN OF CARE
HH orders uploaded to [facility], insurance will assign agency for admission tomorrow.  Plan for d/c to home on today.  Pt has no other needs.  appts are as follow:    Future Appointments  Date Time Provider Department Center   7/20/2018 1:00 PM [facility] UROLOGY PHYSICIAN CLINIC [facility] UROLOGY [facility]   8/29/2018 9:00 AM [facility]   9/4/2018 11:00 AM DIABETIC, [facility]

## 2018-07-09 NOTE — DISCHARGE SUMMARY
[facility] Hospital Medicine  Discharge Summary      Patient Name: Mario Sanford  MRN: [id number]  Admission Date: 6/30/2018  Hospital Length of Stay: 7 days  Discharge Date and Time:  07/09/2018 1:26 PM  Attending Physician: BRIANNA Ruffin MD   Discharging Provider: RAJINDER Ruffin MD  Primary Care Provider: Bereket Rivera [facility] Medicine Team: [facility] MED D RAJINDER Ruffin MD     Overview: Patient is a 63 y.o. male with past medical history of hypertension, dyslipidemia, and recently diagnosed DM-2 admitted to hospital for RUQ pain associated with SIRS, worsening liver enzymes and T.bili. The patient received IVF and his PREM, believed to be due to poor intake, resolved. Urine and blood cultures show NGTD and RUQ U/S was normal. Liver U/S showed a hyperechoic right lobe mass, consistent with hemangioma and an MRCP revealed significant narrowing of the common bile duct near the pancreatic head. Further work-up included Ceruloplasmin which was wnl; Hepatitis viral serologies and autoimmune markers are pending. Iron studies were done to evaluate the patient's normocytic anemia, which was most consistent with anemia of chronic inflammation. Hepatology was consulted and discussed EUS/ERCP with AES who plans to do EUS as outpatient.    Procedure(s) (LRB):  EGD (ESOPHAGOGASTRODUODENOSCOPY) (N/A)      Hospital Course:   Acute duodenal ulcer from peptic ulcer disease with hemorrhage  -Counseled the patient to avoid all NSAIDs, ASA, meloxicam until follow up with GI  -Checked H. Pylori IgG 7/3: Negative  -EGD on 7/3/18 confirmed large duodenal ulcer:   -20mm ulcer with adherent clot in second part of duodenum  -pantoprazole (PROTONIX) 40 mg in dextrose 5 % 100 mL infusion, 8 mg/hr, Intravenous, Continuous x 72 hours was completed   -His Hg was trending down and he still noted some melena              -EGD was repeated on 7/6/18:                          -One non-bleeding duodenal ulcer  "with a visible vessel.                           -Treated with bipolar cautery                          - one clip placed to region for guidance if IR required (not placed for hemostasis)  -He was monitored for 48 hours with Protonix 40mg iv q12 hours until discharge post procedure     Urinary retention  -Nurse performed in and out cath and got out 1300cc's on 7/7            -Required cath's every shift since then  -Started Flomax 0.4mg po qday yesterday with little relief            -Increased to 0.8mg po qday on 7/8/18  -He still required in and out cath's q shift his last night in the hospital, each with about 700cc's of urine  -Case discussed with Urology resident: will attempt follow up in about 10days for voiding trial on Friday in resident's clinic     Grade D reflux esophagitis  -No biopsy was performed during EGD  -Will need close follow up to rule out Acosta's esophagus     Acute post-hemorrhagic anemia  -Stable to monitor, though very slow trend down  -folic acid tablet 1 mg, 1 mg, Oral, Daily  -iron polysaccharides capsule 150 mg, 150 mg, Oral, Daily     Narrowing of the common bile duct on MRCP  Elevated liver enzymes  -Acute pancreatitis was initially suspected but lipase and RUQ US are normal.   -Pain persisted and T.bili / liver enzymes trended upward.   -SIRS rapidly resolved with fluid resuscitation.   -Liver U/S (7/1): "Hyperechoic right lobe mass measures 1.0 x 1.0 x 1.0 cm which may represent a hemangioma".   -MRCP (7/1): "Common bile duct narrows significantly just proximal to the pancreatic head. A 9 mm right hepatic lobe lesion, most consistent with a hemangioma".   -Iron studies with normocytic anemia most consistent with anemia of chronic inflammation   -Ceruloplasmin wnl; Hepatitis panel negative, auto-immune panels negative as well  -Hepatology consulted: Suspect biliary etiology. Plan for EUS with AES as outpatient  -Continuing his Lipitor with care and close LFT follow up     Acute " kidney injury  -Creatinine today: 1.4 (Max of 2 on 6/30)  -Probably due to poor oral intake / volume depletion.   -He does admit to heavy Ibuprofen use for his hip pain        -I counseled him against this and the Meloxicam going forward  -Holding ARB/HCTZ, NSAIDs currently: Will not restart going home as Cr not recovered to baseline     Acute cystitis with hematuria  -UA positive for WBC and a few bacteria, Initiated ceftriaxone pending urine culture.  -Urine cultures growing multiple organisms from 6/30 and > 100K Coag-Neg Staph from7/1.   -Continuing cefTRIAXone injection 1 g, 1 g, Intravenous, Q24H for now, end date was 7/6/18  -Will consider therapy complete at discharge     Essential hypertension  -Holding home Hyzaar 50/12.5mg po qday currently due to resolving PREM  -hydrALAZINE tablet 25 mg, 25 mg, Oral, Q8H PRN SBP>170  -SBP maintaining in the 160's for 2 days:              -Started Coreg 3.125mg po bid 7/6     Type 2 diabetes mellitus, controlled, no complications  -Currently maintaining well on 2000 calorie ada diet and SSI  -At home he takes Amaryl 2mg po qam     Hypercholesteremia  -Lipitor 40mg po qday      Hypomagnesemia  -Added Mg Oxide 400mg po qday on 7/4     Diet: Clear  GI Prophylaxis: Protonix iv 8mg/hr  DVT Prophylaxis: SCD/TEDs: no heparins due to GIB  Lines/Drains/Airway: Peripheral IV- Left AC     Wounds: None      HIGH RISK CONDITION(S):   Patient is currently receiving parenteral controlled     Consults:   Consults         Status Ordering Provider     Inpatient consult to Hepatology  Once     Provider:  (Not yet assigned)    ALISSA Alamo          Final Active Diagnoses:    Diagnosis Date Noted POA    PRINCIPAL PROBLEM:  RUQ abdominal pain [R10.11] 06/30/2018 Yes    Normocytic anemia [D64.9] 07/02/2018 Yes    Melena [K92.1] 07/02/2018 Yes    Hematuria [R31.9] 07/02/2018 Yes    SIRS (systemic inflammatory response syndrome) [R65.10] 06/30/2018 Yes    Elevated liver  enzymes [R74.8] 06/30/2018 Yes    PREM (acute kidney injury) [N17.9] 06/30/2018 Yes    Essential hypertension [I10] 06/12/2018 Yes    Type 2 diabetes mellitus without complication, without long-term current use of insulin [E11.9] 06/12/2018 Yes    Hypercholesteremia [E78.00] 05/29/2018 Yes      Problems Resolved During this Admission:    Diagnosis Date Noted Date Resolved POA      Discharged Condition: fair    Disposition:     Follow Up:  Follow-up Information     Watson Glass MD In 2 weeks.    Specialty:  Gastroenterology  Contact information:  [address] [facility] [address]  [phone]             Bereket Rivera DO In 1 month.    Specialty:  Internal Medicine  Contact information:  [address] [facility] [address]  [phone]             [facility] - Urology 4th Floor On 7/20/2018.    Specialty:  Urology  Why:  1:00 pm voiding trial  Contact information:  [address] [place] [id number] [phone]  Additional information:  [facility] - 4th Floor               Patient Instructions:     Ambulatory referral to Urology   Referral Priority: Routine Referral Type: Consultation   Referral Reason: Specialty Services Required    Requested Specialty: Urology    Number of Visits Requested: 1      Ambulatory referral to Gastroenterology   Referral Priority: Routine Referral Type: Consultation   Referral Reason: Specialty Services Required    Requested Specialty: Gastroenterology    Number of Visits Requested: 1      Diet diabetic     Notify your health care provider if you experience any of the following:     Notify your health care provider if you experience any of the following:  increased confusion or weakness     Notify your health care provider if you experience any of the following:  persistent dizziness, light-headedness, or visual disturbances     Notify your health care provider if you experience any of the following:  worsening rash     Notify your health  care provider if you experience any of the following:  severe persistent headache     Notify your health care provider if you experience any of the following:  difficulty breathing or increased cough     Notify your health care provider if you experience any of the following:  redness, tenderness, or signs of infection (pain, swelling, redness, odor or green/yellow discharge around incision site)     Notify your health care provider if you experience any of the following:  severe uncontrolled pain     Notify your health care provider if you experience any of the following:  persistent nausea and vomiting or diarrhea     Notify your health care provider if you experience any of the following:  temperature >100.4     Activity as tolerated       Medications:  Reconciled Home Medications:      Medication List      START taking these medications    carvedilol 3.125 MG tablet  Commonly known as:  COREG  Take 1 tablet (3.125 mg total) by mouth 2 (two) times daily.     folic acid 1 MG tablet  Commonly known as:  FOLVITE  Take 1 tablet (1 mg total) by mouth once daily.  Start taking on:  7/10/2018     iron polysaccharides 150 mg iron Cap  Commonly known as:  NIFEREX  Take 1 capsule (150 mg total) by mouth once daily.  Start taking on:  7/10/2018     pantoprazole 40 MG tablet  Commonly known as:  PROTONIX  Take 1 tablet (40 mg total) by mouth 2 (two) times daily before meals.     tamsulosin 0.4 mg Cp24  Commonly known as:  FLOMAX  Take 2 capsules (0.8 mg total) by mouth once daily.  Start taking on:  7/10/2018        CONTINUE taking these medications    aspirin 325 MG tablet  Take 81 mg by mouth once daily.     atorvastatin 40 MG tablet  Commonly known as:  LIPITOR  Take 1 tablet (40 mg total) by mouth nightly.     blood sugar diagnostic Strp  1 strip by Misc.(Non-Drug; Combo Route) route once daily. Test strips for glucometer insurance covers to check blood sugar twice daily.     blood-glucose meter Misc  Glucometer that  insurance covers to check blood sugar twice daily     glimepiride 2 MG tablet  Commonly known as:  AMARYL  TAKE 1 TABLET (2 MG TOTAL) BY MOUTH BEFORE BREAKFAST.     ibuprofen 200 MG tablet  Commonly known as:  ADVIL,MOTRIN  Take 200 mg by mouth every 6 (six) hours as needed for Pain.     lancets Misc  1 Device by Misc.(Non-Drug; Combo Route) route once daily. Lancets for glucometer insurance covers to check blood sugar twice daily.        STOP taking these medications    losartan-hydrochlorothiazide 50-12.5 mg 50-12.5 mg per tablet  Commonly known as:  HYZAAR            Significant Diagnostic Studies:   Recent Results (from the past 24 hour(s))   POCT glucose    Collection Time: 07/08/18  5:27 PM   Result Value Ref Range    POCT Glucose 149 (H) 70 - 110 mg/dL   POCT glucose    Collection Time: 07/08/18  8:52 PM   Result Value Ref Range    POCT Glucose 155 (H) 70 - 110 mg/dL   Comprehensive metabolic panel    Collection Time: 07/09/18  5:35 AM   Result Value Ref Range    Sodium 141 136 - 145 mmol/L    Potassium 4.0 3.5 - 5.1 mmol/L    Chloride 108 95 - 110 mmol/L    CO2 24 23 - 29 mmol/L    Glucose 124 (H) 70 - 110 mg/dL    BUN, Bld 15 8 - 23 mg/dL    Creatinine 1.4 0.5 - 1.4 mg/dL    Calcium 8.2 (L) 8.7 - 10.5 mg/dL    Total Protein 5.4 (L) 6.0 - 8.4 g/dL    Albumin 2.5 (L) 3.5 - 5.2 g/dL    Total Bilirubin 0.5 0.1 - 1.0 mg/dL    Alkaline Phosphatase 118 55 - 135 U/L    AST 13 10 - 40 U/L    ALT 26 10 - 44 U/L    Anion Gap 9 8 - 16 mmol/L    eGFR if African American >60.0 >60 mL/min/1.73 m^2    eGFR if non African American 52.4 (A) >60 mL/min/1.73 m^2   CBC auto differential    Collection Time: 07/09/18  5:35 AM   Result Value Ref Range    WBC 10.01 3.90 - 12.70 K/uL    RBC 2.85 (L) 4.60 - 6.20 M/uL    Hemoglobin 8.3 (L) 14.0 - 18.0 g/dL    Hematocrit 27.0 (L) 40.0 - 54.0 %    MCV 95 82 - 98 fL    MCH 29.1 27.0 - 31.0 pg    MCHC 30.7 (L) 32.0 - 36.0 g/dL    RDW 13.3 11.5 - 14.5 %    Platelets 414 (H) 150 - 350 K/uL     MPV 9.8 9.2 - 12.9 fL    Immature Granulocytes 0.7 (H) 0.0 - 0.5 %    Gran # (ANC) 5.7 1.8 - 7.7 K/uL    Immature Grans (Abs) 0.07 (H) 0.00 - 0.04 K/uL    Lymph # 2.9 1.0 - 4.8 K/uL    Mono # 1.0 0.3 - 1.0 K/uL    Eos # 0.3 0.0 - 0.5 K/uL    Baso # 0.06 0.00 - 0.20 K/uL    nRBC 0 0 /100 WBC    Gran% 57.0 38.0 - 73.0 %    Lymph% 29.3 18.0 - 48.0 %    Mono% 9.5 4.0 - 15.0 %    Eosinophil% 2.9 0.0 - 8.0 %    Basophil% 0.6 0.0 - 1.9 %    Differential Method Automated    Magnesium    Collection Time: 07/09/18  5:35 AM   Result Value Ref Range    Magnesium 1.6 1.6 - 2.6 mg/dL   Phosphorus    Collection Time: 07/09/18  5:35 AM   Result Value Ref Range    Phosphorus 2.9 2.7 - 4.5 mg/dL   POCT glucose    Collection Time: 07/09/18  8:01 AM   Result Value Ref Range    POCT Glucose 118 (H) 70 - 110 mg/dL   POCT glucose    Collection Time: 07/09/18 12:11 PM   Result Value Ref Range    POCT Glucose 138 (H) 70 - 110 mg/dL       Pending Diagnostic Studies:     None        Indwelling Lines/Drains at time of discharge:   Lines/Drains/Airways     Drain                 Urethral Catheter 07/09/18 1200 Latex 16 Fr. less than 1 day                Time spent on the discharge of patient: 40 minutes  Patient was seen and examined on the date of discharge and determined to be suitable for discharge.         RAJINDER Ruffin MD  Department of Hospital Medicine  [facility]

## 2018-07-10 ENCOUNTER — TELEPHONE (OUTPATIENT)
Dept: GASTROENTEROLOGY | Facility: CLINIC | Age: 63
End: 2018-07-10

## 2018-07-10 PROBLEM — K20.80 ESOPHAGITIS, LOS ANGELES GRADE D: Status: ACTIVE | Noted: 2018-07-10

## 2018-07-10 LAB
ALBUMIN SERPL BCP-MCNC: 2.5 G/DL
ALP SERPL-CCNC: 109 U/L
ALT SERPL W/O P-5'-P-CCNC: 20 U/L
ANION GAP SERPL CALC-SCNC: 8 MMOL/L
AST SERPL-CCNC: 13 U/L
BASOPHILS # BLD AUTO: 0.05 K/UL
BASOPHILS NFR BLD: 0.5 %
BILIRUB SERPL-MCNC: 0.5 MG/DL
BUN SERPL-MCNC: 14 MG/DL
CALCIUM SERPL-MCNC: 8.2 MG/DL
CHLORIDE SERPL-SCNC: 107 MMOL/L
CO2 SERPL-SCNC: 23 MMOL/L
CREAT SERPL-MCNC: 1.2 MG/DL
DIFFERENTIAL METHOD: ABNORMAL
EOSINOPHIL # BLD AUTO: 0.2 K/UL
EOSINOPHIL NFR BLD: 1.9 %
ERYTHROCYTE [DISTWIDTH] IN BLOOD BY AUTOMATED COUNT: 13.2 %
EST. GFR  (AFRICAN AMERICAN): >60 ML/MIN/1.73 M^2
EST. GFR  (NON AFRICAN AMERICAN): >60 ML/MIN/1.73 M^2
GLUCOSE SERPL-MCNC: 153 MG/DL
HCT VFR BLD AUTO: 26.9 %
HGB BLD-MCNC: 8.4 G/DL
IMM GRANULOCYTES # BLD AUTO: 0.06 K/UL
IMM GRANULOCYTES NFR BLD AUTO: 0.6 %
LYMPHOCYTES # BLD AUTO: 2.8 K/UL
LYMPHOCYTES NFR BLD: 26.3 %
MAGNESIUM SERPL-MCNC: 1.3 MG/DL
MCH RBC QN AUTO: 29.4 PG
MCHC RBC AUTO-ENTMCNC: 31.2 G/DL
MCV RBC AUTO: 94 FL
MONOCYTES # BLD AUTO: 1 K/UL
MONOCYTES NFR BLD: 9.6 %
NEUTROPHILS # BLD AUTO: 6.6 K/UL
NEUTROPHILS NFR BLD: 61.1 %
NRBC BLD-RTO: 0 /100 WBC
PHOSPHATE SERPL-MCNC: 2.3 MG/DL
PLATELET # BLD AUTO: 405 K/UL
PMV BLD AUTO: 9.6 FL
POCT GLUCOSE: 145 MG/DL (ref 70–110)
POCT GLUCOSE: 184 MG/DL (ref 70–110)
POCT GLUCOSE: 230 MG/DL (ref 70–110)
POTASSIUM SERPL-SCNC: 3.6 MMOL/L
PROT SERPL-MCNC: 5.4 G/DL
RBC # BLD AUTO: 2.86 M/UL
SODIUM SERPL-SCNC: 138 MMOL/L
WBC # BLD AUTO: 10.78 K/UL

## 2018-07-10 PROCEDURE — 63600175 PHARM REV CODE 636 W HCPCS: Performed by: INTERNAL MEDICINE

## 2018-07-10 PROCEDURE — 80053 COMPREHEN METABOLIC PANEL: CPT

## 2018-07-10 PROCEDURE — 83735 ASSAY OF MAGNESIUM: CPT

## 2018-07-10 PROCEDURE — 25000003 PHARM REV CODE 250: Performed by: INTERNAL MEDICINE

## 2018-07-10 PROCEDURE — 99232 SBSQ HOSP IP/OBS MODERATE 35: CPT | Mod: ,,, | Performed by: INTERNAL MEDICINE

## 2018-07-10 PROCEDURE — 36415 COLL VENOUS BLD VENIPUNCTURE: CPT

## 2018-07-10 PROCEDURE — 11000001 HC ACUTE MED/SURG PRIVATE ROOM

## 2018-07-10 PROCEDURE — 84100 ASSAY OF PHOSPHORUS: CPT

## 2018-07-10 PROCEDURE — 94761 N-INVAS EAR/PLS OXIMETRY MLT: CPT

## 2018-07-10 PROCEDURE — 85025 COMPLETE CBC W/AUTO DIFF WBC: CPT

## 2018-07-10 RX ORDER — MAGNESIUM SULFATE HEPTAHYDRATE 40 MG/ML
2 INJECTION, SOLUTION INTRAVENOUS ONCE
Status: COMPLETED | OUTPATIENT
Start: 2018-07-10 | End: 2018-07-10

## 2018-07-10 RX ADMIN — PANTOPRAZOLE SODIUM 40 MG: 40 TABLET, DELAYED RELEASE ORAL at 05:07

## 2018-07-10 RX ADMIN — CARVEDILOL 3.12 MG: 3.12 TABLET, FILM COATED ORAL at 08:07

## 2018-07-10 RX ADMIN — MAGNESIUM SULFATE IN WATER 2 G: 40 INJECTION, SOLUTION INTRAVENOUS at 03:07

## 2018-07-10 RX ADMIN — ATORVASTATIN CALCIUM 40 MG: 20 TABLET, FILM COATED ORAL at 08:07

## 2018-07-10 RX ADMIN — INSULIN ASPART 1 UNITS: 100 INJECTION, SOLUTION INTRAVENOUS; SUBCUTANEOUS at 09:07

## 2018-07-10 RX ADMIN — FOLIC ACID 1 MG: 1 TABLET ORAL at 08:07

## 2018-07-10 RX ADMIN — PANTOPRAZOLE SODIUM 40 MG: 40 TABLET, DELAYED RELEASE ORAL at 03:07

## 2018-07-10 RX ADMIN — OXYCODONE HYDROCHLORIDE 10 MG: 5 TABLET ORAL at 02:07

## 2018-07-10 RX ADMIN — TAMSULOSIN HYDROCHLORIDE 0.8 MG: 0.4 CAPSULE ORAL at 08:07

## 2018-07-10 RX ADMIN — Medication 400 MG: at 08:07

## 2018-07-10 RX ADMIN — Medication 150 MG: at 08:07

## 2018-07-10 NOTE — MEDICAL/APP STUDENT
Hospital Medicine  Progress Note    Patient Name: Mario Sanford  MRN: [id number]  Team: [facility] MED D Kacey Hayes MD  Admit Date: 6/30/2018  NAA 7/9/2018  Code status: Full Code    Principal Problem:  Duodenal ulcer with hemorrhage but without obstruction    Interval history:  Reported gross hematuria yesterday and D/C was cancelled. Alonzo placed. Gross hematuria without clots still present.      Review of Systems   Constitutional: Negative for fever.   Respiratory: Negative for shortness of breath.    Genitourinary: Positive for hematuria.       Physical Exam:  Temp:  [97.7 °F (36.5 °C)-98.9 °F (37.2 °C)]   Pulse:  [82-94]   Resp:  [16-18]   BP: (127-147)/(71-89)   SpO2:  [94 %-97 %]      Temp: 97.9 °F (36.6 °C) (07/10/18 1605)  Pulse: 95 (07/10/18 1605)  Resp: 20 (07/10/18 1605)  BP: 139/83 (07/10/18 1605)  SpO2: 98 % (07/10/18 1605)    Intake/Output Summary (Last 24 hours) at 07/10/18 0704  Last data filed at 07/10/18 0250   Gross per 24 hour   Intake                0 ml   Output             2200 ml   Net            -2200 ml     Weight: 77.1 kg (169 lb 14.4 oz)  Body mass index is 25.09 kg/m².    Physical Exam   Eyes: Conjunctivae and lids are normal.   Cardiovascular: S1 normal and S2 normal.    Pulmonary/Chest: Effort normal and breath sounds normal.   Abdominal: Soft. Bowel sounds are normal. There is no tenderness.       Significant Labs:    Recent Labs  Lab 07/07/18  0404 07/08/18  0539 07/09/18  0535 07/10/18  0351   WBC 12.78* 10.55 10.01 10.78   HGB 9.5* 8.8* 8.3* 8.4*   HCT 30.3* 27.8* 27.0* 26.9*   * 446* 414* 405*       Recent Labs  Lab 07/08/18  0539 07/09/18  0535 07/10/18  0351    141 138   K 4.0 4.0 3.6    108 107   CO2 22* 24 23   BUN 15 15 14   CREATININE 1.5* 1.4 1.2   * 124* 153*   CALCIUM 8.6* 8.2* 8.2*   MG 1.7 1.6 1.3*   PHOS 5.1* 2.9 2.3*   ALKPHOS 131 118 109   ALT 33 26 20   AST 18 13 13   ALBUMIN 2.6* 2.5* 2.5*   PROT 5.7* 5.4* 5.4*   BILITOT 0.8 0.5 0.5        Recent Labs  Lab 07/08/18  1727 07/08/18  2052 07/09/18  0801 07/09/18  1211 07/09/18  1816 07/09/18 2021   POCTGLUCOSE 149* 155* 118* 138* 194* 195*     A1C:   Recent Labs  Lab 05/29/18  0958   HGBA1C 8.7*     TSH:   Recent Labs  Lab 07/05/18  0403   TSH 0.913     Urine Studies:   Recent Labs  Lab 07/09/18 2026   COLORU Red*   APPEARANCEUA Cloudy*   PHUR 8.0   SPECGRAV 1.015   PROTEINUA 2+*   GLUCUA 1+*   KETONESU Negative   BILIRUBINUA Negative   OCCULTUA 3+*   NITRITE Negative   UROBILINOGEN Negative   LEUKOCYTESUR 1+*   RBCUA >100*   WBCUA 0   BACTERIA None   HYALINECASTS 0       Inpatient Medications prescribed for management of current Problems:   Scheduled Meds:    atorvastatin  40 mg Oral Daily    carvedilol  3.125 mg Oral BID    folic acid  1 mg Oral Daily    iron polysaccharides  150 mg Oral Daily    magnesium oxide  400 mg Oral Daily    pantoprazole  40 mg Oral BID AC    tamsulosin  0.8 mg Oral Daily     Continuous Infusions:   As Needed: acetaminophen, dextrose 50%, dextrose 50%, glucagon (human recombinant), glucose, glucose, hydrALAZINE, insulin aspart U-100, ondansetron, ondansetron, oxyCODONE, oxyCODONE, ramelteon, sodium chloride 0.9%    Active Hospital Problems    Diagnosis  POA    *Duodenal ulcer with hemorrhage but without obstruction [K26.4]  Yes    Esophagitis, [place] grade D [K20.8]  Yes    BPH with urinary obstruction [N40.1, N13.8]  Yes    Acute posthemorrhagic anemia [D62]  Yes    Urinary retention [R33.9]  Yes    Normocytic anemia [D64.9]  Yes    Melena [K92.1]  Yes    Hematuria [R31.9]  Yes    RUQ abdominal pain [R10.11]  Yes    SIRS (systemic inflammatory response syndrome) [R65.10]  Yes    Elevated liver enzymes [R74.8]  Yes    PREM (acute kidney injury) [N17.9]  Yes    Essential hypertension [I10]  Yes    Type 2 diabetes mellitus without complication, without long-term current use of insulin [E11.9]  Yes    Hypercholesteremia [E78.00]  Yes       Resolved Hospital Problems    Diagnosis Date Resolved POA   No resolved problems to display.       Overview:  Patient is a 63 y.o. male with past medical history of hypertension, dyslipidemia, and recently diagnosed DM-2 admitted to hospital for RUQ pain associated with SIRS, worsening liver enzymes and T.bili. The patient received IVF and his PREM, believed to be due to poor intake, resolved. Urine and blood cultures show NGTD and RUQ U/S was normal. Liver U/S showed a hyperechoic right lobe mass, consistent with hemangioma and an MRCP revealed significant narrowing of the common bile duct near the pancreatic head. Further work-up included Ceruloplasmin, Hepatitis viral serologies and autoimmune markers which are all wnl. Hepatology was consulted and discussed EUS/ERCP with AES who plans to do EUS as outpatient. Iron studies were done to evaluate the patient's normocytic anemia, which was most consistent with anemia of chronic inflammation. The patient's H/H continued to trend down and GI was consulted. An EGD (7/3) confirmed Grade D reflux esophagitis and a large duodenal ulcer with repeat EGD (7/6) showing the non-bleeding duodenal ulcer with a visible vessel which was cauterized. The patient was initially managed with a Pantoprazole gtt which was transitioned to PO Pantoprazole. He was also started on Folate and iron supplements. The patient developed urinary retention with associated hematuria and was requiring intermittent straight caths. Urology was consulted and Tamsulosin was initiated with a recommendation for CIC and 2 week follow up as an outpatient.      Assessment and Plan for Problems addressed today:    Acute duodenal ulcer with hemorrhage but without obstruction  SIRS, resolved   RUQ abdominal pain  Elevated liver enzymes  · Acute pancreatitis was initially suspected but lipase and RUQ US are normal. Pain persisted and T.bili / liver enzymes trended upward.   · Blood and urine cultures obtained.  "SIRS rapidly resolved with fluid resuscitation.   · Patient remained tachycardic; EKG NSR   · For analgesia: PRN Oxycodone 10mg Q4hr  · Liver U/S (7/1): "Hyperechoic right lobe mass measures 1.0 x 1.0 x 1.0 cm which may represent a hemangioma". MRCP (7/1): "Common bile duct narrows significantly just proximal to the pancreatic head. A 9 mm right hepatic lobe lesion, most consistent with a hemangioma".   · Ceruloplasmin wnl; Hepatitis panel negative; autoimmune markers negative    · Hepatology consulted: Suspect biliary etiology. Plan for EUS with AES as outpatient.  · H. Pylori IgG (7/3): Negative. EGD (7/3) confirmed large duodenal ulcer & repeat EGD (7/6): non-bleeding duodenal ulcer with a visible vessel; cauterized   · Pantoprazole gtt transitioned to IV Pantoprazole (7/6), transitioned to PO Pantoprazole 40mg BID(7/9)    Acute posthemorrhagic anemia    Normocytic anemia  · Hgb in May was > 16g/dL; it was 14.3 on admission with volume depletion.   · Iron studies with normocytic anemia most consistent with anemia of chronic inflammation   · H/H trends: Stabilized  · Iron supplements and Folic acid, daily (7/3)    Grade D reflux esophagitis  · No biopsy was performed during EGD  · Will need close follow up to rule out Acosta's esophagus      BPH with urinary obstruction  Urinary retention  Hematuria  · UA positive for WBC and a few bacteria, Initiated ceftriaxone.  · Urine cultures growing multiple organisms from 6/30 and > 100K Coag-Neg Staph from 7/1. Ceftriaxone discontinued on 7/6  · Intermittent catheterization and hill placement on 7/9; UA (7/9) with >100 RBC; Urology consulted: recommend CIC and f/u in 2 weeks with Dr. Mcarthur  · Tamsulosin 0.4mg increased to 0.8mg, daily (7/9)  · Voiding trial (7/10) with CIC if necessary      PREM, resolved  · Probably due to poor oral intake / volume depletion. Continued IV fluids until 7/2.  · Holding ARB/HCTZ, NSAIDs. Cr not at baseline    Essential " hypertension  · Holding ACEi/ARB in setting of PREM. Carvedilol 3.125mg BID started on 7/6. PRN Hydralazine 25mg Q8hr.     Type 2 diabetes mellitus   Hypercholesteremia  · Non-insulin diabetes medications are held and the patient's hyperglycemia is managed with a SQ correction dose insulin regimen.   · Holding ASA for now  · Started Atorvastatin 40mg, daily (7/4)     Hypomagnesemia and Hypokalemia   · Replete as needed     Diet: Diabetic 2000 calorie   GI Prophylaxis: None  DVT Prophylaxis: JOAO hose and SCDs   Lines/Drains/Airway: Peripheral IV- Left wrist  Wounds: None     Discharge plan and follow up  Home-Health Care [facility]      Provider  Kacey Hayes MD  [facility] MED D   Department of Hospital Medicine      Renzo Attestation: I personally scribed for Kacey Hayes MD on 07/10/2018 at 1:57 PM. Electronically signed by renzo Das on 07/10/2018 at 1:57 PM.

## 2018-07-10 NOTE — PROGRESS NOTES
Pt only able to void 100cc per urinal. Bladder scan >335. In & out cath 350cc. Will continue to monitor pt.

## 2018-07-10 NOTE — SUBJECTIVE & OBJECTIVE
Past Medical History:   Diagnosis Date    Diabetes mellitus     Hyperlipidemia     Hypertension     Obesity (BMI 30-39.9)        Past Surgical History:   Procedure Laterality Date    COLONOSCOPY N/A 6/19/2018    Procedure: COLONOSCOPY;  Surgeon: Moris Whalen MD;  Location: [facility] (4TH FLR);  Service: Endoscopy;  Laterality: N/A;  Miralax prep, per Dr. Whalen    ESOPHAGOGASTRODUODENOSCOPY N/A 7/3/2018    Procedure: EGD (ESOPHAGOGASTRODUODENOSCOPY);  Surgeon: Newton Iglesias MD;  Location: [facility] (2ND FLR);  Service: Endoscopy;  Laterality: N/A;    ESOPHAGOGASTRODUODENOSCOPY N/A 7/6/2018    Procedure: EGD (ESOPHAGOGASTRODUODENOSCOPY);  Surgeon: Newton Iglesias MD;  Location: [facility] (2ND FLR);  Service: Endoscopy;  Laterality: N/A;    TONSILLECTOMY         Review of patient's allergies indicates:  No Known Allergies    Family History     Problem Relation (Age of Onset)    Diabetes Father    Heart disease Mother    Hyperlipidemia Mother, Sister    Hypertension Mother, Father          Social History Main Topics    Smoking status: Never Smoker    Smokeless tobacco: Never Used    Alcohol use No    Drug use: No    Sexual activity: Yes     Partners: Female       Review of Systems   Constitutional: Negative for chills and fever.   HENT: Negative for sore throat and trouble swallowing.    Eyes: Negative for pain and discharge.   Respiratory: Negative for cough and shortness of breath.    Cardiovascular: Negative for chest pain and palpitations.   Gastrointestinal: Positive for abdominal pain, nausea (improved) and vomiting.   Genitourinary: Positive for difficulty urinating and hematuria.   Musculoskeletal: Negative.    Skin: Negative.    Neurological: Negative.    Psychiatric/Behavioral: Negative.        Objective:     Temp:  [98.5 °F (36.9 °C)-98.9 °F (37.2 °C)] 98.9 °F (37.2 °C)  Pulse:  [82-98] 82  Resp:  [18-20] 18  SpO2:  [94 %-97 %] 96 %  BP: (119-144)/(65-78) 139/78     Body mass index is 25.09  kg/m².      Date 07/09/18 0700 - 07/10/18 0659   Shift 7892-5185 8547-2084 6227-5805 24 Hour Total   I  N  T  A  K  E   Shift Total  (mL/kg)       O  U  T  P  U  T   Urine  (mL/kg/hr) 600  (1) 600  1200    Emesis/NG output  0  0    Stool  0  0    Blood  0  0    Shift Total  (mL/kg) 600  (7.8) 600  (7.8)  1200  (15.6)   Weight (kg) 77.1 77.1 77.1 77.1     Bladder Scan Volume (mL): 624 mL (07/09/18 0500)    Drains     Drain                 Urethral Catheter 07/09/18 1200 Latex 16 Fr. less than 1 day                Physical Exam   Nursing note and vitals reviewed.  Constitutional: He is oriented to person, place, and time. He appears well-developed and well-nourished. No distress.   HENT:   Head: Normocephalic and atraumatic.   Eyes: EOM are normal. No scleral icterus.   Neck: Normal range of motion. Neck supple.   Cardiovascular: Normal rate and intact distal pulses.    Pulmonary/Chest: Effort normal. No respiratory distress.   Abdominal: Soft. He exhibits no distension. There is no tenderness.   Genitourinary: Right testis shows no mass, no swelling and no tenderness. Left testis shows no mass, no swelling and no tenderness.   Genitourinary Comments: Alonzo in place draining light pink urine. No clots. Irrigated to clear.  Prostate 30 g, smooth, no nodules.   Musculoskeletal: He exhibits no edema or deformity.   Neurological: He is alert and oriented to person, place, and time.   Skin: Skin is warm and dry.     Psychiatric: He has a normal mood and affect. His behavior is normal. Thought content normal.       Significant Labs:    BMP:    Recent Labs  Lab 07/07/18  0404 07/08/18  0539 07/09/18  0535    141 141   K 4.2 4.0 4.0    107 108   CO2 23 22* 24   BUN 17 15 15   CREATININE 1.6* 1.5* 1.4   CALCIUM 9.0 8.6* 8.2*       CBC:    Recent Labs  Lab 07/07/18  0404 07/08/18  0539 07/09/18  0535   WBC 12.78* 10.55 10.01   HGB 9.5* 8.8* 8.3*   HCT 30.3* 27.8* 27.0*   * 446* 414*       All pertinent labs  results from the past 24 hours have been reviewed.    Significant Imaging:  All pertinent imaging results/findings from the past 24 hours have been reviewed.

## 2018-07-10 NOTE — ASSESSMENT & PLAN NOTE
62yo M with urinary retention and hematuria s/p 2 EGDs requiring management with intermittent catheterization and subsequent hill placement    - Hematuria - urine cleared with minimal irrigation. Likely due to bladder decompression or traumatic hill placement. No plan for hematuria workup at this time.  - Recommend to maintain hill until the AM at which point he may be transitioned to CIC with a goal of volumes <350 mL with each catheterization.  - The patient can follow up as an outpatient in 1-2 weeks for further evaluation of retention.  - Patient gets his prostate cancer screening with his PCP. Recent PSA was 1.6.

## 2018-07-10 NOTE — NURSING
"TARUN Chicas notified that pt had a "trippy" side affect after having ativan. The pt has been redirected and is now resting well with his wife at the bedside. Will continue to monitor.  "

## 2018-07-10 NOTE — TREATMENT PLAN
Urology Brief Progress Note    Patient seen and examined.  Agitation overnight and even more so after ativan, pulling at hill and lines.    Pink urine in hill tubing with minimal sediment. Irrigated to clear with return of small debris/clots.  Draining clear urine.    Okay to initiate CIC today. Patient stable for discharge from urology standpoint.   He will follow up with Dr. Mcarthur in 2 weeks.    Sammy Bone   Urology, PGY-2

## 2018-07-10 NOTE — HPI
Mario Sanford is a 63 y.o. male with HTN and recent diagnosis of DMII who was admitted for RUQ pain with elevated LFTs, PREM and UGI bleed. He underwent 2 EGDs after which he went into urinary retention for which urology was consulted.    On 7/7, he required in and out cath which yielded 1300 mL. Since that time, he has required multiple in and out caths of about 700 mL. He was started on flomax and eventually had a hill catheter placed. This afternoon, he began to have hematuria with small clots. His creatinine was 2.0 on admission and 1.4 today, which is still slightly above his baseline. He has not seen a urologist in the past but does report a weaker FOS and nocturia x1-2 prior to this admission. Never smoker, no occupational exposures. No family history of urologic malignancies.

## 2018-07-10 NOTE — CONSULTS
[facility]  Urology  Consult Note    Patient Name: Mario Sanford  MRN: [id number]  Admission Date: 6/30/2018  Hospital Length of Stay: 7   Code Status: Full Code   Attending Provider: BRIANNA Ruffin MD   Consulting Provider: Kaylah Loco MD  Primary Care Physician: Bereket Rivera DO  Principal Problem:Duodenal ulcer with hemorrhage but without obstruction    Inpatient consult to Urology  Consult performed by: KAYLAH LOCO  Consult ordered by: BRIANNA RUFFIN          Subjective:     HPI:  Mario Sanford is a 63 y.o. male with HTN and recent diagnosis of DMII who was admitted for RUQ pain with elevated LFTs, PREM and UGI bleed. He underwent 2 EGDs after which he went into urinary retention for which urology was consulted.    On 7/7, he required in and out cath which yielded 1300 mL. Since that time, he has required multiple in and out caths of about 700 mL. He was started on flomax and eventually had a hill catheter placed. This afternoon, he began to have hematuria with small clots. His creatinine was 2.0 on admission and 1.4 today, which is still slightly above his baseline. He has not seen a urologist in the past but does report a weaker FOS and nocturia x1-2 prior to this admission. Never smoker, no occupational exposures. No family history of urologic malignancies.     Past Medical History:   Diagnosis Date    Diabetes mellitus     Hyperlipidemia     Hypertension     Obesity (BMI 30-39.9)        Past Surgical History:   Procedure Laterality Date    COLONOSCOPY N/A 6/19/2018    Procedure: COLONOSCOPY;  Surgeon: Moris Whalen MD;  Location: [facility] (4TH FLR);  Service: Endoscopy;  Laterality: N/A;  Miralax prep, per Dr. Whalen    ESOPHAGOGASTRODUODENOSCOPY N/A 7/3/2018    Procedure: EGD (ESOPHAGOGASTRODUODENOSCOPY);  Surgeon: Newton Iglesias MD;  Location: [facility] (2ND FLR);  Service: Endoscopy;  Laterality: N/A;    ESOPHAGOGASTRODUODENOSCOPY N/A 7/6/2018    Procedure: EGD  (ESOPHAGOGASTRODUODENOSCOPY);  Surgeon: Newton Iglesias MD;  Location: [facility] ([address] [place] [address]);  Service: Endoscopy;  Laterality: N/A;    TONSILLECTOMY         Review of patient's allergies indicates:  No Known Allergies    Family History     Problem Relation (Age of Onset)    Diabetes Father    Heart disease Mother    Hyperlipidemia Mother, Sister    Hypertension Mother, Father          Social History Main Topics    Smoking status: Never Smoker    Smokeless tobacco: Never Used    Alcohol use No    Drug use: No    Sexual activity: Yes     Partners: Female       Review of Systems   Constitutional: Negative for chills and fever.   HENT: Negative for sore throat and trouble swallowing.    Eyes: Negative for pain and discharge.   Respiratory: Negative for cough and shortness of breath.    Cardiovascular: Negative for chest pain and palpitations.   Gastrointestinal: Positive for abdominal pain, nausea (improved) and vomiting.   Genitourinary: Positive for difficulty urinating and hematuria.   Musculoskeletal: Negative.    Skin: Negative.    Neurological: Negative.    Psychiatric/Behavioral: Negative.        Objective:     Temp:  [98.5 °F (36.9 °C)-98.9 °F (37.2 °C)] 98.9 °F (37.2 °C)  Pulse:  [82-98] 82  Resp:  [18-20] 18  SpO2:  [94 %-97 %] 96 %  BP: (119-144)/(65-78) 139/78     Body mass index is 25.09 kg/m².      Date 07/09/18 0700 - 07/10/18 0659   Shift 5182-9267 3810-9702 9912-2262 24 Hour Total   I  N  T  A  K  E   Shift Total  (mL/kg)       O  U  T  P  U  T   Urine  (mL/kg/hr) 600  (1) 600  1200    Emesis/NG output  0  0    Stool  0  0    Blood  0  0    Shift Total  (mL/kg) 600  (7.8) 600  (7.8)  1200  (15.6)   Weight (kg) 77.1 77.1 77.1 77.1     Bladder Scan Volume (mL): 624 mL (07/09/18 0500)    Drains     Drain                 Urethral Catheter 07/09/18 1200 Latex 16 Fr. less than 1 day                Physical Exam   Nursing note and vitals reviewed.  Constitutional: He is oriented to person, place, and time.  He appears well-developed and well-nourished. No distress.   HENT:   Head: Normocephalic and atraumatic.   Eyes: EOM are normal. No scleral icterus.   Neck: Normal range of motion. Neck supple.   Cardiovascular: Normal rate and intact distal pulses.    Pulmonary/Chest: Effort normal. No respiratory distress.   Abdominal: Soft. He exhibits no distension. There is no tenderness.   Genitourinary: Right testis shows no mass, no swelling and no tenderness. Left testis shows no mass, no swelling and no tenderness.   Genitourinary Comments: Hill in place draining light pink urine. No clots. Irrigated to clear.  Prostate 30 g, smooth, no nodules.   Musculoskeletal: He exhibits no edema or deformity.   Neurological: He is alert and oriented to person, place, and time.   Skin: Skin is warm and dry.     Psychiatric: He has a normal mood and affect. His behavior is normal. Thought content normal.       Significant Labs:    BMP:    Recent Labs  Lab 07/07/18  0404 07/08/18  0539 07/09/18  0535    141 141   K 4.2 4.0 4.0    107 108   CO2 23 22* 24   BUN 17 15 15   CREATININE 1.6* 1.5* 1.4   CALCIUM 9.0 8.6* 8.2*       CBC:    Recent Labs  Lab 07/07/18  0404 07/08/18  0539 07/09/18  0535   WBC 12.78* 10.55 10.01   HGB 9.5* 8.8* 8.3*   HCT 30.3* 27.8* 27.0*   * 446* 414*       All pertinent labs results from the past 24 hours have been reviewed.    Significant Imaging:  All pertinent imaging results/findings from the past 24 hours have been reviewed.                    Assessment and Plan:     Urinary retention    62yo M with urinary retention and hematuria s/p 2 EGDs requiring management with intermittent catheterization and subsequent hill placement    - Hematuria - urine cleared with minimal irrigation. Likely due to bladder decompression or traumatic hill placement. No plan for hematuria workup at this time.  - Recommend to maintain hill until the AM at which point he may be transitioned to CIC with a goal  of volumes <350 mL with each catheterization.  - The patient can follow up as an outpatient in 1-2 weeks for further evaluation of retention.  - Patient gets his prostate cancer screening with his PCP. Recent PSA was 1.6.            VTE Risk Mitigation         Ordered     IP VTE HIGH RISK PATIENT  Once      06/30/18 0845     Reason for No Pharmacological VTE Prophylaxis  Once      06/30/18 0845     Place sequential compression device  Until discontinued      06/30/18 0814     Place JOAO hose  Until discontinued      06/30/18 0812          Thank you for your consult. I will follow-up with patient. Please contact us if you have any additional questions.    Sammy Bone MD  Urology  [facility]

## 2018-07-10 NOTE — PLAN OF CARE
[facility] notified of plan for possible d/c home on tomorrow.      Nancy Victor RN  Case Management  [id number]

## 2018-07-10 NOTE — PLAN OF CARE
Problem: Patient Care Overview  Goal: Plan of Care Review  Outcome: Ongoing (interventions implemented as appropriate)  POC reviewed with pt & spouse. AAO x4. Alonzo catheter removed today, pt still retaining urine. Bladder scan > 335, In & out cath 350cc. Blood glucose monitoring.   Pt remained free from falls. Questions and concerns addressed. Pt progressing towards goals. Will continue to monitor. See flow sheets for full assessment and VS

## 2018-07-10 NOTE — TREATMENT PLAN
Patient underwent EGD on 7/6 for UGIB, and found to have a duodenal ulcer with a visible vessel treated with cautery.     Currently has no episodes of melena, hematemesis, hematochezia or coffee ground emesis.   No further GI symptoms noted.  V/S and H/H stable.     Continue current management as previously indicated.   Okay to discharge from GI standpoint. Discussed with primary team.  GI will sign off, please contact us with further questions or concerns.

## 2018-07-11 ENCOUNTER — PATIENT MESSAGE (OUTPATIENT)
Dept: INTERNAL MEDICINE | Facility: CLINIC | Age: 63
End: 2018-07-11

## 2018-07-11 VITALS
RESPIRATION RATE: 18 BRPM | HEART RATE: 100 BPM | SYSTOLIC BLOOD PRESSURE: 126 MMHG | TEMPERATURE: 98 F | BODY MASS INDEX: 25.16 KG/M2 | HEIGHT: 69 IN | DIASTOLIC BLOOD PRESSURE: 69 MMHG | WEIGHT: 169.88 LBS | OXYGEN SATURATION: 95 %

## 2018-07-11 LAB
ALBUMIN SERPL BCP-MCNC: 2.6 G/DL
ALP SERPL-CCNC: 108 U/L
ALT SERPL W/O P-5'-P-CCNC: 18 U/L
ANION GAP SERPL CALC-SCNC: 11 MMOL/L
AST SERPL-CCNC: 13 U/L
BASOPHILS # BLD AUTO: 0.06 K/UL
BASOPHILS NFR BLD: 0.6 %
BILIRUB SERPL-MCNC: 0.5 MG/DL
BUN SERPL-MCNC: 16 MG/DL
CALCIUM SERPL-MCNC: 8.7 MG/DL
CHLORIDE SERPL-SCNC: 105 MMOL/L
CO2 SERPL-SCNC: 21 MMOL/L
CREAT SERPL-MCNC: 1.2 MG/DL
DIFFERENTIAL METHOD: ABNORMAL
EOSINOPHIL # BLD AUTO: 0.3 K/UL
EOSINOPHIL NFR BLD: 2.5 %
ERYTHROCYTE [DISTWIDTH] IN BLOOD BY AUTOMATED COUNT: 13.2 %
EST. GFR  (AFRICAN AMERICAN): >60 ML/MIN/1.73 M^2
EST. GFR  (NON AFRICAN AMERICAN): >60 ML/MIN/1.73 M^2
GLUCOSE SERPL-MCNC: 162 MG/DL
HCT VFR BLD AUTO: 28.5 %
HGB BLD-MCNC: 9.2 G/DL
IMM GRANULOCYTES # BLD AUTO: 0.08 K/UL
IMM GRANULOCYTES NFR BLD AUTO: 0.7 %
LYMPHOCYTES # BLD AUTO: 2.8 K/UL
LYMPHOCYTES NFR BLD: 26.1 %
MAGNESIUM SERPL-MCNC: 1.7 MG/DL
MCH RBC QN AUTO: 29.7 PG
MCHC RBC AUTO-ENTMCNC: 32.3 G/DL
MCV RBC AUTO: 92 FL
MONOCYTES # BLD AUTO: 1 K/UL
MONOCYTES NFR BLD: 8.9 %
NEUTROPHILS # BLD AUTO: 6.6 K/UL
NEUTROPHILS NFR BLD: 61.2 %
NRBC BLD-RTO: 0 /100 WBC
PHOSPHATE SERPL-MCNC: 2.3 MG/DL
PLATELET # BLD AUTO: 429 K/UL
PMV BLD AUTO: 10 FL
POCT GLUCOSE: 134 MG/DL (ref 70–110)
POCT GLUCOSE: 138 MG/DL (ref 70–110)
POCT GLUCOSE: 230 MG/DL (ref 70–110)
POTASSIUM SERPL-SCNC: 3.7 MMOL/L
PROT SERPL-MCNC: 5.9 G/DL
RBC # BLD AUTO: 3.1 M/UL
SODIUM SERPL-SCNC: 137 MMOL/L
WBC # BLD AUTO: 10.71 K/UL

## 2018-07-11 PROCEDURE — 80053 COMPREHEN METABOLIC PANEL: CPT

## 2018-07-11 PROCEDURE — 99239 HOSP IP/OBS DSCHRG MGMT >30: CPT | Mod: ,,, | Performed by: INTERNAL MEDICINE

## 2018-07-11 PROCEDURE — 85025 COMPLETE CBC W/AUTO DIFF WBC: CPT

## 2018-07-11 PROCEDURE — 63600175 PHARM REV CODE 636 W HCPCS: Performed by: INTERNAL MEDICINE

## 2018-07-11 PROCEDURE — 25000003 PHARM REV CODE 250: Performed by: INTERNAL MEDICINE

## 2018-07-11 PROCEDURE — 51798 US URINE CAPACITY MEASURE: CPT

## 2018-07-11 PROCEDURE — 36415 COLL VENOUS BLD VENIPUNCTURE: CPT

## 2018-07-11 PROCEDURE — 83735 ASSAY OF MAGNESIUM: CPT

## 2018-07-11 PROCEDURE — 84100 ASSAY OF PHOSPHORUS: CPT

## 2018-07-11 RX ADMIN — Medication 150 MG: at 09:07

## 2018-07-11 RX ADMIN — TAMSULOSIN HYDROCHLORIDE 0.8 MG: 0.4 CAPSULE ORAL at 09:07

## 2018-07-11 RX ADMIN — CARVEDILOL 3.12 MG: 3.12 TABLET, FILM COATED ORAL at 09:07

## 2018-07-11 RX ADMIN — PANTOPRAZOLE SODIUM 40 MG: 40 TABLET, DELAYED RELEASE ORAL at 05:07

## 2018-07-11 RX ADMIN — PANTOPRAZOLE SODIUM 40 MG: 40 TABLET, DELAYED RELEASE ORAL at 04:07

## 2018-07-11 RX ADMIN — Medication 400 MG: at 09:07

## 2018-07-11 RX ADMIN — INSULIN ASPART 2 UNITS: 100 INJECTION, SOLUTION INTRAVENOUS; SUBCUTANEOUS at 06:07

## 2018-07-11 RX ADMIN — ATORVASTATIN CALCIUM 40 MG: 20 TABLET, FILM COATED ORAL at 09:07

## 2018-07-11 RX ADMIN — FOLIC ACID 1 MG: 1 TABLET ORAL at 09:07

## 2018-07-11 NOTE — MEDICAL/APP STUDENT
Hospital Medicine  Progress Note    Patient Name: Mario Sanford  MRN: [id number]  Team: [facility] Kacey Hayes MD  Admit Date: 6/30/2018  NAA 7/11/2018  Code status: Full Code    Principal Problem:  Duodenal ulcer with hemorrhage but without obstruction    Interval history:  Still having post-void residuals, requiring CIC.       Review of Systems   Constitutional: Negative for fever.   Respiratory: Negative for shortness of breath.        Physical Exam:  Temp:  [97.9 °F (36.6 °C)-98.6 °F (37 °C)]   Pulse:  []   Resp:  [18-20]   BP: (117-139)/(64-83)   SpO2:  [94 %-98 %]      Temp: 98.6 °F (37 °C) (07/11/18 0410)  Pulse: 98 (07/11/18 0410)  Resp: 18 (07/11/18 0410)  BP: 125/70 (07/11/18 0410)  SpO2: 97 % (07/11/18 0410)    Intake/Output Summary (Last 24 hours) at 07/11/18 0901  Last data filed at 07/11/18 0500   Gross per 24 hour   Intake              500 ml   Output             2275 ml   Net            -1775 ml     Weight: 77.1 kg (169 lb 14.4 oz)  Body mass index is 25.09 kg/m².    Physical Exam   Eyes: Conjunctivae and lids are normal.   Cardiovascular: S1 normal and S2 normal.    Pulmonary/Chest: Effort normal and breath sounds normal.   Abdominal: Soft. Bowel sounds are normal. There is no tenderness.       Significant Labs:    Recent Labs  Lab 07/08/18  0539 07/09/18  0535 07/10/18  0351 07/11/18  0504   WBC 10.55 10.01 10.78 10.71   HGB 8.8* 8.3* 8.4* 9.2*   HCT 27.8* 27.0* 26.9* 28.5*   * 414* 405* 429*       Recent Labs  Lab 07/09/18  0535 07/10/18  0351 07/11/18  0504    138 137   K 4.0 3.6 3.7    107 105   CO2 24 23 21*   BUN 15 14 16   CREATININE 1.4 1.2 1.2   * 153* 162*   CALCIUM 8.2* 8.2* 8.7   MG 1.6 1.3* 1.7   PHOS 2.9 2.3* 2.3*   ALKPHOS 118 109 108   ALT 26 20 18   AST 13 13 13   ALBUMIN 2.5* 2.5* 2.6*   PROT 5.4* 5.4* 5.9*   BILITOT 0.5 0.5 0.5       Recent Labs  Lab 07/09/18  1816 07/09/18  2021 07/10/18  1220 07/10/18  1728 07/10/18  2047 07/11/18  0820    POCTGLUCOSE 194* 195* 184* 145* 230* 138*     A1C:     Recent Labs  Lab 05/29/18  0958   HGBA1C 8.7*     TSH:     Recent Labs  Lab 07/05/18  0403   TSH 0.913     Urine Studies:     Recent Labs  Lab 07/09/18 2026   COLORU Red*   APPEARANCEUA Cloudy*   PHUR 8.0   SPECGRAV 1.015   PROTEINUA 2+*   GLUCUA 1+*   KETONESU Negative   BILIRUBINUA Negative   OCCULTUA 3+*   NITRITE Negative   UROBILINOGEN Negative   LEUKOCYTESUR 1+*   RBCUA >100*   WBCUA 0   BACTERIA None   HYALINECASTS 0       Inpatient Medications prescribed for management of current Problems:   Scheduled Meds:    atorvastatin  40 mg Oral Daily    carvedilol  3.125 mg Oral BID    folic acid  1 mg Oral Daily    iron polysaccharides  150 mg Oral Daily    magnesium oxide  400 mg Oral Daily    pantoprazole  40 mg Oral BID AC    tamsulosin  0.8 mg Oral Daily     Continuous Infusions:   As Needed: acetaminophen, dextrose 50%, dextrose 50%, glucagon (human recombinant), glucose, glucose, hydrALAZINE, insulin aspart U-100, ondansetron, ondansetron, oxyCODONE, oxyCODONE, ramelteon, sodium chloride 0.9%    Active Hospital Problems    Diagnosis  POA    *Duodenal ulcer with hemorrhage but without obstruction [K26.4]  Yes    Esophagitis, Monroe grade D [K20.8]  Yes    BPH with urinary obstruction [N40.1, N13.8]  Yes    Acute posthemorrhagic anemia [D62]  Yes    Urinary retention [R33.9]  Yes    Normocytic anemia [D64.9]  Yes    Melena [K92.1]  Yes    Hematuria [R31.9]  Yes    RUQ abdominal pain [R10.11]  Yes    SIRS (systemic inflammatory response syndrome) [R65.10]  Yes    Elevated liver enzymes [R74.8]  Yes    PREM (acute kidney injury) [N17.9]  Yes    Essential hypertension [I10]  Yes    Type 2 diabetes mellitus without complication, without long-term current use of insulin [E11.9]  Yes    Hypercholesteremia [E78.00]  Yes      Resolved Hospital Problems    Diagnosis Date Resolved POA   No resolved problems to display.       Overview:   Patient is a 63 y.o. male with past medical history of hypertension, dyslipidemia, and recently diagnosed DM-2 admitted to hospital for RUQ pain associated with SIRS, worsening liver enzymes and T.bili. The patient received IVF and his PREM, believed to be due to poor intake, resolved. Urine and blood cultures show NGTD and RUQ U/S was normal. Liver U/S showed a hyperechoic right lobe mass, consistent with hemangioma and an MRCP revealed significant narrowing of the common bile duct near the pancreatic head. Further work-up included Ceruloplasmin, Hepatitis viral serologies and autoimmune markers which are all wnl. Hepatology was consulted and discussed EUS/ERCP with [facility] who plans to do EUS as outpatient. Iron studies were done to evaluate the patient's normocytic anemia, which was most consistent with anemia of chronic inflammation. The patient's H/H continued to trend down and GI was consulted. An EGD (7/3) confirmed Grade D reflux esophagitis and a large duodenal ulcer with repeat EGD (7/6) showing the non-bleeding duodenal ulcer with a visible vessel which was cauterized. The patient was initially managed with a Pantoprazole gtt which was transitioned to PO Pantoprazole. He was also started on Folate and iron supplements. The patient developed urinary retention with associated hematuria and was requiring intermittent straight caths. Urology was consulted and Tamsulosin was initiated with a recommendation for CIC and 2 week follow up as an outpatient.      Assessment and Plan for Problems addressed today:    Acute duodenal ulcer with hemorrhage but without obstruction  SIRS, resolved   RUQ abdominal pain  Elevated liver enzymes  · Acute pancreatitis was initially suspected but lipase and RUQ US are normal. Pain persisted and T.bili / liver enzymes trended upward.   · Blood and urine cultures obtained. SIRS rapidly resolved with fluid resuscitation.   · Patient remained tachycardic; EKG NSR   · For analgesia: PRN  "Oxycodone 10mg Q4hr  · Liver U/S (7/1): "Hyperechoic right lobe mass measures 1.0 x 1.0 x 1.0 cm which may represent a hemangioma". MRCP (7/1): "Common bile duct narrows significantly just proximal to the pancreatic head. A 9 mm right hepatic lobe lesion, most consistent with a hemangioma".   · Ceruloplasmin wnl; Hepatitis panel negative; autoimmune markers negative    · Hepatology consulted: Suspect biliary etiology. Plan for EUS with [facility] as outpatient.  · H. Pylori IgG (7/3): Negative. EGD (7/3) confirmed large duodenal ulcer & repeat EGD (7/6): non-bleeding duodenal ulcer with a visible vessel; cauterized   · Pantoprazole gtt transitioned to IV Pantoprazole (7/6), transitioned to PO Pantoprazole 40mg BID(7/9)    Acute posthemorrhagic anemia    Normocytic anemia  · Hgb in May was > 16g/dL; it was 14.3 on admission with volume depletion.   · Iron studies with normocytic anemia most consistent with anemia of chronic inflammation   · H/H trends: Stabilized  · Iron supplements and Folic acid, daily (7/3)    Grade D reflux esophagitis  · No biopsy was performed during EGD  · Will need close follow up to rule out Acosta's esophagus      BPH with urinary obstruction  Urinary retention  Hematuria  · UA positive for WBC and a few bacteria, Initiated ceftriaxone.  · Urine cultures growing multiple organisms from 6/30 and > 100K Coag-Neg Staph from 7/1. Ceftriaxone discontinued on 7/6  · Intermittent catheterization and hill placement on 7/9; UA (7/9) with >100 RBC; Urology consulted: recommend CIC and f/u in 2 weeks with Dr. Mcarthur  · Tamsulosin 0.4mg increased to 0.8mg, daily (7/9)  · Voiding trial (7/10) with CIC if necessary      PREM, resolved  · Probably due to poor oral intake / volume depletion. Continued IV fluids until 7/2.  · Holding ARB/HCTZ, NSAIDs. Cr not at baseline    Essential hypertension  · Holding ACEi/ARB in setting of PREM. Carvedilol 3.125mg BID started on 7/6. PRN Hydralazine 25mg Q8hr.     Type 2 " diabetes mellitus   Hypercholesteremia  · Non-insulin diabetes medications are held and the patient's hyperglycemia is managed with a SQ correction dose insulin regimen.   · Holding ASA for now  · Started Atorvastatin 40mg, daily (7/4)     Hypomagnesemia and Hypokalemia   · Replete as needed     Diet: Diabetic 2000 calorie   GI Prophylaxis: None  DVT Prophylaxis: JOAO hose and SCDs   Lines/Drains/Airway: Peripheral IV- Left wrist  Wounds: None     Discharge plan and follow up  [facility]-Health Care [facility]      Provider  Kacey Hayes MD  [facility] D   Department of Hospital Medicine      Scribe Attestation: I personally scribed for Kacey Hayes MD on 07/11/2018 at 2:28 PM. Electronically signed by renzo Das on 07/11/2018 at 2:28 PM.

## 2018-07-11 NOTE — PLAN OF CARE
Home health orders uploaded to [facility], pt will be admitted tomorrow.  No additional needs.    Nancy Victor RN  Case Management  [id number]

## 2018-07-11 NOTE — DISCHARGE SUMMARY
Discharge Summary  Hospital Medicine    Patient Name: Mario Sanford  MRN: [id number]  Attending Provider on Discharge: Kacey Hayes MD  Hospital Medicine Team: [facility] D  Date of Admission:  6/30/2018     Date of Discharge:  7/11/2018  7:15 PM  Code status: Full Code    Active Hospital Problems    Diagnosis  POA    *Duodenal ulcer with hemorrhage but without obstruction [K26.4]  Yes    Esophagitis, [place] grade D [K20.8]  Yes    BPH with urinary obstruction [N40.1, N13.8]  Yes    Acute posthemorrhagic anemia [D62]  Yes    Urinary retention [R33.9]  Yes    Normocytic anemia [D64.9]  Yes    Melena [K92.1]  Yes    Hematuria [R31.9]  Yes    RUQ abdominal pain [R10.11]  Yes    SIRS (systemic inflammatory response syndrome) [R65.10]  Yes    Elevated liver enzymes [R74.8]  Yes    PREM (acute kidney injury) [N17.9]  Yes    Essential hypertension [I10]  Yes    Type 2 diabetes mellitus without complication, without long-term current use of insulin [E11.9]  Yes    Hypercholesteremia [E78.00]  Yes      Resolved Hospital Problems    Diagnosis Date Resolved POA   No resolved problems to display.        HPI: 63 y.o. male with past medical history of hypertension, dyslipidemia, and recently diagnosed DM-2 presented with the acute onset of severe non-radiating RUQ pain beginning a few hours prior to admission. Pertinent associated symptoms include progressively worsening nausea and vomiting x 1 week. Patient had a routine colonoscopy on 6/19 and has not felt well since that time. He has had pain similar to that today, intermittently for the past week.    Hospital Course: Patient was admitted to hospital for RUQ pain associated with SIRS, PREM, worsening liver enzymes and T.bili, and concern for acute pancreatitis. The patient received IVF and his PREM, believed to be due to poor intake or urinary retention, PREM resolved. Urine and blood cultures show NG and RUQ U/S was normal. Liver U/S showed a hyperechoic  "right lobe mass, consistent with hemangioma and an MRCP revealed significant narrowing of the common bile duct near the pancreatic head. Further work-up included Ceruloplasmin, Hepatitis viral serologies and autoimmune markers which are all wnl. Hepatology was consulted and discussed EUS/ERCP with [facility] who plans to do EUS as outpatient. Iron studies were done to evaluate the patient's normocytic anemia, which was most consistent with anemia of chronic inflammation. The patient's H&H continued to trend down and GI was consulted. An EGD (7/3) confirmed Grade D reflux esophagitis and a large duodenal ulcer with repeat EGD (7/6) showing the non-bleeding duodenal ulcer with a visible vessel which was cauterized. The patient was initially managed with a Pantoprazole gtt which was transitioned to PO Pantoprazole. He was also started on Folate and iron supplements. The patient developed urinary retention with associated hematuria and was requiring intermittent straight caths. Urology was consulted and Tamsulosin was initiated with a recommendation for CIC and 2 week follow up with Urology as an outpatient.       Acute duodenal ulcer with hemorrhage but without obstruction  SIRS, resolved   RUQ abdominal pain  Elevated liver enzymes  · Acute pancreatitis was initially suspected but lipase and RUQ US are normal. Pain persisted and T.bili / liver enzymes trended upward.   · Blood and urine cultures obtained. SIRS rapidly resolved with fluid resuscitation.   · Patient remained tachycardic; EKG NSR   · For analgesia: PRN Oxycodone 10mg Q4hr  · Liver U/S (7/1): "Hyperechoic right lobe mass measures 1.0 x 1.0 x 1.0 cm which may represent a hemangioma". MRCP (7/1): "Common bile duct narrows significantly just proximal to the pancreatic head. A 9 mm right hepatic lobe lesion, most consistent with a hemangioma".   · Ceruloplasmin wnl; Hepatitis panel negative; autoimmune markers negative    · Hepatology consulted: Suspect biliary " etiology. Plan for EUS with [facility] as outpatient.  · H. Pylori IgG (7/3): Negative. EGD (7/3) confirmed large duodenal ulcer & repeat EGD (7/6): non-bleeding duodenal ulcer with a visible vessel; cauterized   · Pantoprazole gtt transitioned to IV Pantoprazole (7/6), transitioned to PO Pantoprazole 40mg BID(7/9)     Acute posthemorrhagic anemia    Normocytic anemia  · Hgb in May was > 16g/dL; it was 14.3 on admission with volume depletion.   · Iron studies with normocytic anemia most consistent with anemia of chronic inflammation   · H/H trends: Stabilized  · Iron supplements and Folic acid, daily (7/3)     Grade D reflux esophagitis  · No biopsy was performed during EGD  · Will need close follow up to rule out Acosta's esophagus      BPH with urinary obstruction  Urinary retention  Hematuria  · UA positive for WBC and a few bacteria, Initiated ceftriaxone.  · Urine cultures growing multiple organisms from 6/30 and > 100K Coag-Neg Staph from 7/1. Ceftriaxone discontinued on 7/6  · Intermittent catheterization and Alonzo placement on 7/9; UA (7/9) with >100 RBC; Urology consulted: recommend CIC and f/u in 2 weeks with Dr. Mcarthur  · Tamsulosin 0.4mg increased to 0.8mg, daily (7/9)  · Voiding trial (7/10) with CIC if necessary      PREM, resolved  · Probably due to poor oral intake / volume depletion. Continued IV fluids until 7/2.  · Holding ARB/HCTZ, NSAIDs. Cr not at baseline     Essential hypertension  · Holding ACEi/ARB in setting of PREM. Carvedilol 3.125mg BID started on 7/6. PRN Hydralazine 25mg Q8hr.     Type 2 diabetes mellitus   Hypercholesteremia  · Non-insulin diabetes medications are held and the patient's hyperglycemia is managed with a SQ correction dose insulin regimen.   · Holding ASA for now  · Started Atorvastatin 40mg, daily (7/4)     Hypomagnesemia and Hypokalemia   · Replete as needed         Recent Labs  Lab 07/09/18  0535 07/10/18  0351 07/11/18  0504   WBC 10.01 10.78 10.71   HGB 8.3* 8.4* 9.2*    HCT 27.0* 26.9* 28.5*   * 405* 429*       Recent Labs  Lab 07/09/18  0535 07/10/18  0351 07/11/18  0504    138 137   K 4.0 3.6 3.7    107 105   CO2 24 23 21*   BUN 15 14 16   CREATININE 1.4 1.2 1.2   * 153* 162*   CALCIUM 8.2* 8.2* 8.7   MG 1.6 1.3* 1.7   PHOS 2.9 2.3* 2.3*       Recent Labs  Lab 07/09/18  0535 07/10/18  0351 07/11/18  0504   ALKPHOS 118 109 108   ALT 26 20 18   AST 13 13 13   ALBUMIN 2.5* 2.5* 2.6*   PROT 5.4* 5.4* 5.9*   BILITOT 0.5 0.5 0.5        Recent Labs  Lab 07/09/18  2021 07/10/18  1220 07/10/18  1728 07/10/18  2047 07/11/18  0820 07/11/18  1303   POCTGLUCOSE 195* 184* 145* 230* 138* 134*        Procedures: EGD x 2    Consultants:   Consults         Status Ordering Provider     Inpatient consult to Hepatology  Once     Provider:  (Not yet assigned)    Completed ALISSA ORTIZ     Inpatient consult to Urology  Once     Provider:  (Not yet assigned)    Completed BRIANNA MERRITT          Medications:  Reconciled Home Medications:      Medication List      START taking these medications    carvedilol 3.125 MG tablet  Commonly known as:  COREG  Take 1 tablet (3.125 mg total) by mouth 2 (two) times daily.     folic acid 1 MG tablet  Commonly known as:  FOLVITE  Take 1 tablet (1 mg total) by mouth once daily.     iron polysaccharides 150 mg iron Cap  Commonly known as:  NIFEREX  Take 1 capsule (150 mg total) by mouth once daily.     pantoprazole 40 MG tablet  Commonly known as:  PROTONIX  Take 1 tablet (40 mg total) by mouth 2 (two) times daily before meals.     tamsulosin 0.4 mg Cap  Commonly known as:  FLOMAX  Take 2 capsules (0.8 mg total) by mouth once daily.        CONTINUE taking these medications    aspirin 325 MG tablet  Take 81 mg by mouth once daily.     atorvastatin 40 MG tablet  Commonly known as:  LIPITOR  Take 1 tablet (40 mg total) by mouth nightly.     blood sugar diagnostic Strp  1 strip by Misc.(Non-Drug; Combo Route) route once daily. Test strips  for glucometer insurance covers to check blood sugar twice daily.     blood-glucose meter Misc  Glucometer that insurance covers to check blood sugar twice daily     glimepiride 2 MG tablet  Commonly known as:  AMARYL  TAKE 1 TABLET (2 MG TOTAL) BY MOUTH BEFORE BREAKFAST.     ibuprofen 200 MG tablet  Commonly known as:  ADVIL,MOTRIN  Take 200 mg by mouth every 6 (six) hours as needed for Pain.     lancets Misc  1 Device by Misc.(Non-Drug; Combo Route) route once daily. Lancets for glucometer insurance covers to check blood sugar twice daily.        STOP taking these medications    losartan-hydrochlorothiazide 50-12.5 mg 50-12.5 mg per tablet  Commonly known as:  HYZAAR            Discharge Instructions:    Discharge Procedure Orders  Ambulatory referral to Urology   Referral Priority: Routine Referral Type: Consultation   Referral Reason: Specialty Services Required    Requested Specialty: Urology    Number of Visits Requested: 1      Ambulatory referral to Gastroenterology   Referral Priority: Routine Referral Type: Consultation   Referral Reason: Specialty Services Required    Requested Specialty: Gastroenterology    Number of Visits Requested: 1      Diet diabetic     Notify your health care provider if you experience any of the following:     Notify your health care provider if you experience any of the following:  increased confusion or weakness     Notify your health care provider if you experience any of the following:  persistent dizziness, light-headedness, or visual disturbances     Notify your health care provider if you experience any of the following:  worsening rash     Notify your health care provider if you experience any of the following:  severe persistent headache     Notify your health care provider if you experience any of the following:  difficulty breathing or increased cough     Notify your health care provider if you experience any of the following:  redness, tenderness, or signs of  infection (pain, swelling, redness, odor or green/yellow discharge around incision site)     Notify your health care provider if you experience any of the following:  severe uncontrolled pain     Notify your health care provider if you experience any of the following:  persistent nausea and vomiting or diarrhea     Notify your health care provider if you experience any of the following:  temperature >100.4     Activity as tolerated         Discharge Condition: good    Disposition: Home-Health Care [facility]    Indwelling Lines/Drains at time of discharge: CICs    Tests pending at the time of discharge: none      Time spent on the discharge of the patient including review of hospital course with the patient, reviewing discharge medications and arranging follow-up care: 50 minutes.    Discharge examination Patient was seen and examined on 7/11/2018 and determined to be suitable for discharge.    Discharge plan and follow up:  Follow-up Information     Watson Glass MD In 2 weeks.    Specialty:  Gastroenterology  Contact information:  [address]  [facility] [address]  [phone]             Bereket Rivera DO In 1 month.    Specialty:  Internal Medicine  Contact information:  [address]  [place] [address]  [phone]             Sammy Mcarthur MD In 2 weeks.    Specialty:  Urology  Why:  urinary retention and hematuria, initiated CIC inpatient  Contact information:  [address]  [facility] [address]  [phone]                 Future Appointments  Date Time Provider Department Center   7/20/2018 1:00 PM [facility] UROLOGY PHYSICIAN CLINIC [facility] UROLOGY [facility]   8/13/2018 3:00 PM Rachel Barron MD [facility] GASTRO [facility]   8/29/2018 9:00 AM LAB, [facility] [facility] [place]   9/4/2018 11:00 AM DIABETIC, [facility] [place]       Provider  Kacey Hayes MD  Department of Hospital Medicine  [facility] [place] [facility]

## 2018-07-11 NOTE — NURSING
Taught spouse on in and out cath under aseptic teaching pt spouse demonstrated and do in and out cath properly with no problem obtained 300cc clear urine no question and provided teaching hand outs .

## 2018-07-11 NOTE — NURSING
Discharge instructions given to patient and family.  Patient verbalized understanding and had no further questions.  Patient Iv removed and vital signs within normal limits.  Patient now awaiting ride from family.  Will continue to follow up.

## 2018-07-11 NOTE — PROGRESS NOTES
Physician Attestation for Scribe:  I, Kacey Hayes MD, personally performed the services described in this documentation. All medical record entries made by the scribe were at my direction and in my presence.  I have reviewed this note and agree that the record reflects my personal performance and is accurate and complete.     Hospital Medicine  Progress Note     Patient Name: Mario Sanford  MRN: [id number]  Team: [facility] MED D Kacey Hayes MD  Admit Date: 6/30/2018  NAA 7/9/2018  Code status: Full Code     Principal Problem:  Duodenal ulcer with hemorrhage but without obstruction     Interval history:  Reported gross hematuria yesterday and D/C was cancelled. Alonzo placed. Gross hematuria without clots still present.       Review of Systems   Constitutional: Negative for fever.   Respiratory: Negative for shortness of breath.    Genitourinary: Positive for hematuria.         Physical Exam:  Temp:  [97.7 °F (36.5 °C)-98.9 °F (37.2 °C)]   Pulse:  [82-94]   Resp:  [16-18]   BP: (127-147)/(71-89)   SpO2:  [94 %-97 %]       Temp: 97.9 °F (36.6 °C) (07/10/18 1605)  Pulse: 95 (07/10/18 1605)  Resp: 20 (07/10/18 1605)  BP: 139/83 (07/10/18 1605)  SpO2: 98 % (07/10/18 1605)     Intake/Output Summary (Last 24 hours) at 07/10/18 0704  Last data filed at 07/10/18 0250    Gross per 24 hour   Intake                0 ml   Output             2200 ml   Net            -2200 ml      Weight: 77.1 kg (169 lb 14.4 oz)  Body mass index is 25.09 kg/m².     Physical Exam   Eyes: Conjunctivae and lids are normal.   Cardiovascular: S1 normal and S2 normal.    Pulmonary/Chest: Effort normal and breath sounds normal.   Abdominal: Soft. Bowel sounds are normal. There is no tenderness.         Significant Labs:     Recent Labs  Lab 07/07/18  0404 07/08/18  0539 07/09/18  0535 07/10/18  0351   WBC 12.78* 10.55 10.01 10.78   HGB 9.5* 8.8* 8.3* 8.4*   HCT 30.3* 27.8* 27.0* 26.9*   * 446* 414* 405*         Recent Labs  Lab  07/08/18  0539 07/09/18  0535 07/10/18  0351    141 138   K 4.0 4.0 3.6    108 107   CO2 22* 24 23   BUN 15 15 14   CREATININE 1.5* 1.4 1.2   * 124* 153*   CALCIUM 8.6* 8.2* 8.2*   MG 1.7 1.6 1.3*   PHOS 5.1* 2.9 2.3*   ALKPHOS 131 118 109   ALT 33 26 20   AST 18 13 13   ALBUMIN 2.6* 2.5* 2.5*   PROT 5.7* 5.4* 5.4*   BILITOT 0.8 0.5 0.5         Recent Labs  Lab 07/08/18  1727 07/08/18  2052 07/09/18  0801 07/09/18  1211 07/09/18  1816 07/09/18 2021   POCTGLUCOSE 149* 155* 118* 138* 194* 195*      A1C:   Recent Labs  Lab 05/29/18  0958   HGBA1C 8.7*      TSH:   Recent Labs  Lab 07/05/18  0403   TSH 0.913      Urine Studies:   Recent Labs  Lab 07/09/18 2026   COLORU Red*   APPEARANCEUA Cloudy*   PHUR 8.0   SPECGRAV 1.015   PROTEINUA 2+*   GLUCUA 1+*   KETONESU Negative   BILIRUBINUA Negative   OCCULTUA 3+*   NITRITE Negative   UROBILINOGEN Negative   LEUKOCYTESUR 1+*   RBCUA >100*   WBCUA 0   BACTERIA None   HYALINECASTS 0         Inpatient Medications prescribed for management of current Problems:   Scheduled Meds:    atorvastatin  40 mg Oral Daily    carvedilol  3.125 mg Oral BID    folic acid  1 mg Oral Daily    iron polysaccharides  150 mg Oral Daily    magnesium oxide  400 mg Oral Daily    pantoprazole  40 mg Oral BID AC    tamsulosin  0.8 mg Oral Daily      Continuous Infusions:   As Needed: acetaminophen, dextrose 50%, dextrose 50%, glucagon (human recombinant), glucose, glucose, hydrALAZINE, insulin aspart U-100, ondansetron, ondansetron, oxyCODONE, oxyCODONE, ramelteon, sodium chloride 0.9%           Active Hospital Problems     Diagnosis   POA    *Duodenal ulcer with hemorrhage but without obstruction [K26.4]   Yes    Esophagitis, [place] grade D [K20.8]   Yes    BPH with urinary obstruction [N40.1, N13.8]   Yes    Acute posthemorrhagic anemia [D62]   Yes    Urinary retention [R33.9]   Yes    Normocytic anemia [D64.9]   Yes    Melena [K92.1]   Yes    Hematuria [R31.9]    Yes    RUQ abdominal pain [R10.11]   Yes    SIRS (systemic inflammatory response syndrome) [R65.10]   Yes    Elevated liver enzymes [R74.8]   Yes    PREM (acute kidney injury) [N17.9]   Yes    Essential hypertension [I10]   Yes    Type 2 diabetes mellitus without complication, without long-term current use of insulin [E11.9]   Yes    Hypercholesteremia [E78.00]   Yes       Resolved Hospital Problems     Diagnosis Date Resolved POA   No resolved problems to display.         Overview:  Patient is a 63 y.o. male with past medical history of hypertension, dyslipidemia, and recently diagnosed DM-2 admitted to hospital for RUQ pain associated with SIRS, worsening liver enzymes and T.bili. The patient received IVF and his PREM, believed to be due to poor intake, resolved. Urine and blood cultures show NGTD and RUQ U/S was normal. Liver U/S showed a hyperechoic right lobe mass, consistent with hemangioma and an MRCP revealed significant narrowing of the common bile duct near the pancreatic head. Further work-up included Ceruloplasmin, Hepatitis viral serologies and autoimmune markers which are all wnl. Hepatology was consulted and discussed EUS/ERCP with [facility] who plans to do EUS as outpatient. Iron studies were done to evaluate the patient's normocytic anemia, which was most consistent with anemia of chronic inflammation. The patient's H/H continued to trend down and GI was consulted. An EGD (7/3) confirmed Grade D reflux esophagitis and a large duodenal ulcer with repeat EGD (7/6) showing the non-bleeding duodenal ulcer with a visible vessel which was cauterized. The patient was initially managed with a Pantoprazole gtt which was transitioned to PO Pantoprazole. He was also started on Folate and iron supplements. The patient developed urinary retention with associated hematuria and was requiring intermittent straight caths. Urology was consulted and Tamsulosin was initiated with a recommendation for CIC and 2 week  "follow up as an outpatient.       Assessment and Plan for Problems addressed today:     Acute duodenal ulcer with hemorrhage but without obstruction  SIRS, resolved   RUQ abdominal pain  Elevated liver enzymes  · Acute pancreatitis was initially suspected but lipase and RUQ US are normal. Pain persisted and T.bili / liver enzymes trended upward.   · Blood and urine cultures obtained. SIRS rapidly resolved with fluid resuscitation.   · Patient remained tachycardic; EKG NSR   · For analgesia: PRN Oxycodone 10mg Q4hr  · Liver U/S (7/1): "Hyperechoic right lobe mass measures 1.0 x 1.0 x 1.0 cm which may represent a hemangioma". MRCP (7/1): "Common bile duct narrows significantly just proximal to the pancreatic head. A 9 mm right hepatic lobe lesion, most consistent with a hemangioma".   · Ceruloplasmin wnl; Hepatitis panel negative; autoimmune markers negative    · Hepatology consulted: Suspect biliary etiology. Plan for EUS with [facility] as outpatient.  · H. Pylori IgG (7/3): Negative. EGD (7/3) confirmed large duodenal ulcer & repeat EGD (7/6): non-bleeding duodenal ulcer with a visible vessel; cauterized   · Pantoprazole gtt transitioned to IV Pantoprazole (7/6), transitioned to PO Pantoprazole 40mg BID(7/9)     Acute posthemorrhagic anemia    Normocytic anemia  · Hgb in May was > 16g/dL; it was 14.3 on admission with volume depletion.   · Iron studies with normocytic anemia most consistent with anemia of chronic inflammation   · H/H trends: Stabilized  · Iron supplements and Folic acid, daily (7/3)     Grade D reflux esophagitis  · No biopsy was performed during EGD  · Will need close follow up to rule out Acosta's esophagus      BPH with urinary obstruction  Urinary retention  Hematuria  · UA positive for WBC and a few bacteria, Initiated ceftriaxone.  · Urine cultures growing multiple organisms from 6/30 and > 100K Coag-Neg Staph from 7/1. Ceftriaxone discontinued on 7/6  · Intermittent catheterization and hill " placement on 7/9; UA (7/9) with >100 RBC; Urology consulted: recommend CIC and f/u in 2 weeks with Dr. Mcarthur  · Tamsulosin 0.4mg increased to 0.8mg, daily (7/9)  · Voiding trial (7/10) with CIC if necessary      PREM, resolved  · Probably due to poor oral intake / volume depletion. Continued IV fluids until 7/2.  · Holding ARB/HCTZ, NSAIDs. Cr not at baseline     Essential hypertension  · Holding ACEi/ARB in setting of PREM. Carvedilol 3.125mg BID started on 7/6. PRN Hydralazine 25mg Q8hr.     Type 2 diabetes mellitus   Hypercholesteremia  · Non-insulin diabetes medications are held and the patient's hyperglycemia is managed with a SQ correction dose insulin regimen.   · Holding ASA for now  · Started Atorvastatin 40mg, daily (7/4)     Hypomagnesemia and Hypokalemia   · Replete as needed     Diet: Diabetic 2000 calorie   GI Prophylaxis: None  DVT Prophylaxis: JOAO hose and SCDs   Lines/Drains/Airway: Peripheral IV- Left wrist  Wounds: None      Discharge plan and follow up  Home-Health Care [facility]        Provider  Kacey Hayes MD  [facility] HOSP MED D   Department of Hospital Medicine       Scribe Attestation: I personally scribed for Kacey Hayes MD on 07/10/2018 at 1:57 PM. Electronically signed by renzo Das on 07/10/2018 at 1:57 PM.

## 2018-07-11 NOTE — PLAN OF CARE
Problem: Patient Care Overview  Goal: Plan of Care Review   07/11/18 0632   Coping/Psychosocial   Plan Of Care Reviewed With patient;spouse     Bed in lowest position with wheels locked. Bedside table and call bell within reach. Patient remains free from falls.      Comments: Pt is A&Ox4. VSS. Afebrile. Bladder scan Q4. Straight cath x1. Refer to flowsheet for I&O's.

## 2018-07-11 NOTE — PLAN OF CARE
Problem: Patient Care Overview  Goal: Plan of Care Review  Outcome: Outcome(s) achieved Date Met: 07/11/18  Questions answered and concerns addressed.   07/11/18 9421   Coping/Psychosocial   Plan Of Care Reviewed With patient;spouse

## 2018-07-11 NOTE — PLAN OF CARE
Plan for d/c to home on today with [facility].  Pt states he is comfortable to with CIC, will have home health for continued teaching/assistance.  Pt denies any additional needs.    Future Appointments  Date Time Provider Department Center   7/20/2018 1:00 PM [facility] UROLOGY PHYSICIAN CLINIC [facility] UROLOGY [place]   8/13/2018 3:00 PM Rachel Barron MD [facility] GASTRO [place]   8/29/2018 9:00 AM [facility] [place]   9/4/2018 11:00 AM [facility] [place]

## 2018-07-13 ENCOUNTER — TELEPHONE (OUTPATIENT)
Dept: INTERNAL MEDICINE | Facility: CLINIC | Age: 63
End: 2018-07-13

## 2018-07-17 ENCOUNTER — PATIENT MESSAGE (OUTPATIENT)
Dept: GASTROENTEROLOGY | Facility: CLINIC | Age: 63
End: 2018-07-17

## 2018-07-17 ENCOUNTER — TELEPHONE (OUTPATIENT)
Dept: GASTROENTEROLOGY | Facility: CLINIC | Age: 63
End: 2018-07-17

## 2018-07-19 ENCOUNTER — TELEPHONE (OUTPATIENT)
Dept: GASTROENTEROLOGY | Facility: CLINIC | Age: 63
End: 2018-07-19

## 2018-07-19 ENCOUNTER — PATIENT MESSAGE (OUTPATIENT)
Dept: GASTROENTEROLOGY | Facility: CLINIC | Age: 63
End: 2018-07-19

## 2018-07-19 ENCOUNTER — OFFICE VISIT (OUTPATIENT)
Dept: INTERNAL MEDICINE | Facility: CLINIC | Age: 63
End: 2018-07-19
Payer: MEDICARE

## 2018-07-19 VITALS
SYSTOLIC BLOOD PRESSURE: 119 MMHG | DIASTOLIC BLOOD PRESSURE: 64 MMHG | BODY MASS INDEX: 28.81 KG/M2 | HEART RATE: 88 BPM | RESPIRATION RATE: 16 BRPM | HEIGHT: 66 IN | WEIGHT: 179.25 LBS | TEMPERATURE: 98 F

## 2018-07-19 DIAGNOSIS — E11.69 HYPERLIPIDEMIA ASSOCIATED WITH TYPE 2 DIABETES MELLITUS: ICD-10-CM

## 2018-07-19 DIAGNOSIS — K20.90 ESOPHAGITIS: Primary | ICD-10-CM

## 2018-07-19 DIAGNOSIS — E78.5 HYPERLIPIDEMIA ASSOCIATED WITH TYPE 2 DIABETES MELLITUS: ICD-10-CM

## 2018-07-19 DIAGNOSIS — E11.9 TYPE 2 DIABETES MELLITUS WITHOUT COMPLICATION, WITHOUT LONG-TERM CURRENT USE OF INSULIN: ICD-10-CM

## 2018-07-19 DIAGNOSIS — K26.4 DUODENAL ULCER WITH HEMORRHAGE BUT WITHOUT OBSTRUCTION: Primary | ICD-10-CM

## 2018-07-19 DIAGNOSIS — D62 ACUTE POSTHEMORRHAGIC ANEMIA: ICD-10-CM

## 2018-07-19 DIAGNOSIS — K26.9 DUODENAL ULCER: ICD-10-CM

## 2018-07-19 DIAGNOSIS — I10 ESSENTIAL HYPERTENSION: ICD-10-CM

## 2018-07-19 DIAGNOSIS — N40.1 BPH WITH URINARY OBSTRUCTION: ICD-10-CM

## 2018-07-19 DIAGNOSIS — N13.8 BPH WITH URINARY OBSTRUCTION: ICD-10-CM

## 2018-07-19 DIAGNOSIS — N17.9 AKI (ACUTE KIDNEY INJURY): ICD-10-CM

## 2018-07-19 DIAGNOSIS — R74.8 ELEVATED LIVER ENZYMES: ICD-10-CM

## 2018-07-19 PROBLEM — E78.00 HYPERCHOLESTEREMIA: Status: RESOLVED | Noted: 2018-05-29 | Resolved: 2018-07-19

## 2018-07-19 PROBLEM — Z12.11 COLON CANCER SCREENING: Status: RESOLVED | Noted: 2018-06-19 | Resolved: 2018-07-19

## 2018-07-19 PROBLEM — R65.10 SIRS (SYSTEMIC INFLAMMATORY RESPONSE SYNDROME): Status: RESOLVED | Noted: 2018-06-30 | Resolved: 2018-07-19

## 2018-07-19 PROBLEM — E66.9 OBESITY (BMI 30-39.9): Status: RESOLVED | Noted: 2018-05-29 | Resolved: 2018-07-19

## 2018-07-19 PROCEDURE — 99215 OFFICE O/P EST HI 40 MIN: CPT | Mod: S$GLB,,, | Performed by: INTERNAL MEDICINE

## 2018-07-19 PROCEDURE — 3074F SYST BP LT 130 MM HG: CPT | Mod: CPTII,S$GLB,, | Performed by: INTERNAL MEDICINE

## 2018-07-19 PROCEDURE — 3008F BODY MASS INDEX DOCD: CPT | Mod: CPTII,S$GLB,, | Performed by: INTERNAL MEDICINE

## 2018-07-19 PROCEDURE — 3078F DIAST BP <80 MM HG: CPT | Mod: CPTII,S$GLB,, | Performed by: INTERNAL MEDICINE

## 2018-07-19 PROCEDURE — 99999 PR PBB SHADOW E&M-EST. PATIENT-LVL III: CPT | Mod: PBBFAC,,, | Performed by: INTERNAL MEDICINE

## 2018-07-19 PROCEDURE — 99499 UNLISTED E&M SERVICE: CPT | Mod: S$GLB,,, | Performed by: INTERNAL MEDICINE

## 2018-07-19 PROCEDURE — 3045F PR MOST RECENT HEMOGLOBIN A1C LEVEL 7.0-9.0%: CPT | Mod: CPTII,S$GLB,, | Performed by: INTERNAL MEDICINE

## 2018-07-19 RX ORDER — GLIMEPIRIDE 2 MG/1
2 TABLET ORAL
Qty: 90 TABLET | Refills: 3 | Status: SHIPPED | OUTPATIENT
Start: 2018-07-19 | End: 2019-06-21 | Stop reason: SDUPTHER

## 2018-07-19 RX ORDER — PANTOPRAZOLE SODIUM 40 MG/1
40 TABLET, DELAYED RELEASE ORAL DAILY
Qty: 30 TABLET | Refills: 11 | Status: SHIPPED | OUTPATIENT
Start: 2018-07-19 | End: 2018-08-13

## 2018-07-19 NOTE — TELEPHONE ENCOUNTER
----- Message from Rachel Barron MD sent at 7/19/2018  2:47 PM CDT -----  And afterwards? There has to be some coverage for people with significant ulcers who need it for more than 30 days.    Is there some way that I can do a peer to peer.    Dr. Barron  ----- Message -----  From: Yajaira Cruz MA  Sent: 7/19/2018   2:11 PM  To: Rachel Barron MD    Hi Dr. Barron,    I called and spoke with someone at [employer] [facility]. Only Pantoprazole, Omeprazole, and Lansoprazole 30 per 30 day supply will be approved.     Please advise, thanks.   ----- Message -----  From: Rachel Barron MD  Sent: 7/19/2018   1:39 PM  To: Yajaira Cruz MA    [facility]    Mr. Sanford is scheduled to see me in clinic on 8/13/18 (I have already seen him in the hospital).    He has [employer] (if I am not mistaken) and sent me a message that they wont coverage his protonix.    Can we please contact the insurance company and see what they cover in order to modify my script? He had a significant ulcer and therefore dont want him to go without it.    Dr. Barron

## 2018-07-19 NOTE — PROGRESS NOTES
Subjective:       Patient ID: Mario Sanford is a 63 y.o. male.    Chief Complaint: Follow-up    HPI   63 y.o. male with past medical history of hypertension, dyslipidemia, and recently diagnosed DM-2 presented with the acute onset of severe non-radiating RUQ pain beginning a few hours prior to admission. Pertinent associated symptoms include progressively worsening nausea and vomiting x 1 week. Patient had a routine colonoscopy on 6/19 and has not felt well since that time. He has had pain similar to that today, intermittently for the past week.    Patient was admitted to hospital for RUQ pain associated with SIRS, PREM, worsening liver enzymes and T.bili, and concern for acute pancreatitis. The patient received IVF and his PREM, believed to be due to poor intake or urinary retention, PREM resolved. Urine and blood cultures show NG and RUQ U/S was normal. Liver U/S showed a hyperechoic right lobe mass, consistent with hemangioma and an MRCP revealed significant narrowing of the common bile duct near the pancreatic head. Further work-up included Ceruloplasmin, Hepatitis viral serologies and autoimmune markers which are all wnl. Hepatology was consulted and discussed EUS/ERCP with [facility] who plans to do EUS as outpatient. Iron studies were done to evaluate the patient's normocytic anemia, which was most consistent with anemia of chronic inflammation. The patient's H&H continued to trend down and GI was consulted. An EGD (7/3) confirmed Grade D reflux esophagitis and a large duodenal ulcer with repeat EGD (7/6) showing the non-bleeding duodenal ulcer with a visible vessel which was cauterized. The patient was initially managed with a Pantoprazole gtt which was transitioned to PO Pantoprazole. He was also started on Folate and iron supplements. The patient developed urinary retention with associated hematuria and was requiring intermittent straight caths. Urology was consulted and Tamsulosin was initiated with a  recommendation for CIC and 2 week follow up with Urology as an outpatient.      Since discharge pt has been doing well and denies any acute complaints. He is back to his baseline.   Review of Systems   Constitutional: Negative for activity change, appetite change, chills, diaphoresis, fatigue, fever and unexpected weight change.   HENT: Negative for congestion, mouth sores, postnasal drip, rhinorrhea, sinus pressure, sneezing, sore throat, trouble swallowing and voice change.    Eyes: Negative for discharge, itching and visual disturbance.   Respiratory: Negative for cough, chest tightness, shortness of breath and wheezing.    Cardiovascular: Negative for chest pain, palpitations and leg swelling.   Gastrointestinal: Negative for abdominal pain, blood in stool, constipation, diarrhea, nausea and vomiting.   Endocrine: Negative for cold intolerance and heat intolerance.   Genitourinary: Negative for difficulty urinating, dysuria, flank pain, hematuria and urgency.   Musculoskeletal: Negative for arthralgias, back pain, myalgias and neck pain.   Skin: Negative for rash and wound.   Allergic/Immunologic: Negative for environmental allergies and food allergies.   Neurological: Negative for dizziness, tremors, seizures, syncope, weakness and headaches.   Hematological: Negative for adenopathy. Does not bruise/bleed easily.   Psychiatric/Behavioral: Negative for confusion, sleep disturbance and suicidal ideas. The patient is not nervous/anxious.        Objective:      Physical Exam   Constitutional: He is oriented to person, place, and time. He appears well-developed and well-nourished. No distress.   HENT:   Head: Normocephalic and atraumatic.   Right Ear: External ear normal.   Left Ear: External ear normal.   Nose: Nose normal.   Mouth/Throat: Oropharynx is clear and moist. No oropharyngeal exudate.   Eyes: Conjunctivae and EOM are normal. Pupils are equal, round, and reactive to light. Right eye exhibits no discharge.  "Left eye exhibits no discharge. No scleral icterus.   Neck: Normal range of motion. Neck supple. No JVD present. No thyromegaly present.   Cardiovascular: Normal rate, regular rhythm, normal heart sounds and intact distal pulses.    No murmur heard.  Pulmonary/Chest: Effort normal and breath sounds normal. No respiratory distress. He has no wheezes. He has no rales.   Abdominal: Soft. Bowel sounds are normal. He exhibits no distension. There is no tenderness. There is no guarding.   Musculoskeletal: He exhibits no edema.   Lymphadenopathy:     He has no cervical adenopathy.   Neurological: He is alert and oriented to person, place, and time. No cranial nerve deficit. Coordination normal.   Skin: Skin is warm and dry. No rash noted. He is not diaphoretic. No pallor.   Psychiatric: He has a normal mood and affect. Judgment normal.       Assessment:       1. Duodenal ulcer with hemorrhage but without obstruction    2. Elevated liver enzymes    3. BPH with urinary obstruction    4. Acute posthemorrhagic anemia    5. PREM (acute kidney injury)    6. Essential hypertension    7. Type 2 diabetes mellitus without complication, without long-term current use of insulin    8. Hyperlipidemia associated with type 2 diabetes mellitus        Plan:    1. Duodenal ulcer with hemorrhage- cauterized during hospitalization       Continue Protonix BID       F/u with GI   2. Elevated LAEs- resolved        Suspect biliary etiology. Plan for possible EUS with [facility] as outpatient per GI       MRCP (7/1): "Common bile duct narrows significantly just proximal to the pancreatic head. A 9 mm right hepatic lobe lesion, most consistent with a hemangioma".        Ceruloplasmin wnl; Hepatitis panel negative; autoimmune markers negative     3. BPH with hx of obstruction- stable on Flomax 0.8 mg daily   4. NC/NC Anemia- 2/2 # 1, will trend    5. PREM- resolved   6. HTN- continue Coreg 3.125 mg BID, was previously on ARB    7. T2DM- last HA1C(5/18)       " Stable on Amaryl 2 mg qam        Repeat A1C in 2 months    8. HLD- stable on Atorvastatin 40 mg daily       Over 1/2 of 40 minute visit spent reviewing pt's medical records, education/discussion of pt's medical conditions and medical management

## 2018-07-19 NOTE — TELEPHONE ENCOUNTER
----- Message from Rachel Barron MD sent at 7/19/2018  1:39 PM CDT -----  Davey Gates    Mr. Sanford is scheduled to see me in clinic on 8/13/18 (I have already seen him in the hospital).    He has [employer] (if I am not mistaken) and sent me a message that they wont coverage his protonix.    Can we please contact the insurance company and see what they cover in order to modify my script? He had a significant ulcer and therefore dont want him to go without it.    Dr. Barron

## 2018-07-19 NOTE — TELEPHONE ENCOUNTER
Spoke with rep. [facility]. Only Pantoprazole, Omeprazole, and Lansoprazole 30 per 30 day supply will be approved.     Message routed to provider.

## 2018-07-20 ENCOUNTER — OFFICE VISIT (OUTPATIENT)
Dept: UROLOGY | Facility: CLINIC | Age: 63
End: 2018-07-20
Payer: MEDICARE

## 2018-07-20 VITALS
HEIGHT: 66 IN | DIASTOLIC BLOOD PRESSURE: 86 MMHG | WEIGHT: 178 LBS | HEART RATE: 92 BPM | SYSTOLIC BLOOD PRESSURE: 123 MMHG | BODY MASS INDEX: 28.61 KG/M2

## 2018-07-20 DIAGNOSIS — E11.9 TYPE 2 DIABETES MELLITUS WITHOUT COMPLICATION, WITHOUT LONG-TERM CURRENT USE OF INSULIN: ICD-10-CM

## 2018-07-20 DIAGNOSIS — N13.8 BPH WITH URINARY OBSTRUCTION: Primary | ICD-10-CM

## 2018-07-20 DIAGNOSIS — N40.1 BPH WITH URINARY OBSTRUCTION: Primary | ICD-10-CM

## 2018-07-20 DIAGNOSIS — K26.4 DUODENAL ULCER WITH HEMORRHAGE BUT WITHOUT OBSTRUCTION: ICD-10-CM

## 2018-07-20 PROCEDURE — 99204 OFFICE O/P NEW MOD 45 MIN: CPT | Mod: S$GLB,,, | Performed by: UROLOGY

## 2018-07-20 PROCEDURE — 3074F SYST BP LT 130 MM HG: CPT | Mod: CPTII,S$GLB,, | Performed by: UROLOGY

## 2018-07-20 PROCEDURE — 3079F DIAST BP 80-89 MM HG: CPT | Mod: CPTII,S$GLB,, | Performed by: UROLOGY

## 2018-07-20 PROCEDURE — 99999 PR PBB SHADOW E&M-EST. PATIENT-LVL III: CPT | Mod: PBBFAC,,,

## 2018-07-20 PROCEDURE — 3045F PR MOST RECENT HEMOGLOBIN A1C LEVEL 7.0-9.0%: CPT | Mod: CPTII,S$GLB,, | Performed by: UROLOGY

## 2018-07-20 PROCEDURE — 3008F BODY MASS INDEX DOCD: CPT | Mod: CPTII,S$GLB,, | Performed by: UROLOGY

## 2018-07-20 NOTE — PROGRESS NOTES
Urology - [facility]  Resident Clinic  Staff - Neil Thornton MD    SUBJECTIVE:     Chief Complaint: BPH, history of urinary retention    History of Present Illness:  Mario Sanford is a 63 y.o. male who was recently diagnosed with duodenal ulcer, HTN, DM2, HLD. He was recently hospitalized for the ulcer and suffered from urinary retention. He passed a voiding trial and was started on flomax. Denies any LUTS at this time, other than nocturia 1-2x at night which is not bothersome    Negative for gross hematuria, recurrent UTIs.     Review of patient's allergies indicates:  No Known Allergies    Past Medical History:   Diagnosis Date    Diabetes mellitus     Hyperlipidemia     Hypertension     Obesity (BMI 30-39.9)      Past Surgical History:   Procedure Laterality Date    COLONOSCOPY N/A 6/19/2018    Procedure: COLONOSCOPY;  Surgeon: Moris Whalen MD;  Location: [facility] (4TH FLR);  Service: Endoscopy;  Laterality: N/A;  Miralax prep, per Dr. Whalen    ESOPHAGOGASTRODUODENOSCOPY N/A 7/3/2018    Procedure: EGD (ESOPHAGOGASTRODUODENOSCOPY);  Surgeon: Newton Iglesias MD;  Location: [facility] (2ND FLR);  Service: Endoscopy;  Laterality: N/A;    ESOPHAGOGASTRODUODENOSCOPY N/A 7/6/2018    Procedure: EGD (ESOPHAGOGASTRODUODENOSCOPY);  Surgeon: Newton Iglesias MD;  Location: [facility] (2ND FLR);  Service: Endoscopy;  Laterality: N/A;    TONSILLECTOMY       Family History   Problem Relation Age of Onset    Heart disease Mother     Hypertension Mother     Hyperlipidemia Mother     Diabetes Father     Hypertension Father     Hyperlipidemia Sister     Cancer Neg Hx     Stroke Neg Hx      Social History   Substance Use Topics    Smoking status: Never Smoker    Smokeless tobacco: Never Used    Alcohol use No        Review of Systems   Constitutional: Negative for activity change.   HENT: Negative for congestion.    Eyes: Negative for discharge.   Respiratory: Negative for apnea.    Cardiovascular: Negative for  "chest pain.   Gastrointestinal: Negative for abdominal distention.   Endocrine: Negative for polyuria.   Genitourinary: Negative for difficulty urinating.   Musculoskeletal: Negative for arthralgias.   Skin: Negative for color change.   Allergic/Immunologic: Negative for immunocompromised state.   Neurological: Negative for dizziness.   Hematological: Does not bruise/bleed easily.   Psychiatric/Behavioral: Negative for agitation.       OBJECTIVE:     Anticoagulation:  No    Estimated body mass index is 28.73 kg/m² as calculated from the following:    Height as of this encounter: 5' 6" (1.676 m).    Weight as of this encounter: 80.7 kg (178 lb).    Vital Signs (Most Recent)  Pulse: 92 (07/20/18 1305)  BP: 123/86 (07/20/18 1305)    Physical Exam   Constitutional: He is well-developed, well-nourished, and in no distress. No distress.   HENT:   Head: Normocephalic.   Eyes: Pupils are equal, round, and reactive to light.   Neck: Normal range of motion.   Cardiovascular: Intact distal pulses.    Pulmonary/Chest: Effort normal.   Abdominal: Soft.   Genitourinary: Penis normal. No discharge found.   Genitourinary Comments: FERNANDO- 3x3 cm prostate, no nodules   Musculoskeletal: Normal range of motion. He exhibits no edema.   Neurological: He is alert. No cranial nerve deficit.   Skin: He is not diaphoretic. No erythema.   Psychiatric: Affect normal.       Lab Results   Component Value Date    PSA 1.6 05/25/2018       BMP  Lab Results   Component Value Date     07/11/2018    K 3.7 07/11/2018     07/11/2018    CO2 21 (L) 07/11/2018    BUN 16 07/11/2018    CREATININE 1.2 07/11/2018    CALCIUM 8.7 07/11/2018    ANIONGAP 11 07/11/2018    ESTGFRAFRICA >60.0 07/11/2018    EGFRNONAA >60.0 07/11/2018       Lab Results   Component Value Date    WBC 10.71 07/11/2018    HGB 9.2 (L) 07/11/2018    HCT 28.5 (L) 07/11/2018    MCV 92 07/11/2018     (H) 07/11/2018 5/2018- PSA 1.6    Imaging: none " pertinent    ASSESSMENT/PLAN:         Problem List Items Addressed This Visit        Renal/    BPH with urinary obstruction - Primary       Endocrine    Type 2 diabetes mellitus without complication, without long-term current use of insulin       GI    Duodenal ulcer with hemorrhage but without obstruction        Plan- Return to clinic as needed. Continue flomax 0.4 mg QHS. Continue annual PSA testing and discussion with primary care physician. No intervention warranted at this time. If he requires catheter for urinary retention, would be a candidate for endoscopic management.     I spent over 45 minutes with the patient. Over 50% of the visit was spent in counseling.      Kirsten Dailey MD     I personally saw and examined the patient and agree with Dr. Dailey's assessment and plan.

## 2018-07-23 ENCOUNTER — TELEPHONE (OUTPATIENT)
Dept: GASTROENTEROLOGY | Facility: CLINIC | Age: 63
End: 2018-07-23

## 2018-07-31 ENCOUNTER — TELEPHONE (OUTPATIENT)
Dept: GASTROENTEROLOGY | Facility: CLINIC | Age: 63
End: 2018-07-31

## 2018-07-31 ENCOUNTER — TELEPHONE (OUTPATIENT)
Dept: ENDOSCOPY | Facility: HOSPITAL | Age: 63
End: 2018-07-31

## 2018-07-31 NOTE — TELEPHONE ENCOUNTER
Spoke with patient's wife. EGD/ERCP scheduled for 8/10 at 9a. Reviewed prep instructions. Ms Sanford verbalized understanding.

## 2018-07-31 NOTE — TELEPHONE ENCOUNTER
----- Message from Carolina Riley sent at 7/31/2018  9:45 AM CDT -----  Contact: Self- [phone]  Kalyan- pt is returning a missed call from Mirlande in regards to scheduling his EGD/EUS- please contact pt at [phone]

## 2018-08-10 ENCOUNTER — SURGERY (OUTPATIENT)
Age: 63
End: 2018-08-10

## 2018-08-10 ENCOUNTER — TELEPHONE (OUTPATIENT)
Dept: ADMINISTRATIVE | Facility: CLINIC | Age: 63
End: 2018-08-10

## 2018-08-10 ENCOUNTER — ANESTHESIA EVENT (OUTPATIENT)
Dept: ENDOSCOPY | Facility: HOSPITAL | Age: 63
End: 2018-08-10
Payer: MEDICARE

## 2018-08-10 ENCOUNTER — HOSPITAL ENCOUNTER (OUTPATIENT)
Facility: HOSPITAL | Age: 63
Discharge: HOME | End: 2018-08-10
Attending: INTERNAL MEDICINE | Admitting: INTERNAL MEDICINE
Payer: MEDICARE

## 2018-08-10 ENCOUNTER — ANESTHESIA (OUTPATIENT)
Dept: ENDOSCOPY | Facility: HOSPITAL | Age: 63
End: 2018-08-10
Payer: MEDICARE

## 2018-08-10 VITALS
SYSTOLIC BLOOD PRESSURE: 113 MMHG | RESPIRATION RATE: 16 BRPM | TEMPERATURE: 98 F | HEART RATE: 90 BPM | HEIGHT: 66 IN | DIASTOLIC BLOOD PRESSURE: 74 MMHG | BODY MASS INDEX: 28.93 KG/M2 | WEIGHT: 180 LBS | OXYGEN SATURATION: 99 %

## 2018-08-10 DIAGNOSIS — R93.3 ABNORMAL FINDING ON GI TRACT IMAGING: ICD-10-CM

## 2018-08-10 DIAGNOSIS — K20.80 ESOPHAGITIS, LOS ANGELES GRADE D: Primary | ICD-10-CM

## 2018-08-10 LAB — POCT GLUCOSE: 127 MG/DL (ref 70–110)

## 2018-08-10 PROCEDURE — 43259 EGD US EXAM DUODENUM/JEJUNUM: CPT | Performed by: INTERNAL MEDICINE

## 2018-08-10 PROCEDURE — D9220A PRA ANESTHESIA: Mod: ANES,,, | Performed by: ANESTHESIOLOGY

## 2018-08-10 PROCEDURE — 25000003 PHARM REV CODE 250: Performed by: NURSE ANESTHETIST, CERTIFIED REGISTERED

## 2018-08-10 PROCEDURE — 63600175 PHARM REV CODE 636 W HCPCS: Performed by: NURSE ANESTHETIST, CERTIFIED REGISTERED

## 2018-08-10 PROCEDURE — 43259 EGD US EXAM DUODENUM/JEJUNUM: CPT | Mod: ,,, | Performed by: INTERNAL MEDICINE

## 2018-08-10 PROCEDURE — D9220A PRA ANESTHESIA: Mod: CRNA,,, | Performed by: NURSE ANESTHETIST, CERTIFIED REGISTERED

## 2018-08-10 PROCEDURE — 37000008 HC ANESTHESIA 1ST 15 MINUTES: Performed by: INTERNAL MEDICINE

## 2018-08-10 PROCEDURE — 37000009 HC ANESTHESIA EA ADD 15 MINS: Performed by: INTERNAL MEDICINE

## 2018-08-10 RX ORDER — PROPOFOL 10 MG/ML
VIAL (ML) INTRAVENOUS
Status: DISCONTINUED | OUTPATIENT
Start: 2018-08-10 | End: 2018-08-10

## 2018-08-10 RX ORDER — SODIUM CHLORIDE 0.9 % (FLUSH) 0.9 %
3 SYRINGE (ML) INJECTION
Status: DISCONTINUED | OUTPATIENT
Start: 2018-08-10 | End: 2018-08-10 | Stop reason: HOSPADM

## 2018-08-10 RX ORDER — PROPOFOL 10 MG/ML
VIAL (ML) INTRAVENOUS CONTINUOUS PRN
Status: DISCONTINUED | OUTPATIENT
Start: 2018-08-10 | End: 2018-08-10

## 2018-08-10 RX ORDER — SODIUM CHLORIDE 9 MG/ML
INJECTION, SOLUTION INTRAVENOUS CONTINUOUS PRN
Status: DISCONTINUED | OUTPATIENT
Start: 2018-08-10 | End: 2018-08-10

## 2018-08-10 RX ORDER — LIDOCAINE HCL/PF 100 MG/5ML
SYRINGE (ML) INTRAVENOUS
Status: DISCONTINUED | OUTPATIENT
Start: 2018-08-10 | End: 2018-08-10

## 2018-08-10 RX ORDER — SODIUM CHLORIDE 0.9 % (FLUSH) 0.9 %
3 SYRINGE (ML) INJECTION
Status: DISCONTINUED | OUTPATIENT
Start: 2018-08-10 | End: 2018-08-10

## 2018-08-10 RX ADMIN — PROPOFOL 150 MCG/KG/MIN: 10 INJECTION, EMULSION INTRAVENOUS at 10:08

## 2018-08-10 RX ADMIN — PROPOFOL 100 MG: 10 INJECTION, EMULSION INTRAVENOUS at 10:08

## 2018-08-10 RX ADMIN — SODIUM CHLORIDE: 9 INJECTION, SOLUTION INTRAVENOUS at 09:08

## 2018-08-10 RX ADMIN — LIDOCAINE HYDROCHLORIDE 50 MG: 20 INJECTION, SOLUTION INTRAVENOUS at 10:08

## 2018-08-10 NOTE — TRANSFER OF CARE
"Anesthesia Transfer of Care Note    Patient: Mario Sanford    Procedure(s) Performed: Procedure(s) (LRB):  EGD (ESOPHAGOGASTRODUODENOSCOPY) (N/A)  ULTRASOUND, ENDOSCOPIC, UPPER GI TRACT (N/A)    Patient location: [facility]    Anesthesia Type: general    Transport from OR: Transported from OR on room air with adequate spontaneous ventilation    Post assessment: no apparent anesthetic complications    Post vital signs: stable    Level of consciousness: awake    Nausea/Vomiting: no nausea/vomiting    Complications: none    Transfer of care protocol was followed      Last vitals:   Visit Vitals  /74 (BP Location: Right arm, Patient Position: Lying)   Pulse 90   Temp 36.5 °C (97.7 °F) (Temporal)   Resp 16   Ht 5' 6" (1.676 m)   Wt 81.6 kg (180 lb)   SpO2 99%   BMI 29.05 kg/m²     "

## 2018-08-10 NOTE — ANESTHESIA PREPROCEDURE EVALUATION
08/10/2018  Mario Sanford is a 63 y.o., male.    Pre-op Assessment    I have reviewed the Patient Summary Reports.     I have reviewed the Nursing Notes.   I have reviewed the Medications.     Review of Systems  Anesthesia Hx:  No problems with previous Anesthesia    Cardiovascular:   Exercise tolerance: good Hypertension  Denies Angina. hyperlipidemia    Pulmonary:  Pulmonary Normal    Hepatic/GI:   PUD, GERD, well controlled    Endocrine:   Diabetes        Physical Exam  General:  Obesity    Airway/Jaw/Neck:  Airway Findings: Mouth Opening: Small, but > 3cm Tongue: Normal  General Airway Assessment: Adult  Mallampati: II  TM Distance: Normal, at least 6 cm  Jaw/Neck Findings:     Neck ROM: Normal ROM      Dental:  Dental Findings: In tact   Chest/Lungs:  Chest/Lungs Findings: Clear to auscultation, Normal Respiratory Rate     Heart/Vascular:  Heart Findings: Rate: Normal  Rhythm: Regular Rhythm  Sounds: Normal        Mental Status:  Mental Status Findings:  Cooperative, Alert and Oriented         Anesthesia Plan  Type of Anesthesia, risks & benefits discussed:  Anesthesia Type:  general  Patient's Preference:   Intra-op Monitoring Plan: standard ASA monitors  Intra-op Monitoring Plan Comments:   Post Op Pain Control Plan: multimodal analgesia, IV/PO Opioids PRN and per primary service following discharge from PACU  Post Op Pain Control Plan Comments:   Induction:   IV  Beta Blocker:  Patient is on a Beta-Blocker and has received one dose within the past 24 hours (No further documentation required).       Informed Consent: Patient understands risks and agrees with Anesthesia plan.  Questions answered. Anesthesia consent signed with patient.  ASA Score: 2     Day of Surgery Review of History & Physical:    H&P update referred to the provider.         Ready For Surgery From Anesthesia Perspective.

## 2018-08-10 NOTE — [employer] PATIENT INSTRUCTIONS
Discharge Summary/Instructions after an Endoscopic Procedure  Patient Name: Mario Sanford  Patient MRN: [id number]  Patient YOB: 1955  Friday, August 10, 2018  Watson Glass MD  RESTRICTIONS:  During your procedure today, you received medications for sedation.  These   medications may affect your judgment, balance and coordination.  Therefore,   for 24 hours, you have the following restrictions:   - DO NOT drive a car, operate machinery, make legal/financial decisions,   sign important papers or drink alcohol.    ACTIVITY:  Today: no heavy lifting, straining or running due to procedural   sedation/anesthesia.  The following day: return to full activity including work.  DIET:  Eat and drink normally unless instructed otherwise.     TREATMENT FOR COMMON SIDE EFFECTS:  - Mild abdominal pain, nausea, belching, bloating or excessive gas:  rest,   eat lightly and use a heating pad.  - Sore Throat: treat with throat lozenges and/or gargle with warm salt   water.  - Because air was used during the procedure, expelling large amounts of air   from your rectum or belching is normal.  - If a bowel prep was taken, you may not have a bowel movement for 1-3 days.    This is normal.  SYMPTOMS TO WATCH FOR AND REPORT TO YOUR PHYSICIAN:  1. Abdominal pain or bloating, other than gas cramps.  2. Chest pain.  3. Back pain.  4. Signs of infection such as: chills or fever occurring within 24 hours   after the procedure.  5. Rectal bleeding, which would show as bright red, maroon, or black stools.   (A tablespoon of blood from the rectum is not serious, especially if   hemorrhoids are present.)  6. Vomiting.  7. Weakness or dizziness.  GO DIRECTLY TO THE NEAREST EMERGENCY ROOM IF YOU HAVE ANY OF THE FOLLOWING:      Difficulty breathing              Chills and/or fever over 101 F   Persistent vomiting and/or vomiting blood   Severe abdominal pain   Severe chest pain   Black, tarry stools   Bleeding- more than one  tablespoon   Any other symptom or condition that you feel may need urgent attention  Your doctor recommends these additional instructions:  If any biopsies were taken, your doctors clinic will contact you in 1 to 2   weeks with any results.  - Discharge patient to home (ambulatory).   - Perform an ERCP at appointment to be scheduled.   - The findings and recommendations were discussed with the patient.   - The findings and recommendations were discussed with the patient's   family.  For questions, problems or results please call your physician - Watson Glass MD at Work:  [phone].  [facility], EMERGENCY ROOM PHONE NUMBER: [phone]  IF A COMPLICATION OR EMERGENCY SITUATION ARISES AND YOU ARE UNABLE TO REACH   YOUR PHYSICIAN - GO DIRECTLY TO THE EMERGENCY ROOM.  Watson Glass MD  8/10/2018 10:39:17 AM  This report has been verified and signed electronically.  [employer]

## 2018-08-10 NOTE — DISCHARGE INSTRUCTIONS
Endoscopic Ultrasound (EUS)    An endoscopic ultrasound (EUS) is a test to look at the inside of your gastrointestinal (GI) tract. It's commonly used to look for cancers or growths in the esophagus, stomach, pancreas, liver, and rectum. It can help to stage cancer (see how advanced a cancer is). EUS may also be used to help diagnose certain diseases or to drain cysts or abscesses.  What is EUS?  EUS shows both ultrasound images and live video of the GI tract. During the test, a flexible tube called an endoscope (scope) is used. At the end of the scope is a tiny video camera and light. The video camera sends live images to a monitor. The scope also contains a very small ultrasound device. This uses sound waves to create images and send them to a monitor.  A needle is passed through the scope. The needle can be used take a small sample of tissue for testing. This is called a biopsy. The needle can be used to take a sample of fluid. This is called fine-needle aspiration (FNA).  Risks and possible complications of EUS  Risks and possible complications include the following:  · Bleeding  · Infection  · A perforation (hole) in the digestive tract   · Risks of sedation or anesthesia   Before the test  Be prepared prior to the test:  · Tell your healthcare provider what medicine you take. This includes vitamins, herbs, and over-the-counter medicine. It also includes any blood thinners, such as warfarin, clopidogrel, ibuprofen, or daily aspirin. Ask your healthcare provider if you need to stop taking some or all of them before the test.  · You may be prescribed antibiotics to take before or after the test. This depends on the area being studied and what is done during the test. These medicines help prevent infection.  · Carefully follow the instructions for preparing for the test to make sure results are accurate. Instructions may include:  ¨ If youre having an EUS of the upper GI tract (esophagus, stomach, duodenum,  pancreas, liver):  § Do not eat or drink for 6 hours before the test.  ¨ If youre having an EUS of the lower GI tract (rectum):  § Before the test, do bowel prep as instructed to clean your rectum of stool. This may involve a clear liquid diet and using a laxative (liquid or pills) the night before the test. Or it may mean doing one or more enemas the morning of the test.  § Do not eat or drink for 6 hours before the test.  · Be sure to arrive on time at the facility. Bring your identification and health insurance card. Leave valuables at home. If you have them, bring X-rays or other test results with you.  Let the healthcare provider know  For your safety, tell the healthcare provider if you:  · Take insulin. Your dose may need to be changed on the day of your test.  · Are allergic to latex.  · Have any other allergies.  · Are taking blood thinners.   During the test  An endoscopic ultrasound usually takes place in a hospital. The procedure itself may take 1 to 2 hours. You will likely go home soon afterward. During the test:  · You lie on your left side on an exam table.  · An intravenous (IV) line will be put into a vein in your arm or hand. This line supplies fluids and medicines. To keep you comfortable during the test, you will be given a sedative medicine. This medicine prevents discomfort and will make you sleepy.  · If you are having an EUS of the upper GI tract, local anesthetic may be sprayed in your throat. This will help you be more comfortable as the healthcare provider inserts the scope. The healthcare provider then gently puts the flexible scope into your mouth or nose and down your throat.  · If youre having an EUS of the lower GI tract, the healthcare provider gently puts the flexible scope into your anus.  · During the test, the scope sends live video and ultrasound images from inside your body to nearby monitors. These are used to examine your GI tract. Specialized procedures, such as drainage,  are done as needed.  · The healthcare provider may discuss the results with you soon after the test. Biopsy results take several  days.  · In most cases, you can go home within a few hours of the test. When you leave the facility, have an adult family member or friend drive you, even if you don't feel that sleepy.  After the test  Here is what to expect after the test:  · You may feel tired from the sedative. This should wear off by the end of the day.  · If you had an upper digestive endoscopy, your throat may feel sore for a day or two. Over-the-counter sore throat lozenges and spray should help.  · You can eat and drink normally as soon as the test is done.  When to call the healthcare provider  Call your healthcare provider if you notice any of the following:  · Fever of 100.4°F (38.0°C) or higher, or as advised by your healthcare provider  · Shortness of breath  · Vomiting blood, blood in stool, or black stools  · Coughing or hoarse voice that wont go away   Date Last Reviewed: 7/1/2016 © 2000-2017 [employer]. [address] [place] [address]. All rights reserved. This information is not intended as a substitute for professional medical care. Always follow your healthcare professional's instructions.          Recovery After Procedural Sedation (Adult)  You have been given medicine by vein to make you sleep during your surgery. This may have included both a pain medicine and sleeping medicine. Most of the effects have worn off. But you may still have some drowsiness for the next 6 to 8 hours.  Home care  Follow these guidelines when you get home:  · For the next 8 hours, you should be watched by a responsible adult. This person should make sure your condition is not getting worse.  · Don't drink any alcohol for the next 24 hours.  · Don't drive, operate dangerous machinery, or make important business or personal decisions during the next 24 hours.  Note: Your healthcare provider may  tell you not to take any medicine by mouth for pain or sleep in the next 4 hours. These medicines may react with the medicines you were given in the hospital. This could cause a much stronger response than usual.  Follow-up care  Follow up with your healthcare provider if you are not alert and back to your usual level of activity within 12 hours.  When to seek medical advice  Call your healthcare provider right away if any of these occur:  · Drowsiness gets worse  · Weakness or dizziness gets worse  · Repeated vomiting  · You can't be awakened   Date Last Reviewed: 10/18/2016  © 7783-8148 [employer]. [address] [place] [address], [place] [address]. All rights reserved. This information is not intended as a substitute for professional medical care. Always follow your healthcare professional's instructions.

## 2018-08-10 NOTE — H&P
History & Physical - Short Stay  Gastroenterology      SUBJECTIVE:     Procedure: EGD and EUS    Chief Complaint/Indication for Procedure: Abnormal imaging    History of Present Illness:  Patient is a 63 y.o. male presents with a recent abnormal MRCP with possible biliary narrowing vs. extrinsic compression due to a duodenal diverticula. Of note LFT's currently normal. Recent EGD showed esophagitis grade D and duodenal ulcer. Need EGD to check healing.    PTA Medications   Medication Sig    atorvastatin (LIPITOR) 40 MG tablet Take 1 tablet (40 mg total) by mouth nightly.    folic acid (FOLVITE) 1 MG tablet Take 1 tablet (1 mg total) by mouth once daily.    glimepiride (AMARYL) 2 MG tablet Take 1 tablet (2 mg total) by mouth before breakfast.    ibuprofen (ADVIL,MOTRIN) 200 MG tablet Take 200 mg by mouth every 6 (six) hours as needed for Pain.    iron polysaccharides (NIFEREX) 150 mg iron Cap Take 1 capsule (150 mg total) by mouth once daily.    pantoprazole (PROTONIX) 40 MG tablet Take 1 tablet (40 mg total) by mouth once daily.    tamsulosin (FLOMAX) 0.4 mg Cp24 Take 2 capsules (0.8 mg total) by mouth once daily.    blood sugar diagnostic Strp 1 strip by Misc.(Non-Drug; Combo Route) route once daily. Test strips for glucometer insurance covers to check blood sugar twice daily.    blood-glucose meter Misc Glucometer that insurance covers to check blood sugar twice daily    lancets Misc 1 Device by Misc.(Non-Drug; Combo Route) route once daily. Lancets for glucometer insurance covers to check blood sugar twice daily.       Review of patient's allergies indicates:  No Known Allergies     Past Medical History:   Diagnosis Date    Diabetes mellitus     Duodenal diverticulum     Duodenal ulcer     Esophagitis     Hyperlipidemia     Hypertension     Melena     Obesity (BMI 30-39.9)      Past Surgical History:   Procedure Laterality Date    COLONOSCOPY N/A 6/19/2018    Procedure: COLONOSCOPY;  Surgeon:  Moris Whalen MD;  Location: [facility] ENDO (4TH FLR);  Service: Endoscopy;  Laterality: N/A;  Miralax prep, per Dr. Whalen    ESOPHAGOGASTRODUODENOSCOPY N/A 7/3/2018    Procedure: EGD (ESOPHAGOGASTRODUODENOSCOPY);  Surgeon: Newton Iglesias MD;  Location: [facility] ENDO (2ND FLR);  Service: Endoscopy;  Laterality: N/A;    ESOPHAGOGASTRODUODENOSCOPY N/A 7/6/2018    Procedure: EGD (ESOPHAGOGASTRODUODENOSCOPY);  Surgeon: Newton Iglesias MD;  Location: [facility] ENDO (2ND FLR);  Service: Endoscopy;  Laterality: N/A;    TONSILLECTOMY       Family History   Problem Relation Age of Onset    Heart disease Mother     Hypertension Mother     Hyperlipidemia Mother     Diabetes Father     Hypertension Father     Hyperlipidemia Sister     Cancer Neg Hx     Stroke Neg Hx      Social History   Substance Use Topics    Smoking status: Never Smoker    Smokeless tobacco: Never Used    Alcohol use No       Review of Systems:  Constitutional: no fever or chills  Respiratory: no cough or shortness of breath  Cardiovascular: no chest pain or palpitations  Gastrointestinal: no nausea or vomiting, no abdominal pain or change in bowel habits    OBJECTIVE:     Vital Signs (Most Recent)       Physical Exam:  General: well developed, well nourished  Lungs:  clear to auscultation bilaterally and normal respiratory effort  Heart: regular rate, S1, S2 normal  Abdomen: soft, non-tender non-distented; bowel sounds normal; no masses,  no organomegaly    Laboratory  CBC: No results for input(s): WBC, RBC, HGB, HCT, PLT, MCV, MCH, MCHC in the last 168 hours.  CMP: No results for input(s): GLU, CALCIUM, ALBUMIN, PROT, NA, K, CO2, CL, BUN, CREATININE, ALKPHOS, ALT, AST, BILITOT in the last 168 hours.  Coagulation: No results for input(s): LABPROT, INR, APTT in the last 168 hours.      Diagnostic Results:      ASSESSMENT/PLAN:     Esophagitis/duodenal ulcer  Abnormal MRCP    Plan: EGD and EUS    Anesthesia Plan: MAC    ASA Grade: ASA 2 - Patient with mild systemic  disease with no functional limitations     The impression and plan was discussed in detail with the patient and family. All questions have been answered and the patient voices understanding of our plan at this point. The risk of the procedure was discussed in detail which includes but not limited to bleeding, infection, perforation in some cases requiring surgery with its spectrum of complications.

## 2018-08-10 NOTE — DISCHARGE SUMMARY
Discharge Summary/Instructions after an Endoscopic Procedure    Patient Name: Mario Sanford  Patient MRN: [id number]  Patient YOB: 1955    Friday, August 10, 2018  Watson Glass MD    RESTRICTIONS:  During your procedure today, you received medications for sedation.  These medications may affect your judgment, balance and coordination.  Therefore, for 24 hours, you have the following restrictions:     - DO NOT drive a car, operate machinery, make legal/financial decisions, sign important papers or drink alcohol.      ACTIVITY:  Today: no heavy lifting, straining or running due to procedural sedation/anesthesia.  The following day: return to full activity including work.    DIET:  Eat and drink normally unless instructed otherwise.     TREATMENT FOR COMMON SIDE EFFECTS:  - Mild abdominal pain, nausea, belching, bloating or excessive gas:  rest, eat lightly and use a heating pad.  - Sore Throat: treat with throat lozenges and/or gargle with warm salt water.  - Because air was used during the procedure, expelling large amounts of air from your rectum or belching is normal.  - If a bowel prep was taken, you may not have a bowel movement for 1-3 days.  This is normal.      SYMPTOMS TO WATCH FOR AND REPORT TO YOUR PHYSICIAN:  1. Abdominal pain or bloating, other than gas cramps.  2. Chest pain.  3. Back pain.  4. Signs of infection such as: chills or fever occurring within 24 hours after the procedure.  5. Rectal bleeding, which would show as bright red, maroon, or black stools. (A tablespoon of blood from the rectum is not serious, especially if hemorrhoids are present.)  6. Vomiting.  7. Weakness or dizziness.      GO DIRECTLY TO THE NEAREST EMERGENCY ROOM IF YOU HAVE ANY OF THE FOLLOWING:     Difficulty breathing              Chills and/or fever over 101 F   Persistent vomiting and/or vomiting blood   Severe abdominal pain   Severe chest pain   Black, tarry stools   Bleeding- more than one tablespoon   Any  other symptom or condition that you feel may need urgent attention    Your doctor recommends these additional instructions:  If any biopsies were taken, your doctors clinic will contact you in 1 to 2 weeks with any results.    - Discharge patient to home (ambulatory).   - Perform an ERCP at appointment to be scheduled.   - The findings and recommendations were discussed with the patient.   - The findings and recommendations were discussed with the patient's family.    For questions, problems or results please call your physician - Watson Glass MD at Work:  [phone].    [facility], EMERGENCY ROOM PHONE NUMBER: [phone]    IF A COMPLICATION OR EMERGENCY SITUATION ARISES AND YOU ARE UNABLE TO REACH YOUR PHYSICIAN - GO DIRECTLY TO THE EMERGENCY ROOM.

## 2018-08-10 NOTE — PROGRESS NOTES
Home Health SOC with [facility]-Dr. Bereket Rivera. Pt received SN, PT, OT and MSW services.

## 2018-08-10 NOTE — ANESTHESIA POSTPROCEDURE EVALUATION
"Anesthesia Post Evaluation    Patient: Mario Sanford    Procedure(s) Performed: Procedure(s) (LRB):  EGD (ESOPHAGOGASTRODUODENOSCOPY) (N/A)  ULTRASOUND, ENDOSCOPIC, UPPER GI TRACT (N/A)    Final Anesthesia Type: general  Patient location during evaluation: [facility]  Patient participation: Yes- Able to Participate  Level of consciousness: awake and alert  Post-procedure vital signs: reviewed and stable  Pain management: adequate  Airway patency: patent  PONV status at discharge: No PONV  Anesthetic complications: no      Cardiovascular status: blood pressure returned to baseline  Respiratory status: unassisted  Hydration status: euvolemic  Follow-up not needed.        Visit Vitals  /74 (BP Location: Right arm, Patient Position: Lying)   Pulse 90   Temp 36.5 °C (97.7 °F) (Temporal)   Resp 16   Ht 5' 6" (1.676 m)   Wt 81.6 kg (180 lb)   SpO2 99%   BMI 29.05 kg/m²       Pain/Renate Score: Pain Assessment Performed: Yes (8/10/2018 10:40 AM)  Presence of Pain: denies (8/10/2018 10:40 AM)  Pain Rating Prior to Med Admin: 0 (8/10/2018  8:31 AM)  Pain Rating Post Med Admin: 0 (8/10/2018  8:31 AM)  Renate Score: 10 (8/10/2018 10:40 AM)      "

## 2018-08-13 ENCOUNTER — TELEPHONE (OUTPATIENT)
Dept: GASTROENTEROLOGY | Facility: CLINIC | Age: 63
End: 2018-08-13

## 2018-08-13 ENCOUNTER — OFFICE VISIT (OUTPATIENT)
Dept: GASTROENTEROLOGY | Facility: CLINIC | Age: 63
End: 2018-08-13
Payer: MEDICARE

## 2018-08-13 VITALS
DIASTOLIC BLOOD PRESSURE: 80 MMHG | SYSTOLIC BLOOD PRESSURE: 131 MMHG | HEART RATE: 92 BPM | HEIGHT: 66 IN | WEIGHT: 184.75 LBS | BODY MASS INDEX: 29.69 KG/M2

## 2018-08-13 DIAGNOSIS — K80.50 COMMON BILE DUCT STONE: Primary | ICD-10-CM

## 2018-08-13 DIAGNOSIS — D64.9 NORMOCYTIC ANEMIA: ICD-10-CM

## 2018-08-13 DIAGNOSIS — K20.90 ESOPHAGITIS: Primary | ICD-10-CM

## 2018-08-13 DIAGNOSIS — K83.8 COMMON BILE DUCT DILATATION: ICD-10-CM

## 2018-08-13 DIAGNOSIS — K26.9 DUODENAL ULCER: ICD-10-CM

## 2018-08-13 DIAGNOSIS — K83.3 CHOLEDOCHODUODENAL FISTULA: ICD-10-CM

## 2018-08-13 PROCEDURE — 99213 OFFICE O/P EST LOW 20 MIN: CPT | Mod: GC,S$GLB,, | Performed by: INTERNAL MEDICINE

## 2018-08-13 PROCEDURE — 3079F DIAST BP 80-89 MM HG: CPT | Mod: CPTII,GC,S$GLB, | Performed by: INTERNAL MEDICINE

## 2018-08-13 PROCEDURE — 3008F BODY MASS INDEX DOCD: CPT | Mod: CPTII,GC,S$GLB, | Performed by: INTERNAL MEDICINE

## 2018-08-13 PROCEDURE — 3075F SYST BP GE 130 - 139MM HG: CPT | Mod: CPTII,GC,S$GLB, | Performed by: INTERNAL MEDICINE

## 2018-08-13 PROCEDURE — 99999 PR PBB SHADOW E&M-EST. PATIENT-LVL III: CPT | Mod: PBBFAC,GC,, | Performed by: STUDENT IN AN ORGANIZED HEALTH CARE EDUCATION/TRAINING PROGRAM

## 2018-08-13 RX ORDER — CARVEDILOL 3.12 MG/1
TABLET ORAL
Refills: 11 | COMMUNITY
Start: 2018-08-06 | End: 2019-07-05 | Stop reason: SDUPTHER

## 2018-08-13 NOTE — PROGRESS NOTES
Gastroenterology Clinic    Reason for visit: The primary encounter diagnosis was Esophagitis. Diagnoses of Duodenal ulcer, Common bile duct dilatation, Choledochoduodenal fistula, and Normocytic anemia were also pertinent to this visit.  Referring provider/PCP: Bereket Rivera DO    HPI:  63 year old male with a history of HTN, HLD, DM Type 2 who presents to clinic for a hospital follow up.    Patient known to him as I saw him on the inpatient setting and presents with his wife to clinic today for a follow up.    Patient initially presented to the hospital in June 2018 with abdominal pain and abnormal LFT's which prompted a Hepatology consult. Based on US, MRCP, and overall improving clinical picture [facility] recommended an outpatient EUS. However prior to discharge patient reported melena and he was scoped by GI revealing LA Grade D esophagitis/large duodenal ulcer (7/3/18)which required a repeat scope (7/6/18) due to H/H continuing to trend down. Since D/C patient has been on a PPI daily and has no longer been taking NSAIDs. He is also on daily iron and complete a 1 month course of folic acid. He has also had an EUS/EGD on 8/10/18 which should improving esophagitis/ulcer with a dilated CBD and a choledochoduodenal fistula. He denies any weight loss, hematemesis, melena, or hematochezia.    PEndoHx:  EGD and EUS 8/10/18  - Hiatal hernia.  - Submucosal nodule found in the esophagus.  - Normal stomach.  - Duodenal scar.  - Duodenal fistula.  - There was no evidence of significant pathology in the visualized portion of the liver.  - Pancreatic parenchymal abnormalities consisting of lobularity were noted in the entire pancreas.  - There was dilation in the common bile duct which measured up to 7.6 mm.  - Hyperechoic material consistent with sludge was visualized endosonographically in the common bile duct.  - A very small choledochoduodenal fistula was visualized.  - An intramural (subepithelial) lesion was found  in the gastroesophageal junction. It appeared to  originate from within the submucosa (Layer 3). Tissue has not been obtained. However, the   endosonographic appearance is highly suspicious for a duplication cyst.    EGD 7/6/18  - LA Grade D reflux esophagitis.  - Normal stomach.  - One non-bleeding duodenal ulcer with a visible vessel. Treated with bipolar cautery.    EGD 7/3/18  - LA Grade D reflux esophagitis. No specimens collected.  - Normal stomach, gastroesophageal junction, cardia and gastric fundus.  - No esophageal or gastric varices.  - Large 20mm ulcer with adherent clot in second part of duodenumm.    Colonoscopy 6/19/18  - One 5 mm polyp in the sigmoid colon, removed with  a hot snare. Resected and retrieved.  Follow up in 5 years    Review of Systems:  Constitutional: no fever, chills or change in weight   Eyes: no visual changes   ENT: no sore throat or dysphagia  Respiratory: no cough or shortness of breath   Cardiovascular: no chest pain or palpitations   Gastrointestinal: as per HPI  Hematologic/Lymphatic: no easy bruising or lymphadenopathy   Musculoskeletal: no arthralgias or myalgias   Neurological: no change in mental status  Behavioral/Psych: no change in mood      Past Medical History:   Diagnosis Date    Diabetes mellitus     Dilated cbd, acquired     Duodenal diverticulum     Duodenal fistula     choledochoduodenal    Duodenal ulcer     Esophagitis     Hiatal hernia     Hyperlipidemia     Hypertension     Melena     Nodule of esophagus     Obesity (BMI 30-39.9)        Past Surgical History:   Procedure Laterality Date    TONSILLECTOMY      UPPER ENDOSCOPIC ULTRASOUND W/ FNA  08/10/2018    without FNA       Family History   Problem Relation Age of Onset    Heart disease Mother     Hypertension Mother     Hyperlipidemia Mother     Diabetes Father     Hypertension Father     Hyperlipidemia Sister     Cancer Neg Hx     Stroke Neg Hx        Social History     Socioeconomic  History    Marital status:      Spouse name: Not on file    Number of children: 1    Years of education: Not on file    Highest education level: Not on file   Social Needs    Financial resource strain: Not on file    Food insecurity - worry: Not on file    Food insecurity - inability: Not on file    Transportation needs - medical: Not on file    Transportation needs - non-medical: Not on file   Occupational History    Occupation: Retired from [employer]    Tobacco Use    Smoking status: Never Smoker    Smokeless tobacco: Never Used   Substance and Sexual Activity    Alcohol use: No    Drug use: No    Sexual activity: Yes     Partners: Female   Other Topics Concern    Not on file   Social History Narrative    Not on file       Current Outpatient Medications on File Prior to Visit   Medication Sig Dispense Refill    atorvastatin (LIPITOR) 40 MG tablet Take 1 tablet (40 mg total) by mouth nightly. 90 tablet 3    carvedilol (COREG) 3.125 MG tablet TAKE 1 TABLET (3.125 MG TOTAL) BY MOUTH 2 (TWO) TIMES DAILY.  11    glimepiride (AMARYL) 2 MG tablet Take 1 tablet (2 mg total) by mouth before breakfast. 90 tablet 3    tamsulosin (FLOMAX) 0.4 mg Cp24 Take 2 capsules (0.8 mg total) by mouth once daily. 60 capsule 11    [DISCONTINUED] folic acid (FOLVITE) 1 MG tablet Take 1 tablet (1 mg total) by mouth once daily. 30 tablet 0    [DISCONTINUED] iron polysaccharides (NIFEREX) 150 mg iron Cap Take 1 capsule (150 mg total) by mouth once daily.  0    [DISCONTINUED] pantoprazole (PROTONIX) 40 MG tablet Take 1 tablet (40 mg total) by mouth once daily. 30 tablet 11    blood sugar diagnostic Strp 1 strip by Misc.(Non-Drug; Combo Route) route once daily. Test strips for glucometer insurance covers to check blood sugar twice daily. 200 strip 3    blood-glucose meter Misc Glucometer that insurance covers to check blood sugar twice daily 1 each 0    lancets Misc 1 Device by Misc.(Non-Drug; Combo Route) route  "once daily. Lancets for glucometer insurance covers to check blood sugar twice daily. 200 each 3    [DISCONTINUED] ibuprofen (ADVIL,MOTRIN) 200 MG tablet Take 200 mg by mouth every 6 (six) hours as needed for Pain.       No current facility-administered medications on file prior to visit.        Review of patient's allergies indicates:  No Known Allergies    Physical Exam:  /80   Pulse 92   Ht 5' 6" (1.676 m)   Wt 83.8 kg (184 lb 11.9 oz)   BMI 29.82 kg/m²   General appearance: alert, appears stated age and cooperative  Lungs: clear to auscultation bilaterally  Chest wall: no tenderness  Heart: regular rate and rhythm, S1, S2 normal, no murmur, click, rub or gallop  Abdomen: soft, non-tender; bowel sounds normal; no masses,  no organomegaly  Extremities: extremities normal, atraumatic, no cyanosis or edema  Pulses: 2+ and symmetric    Laboratory:  Lab Results   Component Value Date    WBC 10.71 07/11/2018    HGB 9.2 (L) 07/11/2018    HCT 28.5 (L) 07/11/2018    MCV 92 07/11/2018     (H) 07/11/2018     CMP  Sodium   Date Value Ref Range Status   07/11/2018 137 136 - 145 mmol/L Final     Potassium   Date Value Ref Range Status   07/11/2018 3.7 3.5 - 5.1 mmol/L Final     Chloride   Date Value Ref Range Status   07/11/2018 105 95 - 110 mmol/L Final     CO2   Date Value Ref Range Status   07/11/2018 21 (L) 23 - 29 mmol/L Final     Glucose   Date Value Ref Range Status   07/11/2018 162 (H) 70 - 110 mg/dL Final     BUN, Bld   Date Value Ref Range Status   07/11/2018 16 8 - 23 mg/dL Final     Creatinine   Date Value Ref Range Status   07/11/2018 1.2 0.5 - 1.4 mg/dL Final     Calcium   Date Value Ref Range Status   07/11/2018 8.7 8.7 - 10.5 mg/dL Final     Total Protein   Date Value Ref Range Status   07/11/2018 5.9 (L) 6.0 - 8.4 g/dL Final     Albumin   Date Value Ref Range Status   07/11/2018 2.6 (L) 3.5 - 5.2 g/dL Final     Total Bilirubin   Date Value Ref Range Status   07/11/2018 0.5 0.1 - 1.0 mg/dL " Final     Comment:     For infants and newborns, interpretation of results should be based  on gestational age, weight and in agreement with clinical  observations.  Premature Infant recommended reference ranges:  Up to 24 hours.............<8.0 mg/dL  Up to 48 hours............<12.0 mg/dL  3-5 days..................<15.0 mg/dL  6-29 days.................<15.0 mg/dL       Alkaline Phosphatase   Date Value Ref Range Status   07/11/2018 108 55 - 135 U/L Final     AST   Date Value Ref Range Status   07/11/2018 13 10 - 40 U/L Final     ALT   Date Value Ref Range Status   07/11/2018 18 10 - 44 U/L Final     Anion Gap   Date Value Ref Range Status   07/11/2018 11 8 - 16 mmol/L Final     eGFR if    Date Value Ref Range Status   07/11/2018 >60.0 >60 mL/min/1.73 m^2 Final     eGFR if non    Date Value Ref Range Status   07/11/2018 >60.0 >60 mL/min/1.73 m^2 Final     Comment:     Calculation used to obtain the estimated glomerular filtration  rate (eGFR) is the CKD-EPI equation.          Imaging:  US 7/1/18:  Hyperechoic right lobe mass measures 1.0 x 1.0 x 1.0 cm which may represent a hemangioma if patient has no history of liver disease or malignancy.    MRCP 7/1/18:  Mildly prominent common bile duct (7 mm) which narrows significantly just proximal to the pancreatic head.  No obstructing stones identified.  Adjacent duodenal diverticulum noted, which may be contributing to the bile duct narrowing.  Nonspecific gallbladder wall thickening and trace amount of subhepatic fluid.  No gallbladder distension or filling defects.  9 mm right hepatic lobe lesion correlating with the 07/01/2018 ultrasound, most consistent with a hemangioma, noting limited evaluation without IV contrast.    Assessment:  63 year old male with a history of HTN, HLD, DM Type 2 who presents to clinic for a hospital follow up.    Plan:  LA Grade D Esophagitis and duodenal ulcer both improving on repeat EGD, likely secondary  to NSAIDs use as HP serology was negative and the fact that it improved with time rule out a malignant process.   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    Rachel Barron M.D.  Gastroenterology Fellow, PGY-V  Pager: [phone]  [facility]

## 2018-08-13 NOTE — PATIENT INSTRUCTIONS
--Continue PPI but can decrease to over the counter dose (Prilosec OTC or Omeprazole 20 mg every day)    --OK to stop iron and folic acid    --AVOID NSAID's (Advil, Aleve, Goodies, Ibuprofen)    --F/U with Dr. Glass for ERCP    --F/U with Dr. Braron as needed (call office or send epic message with questions)    Rachel Barron M.D.  Gastroenterology Fellow, PGY-V  [facility]

## 2018-08-15 ENCOUNTER — PATIENT MESSAGE (OUTPATIENT)
Dept: GASTROENTEROLOGY | Facility: CLINIC | Age: 63
End: 2018-08-15

## 2018-08-16 ENCOUNTER — TELEPHONE (OUTPATIENT)
Dept: GASTROENTEROLOGY | Facility: CLINIC | Age: 63
End: 2018-08-16

## 2018-08-16 NOTE — TELEPHONE ENCOUNTER
Spoke with patient's wife. ERCP scheduled for 9/10 at 1p. Reviewed prep instructions. Ms Sanford verbalized understanding.

## 2018-08-16 NOTE — TELEPHONE ENCOUNTER
----- Message from Carolina Riley sent at 8/16/2018 12:52 PM CDT -----  Contact: Self- [phone]  Quan- pt is returning a missed call in regards to scheduling his ERCP- please contact pt at [phone]

## 2018-08-21 ENCOUNTER — TELEPHONE (OUTPATIENT)
Dept: ENDOSCOPY | Facility: HOSPITAL | Age: 63
End: 2018-08-21

## 2018-08-29 ENCOUNTER — LAB VISIT (OUTPATIENT)
Dept: LAB | Facility: HOSPITAL | Age: 63
End: 2018-08-29
Attending: INTERNAL MEDICINE
Payer: MEDICARE

## 2018-08-29 DIAGNOSIS — E78.00 HYPERCHOLESTEREMIA: ICD-10-CM

## 2018-08-29 DIAGNOSIS — E11.9 DIABETES MELLITUS, NEW ONSET: ICD-10-CM

## 2018-08-29 LAB
ALT SERPL W/O P-5'-P-CCNC: 14 U/L
AST SERPL-CCNC: 15 U/L
CHOLEST SERPL-MCNC: 139 MG/DL
CHOLEST/HDLC SERPL: 4 {RATIO}
ESTIMATED AVG GLUCOSE: 120 MG/DL
HBA1C MFR BLD HPLC: 5.8 %
HDLC SERPL-MCNC: 35 MG/DL
HDLC SERPL: 25.2 %
LDLC SERPL CALC-MCNC: 79.6 MG/DL
NONHDLC SERPL-MCNC: 104 MG/DL
TRIGL SERPL-MCNC: 122 MG/DL

## 2018-08-29 PROCEDURE — 80061 LIPID PANEL: CPT

## 2018-08-29 PROCEDURE — 84460 ALANINE AMINO (ALT) (SGPT): CPT

## 2018-08-29 PROCEDURE — 84450 TRANSFERASE (AST) (SGOT): CPT

## 2018-08-29 PROCEDURE — 36415 COLL VENOUS BLD VENIPUNCTURE: CPT | Mod: PO

## 2018-08-29 PROCEDURE — 83036 HEMOGLOBIN GLYCOSYLATED A1C: CPT

## 2018-09-04 ENCOUNTER — TELEPHONE (OUTPATIENT)
Dept: ENDOSCOPY | Facility: HOSPITAL | Age: 63
End: 2018-09-04

## 2018-09-04 ENCOUNTER — CLINICAL SUPPORT (OUTPATIENT)
Dept: DIABETES | Facility: CLINIC | Age: 63
End: 2018-09-04
Payer: MEDICARE

## 2018-09-04 DIAGNOSIS — E11.9 TYPE 2 DIABETES MELLITUS WITHOUT COMPLICATION, WITHOUT LONG-TERM CURRENT USE OF INSULIN: ICD-10-CM

## 2018-09-04 PROCEDURE — 99212 OFFICE O/P EST SF 10 MIN: CPT | Mod: PBBFAC,PO

## 2018-09-04 PROCEDURE — G0108 DIAB MANAGE TRN  PER INDIV: HCPCS | Mod: PBBFAC,PO | Performed by: DIETITIAN, REGISTERED

## 2018-09-04 PROCEDURE — 99999 PR PBB SHADOW E&M-EST. PATIENT-LVL II: CPT | Mod: PBBFAC,,,

## 2018-09-04 NOTE — TELEPHONE ENCOUNTER
Patient called stating he can not come for ERCP 9/10/18 due to financial issues.  States he will call to reschedule when he can.

## 2018-09-04 NOTE — LETTER
September 4, 2018      Bereket Rivera, DO  [address] [facility] [address]         Patient: Mario Sanford   MR Number: [id number]   YOB: 1955   Date of Visit: 9/4/2018       Dear Dr. Rivera:    Thank you for referring Mario for diabetes self-management education and support. He has completed all components of our Diabetes Management Program and his Self-Management Support Plan. Below is a summary of his clinical outcomes and goal progress.    Patient Outcomes:    A1c Status:   Lab Results   Component Value Date    HGBA1C 5.8 (H) 08/29/2018    HGBA1C 8.7 (H) 05/29/2018     Goals  Patient has selected/evaluated goals during today's session: Yes, evaluated  Monitoring: % Met  Met Percentage : 100%    Follow up:   · Mario to follow diabetes support plan indicated above  · Mario to attend medical appointments as scheduled  · Mario to update you on his DM education progress as needed      If you have questions, please do not hesitate to call me. I look forward to providing additional education and support as needed.    Sincerely,    Pita Nugent RD, CDE

## 2018-09-04 NOTE — PROGRESS NOTES
Diabetes Education  Author: Pita Nugent RD, CDE  Date: 9/4/2018    Diabetes Education Visit  Diabetes Education Record Assessment/Progress: Post Program/Follow-up    Diabetes Type  Diabetes Type : Type II    Diabetes History  Diabetes Diagnosis: 0-1 year    Nutrition  Meal Planning: 3 meals per day, snacks between meal, water, eats out seldom, artificial sweeteners  What type of sweetener do you use?: Stevia/Truvia  What type of beverages do you drink?: diet soda/tea, juice, milk, water  Meal Plan 24 Hour Recall - Breakfast: (Cornflakes/milk)  Meal Plan 24 Hour Recall - Lunch: (Salads, meat)  Meal Plan 24 Hour Recall - Dinner: (Meat, veggies)  Meal Plan 24 Hour Recall - Snack: (Fruit, nuts, cereal)    Monitoring   Self Monitoring : (Checks once a day - BG ranges 115-128; A1c 5.8%)  Blood Glucose Logs: No  Do you use a personal glucose monitor?: No  In the last month, how often have you had a low blood sugar reaction?: never(Lowest BG in the 80s)  What are your symptoms of low blood sugar?: (N/A)  How do you treat low blood sugar?: (N/A)  Can you tell when your blood sugar is too high?: yes(Had blurred vision when first diagnosed - resolved)  How do you treat high blood sugar?: (None)    Exercise   Exercise Type: walking(Arthritis in hip, but tries to do a little walking)    Current Diabetes Treatment   Current Treatment: Oral Medication    Diabetes Education Assessment/Progress  Diabetes Disease Process (diabetes disease process and treatment options): Demonstrates Understanding/Competency(verbalizes/demonstrates)  Nutrition (Incorporating nutritional management into one's lifestyle): Demonstrates Understanding/Competency (verbalizes/demonstrates)  Physical Activity (incorporating physical activity into one's lifestyle): Demonstrates Understanding/Competency (verbalizes/demonstrates)  Medications (states correct name, dose, onset, peak, duration, side effects & timing of meds): Demonstrates  Understanding/Competency(verbalizes/demonstrates)  Monitoring (monitoring blood glucose/other parameters & using results): Demonstrates Understanding/Competency (verbalizes/demonstrates)  Acute Complications (preventing, detecting, and treating acute complications): Demonstrates Understanding/Competency (verbalizes/demonstrates)  Chronic Complications (preventing, detecting, and treating chronic complications): Demonstrates Understanding/Competency (verbalizes/demonstrates)(States had recent dilated eye exam outside of [facility])  Clinical (diabetes, other pertinent medical history, and relevant comorbidities reviewed during visit): Demonstrates Understanding/Competency (verbalizes/demonstrates)  Cognitive (knowledge of self-management skills, functional health literacy): Demonstrates Understanding/Competency (verbalizes/demonstrates)  Psychosocial (emotional response to diabetes): Demonstrates Understanding/Competency (verbalizes/demonstrates)  Diabetes Distress and Support Systems: Demonstrates Understanding/Competency (verbalizes/demonstrates)(Distress Scale Score: 1.05)  Behavioral (readiness for change, lifestyle practices, self-care behaviors): Demonstrates Understanding/Competency (verbalizes/demonstrates)    Goals  Patient has selected/evaluated goals during today's session: Yes, evaluated  Monitoring: % Met  Met Percentage : 100%    Diabetes Self-Management Support Plan  Diabetes Learning: DM websites  Review Status: Patient has selected and agrees to support plan.    Education Units of Time   Time Spent: 30 min    Health Maintenance was reviewed today with patient. Discussed with patient importance of routine eye exams, foot exams/foot care, blood work (i.e.: A1c, microalbumin, and lipid), dental visits, yearly flu vaccine, and pneumonia vaccine as indicated by PCP. Patient verbalized understanding.     Health Maintenance Topics with due status: Not Due       Topic Last Completion Date    TETANUS VACCINE  05/29/2018    Pneumococcal (65+) 05/29/2018    Foot Exam 06/12/2018    Eye Exam 06/12/2018    Colonoscopy 06/19/2018    Low Dose Statin 08/13/2018    Lipid Panel 08/29/2018    Hemoglobin A1c 08/29/2018     Health Maintenance Due   Topic Date Due    Urine Microalbumin  05/22/1963    Zoster Vaccine  05/22/2013    Influenza Vaccine  08/01/2018

## 2018-09-05 ENCOUNTER — OFFICE VISIT (OUTPATIENT)
Dept: INTERNAL MEDICINE | Facility: CLINIC | Age: 63
End: 2018-09-05
Payer: MEDICARE

## 2018-09-05 VITALS
TEMPERATURE: 99 F | HEART RATE: 90 BPM | SYSTOLIC BLOOD PRESSURE: 138 MMHG | BODY MASS INDEX: 30.4 KG/M2 | RESPIRATION RATE: 18 BRPM | DIASTOLIC BLOOD PRESSURE: 72 MMHG | HEIGHT: 66 IN | WEIGHT: 189.13 LBS

## 2018-09-05 DIAGNOSIS — I10 ESSENTIAL HYPERTENSION: ICD-10-CM

## 2018-09-05 DIAGNOSIS — E66.9 OBESITY, DIABETES, AND HYPERTENSION SYNDROME: ICD-10-CM

## 2018-09-05 DIAGNOSIS — E11.69 OBESITY, DIABETES, AND HYPERTENSION SYNDROME: ICD-10-CM

## 2018-09-05 DIAGNOSIS — E11.59 OBESITY, DIABETES, AND HYPERTENSION SYNDROME: ICD-10-CM

## 2018-09-05 DIAGNOSIS — E11.9 TYPE 2 DIABETES MELLITUS WITHOUT COMPLICATION, WITHOUT LONG-TERM CURRENT USE OF INSULIN: Primary | ICD-10-CM

## 2018-09-05 DIAGNOSIS — I15.2 OBESITY, DIABETES, AND HYPERTENSION SYNDROME: ICD-10-CM

## 2018-09-05 DIAGNOSIS — E78.5 HYPERLIPIDEMIA ASSOCIATED WITH TYPE 2 DIABETES MELLITUS: ICD-10-CM

## 2018-09-05 DIAGNOSIS — E11.69 HYPERLIPIDEMIA ASSOCIATED WITH TYPE 2 DIABETES MELLITUS: ICD-10-CM

## 2018-09-05 PROBLEM — N17.9 AKI (ACUTE KIDNEY INJURY): Status: RESOLVED | Noted: 2018-06-30 | Resolved: 2018-09-05

## 2018-09-05 PROCEDURE — 3075F SYST BP GE 130 - 139MM HG: CPT | Mod: CPTII,,, | Performed by: INTERNAL MEDICINE

## 2018-09-05 PROCEDURE — 99999 PR PBB SHADOW E&M-EST. PATIENT-LVL III: CPT | Mod: PBBFAC,,, | Performed by: INTERNAL MEDICINE

## 2018-09-05 PROCEDURE — 99214 OFFICE O/P EST MOD 30 MIN: CPT | Mod: S$PBB,,, | Performed by: INTERNAL MEDICINE

## 2018-09-05 PROCEDURE — 99213 OFFICE O/P EST LOW 20 MIN: CPT | Mod: PBBFAC,PO | Performed by: INTERNAL MEDICINE

## 2018-09-05 PROCEDURE — 3044F HG A1C LEVEL LT 7.0%: CPT | Mod: CPTII,,, | Performed by: INTERNAL MEDICINE

## 2018-09-05 PROCEDURE — 1101F PT FALLS ASSESS-DOCD LE1/YR: CPT | Mod: CPTII,,, | Performed by: INTERNAL MEDICINE

## 2018-09-05 PROCEDURE — 99499 UNLISTED E&M SERVICE: CPT | Mod: S$GLB,,, | Performed by: INTERNAL MEDICINE

## 2018-09-05 PROCEDURE — 3008F BODY MASS INDEX DOCD: CPT | Mod: CPTII,,, | Performed by: INTERNAL MEDICINE

## 2018-09-05 PROCEDURE — 3078F DIAST BP <80 MM HG: CPT | Mod: CPTII,,, | Performed by: INTERNAL MEDICINE

## 2018-09-05 RX ORDER — PANTOPRAZOLE SODIUM 40 MG/1
40 TABLET, DELAYED RELEASE ORAL DAILY
Refills: 11 | COMMUNITY
Start: 2018-08-26 | End: 2019-05-03 | Stop reason: SDUPTHER

## 2018-09-05 NOTE — PROGRESS NOTES
Subjective:       Patient ID: Mario Sanford is a 63 y.o. male.    Chief Complaint: Follow-up (diabetes)    HPI   Pt with HTN, HLD, Obesity is here for f/u regarding T2DM. He has adjusted his diet and has continued on Amaryl. Sugars are well controlled at home.   Review of Systems   Constitutional: Negative for activity change, appetite change, chills, diaphoresis, fatigue, fever and unexpected weight change.   HENT: Negative for postnasal drip, rhinorrhea, sinus pressure, sneezing, sore throat, trouble swallowing and voice change.    Respiratory: Negative for cough, shortness of breath and wheezing.    Cardiovascular: Negative for chest pain, palpitations and leg swelling.   Gastrointestinal: Negative for abdominal pain, blood in stool, constipation, diarrhea, nausea and vomiting.   Endocrine: Negative for polydipsia, polyphagia and polyuria.   Genitourinary: Negative for dysuria.   Musculoskeletal: Negative for arthralgias and myalgias.   Skin: Negative for pallor, rash and wound.   Allergic/Immunologic: Negative for environmental allergies and food allergies.   Neurological: Negative for dizziness, tremors, seizures, speech difficulty, weakness and headaches.   Hematological: Negative for adenopathy. Does not bruise/bleed easily.   Psychiatric/Behavioral: Negative for confusion. The patient is not nervous/anxious.        Objective:      Physical Exam   Constitutional: He is oriented to person, place, and time. He appears well-developed and well-nourished. No distress.   HENT:   Head: Normocephalic and atraumatic.   Eyes: Conjunctivae and EOM are normal. Pupils are equal, round, and reactive to light. Right eye exhibits no discharge. Left eye exhibits no discharge. No scleral icterus.   Neck: Normal range of motion. Neck supple. No JVD present.   Cardiovascular: Normal rate, regular rhythm and normal heart sounds.   No murmur heard.  Pulmonary/Chest: Effort normal and breath sounds normal. No respiratory distress.  He has no wheezes. He has no rales.   Musculoskeletal: He exhibits no edema.   Lymphadenopathy:     He has no cervical adenopathy.   Neurological: He is alert and oriented to person, place, and time.   Skin: Skin is warm and dry. No rash noted. He is not diaphoretic. No pallor.       Assessment:       1. Type 2 diabetes mellitus without complication, without long-term current use of insulin    2. Essential hypertension    3. Hyperlipidemia associated with type 2 diabetes mellitus    4. Obesity, diabetes, and hypertension syndrome        Plan:    1. HTN- controlled on Hyzaar 50-12.5 mg daily   2. T2DM- last HA1C of 5.8(8/18)<--8.7(5/18)       Continue Amaryl 2 mg qam   3. HLD- stable on Lipitor 40 mg daily   4. Diabetes, Obesity and HTN- pt advised on proper diet/exercise for weight loss   5. F/u in 4 months

## 2018-09-07 DIAGNOSIS — E11.9 TYPE 2 DIABETES MELLITUS WITHOUT COMPLICATION: ICD-10-CM

## 2018-09-18 ENCOUNTER — TELEPHONE (OUTPATIENT)
Dept: INTERNAL MEDICINE | Facility: CLINIC | Age: 63
End: 2018-09-18

## 2018-09-18 NOTE — TELEPHONE ENCOUNTER
----- Message from Opal Santamaria sent at 9/18/2018 12:14 PM CDT -----  Contact: nixon with [facility] health [phone]  Calling in regards to his home health orders, they were sent to hub as directed,but something is still showing that it is missing the doctors signature

## 2018-09-19 ENCOUNTER — TELEPHONE (OUTPATIENT)
Dept: ENDOSCOPY | Facility: HOSPITAL | Age: 63
End: 2018-09-19

## 2018-09-25 ENCOUNTER — TELEPHONE (OUTPATIENT)
Dept: ENDOSCOPY | Facility: HOSPITAL | Age: 63
End: 2018-09-25

## 2018-09-25 NOTE — TELEPHONE ENCOUNTER
Deyvi Garcia RN     9/4/18 9:54 AM   Note      Patient called stating he can not come for ERCP 9/10/18 due to financial issues.  States he will call to reschedule when he can.        Please advise

## 2018-11-14 ENCOUNTER — TELEPHONE (OUTPATIENT)
Dept: INTERNAL MEDICINE | Facility: CLINIC | Age: 63
End: 2018-11-14

## 2018-11-14 RX ORDER — ATORVASTATIN CALCIUM 40 MG/1
40 TABLET, FILM COATED ORAL NIGHTLY
Qty: 90 TABLET | Refills: 3 | Status: SHIPPED | OUTPATIENT
Start: 2018-11-14 | End: 2020-01-08

## 2018-11-26 ENCOUNTER — PATIENT MESSAGE (OUTPATIENT)
Dept: INTERNAL MEDICINE | Facility: CLINIC | Age: 63
End: 2018-11-26

## 2018-11-26 DIAGNOSIS — R33.9 URINARY RETENTION: Primary | ICD-10-CM

## 2018-11-27 NOTE — TELEPHONE ENCOUNTER
Patient was instructed to go to ER per Dr. Rivera if the pain and pressure persist. Patient stated understanding.    Referral to urology sent to Shara

## 2018-11-27 NOTE — TELEPHONE ENCOUNTER
Patient while he was in the hospital was seen by a urologist (does not remember the name) Can you give referral to urology. See message

## 2018-11-28 ENCOUNTER — TELEPHONE (OUTPATIENT)
Dept: UROLOGY | Facility: CLINIC | Age: 63
End: 2018-11-28

## 2018-11-28 NOTE — TELEPHONE ENCOUNTER
----- Message from Maribeth Nichols sent at 11/28/2018  9:01 AM CST -----  Patient was seen in the hospital in July by Dr Thornton and Dr Kirsten Dailey.  Patient also followed up with Dr Dailey once he was out of hospital in July.  Patient called this morning because he will use his last home Catheter Kit today. Patient is wanting to know if he can get a RX for new kits.  Patient has appointment scheduled at [facility] on Friday but is worried  about managing his urinary retention before then.  I am unable to pull anything for Dr Dailey or to send him a message.  Please call patient to advise.    Thanks, Shara lewis Fl Referrals  [facility]

## 2018-11-28 NOTE — TELEPHONE ENCOUNTER
Called pt and told him that I would leave some samples in a bag with his name on it at the desk until his appt Friday.  Pt verbalized understanding.  All questions answered,

## 2018-11-30 ENCOUNTER — OFFICE VISIT (OUTPATIENT)
Dept: UROLOGY | Facility: CLINIC | Age: 63
End: 2018-11-30
Payer: MEDICARE

## 2018-11-30 ENCOUNTER — LAB VISIT (OUTPATIENT)
Dept: LAB | Facility: HOSPITAL | Age: 63
End: 2018-11-30
Payer: MEDICARE

## 2018-11-30 VITALS
DIASTOLIC BLOOD PRESSURE: 79 MMHG | WEIGHT: 187.38 LBS | HEART RATE: 101 BPM | BODY MASS INDEX: 30.25 KG/M2 | SYSTOLIC BLOOD PRESSURE: 137 MMHG

## 2018-11-30 DIAGNOSIS — N40.1 BPH WITH URINARY OBSTRUCTION: ICD-10-CM

## 2018-11-30 DIAGNOSIS — R31.0 GROSS HEMATURIA: ICD-10-CM

## 2018-11-30 DIAGNOSIS — N39.0 COMPLICATED URINARY TRACT INFECTION: Primary | ICD-10-CM

## 2018-11-30 DIAGNOSIS — N13.8 BPH WITH URINARY OBSTRUCTION: ICD-10-CM

## 2018-11-30 DIAGNOSIS — Z12.5 PROSTATE CANCER SCREENING: ICD-10-CM

## 2018-11-30 LAB
ANION GAP SERPL CALC-SCNC: 12 MMOL/L
BUN SERPL-MCNC: 20 MG/DL
CALCIUM SERPL-MCNC: 9.3 MG/DL
CHLORIDE SERPL-SCNC: 108 MMOL/L
CO2 SERPL-SCNC: 22 MMOL/L
CREAT SERPL-MCNC: 1.2 MG/DL
EST. GFR  (AFRICAN AMERICAN): >60 ML/MIN/1.73 M^2
EST. GFR  (NON AFRICAN AMERICAN): >60 ML/MIN/1.73 M^2
GLUCOSE SERPL-MCNC: 115 MG/DL
POTASSIUM SERPL-SCNC: 3.8 MMOL/L
SODIUM SERPL-SCNC: 142 MMOL/L

## 2018-11-30 PROCEDURE — 80048 BASIC METABOLIC PNL TOTAL CA: CPT

## 2018-11-30 PROCEDURE — 87086 URINE CULTURE/COLONY COUNT: CPT

## 2018-11-30 PROCEDURE — 3078F DIAST BP <80 MM HG: CPT | Mod: CPTII,S$GLB,, | Performed by: UROLOGY

## 2018-11-30 PROCEDURE — 99214 OFFICE O/P EST MOD 30 MIN: CPT | Mod: S$GLB,,, | Performed by: UROLOGY

## 2018-11-30 PROCEDURE — 99999 PR PBB SHADOW E&M-EST. PATIENT-LVL III: CPT | Mod: PBBFAC,,,

## 2018-11-30 PROCEDURE — 3075F SYST BP GE 130 - 139MM HG: CPT | Mod: CPTII,S$GLB,, | Performed by: UROLOGY

## 2018-11-30 PROCEDURE — 36415 COLL VENOUS BLD VENIPUNCTURE: CPT

## 2018-11-30 PROCEDURE — 1101F PT FALLS ASSESS-DOCD LE1/YR: CPT | Mod: CPTII,S$GLB,, | Performed by: UROLOGY

## 2018-11-30 PROCEDURE — 3008F BODY MASS INDEX DOCD: CPT | Mod: CPTII,S$GLB,, | Performed by: UROLOGY

## 2018-11-30 RX ORDER — SULFAMETHOXAZOLE AND TRIMETHOPRIM 800; 160 MG/1; MG/1
1 TABLET ORAL 2 TIMES DAILY
Qty: 14 TABLET | Refills: 0 | Status: SHIPPED | OUTPATIENT
Start: 2018-11-30 | End: 2018-12-07

## 2018-11-30 NOTE — H&P (VIEW-ONLY)
Urology - [facility]  Resident Clinic  Staff - Sammy Mcarthur MD    SUBJECTIVE:     Chief Complaint: LUTS, gross hematuria    History of Present Illness:  Mario Sanford is a 63 y.o. male with a history of obesity, HTN, HLD, and type 2 DM who presents to clinic for worsening LUTS and gross hematuria.    He was hospitalized in 7/2018 for a duodenal ulcer in which he experienced urinary retention requiring intermittent straight caths. He was started on Flomax 0.8 mg qd while hospitalized. He was subsequently seen in resident clinic following discharge.    He has had no further issues with urination and has not had to self cath up until 4 days ago. He has felt bladder fullness and intermittently has had to self cath although with only 100 cc output each time. He endorses nocturia x4, straining, decreased FOS, and intermittency. Denies dysuria, dribbling, and urinary incontinence. Has noted an episode of gross hematuria a few days ago.    Most recent PSA 1.6.    Review of patient's allergies indicates:  No Known Allergies    Past Medical History:   Diagnosis Date    Diabetes mellitus     Dilated cbd, acquired     Duodenal diverticulum     Duodenal fistula     choledochoduodenal    Duodenal ulcer     Esophagitis     Hiatal hernia     Hyperlipidemia     Hypertension     Melena     Nodule of esophagus     Obesity (BMI 30-39.9)      Past Surgical History:   Procedure Laterality Date    COLONOSCOPY N/A 6/19/2018    Procedure: COLONOSCOPY;  Surgeon: Moris Whalen MD;  Location: [facility] (4TH FLR);  Service: Endoscopy;  Laterality: N/A;  Miralax prep, per Dr. Whalen    COLONOSCOPY N/A 6/19/2018    Performed by Moris Whalen MD at [facility] (4TH FLR)    EGD (ESOPHAGOGASTRODUODENOSCOPY) N/A 8/10/2018    Performed by Watson Glass MD at [facility] (2ND FLR)    EGD (ESOPHAGOGASTRODUODENOSCOPY) N/A 7/6/2018    Performed by Newton Iglesias MD at [facility] (2ND FLR)    EGD (ESOPHAGOGASTRODUODENOSCOPY) N/A 7/3/2018     Performed by Newton Iglesias MD at [facility] (2ND FLR)    ENDOSCOPIC ULTRASOUND OF UPPER GASTROINTESTINAL TRACT N/A 8/10/2018    Procedure: ULTRASOUND, ENDOSCOPIC, UPPER GI TRACT;  Surgeon: Watson Glass MD;  Location: [facility] (2ND FLR);  Service: Endoscopy;  Laterality: N/A;  MRCP with narrowing at the CBD as well as duodenal diverticulum however LFTs have normalized and patient will therefore need EUS as outpatient.    ESOPHAGOGASTRODUODENOSCOPY N/A 7/3/2018    Procedure: EGD (ESOPHAGOGASTRODUODENOSCOPY);  Surgeon: Newton Iglesias MD;  Location: [facility] (2ND FLR);  Service: Endoscopy;  Laterality: N/A;    ESOPHAGOGASTRODUODENOSCOPY N/A 7/6/2018    Procedure: EGD (ESOPHAGOGASTRODUODENOSCOPY);  Surgeon: Newton Iglesias MD;  Location: [facility] (2ND FLR);  Service: Endoscopy;  Laterality: N/A;    ESOPHAGOGASTRODUODENOSCOPY N/A 8/10/2018    Procedure: EGD (ESOPHAGOGASTRODUODENOSCOPY);  Surgeon: Watson Glass MD;  [facility]  Service: Endoscopy;  Laterality: N/A;  Inpatient EGD (7/6/18) remarkable for esophagitis and a duodenal ulcer.     TONSILLECTOMY      ULTRASOUND, ENDOSCOPIC, UPPER GI TRACT N/A 8/10/2018    Performed by Watson Glass MD at [facility] (2ND FLR)    UPPER ENDOSCOPIC ULTRASOUND W/ FNA  08/10/2018    without FNA     Family History   Problem Relation Age of Onset    Heart disease Mother     Hypertension Mother     Hyperlipidemia Mother     Diabetes Father     Hypertension Father     Hyperlipidemia Sister     Cancer Neg Hx     Stroke Neg Hx      Social History     Tobacco Use    Smoking status: Never Smoker    Smokeless tobacco: Never Used   Substance Use Topics    Alcohol use: No    Drug use: No        Review of Systems   Constitutional: Positive for chills. Negative for fever.   HENT: Negative.    Eyes: Negative.    Respiratory: Negative for chest tightness and shortness of breath.    Cardiovascular: Negative for chest pain and palpitations.  "  Gastrointestinal: Positive for abdominal pain. Negative for nausea and vomiting.   Genitourinary: Positive for difficulty urinating, frequency, hematuria and nocturia. Negative for dysuria, flank pain and urgency.   Musculoskeletal: Negative.    Skin: Negative.    Neurological: Negative.    Psychiatric/Behavioral: Negative.        OBJECTIVE:     Estimated body mass index is 30.25 kg/m² as calculated from the following:    Height as of 9/5/18: 5' 6" (1.676 m).    Weight as of this encounter: 85 kg (187 lb 6.3 oz).    Vital Signs (Most Recent)  Pulse: 101 (11/30/18 1532)  BP: 137/79 (11/30/18 1532)    Physical Exam   Constitutional: He is oriented to person, place, and time. He appears well-developed. No distress.   HENT:   Head: Normocephalic and atraumatic.   Eyes: No scleral icterus.   Pulmonary/Chest: Effort normal. No respiratory distress.   Abdominal: Soft. He exhibits no distension and no mass. There is no tenderness. There is no rebound and no guarding.   Genitourinary:   Genitourinary Comments: Penis circumcised  No testicular nodules or tenderness palpated  No epididymal masses or tenderness palpated  Prostate 50 g, no induration or nodules     Neurological: He is alert and oriented to person, place, and time.   Skin: Skin is warm and dry.     Psychiatric: He has a normal mood and affect. His behavior is normal. Judgment and thought content normal.       BMP  Lab Results   Component Value Date     07/11/2018    K 3.7 07/11/2018     07/11/2018    CO2 21 (L) 07/11/2018    BUN 16 07/11/2018    CREATININE 1.2 07/11/2018    CALCIUM 8.7 07/11/2018    ANIONGAP 11 07/11/2018    ESTGFRAFRICA >60.0 07/11/2018    EGFRNONAA >60.0 07/11/2018       Lab Results   Component Value Date    WBC 10.71 07/11/2018    HGB 9.2 (L) 07/11/2018    HCT 28.5 (L) 07/11/2018    MCV 92 07/11/2018     (H) 07/11/2018       Lab Results   Component Value Date    PSA 1.6 05/25/2018       PVR (today): 115 cc    Urine " dipstick (today): SG 1.025, pH, leuk +, nitrite +, 100 protein, 50 glucose, 250 blood. Negative for all other tested components.    ASSESSMENT     1. Complicated urinary tract infection    2. Gross hematuria    3. BPH with urinary obstruction    4. Prostate cancer screening        PLAN:     1) Complicated urinary tract infection  -Will treat with empirically with bactrim x7 days  -Urine sent for culture, will follow up sensitivities    2) Gross hematuria  -Will arrange for CT urogram and flexible cystoscopy    3) BPH with LUTS  -Continue tamsulosin 0.8 mg  -Patient's symptoms well-controlled up until 4 days ago  -Patient unlikely to benefit from continued intermittent self-cathing given that he only gets 100 cc out at a time and his current bladder symptoms are likely from his UTI  -Patient amenable to future endoscopic treatment of BPH given enlarged prostate on exam and prior history of urinary retention    4) Prostate cancer screening  -Continue annual PSA screening via PCP    Issa Nelson MD

## 2018-11-30 NOTE — PROGRESS NOTES
Urology - [facility]  Resident Clinic  Staff - Sammy Mcarthur MD    SUBJECTIVE:     Chief Complaint: LUTS, gross hematuria    History of Present Illness:  Mario Sanford is a 63 y.o. male with a history of obesity, HTN, HLD, and type 2 DM who presents to clinic for worsening LUTS and gross hematuria.    He was hospitalized in 7/2018 for a duodenal ulcer in which he experienced urinary retention requiring intermittent straight caths. He was started on Flomax 0.8 mg qd while hospitalized. He was subsequently seen in resident clinic following discharge.    He has had no further issues with urination and has not had to self cath up until 4 days ago. He has felt bladder fullness and intermittently has had to self cath although with only 100 cc output each time. He endorses nocturia x4, straining, decreased FOS, and intermittency. Denies dysuria, dribbling, and urinary incontinence. Has noted an episode of gross hematuria a few days ago.    Most recent PSA 1.6.    Review of patient's allergies indicates:  No Known Allergies    Past Medical History:   Diagnosis Date    Diabetes mellitus     Dilated cbd, acquired     Duodenal diverticulum     Duodenal fistula     choledochoduodenal    Duodenal ulcer     Esophagitis     Hiatal hernia     Hyperlipidemia     Hypertension     Melena     Nodule of esophagus     Obesity (BMI 30-39.9)      Past Surgical History:   Procedure Laterality Date    COLONOSCOPY N/A 6/19/2018    Procedure: COLONOSCOPY;  Surgeon: Moris Whalen MD;  Location: [facility] (4TH FLR);  Service: Endoscopy;  Laterality: N/A;  Miralax prep, per Dr. Whalen    COLONOSCOPY N/A 6/19/2018    Performed by Moris Whalen MD at [facility] (4TH FLR)    EGD (ESOPHAGOGASTRODUODENOSCOPY) N/A 8/10/2018    Performed by Watson Glass MD at [facility] (2ND FLR)    EGD (ESOPHAGOGASTRODUODENOSCOPY) N/A 7/6/2018    Performed by Newton Iglesias MD at [facility] (2ND FLR)    EGD (ESOPHAGOGASTRODUODENOSCOPY) N/A 7/3/2018     Performed by Newton Iglesias MD at [facility] (2ND FLR)    ENDOSCOPIC ULTRASOUND OF UPPER GASTROINTESTINAL TRACT N/A 8/10/2018    Procedure: ULTRASOUND, ENDOSCOPIC, UPPER GI TRACT;  Surgeon: Watson Glass MD;  Location: [facility] (2ND FLR);  Service: Endoscopy;  Laterality: N/A;  MRCP with narrowing at the CBD as well as duodenal diverticulum however LFTs have normalized and patient will therefore need EUS as outpatient.    ESOPHAGOGASTRODUODENOSCOPY N/A 7/3/2018    Procedure: EGD (ESOPHAGOGASTRODUODENOSCOPY);  Surgeon: Newton Iglesias MD;  Location: [facility] (2ND FLR);  Service: Endoscopy;  Laterality: N/A;    ESOPHAGOGASTRODUODENOSCOPY N/A 7/6/2018    Procedure: EGD (ESOPHAGOGASTRODUODENOSCOPY);  Surgeon: Newton Iglesias MD;  Location: [facility] (2ND FLR);  Service: Endoscopy;  Laterality: N/A;    ESOPHAGOGASTRODUODENOSCOPY N/A 8/10/2018    Procedure: EGD (ESOPHAGOGASTRODUODENOSCOPY);  Surgeon: Watson Glass MD;  [facility]  Service: Endoscopy;  Laterality: N/A;  Inpatient EGD (7/6/18) remarkable for esophagitis and a duodenal ulcer.     TONSILLECTOMY      ULTRASOUND, ENDOSCOPIC, UPPER GI TRACT N/A 8/10/2018    Performed by Watson Glass MD at [facility] (2ND FLR)    UPPER ENDOSCOPIC ULTRASOUND W/ FNA  08/10/2018    without FNA     Family History   Problem Relation Age of Onset    Heart disease Mother     Hypertension Mother     Hyperlipidemia Mother     Diabetes Father     Hypertension Father     Hyperlipidemia Sister     Cancer Neg Hx     Stroke Neg Hx      Social History     Tobacco Use    Smoking status: Never Smoker    Smokeless tobacco: Never Used   Substance Use Topics    Alcohol use: No    Drug use: No        Review of Systems   Constitutional: Positive for chills. Negative for fever.   HENT: Negative.    Eyes: Negative.    Respiratory: Negative for chest tightness and shortness of breath.    Cardiovascular: Negative for chest pain and palpitations.  "  Gastrointestinal: Positive for abdominal pain. Negative for nausea and vomiting.   Genitourinary: Positive for difficulty urinating, frequency, hematuria and nocturia. Negative for dysuria, flank pain and urgency.   Musculoskeletal: Negative.    Skin: Negative.    Neurological: Negative.    Psychiatric/Behavioral: Negative.        OBJECTIVE:     Estimated body mass index is 30.25 kg/m² as calculated from the following:    Height as of 9/5/18: 5' 6" (1.676 m).    Weight as of this encounter: 85 kg (187 lb 6.3 oz).    Vital Signs (Most Recent)  Pulse: 101 (11/30/18 1532)  BP: 137/79 (11/30/18 1532)    Physical Exam   Constitutional: He is oriented to person, place, and time. He appears well-developed. No distress.   HENT:   Head: Normocephalic and atraumatic.   Eyes: No scleral icterus.   Pulmonary/Chest: Effort normal. No respiratory distress.   Abdominal: Soft. He exhibits no distension and no mass. There is no tenderness. There is no rebound and no guarding.   Genitourinary:   Genitourinary Comments: Penis circumcised  No testicular nodules or tenderness palpated  No epididymal masses or tenderness palpated  Prostate 50 g, no induration or nodules     Neurological: He is alert and oriented to person, place, and time.   Skin: Skin is warm and dry.     Psychiatric: He has a normal mood and affect. His behavior is normal. Judgment and thought content normal.       BMP  Lab Results   Component Value Date     07/11/2018    K 3.7 07/11/2018     07/11/2018    CO2 21 (L) 07/11/2018    BUN 16 07/11/2018    CREATININE 1.2 07/11/2018    CALCIUM 8.7 07/11/2018    ANIONGAP 11 07/11/2018    ESTGFRAFRICA >60.0 07/11/2018    EGFRNONAA >60.0 07/11/2018       Lab Results   Component Value Date    WBC 10.71 07/11/2018    HGB 9.2 (L) 07/11/2018    HCT 28.5 (L) 07/11/2018    MCV 92 07/11/2018     (H) 07/11/2018       Lab Results   Component Value Date    PSA 1.6 05/25/2018       PVR (today): 115 cc    Urine " dipstick (today): SG 1.025, pH, leuk +, nitrite +, 100 protein, 50 glucose, 250 blood. Negative for all other tested components.    ASSESSMENT     1. Complicated urinary tract infection    2. Gross hematuria    3. BPH with urinary obstruction    4. Prostate cancer screening        PLAN:     1) Complicated urinary tract infection  -Will treat with empirically with bactrim x7 days  -Urine sent for culture, will follow up sensitivities    2) Gross hematuria  -Will arrange for CT urogram and flexible cystoscopy    3) BPH with LUTS  -Continue tamsulosin 0.8 mg  -Patient's symptoms well-controlled up until 4 days ago  -Patient unlikely to benefit from continued intermittent self-cathing given that he only gets 100 cc out at a time and his current bladder symptoms are likely from his UTI  -Patient amenable to future endoscopic treatment of BPH given enlarged prostate on exam and prior history of urinary retention    4) Prostate cancer screening  -Continue annual PSA screening via PCP    Issa Nelson MD

## 2018-11-30 NOTE — H&P (VIEW-ONLY)
Urology - [facility]  Resident Clinic  Staff - Sammy Mcarthur MD    SUBJECTIVE:     Chief Complaint: LUTS, gross hematuria    History of Present Illness:  Mario Sanford is a 63 y.o. male with a history of obesity, HTN, HLD, and type 2 DM who presents to clinic for worsening LUTS and gross hematuria.    He was hospitalized in 7/2018 for a duodenal ulcer in which he experienced urinary retention requiring intermittent straight caths. He was started on Flomax 0.8 mg qd while hospitalized. He was subsequently seen in resident clinic following discharge.    He has had no further issues with urination and has not had to self cath up until 4 days ago. He has felt bladder fullness and intermittently has had to self cath although with only 100 cc output each time. He endorses nocturia x4, straining, decreased FOS, and intermittency. Denies dysuria, dribbling, and urinary incontinence. Has noted an episode of gross hematuria a few days ago.    Most recent PSA 1.6.    Review of patient's allergies indicates:  No Known Allergies    Past Medical History:   Diagnosis Date    Diabetes mellitus     Dilated cbd, acquired     Duodenal diverticulum     Duodenal fistula     choledochoduodenal    Duodenal ulcer     Esophagitis     Hiatal hernia     Hyperlipidemia     Hypertension     Melena     Nodule of esophagus     Obesity (BMI 30-39.9)      Past Surgical History:   Procedure Laterality Date    COLONOSCOPY N/A 6/19/2018    Procedure: COLONOSCOPY;  Surgeon: Moris Whalen MD;  Location: [facility] (4TH FLR);  Service: Endoscopy;  Laterality: N/A;  Miralax prep, per Dr. Whalen    COLONOSCOPY N/A 6/19/2018    Performed by Moris Whalen MD at [facility] (4TH FLR)    EGD (ESOPHAGOGASTRODUODENOSCOPY) N/A 8/10/2018    Performed by Watson Glass MD at [facility] (2ND FLR)    EGD (ESOPHAGOGASTRODUODENOSCOPY) N/A 7/6/2018    Performed by Newton Iglesias MD at [facility] (2ND FLR)    EGD (ESOPHAGOGASTRODUODENOSCOPY) N/A 7/3/2018     Performed by Newton Iglesias MD at [facility] (2ND FLR)    ENDOSCOPIC ULTRASOUND OF UPPER GASTROINTESTINAL TRACT N/A 8/10/2018    Procedure: ULTRASOUND, ENDOSCOPIC, UPPER GI TRACT;  Surgeon: Watson Glass MD;  Location: [facility] (2ND FLR);  Service: Endoscopy;  Laterality: N/A;  MRCP with narrowing at the CBD as well as duodenal diverticulum however LFTs have normalized and patient will therefore need EUS as outpatient.    ESOPHAGOGASTRODUODENOSCOPY N/A 7/3/2018    Procedure: EGD (ESOPHAGOGASTRODUODENOSCOPY);  Surgeon: Newton Iglesias MD;  Location: [facility] (2ND FLR);  Service: Endoscopy;  Laterality: N/A;    ESOPHAGOGASTRODUODENOSCOPY N/A 7/6/2018    Procedure: EGD (ESOPHAGOGASTRODUODENOSCOPY);  Surgeon: Newton Iglesias MD;  Location: [facility] (2ND FLR);  Service: Endoscopy;  Laterality: N/A;    ESOPHAGOGASTRODUODENOSCOPY N/A 8/10/2018    Procedure: EGD (ESOPHAGOGASTRODUODENOSCOPY);  Surgeon: Watson Glass MD;  [facility]  Service: Endoscopy;  Laterality: N/A;  Inpatient EGD (7/6/18) remarkable for esophagitis and a duodenal ulcer.     TONSILLECTOMY      ULTRASOUND, ENDOSCOPIC, UPPER GI TRACT N/A 8/10/2018    Performed by Watson Glass MD at [facility] (2ND FLR)    UPPER ENDOSCOPIC ULTRASOUND W/ FNA  08/10/2018    without FNA     Family History   Problem Relation Age of Onset    Heart disease Mother     Hypertension Mother     Hyperlipidemia Mother     Diabetes Father     Hypertension Father     Hyperlipidemia Sister     Cancer Neg Hx     Stroke Neg Hx      Social History     Tobacco Use    Smoking status: Never Smoker    Smokeless tobacco: Never Used   Substance Use Topics    Alcohol use: No    Drug use: No        Review of Systems   Constitutional: Positive for chills. Negative for fever.   HENT: Negative.    Eyes: Negative.    Respiratory: Negative for chest tightness and shortness of breath.    Cardiovascular: Negative for chest pain and palpitations.  "  Gastrointestinal: Positive for abdominal pain. Negative for nausea and vomiting.   Genitourinary: Positive for difficulty urinating, frequency, hematuria and nocturia. Negative for dysuria, flank pain and urgency.   Musculoskeletal: Negative.    Skin: Negative.    Neurological: Negative.    Psychiatric/Behavioral: Negative.        OBJECTIVE:     Estimated body mass index is 30.25 kg/m² as calculated from the following:    Height as of 9/5/18: 5' 6" (1.676 m).    Weight as of this encounter: 85 kg (187 lb 6.3 oz).    Vital Signs (Most Recent)  Pulse: 101 (11/30/18 1532)  BP: 137/79 (11/30/18 1532)    Physical Exam   Constitutional: He is oriented to person, place, and time. He appears well-developed. No distress.   HENT:   Head: Normocephalic and atraumatic.   Eyes: No scleral icterus.   Pulmonary/Chest: Effort normal. No respiratory distress.   Abdominal: Soft. He exhibits no distension and no mass. There is no tenderness. There is no rebound and no guarding.   Genitourinary:   Genitourinary Comments: Penis circumcised  No testicular nodules or tenderness palpated  No epididymal masses or tenderness palpated  Prostate 50 g, no induration or nodules     Neurological: He is alert and oriented to person, place, and time.   Skin: Skin is warm and dry.     Psychiatric: He has a normal mood and affect. His behavior is normal. Judgment and thought content normal.       BMP  Lab Results   Component Value Date     07/11/2018    K 3.7 07/11/2018     07/11/2018    CO2 21 (L) 07/11/2018    BUN 16 07/11/2018    CREATININE 1.2 07/11/2018    CALCIUM 8.7 07/11/2018    ANIONGAP 11 07/11/2018    ESTGFRAFRICA >60.0 07/11/2018    EGFRNONAA >60.0 07/11/2018       Lab Results   Component Value Date    WBC 10.71 07/11/2018    HGB 9.2 (L) 07/11/2018    HCT 28.5 (L) 07/11/2018    MCV 92 07/11/2018     (H) 07/11/2018       Lab Results   Component Value Date    PSA 1.6 05/25/2018       PVR (today): 115 cc    Urine " dipstick (today): SG 1.025, pH, leuk +, nitrite +, 100 protein, 50 glucose, 250 blood. Negative for all other tested components.    ASSESSMENT     1. Complicated urinary tract infection    2. Gross hematuria    3. BPH with urinary obstruction    4. Prostate cancer screening        PLAN:     1) Complicated urinary tract infection  -Will treat with empirically with bactrim x7 days  -Urine sent for culture, will follow up sensitivities    2) Gross hematuria  -Will arrange for CT urogram and flexible cystoscopy    3) BPH with LUTS  -Continue tamsulosin 0.8 mg  -Patient's symptoms well-controlled up until 4 days ago  -Patient unlikely to benefit from continued intermittent self-cathing given that he only gets 100 cc out at a time and his current bladder symptoms are likely from his UTI  -Patient amenable to future endoscopic treatment of BPH given enlarged prostate on exam and prior history of urinary retention    4) Prostate cancer screening  -Continue annual PSA screening via PCP    Issa Nelson MD

## 2018-12-02 LAB
BACTERIA UR CULT: NORMAL
BACTERIA UR CULT: NORMAL

## 2018-12-07 ENCOUNTER — HOSPITAL ENCOUNTER (OUTPATIENT)
Dept: UROLOGY | Facility: HOSPITAL | Age: 63
Discharge: HOME | End: 2018-12-07
Attending: STUDENT IN AN ORGANIZED HEALTH CARE EDUCATION/TRAINING PROGRAM
Payer: MEDICARE

## 2018-12-07 ENCOUNTER — HOSPITAL ENCOUNTER (OUTPATIENT)
Dept: RADIOLOGY | Facility: HOSPITAL | Age: 63
Discharge: HOME | End: 2018-12-07
Attending: STUDENT IN AN ORGANIZED HEALTH CARE EDUCATION/TRAINING PROGRAM
Payer: MEDICARE

## 2018-12-07 VITALS
BODY MASS INDEX: 28.81 KG/M2 | HEIGHT: 66 IN | HEART RATE: 99 BPM | RESPIRATION RATE: 18 BRPM | WEIGHT: 179.25 LBS | SYSTOLIC BLOOD PRESSURE: 148 MMHG | TEMPERATURE: 98 F | DIASTOLIC BLOOD PRESSURE: 83 MMHG

## 2018-12-07 DIAGNOSIS — Z12.5 PROSTATE CANCER SCREENING: Primary | ICD-10-CM

## 2018-12-07 DIAGNOSIS — R31.0 GROSS HEMATURIA: ICD-10-CM

## 2018-12-07 DIAGNOSIS — D49.4 BLADDER TUMOR: Primary | ICD-10-CM

## 2018-12-07 PROCEDURE — 87086 URINE CULTURE/COLONY COUNT: CPT

## 2018-12-07 PROCEDURE — 87186 SC STD MICRODIL/AGAR DIL: CPT

## 2018-12-07 PROCEDURE — 52000 CYSTOURETHROSCOPY: CPT | Mod: ,,, | Performed by: UROLOGY

## 2018-12-07 PROCEDURE — 87077 CULTURE AEROBIC IDENTIFY: CPT

## 2018-12-07 PROCEDURE — 52000 CYSTOURETHROSCOPY: CPT

## 2018-12-07 PROCEDURE — 25500020 PHARM REV CODE 255: Performed by: STUDENT IN AN ORGANIZED HEALTH CARE EDUCATION/TRAINING PROGRAM

## 2018-12-07 PROCEDURE — 74178 CT ABD&PLV WO CNTR FLWD CNTR: CPT | Mod: 26,,, | Performed by: RADIOLOGY

## 2018-12-07 PROCEDURE — 87088 URINE BACTERIA CULTURE: CPT

## 2018-12-07 PROCEDURE — 74178 CT ABD&PLV WO CNTR FLWD CNTR: CPT | Mod: TC

## 2018-12-07 RX ORDER — SULFAMETHOXAZOLE AND TRIMETHOPRIM 800; 160 MG/1; MG/1
1 TABLET ORAL
Status: COMPLETED | OUTPATIENT
Start: 2018-12-07 | End: 2018-12-07

## 2018-12-07 RX ORDER — SULFAMETHOXAZOLE AND TRIMETHOPRIM 800; 160 MG/1; MG/1
1 TABLET ORAL 2 TIMES DAILY
Qty: 12 TABLET | Refills: 0 | Status: SHIPPED | OUTPATIENT
Start: 2018-12-07 | End: 2018-12-10

## 2018-12-07 RX ORDER — LIDOCAINE HYDROCHLORIDE 20 MG/ML
JELLY TOPICAL
Status: COMPLETED | OUTPATIENT
Start: 2018-12-07 | End: 2018-12-07

## 2018-12-07 RX ADMIN — IOHEXOL 125 ML: 350 INJECTION, SOLUTION INTRAVENOUS at 02:12

## 2018-12-07 RX ADMIN — SULFAMETHOXAZOLE AND TRIMETHOPRIM 1 TABLET: 800; 160 TABLET ORAL at 03:12

## 2018-12-07 RX ADMIN — LIDOCAINE HYDROCHLORIDE: 20 JELLY TOPICAL at 03:12

## 2018-12-07 NOTE — PATIENT INSTRUCTIONS
What to Expect After a Cystoscopy  For the next 24-48 hours, you may feel a mild burning when you urinate. This burning is normal and expected. Drink plenty of water to dilute the urine to help relieve the burning sensation. You may also see a small amount of blood in your urine after the procedure.    Unless you are already taking antibiotics, you may be given an antibiotic after the test to prevent infection.    Signs and Symptoms to Report  Call the [facility] at [phone] if you develop any of the following:  · Fever of 101 degrees or higher  · Chills or persistent bleeding  · Inability to urinate

## 2018-12-07 NOTE — PROCEDURES
CYSTOSCOPY REPORT    12/7/2018     Procedure: Cystoscopy    Pre Procedure Diagnosis: Gross hematuria     Post Procedure Diagnosis:  1. Same      2. Bladder tumor    Anesthesia: 10 cc 2% lidocaine jelly applied per urethra.    14 FR Flexible Olympus cystoscope used.    FINDINGS: Tumor versus stone at bladder neck    Specimen:  None    The patient was taken to the cystoscopy suite and placed in supine position.  The genitalia was prepped and draped  in the usual sterile fashion.  Two percent lidocaine jelly was inserted in the urethra and held in place with a penile clamp.  After sufficent time had passed to allow good local anesthesia, the cystoscope was inserted in the urethra and passed into the bladder visualizing the urethra along its entire course.  The dome, anterior, posterior and lateral walls were examined systematically.  The ureteral orifices were in their usual position and configuration.  The cystoscope was turned upon itself 180 degrees to visualize the bladder neck.  The cystoscope was then brought to the level of the bladder neck, the water was turned on and the prostate was visualized.  The cystoscope was removed and the patient was instructed to urinate prior to leaving the office.     ASSESSMENT/PLAN:  Patient status post flexible cystoscopy.  1. Push fluids for 24 hours.  2. May see blood in the urine, this should gradually improve over the next 2-3 days.  3. The patient was instructed to return to the office or go to the emergency should fever, chills, cloudy urine, or inability to urinate develop.  4. Follow up in OR for TURBT/TURP v. cystolitholoplaxy.

## 2018-12-10 ENCOUNTER — TELEPHONE (OUTPATIENT)
Dept: UROLOGY | Facility: HOSPITAL | Age: 63
End: 2018-12-10

## 2018-12-10 RX ORDER — NITROFURANTOIN 25; 75 MG/1; MG/1
100 CAPSULE ORAL 2 TIMES DAILY
Qty: 14 CAPSULE | Refills: 0 | Status: SHIPPED | OUTPATIENT
Start: 2018-12-10 | End: 2018-12-17

## 2018-12-10 NOTE — TELEPHONE ENCOUNTER
Patient's urine culture finalized today (12/10) as Enterococcus faecalis >100,000 cfu/mL susceptible to nitrofurantoin. I called to instruct patient to stop taking the bactrim that was prescribed to him on 12/7 and to start taking Macrobid sent into his pharmacy today. His wife (Brittaney) answered the phone and she voiced understanding and will relay the message to him.

## 2018-12-11 LAB — BACTERIA UR CULT: NORMAL

## 2018-12-12 ENCOUNTER — ANESTHESIA EVENT (OUTPATIENT)
Dept: SURGERY | Facility: HOSPITAL | Age: 63
End: 2018-12-12
Payer: MEDICARE

## 2018-12-12 ENCOUNTER — TELEPHONE (OUTPATIENT)
Dept: UROLOGY | Facility: CLINIC | Age: 63
End: 2018-12-12

## 2018-12-12 NOTE — TELEPHONE ENCOUNTER
Called pt and spoke with wife to confirm arrival time of 12pm for procedure on 12/13/2018. Gave pt NPO instructions and gave pt opportunity to ask questions. Pt verbalized understanding.

## 2018-12-13 ENCOUNTER — HOSPITAL ENCOUNTER (OUTPATIENT)
Facility: HOSPITAL | Age: 63
Discharge: HOME | End: 2018-12-14
Attending: UROLOGY | Admitting: UROLOGY
Payer: MEDICARE

## 2018-12-13 ENCOUNTER — ANESTHESIA (OUTPATIENT)
Dept: SURGERY | Facility: HOSPITAL | Age: 63
End: 2018-12-13
Payer: MEDICARE

## 2018-12-13 DIAGNOSIS — N32.9 LESION OF BLADDER: ICD-10-CM

## 2018-12-13 DIAGNOSIS — D49.4 BLADDER TUMOR: Primary | ICD-10-CM

## 2018-12-13 LAB
ABO + RH BLD: NORMAL
ANION GAP SERPL CALC-SCNC: 9 MMOL/L
BASOPHILS # BLD AUTO: 0.05 K/UL
BASOPHILS NFR BLD: 0.4 %
BLD GP AB SCN CELLS X3 SERPL QL: NORMAL
BUN SERPL-MCNC: 23 MG/DL
CALCIUM SERPL-MCNC: 8.4 MG/DL
CHLORIDE SERPL-SCNC: 104 MMOL/L
CO2 SERPL-SCNC: 25 MMOL/L
CREAT SERPL-MCNC: 1.2 MG/DL
DIFFERENTIAL METHOD: ABNORMAL
EOSINOPHIL # BLD AUTO: 0.1 K/UL
EOSINOPHIL NFR BLD: 0.7 %
ERYTHROCYTE [DISTWIDTH] IN BLOOD BY AUTOMATED COUNT: 14.4 %
EST. GFR  (AFRICAN AMERICAN): >60 ML/MIN/1.73 M^2
EST. GFR  (NON AFRICAN AMERICAN): >60 ML/MIN/1.73 M^2
ESTIMATED AVG GLUCOSE: 137 MG/DL
GLUCOSE SERPL-MCNC: 184 MG/DL
HBA1C MFR BLD HPLC: 6.4 %
HCT VFR BLD AUTO: 38.3 %
HGB BLD-MCNC: 12.3 G/DL
IMM GRANULOCYTES # BLD AUTO: 0.05 K/UL
IMM GRANULOCYTES NFR BLD AUTO: 0.4 %
LYMPHOCYTES # BLD AUTO: 3.5 K/UL
LYMPHOCYTES NFR BLD: 31.5 %
MAGNESIUM SERPL-MCNC: 1.7 MG/DL
MCH RBC QN AUTO: 28.4 PG
MCHC RBC AUTO-ENTMCNC: 32.1 G/DL
MCV RBC AUTO: 89 FL
MONOCYTES # BLD AUTO: 0.7 K/UL
MONOCYTES NFR BLD: 5.8 %
NEUTROPHILS # BLD AUTO: 6.9 K/UL
NEUTROPHILS NFR BLD: 61.2 %
NRBC BLD-RTO: 0 /100 WBC
PHOSPHATE SERPL-MCNC: 3.8 MG/DL
PLATELET # BLD AUTO: 254 K/UL
PMV BLD AUTO: 9.6 FL
POCT GLUCOSE: 156 MG/DL (ref 70–110)
POTASSIUM SERPL-SCNC: 3.6 MMOL/L
RBC # BLD AUTO: 4.33 M/UL
SODIUM SERPL-SCNC: 138 MMOL/L
WBC # BLD AUTO: 11.23 K/UL

## 2018-12-13 PROCEDURE — 25000003 PHARM REV CODE 250: Performed by: STUDENT IN AN ORGANIZED HEALTH CARE EDUCATION/TRAINING PROGRAM

## 2018-12-13 PROCEDURE — D9220A PRA ANESTHESIA: Mod: CRNA,,, | Performed by: NURSE ANESTHETIST, CERTIFIED REGISTERED

## 2018-12-13 PROCEDURE — 37000008 HC ANESTHESIA 1ST 15 MINUTES: Performed by: UROLOGY

## 2018-12-13 PROCEDURE — 63600175 PHARM REV CODE 636 W HCPCS: Performed by: STUDENT IN AN ORGANIZED HEALTH CARE EDUCATION/TRAINING PROGRAM

## 2018-12-13 PROCEDURE — 63600175 PHARM REV CODE 636 W HCPCS: Performed by: NURSE ANESTHETIST, CERTIFIED REGISTERED

## 2018-12-13 PROCEDURE — 84100 ASSAY OF PHOSPHORUS: CPT

## 2018-12-13 PROCEDURE — 52234 CYSTOSCOPY AND TREATMENT: CPT | Mod: 51,,, | Performed by: UROLOGY

## 2018-12-13 PROCEDURE — 88305 TISSUE EXAM BY PATHOLOGIST: CPT | Mod: 59 | Performed by: PATHOLOGY

## 2018-12-13 PROCEDURE — 37000009 HC ANESTHESIA EA ADD 15 MINS: Performed by: UROLOGY

## 2018-12-13 PROCEDURE — 82962 GLUCOSE BLOOD TEST: CPT | Mod: 91 | Performed by: UROLOGY

## 2018-12-13 PROCEDURE — 85025 COMPLETE CBC W/AUTO DIFF WBC: CPT

## 2018-12-13 PROCEDURE — 25000003 PHARM REV CODE 250: Performed by: UROLOGY

## 2018-12-13 PROCEDURE — 27201423 OPTIME MED/SURG SUP & DEVICES STERILE SUPPLY: Performed by: UROLOGY

## 2018-12-13 PROCEDURE — 71000015 HC POSTOP RECOV 1ST HR: Performed by: UROLOGY

## 2018-12-13 PROCEDURE — 86901 BLOOD TYPING SEROLOGIC RH(D): CPT

## 2018-12-13 PROCEDURE — 71000044 HC DOSC ROUTINE RECOVERY FIRST HOUR: Performed by: UROLOGY

## 2018-12-13 PROCEDURE — 36000707: Performed by: UROLOGY

## 2018-12-13 PROCEDURE — 36000706: Performed by: UROLOGY

## 2018-12-13 PROCEDURE — 80048 BASIC METABOLIC PNL TOTAL CA: CPT

## 2018-12-13 PROCEDURE — 25000003 PHARM REV CODE 250: Performed by: NURSE ANESTHETIST, CERTIFIED REGISTERED

## 2018-12-13 PROCEDURE — 83735 ASSAY OF MAGNESIUM: CPT

## 2018-12-13 PROCEDURE — 88331 PATH CONSLTJ SURG 1 BLK 1SPC: CPT | Performed by: PATHOLOGY

## 2018-12-13 PROCEDURE — 36415 COLL VENOUS BLD VENIPUNCTURE: CPT

## 2018-12-13 PROCEDURE — 88305 TISSUE EXAM BY PATHOLOGIST: CPT | Mod: 26,,, | Performed by: PATHOLOGY

## 2018-12-13 PROCEDURE — 88331 PATH CONSLTJ SURG 1 BLK 1SPC: CPT | Mod: 26,,, | Performed by: PATHOLOGY

## 2018-12-13 PROCEDURE — 52601 PROSTATECTOMY (TURP): CPT | Mod: ,,, | Performed by: UROLOGY

## 2018-12-13 PROCEDURE — D9220A PRA ANESTHESIA: Mod: ANES,,, | Performed by: ANESTHESIOLOGY

## 2018-12-13 PROCEDURE — 83036 HEMOGLOBIN GLYCOSYLATED A1C: CPT

## 2018-12-13 RX ORDER — ONDANSETRON 2 MG/ML
4 INJECTION INTRAMUSCULAR; INTRAVENOUS EVERY 8 HOURS PRN
Status: DISCONTINUED | OUTPATIENT
Start: 2018-12-13 | End: 2018-12-14 | Stop reason: HOSPADM

## 2018-12-13 RX ORDER — ESMOLOL HYDROCHLORIDE 10 MG/ML
INJECTION INTRAVENOUS
Status: DISCONTINUED | OUTPATIENT
Start: 2018-12-13 | End: 2018-12-13

## 2018-12-13 RX ORDER — ACETAMINOPHEN 10 MG/ML
INJECTION, SOLUTION INTRAVENOUS
Status: DISCONTINUED | OUTPATIENT
Start: 2018-12-13 | End: 2018-12-13

## 2018-12-13 RX ORDER — CARVEDILOL 3.12 MG/1
3.12 TABLET ORAL 2 TIMES DAILY
Status: DISCONTINUED | OUTPATIENT
Start: 2018-12-13 | End: 2018-12-14 | Stop reason: HOSPADM

## 2018-12-13 RX ORDER — NEOSTIGMINE METHYLSULFATE 1 MG/ML
INJECTION, SOLUTION INTRAVENOUS
Status: DISCONTINUED | OUTPATIENT
Start: 2018-12-13 | End: 2018-12-13

## 2018-12-13 RX ORDER — DIPHENHYDRAMINE HCL 25 MG
25 CAPSULE ORAL EVERY 6 HOURS PRN
Status: DISCONTINUED | OUTPATIENT
Start: 2018-12-13 | End: 2018-12-14 | Stop reason: HOSPADM

## 2018-12-13 RX ORDER — IBUPROFEN 200 MG
16 TABLET ORAL
Status: DISCONTINUED | OUTPATIENT
Start: 2018-12-13 | End: 2018-12-14 | Stop reason: HOSPADM

## 2018-12-13 RX ORDER — ONDANSETRON 2 MG/ML
INJECTION INTRAMUSCULAR; INTRAVENOUS
Status: DISCONTINUED | OUTPATIENT
Start: 2018-12-13 | End: 2018-12-13

## 2018-12-13 RX ORDER — ROCURONIUM BROMIDE 10 MG/ML
INJECTION, SOLUTION INTRAVENOUS
Status: DISCONTINUED | OUTPATIENT
Start: 2018-12-13 | End: 2018-12-13

## 2018-12-13 RX ORDER — TAMSULOSIN HYDROCHLORIDE 0.4 MG/1
0.4 CAPSULE ORAL DAILY
Status: DISCONTINUED | OUTPATIENT
Start: 2018-12-14 | End: 2018-12-14 | Stop reason: HOSPADM

## 2018-12-13 RX ORDER — POLYETHYLENE GLYCOL 3350 17 G/17G
17 POWDER, FOR SOLUTION ORAL DAILY
Status: DISCONTINUED | OUTPATIENT
Start: 2018-12-14 | End: 2018-12-14 | Stop reason: HOSPADM

## 2018-12-13 RX ORDER — ACETAMINOPHEN 10 MG/ML
1000 INJECTION, SOLUTION INTRAVENOUS EVERY 6 HOURS
Status: DISCONTINUED | OUTPATIENT
Start: 2018-12-13 | End: 2018-12-14 | Stop reason: HOSPADM

## 2018-12-13 RX ORDER — SODIUM CHLORIDE 9 MG/ML
INJECTION, SOLUTION INTRAVENOUS CONTINUOUS
Status: DISCONTINUED | OUTPATIENT
Start: 2018-12-13 | End: 2018-12-14 | Stop reason: HOSPADM

## 2018-12-13 RX ORDER — ONDANSETRON 2 MG/ML
INJECTION INTRAMUSCULAR; INTRAVENOUS
Status: COMPLETED
Start: 2018-12-13 | End: 2018-12-13

## 2018-12-13 RX ORDER — MIDAZOLAM HYDROCHLORIDE 1 MG/ML
INJECTION, SOLUTION INTRAMUSCULAR; INTRAVENOUS
Status: DISCONTINUED | OUTPATIENT
Start: 2018-12-13 | End: 2018-12-13

## 2018-12-13 RX ORDER — PANTOPRAZOLE SODIUM 40 MG/1
40 TABLET, DELAYED RELEASE ORAL DAILY
Status: DISCONTINUED | OUTPATIENT
Start: 2018-12-14 | End: 2018-12-14 | Stop reason: HOSPADM

## 2018-12-13 RX ORDER — GLUCAGON 1 MG
1 KIT INJECTION
Status: DISCONTINUED | OUTPATIENT
Start: 2018-12-13 | End: 2018-12-14 | Stop reason: HOSPADM

## 2018-12-13 RX ORDER — INSULIN ASPART 100 [IU]/ML
1-10 INJECTION, SOLUTION INTRAVENOUS; SUBCUTANEOUS
Status: DISCONTINUED | OUTPATIENT
Start: 2018-12-13 | End: 2018-12-14 | Stop reason: HOSPADM

## 2018-12-13 RX ORDER — PROPOFOL 10 MG/ML
VIAL (ML) INTRAVENOUS
Status: DISCONTINUED | OUTPATIENT
Start: 2018-12-13 | End: 2018-12-13

## 2018-12-13 RX ORDER — SODIUM CHLORIDE 9 MG/ML
INJECTION, SOLUTION INTRAVENOUS CONTINUOUS
Status: DISCONTINUED | OUTPATIENT
Start: 2018-12-13 | End: 2018-12-14

## 2018-12-13 RX ORDER — FENTANYL CITRATE 50 UG/ML
INJECTION, SOLUTION INTRAMUSCULAR; INTRAVENOUS
Status: DISCONTINUED | OUTPATIENT
Start: 2018-12-13 | End: 2018-12-13

## 2018-12-13 RX ORDER — ATROPA BELLADONNA AND OPIUM 16.2; 3 MG/1; MG/1
30 SUPPOSITORY RECTAL EVERY 8 HOURS PRN
Status: DISCONTINUED | OUTPATIENT
Start: 2018-12-13 | End: 2018-12-14 | Stop reason: HOSPADM

## 2018-12-13 RX ORDER — IBUPROFEN 200 MG
24 TABLET ORAL
Status: DISCONTINUED | OUTPATIENT
Start: 2018-12-13 | End: 2018-12-14 | Stop reason: HOSPADM

## 2018-12-13 RX ORDER — ATORVASTATIN CALCIUM 10 MG/1
40 TABLET, FILM COATED ORAL NIGHTLY
Status: DISCONTINUED | OUTPATIENT
Start: 2018-12-13 | End: 2018-12-14 | Stop reason: HOSPADM

## 2018-12-13 RX ORDER — GLYCOPYRROLATE 0.2 MG/ML
INJECTION INTRAMUSCULAR; INTRAVENOUS
Status: DISCONTINUED | OUTPATIENT
Start: 2018-12-13 | End: 2018-12-13

## 2018-12-13 RX ORDER — NITROFURANTOIN 25; 75 MG/1; MG/1
100 CAPSULE ORAL 2 TIMES DAILY
Status: DISCONTINUED | OUTPATIENT
Start: 2018-12-14 | End: 2018-12-14 | Stop reason: HOSPADM

## 2018-12-13 RX ADMIN — MIDAZOLAM HYDROCHLORIDE 2 MG: 1 INJECTION, SOLUTION INTRAMUSCULAR; INTRAVENOUS at 02:12

## 2018-12-13 RX ADMIN — SODIUM CHLORIDE: 0.9 INJECTION, SOLUTION INTRAVENOUS at 02:12

## 2018-12-13 RX ADMIN — FENTANYL CITRATE 50 MCG: 50 INJECTION, SOLUTION INTRAMUSCULAR; INTRAVENOUS at 03:12

## 2018-12-13 RX ADMIN — GLYCOPYRROLATE 0.4 MG: 0.2 INJECTION INTRAMUSCULAR; INTRAVENOUS at 05:12

## 2018-12-13 RX ADMIN — VANCOMYCIN HYDROCHLORIDE 1250 MG: 10 INJECTION, POWDER, LYOPHILIZED, FOR SOLUTION INTRAVENOUS at 02:12

## 2018-12-13 RX ADMIN — PROPOFOL 180 MG: 10 INJECTION, EMULSION INTRAVENOUS at 02:12

## 2018-12-13 RX ADMIN — GENTAMICIN SULFATE 240 MG: 40 INJECTION, SOLUTION INTRAMUSCULAR; INTRAVENOUS at 03:12

## 2018-12-13 RX ADMIN — ATORVASTATIN CALCIUM 40 MG: 10 TABLET, FILM COATED ORAL at 09:12

## 2018-12-13 RX ADMIN — FENTANYL CITRATE 50 MCG: 50 INJECTION, SOLUTION INTRAMUSCULAR; INTRAVENOUS at 02:12

## 2018-12-13 RX ADMIN — ESMOLOL HYDROCHLORIDE 20 MG: 10 INJECTION INTRAVENOUS at 03:12

## 2018-12-13 RX ADMIN — CARVEDILOL 3.12 MG: 3.12 TABLET, FILM COATED ORAL at 09:12

## 2018-12-13 RX ADMIN — FENTANYL CITRATE 25 MCG: 50 INJECTION, SOLUTION INTRAMUSCULAR; INTRAVENOUS at 05:12

## 2018-12-13 RX ADMIN — ACETAMINOPHEN 1000 MG: 10 INJECTION, SOLUTION INTRAVENOUS at 03:12

## 2018-12-13 RX ADMIN — SODIUM CHLORIDE, SODIUM GLUCONATE, SODIUM ACETATE, POTASSIUM CHLORIDE, MAGNESIUM CHLORIDE, SODIUM PHOSPHATE, DIBASIC, AND POTASSIUM PHOSPHATE: .53; .5; .37; .037; .03; .012; .00082 INJECTION, SOLUTION INTRAVENOUS at 05:12

## 2018-12-13 RX ADMIN — ONDANSETRON 4 MG: 2 INJECTION INTRAMUSCULAR; INTRAVENOUS at 04:12

## 2018-12-13 RX ADMIN — NEOSTIGMINE METHYLSULFATE 3 MG: 1 INJECTION INTRAVENOUS at 05:12

## 2018-12-13 RX ADMIN — ROCURONIUM BROMIDE 40 MG: 10 INJECTION, SOLUTION INTRAVENOUS at 02:12

## 2018-12-13 RX ADMIN — SODIUM CHLORIDE, SODIUM GLUCONATE, SODIUM ACETATE, POTASSIUM CHLORIDE, MAGNESIUM CHLORIDE, SODIUM PHOSPHATE, DIBASIC, AND POTASSIUM PHOSPHATE: .53; .5; .37; .037; .03; .012; .00082 INJECTION, SOLUTION INTRAVENOUS at 03:12

## 2018-12-13 NOTE — TRANSFER OF CARE
"Anesthesia Transfer of Care Note    Patient: Mario Sanford    Procedure(s) Performed: Procedure(s) (LRB):  TURBT (TRANSURETHRAL RESECTION OF BLADDER TUMOR) (N/A)  TURP, WITHOUT USING LASER (N/A)  CYSTOLITHOLAPAXY    Patient location: [facility]    Anesthesia Type: general    Transport from OR: Transported from OR on room air with adequate spontaneous ventilation    Post pain: adequate analgesia    Post assessment: no apparent anesthetic complications    Post vital signs: stable    Level of consciousness: awake    Nausea/Vomiting: no nausea/vomiting    Complications: none    Transfer of care protocol was followed      Last vitals:   Visit Vitals  /78 (BP Location: Left arm, Patient Position: Lying)   Pulse 101   Temp 37 °C (98.6 °F) (Oral)   Resp 16   Ht 5' 6" (1.676 m)   Wt 77.1 kg (170 lb)   SpO2 98%   BMI 27.44 kg/m²     "

## 2018-12-13 NOTE — INTERVAL H&P NOTE
The patient has been examined and the H&P has been reviewed:    I concur with the findings and changes have been noted since the H&P was written:       Cystoscopy demonstrated bladder tumor vs stone. He has been taking macrobid for positive urine culture. Urine negative for blood, nitrites, and leukocytes today. To OR for today    Anesthesia/Surgery risks, benefits and alternative options discussed and understood by patient/family.          Active Hospital Problems    Diagnosis  POA    Lesion of bladder [N32.9]  Yes    Bladder tumor [D49.4]  Yes      Resolved Hospital Problems   No resolved problems to display.

## 2018-12-13 NOTE — OP NOTE
[facility]  Operative Note    Date: 12/13/2018    Pre-Op Diagnosis: BPH with bladder outlet obstruction    Post-Op Diagnosis: same    Procedure(s) Performed:   TURP  Cystolithalopaxy  TURBT    Specimen(s):   prostate chips and bladder neck  Bladder stone    Staff Surgeon: Neil Thornton MD    Assistant Surgeon: Kirsten Dailey MD, Sammy Bone MD    Anesthesia: General endotracheal anesthesia    Indications: Mario Sanford is a 63 y.o. male with a history of gross hematuria, urinary retention. Not currently needing a hill catheter and is on flomax daily. His hematuria work-up revealed a concerning bladder mass at the anterior bladder wall near the bladder neck with significant calcifications. We discussed all possibilities for these findings as well as all treatment options. They voiced their understanding and agreed to proceed with TURBT, possible TURP, and all other indicated procedures.     Findings:   Bladder with significant diverticula throughout. Both UOs visualized and well away from the prostate and bladder mass/prostate lobe.     This mass appeared to originate from the mid prostate urethra    A Layer of stone was sitting on top of the prostate/lobe or mass. All of this stone debris was removed    Significant trilobar hyperplasia    Frozen section intra-op was negative for malignancy    After resection of the mass, a TURP was performed.    Estimated Blood Loss: minimal    Drains: 24 Fr hill catheter- 3 way with 60 cc in the balloon on traction    Procedure in detail:  After the risks and benefits of the procedure were explained, consent was obtained, and the patient was taken to the operating suite and placed in the supine position.  Anesthesia was administered.  When adequately sedated, the patient was placed in dorsal lithotomy position and prepped and draped in normal sterile fashion.  Timeout was performed and preoperative ABx were confirmed.      A 24-Danish sheath resectoscope was  "placed into the bladder using a visual obturator. The visual obturator was exchanged for the resecting mechanism. The ureteral orifices were identified. See "findings" above for details. Once this mass was resected and frozen section was negative, we then turned our attention to the left lateral lobe of the prostate.The right lateral lobe was then resected in a stepwise fashion from 7 o'clock to 12 o'clock with care to leave the verumontanum and sphincter intact. Once this was performed we directed our attention to the right lobe of the prostate and again the lateral lobe was resected from the 5 o'clock to the 12 o'clock position. Hemostasis was then achieved.    The ureteral orifices, verumontanum and sphincter were intact. The Printed Piece evacuator was used to remove all prostate chips and bladder stones and again hemostasis was obtained.     Once the bladder was drained, we could see that he had an open channel and the scope was removed.  A 24 Fr 3-way catheter was placed in the bladder with 60 mL of water in the balloon. The patient was placed on traction and on CBI. The patient was transferred to recovery in stable condition.     Disposition: The patient will remain on the urology service overnight for observation and CBI will be titrated.    Outpatient plan: Alonzo removal on Tuesday. Return to resident clinic in 1 month. Continue flomax. Discuss Finasteride as an outpatient.     Kirsten Dailey MD    As the attending surgeon, Dr. Thornton was present for and performed all key aspects of the operation.    "

## 2018-12-13 NOTE — ANESTHESIA PREPROCEDURE EVALUATION
12/13/2018  Mario Sanford is a 63 y.o., male.    Anesthesia Evaluation    I have reviewed the Patient Summary Reports.     I have reviewed the Medications.     Review of Systems  Anesthesia Hx:  No problems with previous Anesthesia    Hematology/Oncology:  Hematology Normal        Cardiovascular:   Hypertension, well controlled  Denies Angina.        Pulmonary:  Pulmonary Normal    Hepatic/GI:   GERD, well controlled    Endocrine:   Diabetes, well controlled, type 2        Physical Exam   Airway/Jaw/Neck:  Airway Findings: Mouth Opening: Normal Tongue: Normal  General Airway Assessment: Adult  Mallampati: II  TM Distance: Normal, at least 6 cm  Jaw/Neck Findings:  Neck ROM: Normal ROM      Dental:  Dental Findings:    Chest/Lungs:  Chest/Lungs Findings: Clear to auscultation, Normal Respiratory Rate     Heart/Vascular:  Heart Findings: Rate: Normal  Rhythm: Regular Rhythm  Sounds: Normal             Anesthesia Plan  Type of Anesthesia, risks & benefits discussed:  Anesthesia Type:  general  Patient's Preference:   Intra-op Monitoring Plan: standard ASA monitors  Intra-op Monitoring Plan Comments:   Post Op Pain Control Plan:   Post Op Pain Control Plan Comments:   Induction:   IV  Beta Blocker:  Patient is not currently on a Beta-Blocker (No further documentation required).       Informed Consent: Patient understands risks and agrees with Anesthesia plan.  Questions answered. Anesthesia consent signed with patient.  ASA Score: 3     Day of Surgery Review of History & Physical:    H&P update referred to the surgeon.         Ready For Surgery From Anesthesia Perspective.

## 2018-12-14 VITALS
TEMPERATURE: 99 F | RESPIRATION RATE: 20 BRPM | HEIGHT: 66 IN | DIASTOLIC BLOOD PRESSURE: 77 MMHG | HEART RATE: 94 BPM | WEIGHT: 170 LBS | SYSTOLIC BLOOD PRESSURE: 128 MMHG | BODY MASS INDEX: 27.32 KG/M2 | OXYGEN SATURATION: 95 %

## 2018-12-14 LAB
ANION GAP SERPL CALC-SCNC: 7 MMOL/L
BASOPHILS # BLD AUTO: 0.06 K/UL
BASOPHILS NFR BLD: 0.5 %
BUN SERPL-MCNC: 23 MG/DL
CALCIUM SERPL-MCNC: 8.3 MG/DL
CHLORIDE SERPL-SCNC: 106 MMOL/L
CO2 SERPL-SCNC: 24 MMOL/L
CREAT SERPL-MCNC: 1.3 MG/DL
DIFFERENTIAL METHOD: ABNORMAL
EOSINOPHIL # BLD AUTO: 0.1 K/UL
EOSINOPHIL NFR BLD: 0.9 %
ERYTHROCYTE [DISTWIDTH] IN BLOOD BY AUTOMATED COUNT: 14.6 %
EST. GFR  (AFRICAN AMERICAN): >60 ML/MIN/1.73 M^2
EST. GFR  (NON AFRICAN AMERICAN): 57.3 ML/MIN/1.73 M^2
GLUCOSE SERPL-MCNC: 129 MG/DL
HCT VFR BLD AUTO: 36.4 %
HGB BLD-MCNC: 11.6 G/DL
IMM GRANULOCYTES # BLD AUTO: 0.06 K/UL
IMM GRANULOCYTES NFR BLD AUTO: 0.5 %
LYMPHOCYTES # BLD AUTO: 2.7 K/UL
LYMPHOCYTES NFR BLD: 22.6 %
MAGNESIUM SERPL-MCNC: 1.8 MG/DL
MCH RBC QN AUTO: 29 PG
MCHC RBC AUTO-ENTMCNC: 31.9 G/DL
MCV RBC AUTO: 91 FL
MONOCYTES # BLD AUTO: 1 K/UL
MONOCYTES NFR BLD: 8.5 %
NEUTROPHILS # BLD AUTO: 7.9 K/UL
NEUTROPHILS NFR BLD: 67 %
NRBC BLD-RTO: 0 /100 WBC
PHOSPHATE SERPL-MCNC: 3.8 MG/DL
PLATELET # BLD AUTO: 263 K/UL
PMV BLD AUTO: 9.8 FL
POCT GLUCOSE: 111 MG/DL (ref 70–110)
POCT GLUCOSE: 137 MG/DL (ref 70–110)
POCT GLUCOSE: 155 MG/DL (ref 70–110)
POCT GLUCOSE: 159 MG/DL (ref 70–110)
POTASSIUM SERPL-SCNC: 4.2 MMOL/L
RBC # BLD AUTO: 4 M/UL
SODIUM SERPL-SCNC: 137 MMOL/L
WBC # BLD AUTO: 11.75 K/UL

## 2018-12-14 PROCEDURE — 85025 COMPLETE CBC W/AUTO DIFF WBC: CPT

## 2018-12-14 PROCEDURE — 84100 ASSAY OF PHOSPHORUS: CPT

## 2018-12-14 PROCEDURE — 80048 BASIC METABOLIC PNL TOTAL CA: CPT

## 2018-12-14 PROCEDURE — 25000003 PHARM REV CODE 250: Performed by: UROLOGY

## 2018-12-14 PROCEDURE — 36415 COLL VENOUS BLD VENIPUNCTURE: CPT

## 2018-12-14 PROCEDURE — 83735 ASSAY OF MAGNESIUM: CPT

## 2018-12-14 PROCEDURE — 63600175 PHARM REV CODE 636 W HCPCS: Performed by: UROLOGY

## 2018-12-14 RX ORDER — POLYETHYLENE GLYCOL 3350 17 G/17G
17 POWDER, FOR SOLUTION ORAL DAILY
Qty: 30 PACKET | Refills: 0 | Status: SHIPPED | OUTPATIENT
Start: 2018-12-14

## 2018-12-14 RX ORDER — OXYCODONE AND ACETAMINOPHEN 5; 325 MG/1; MG/1
1 TABLET ORAL EVERY 4 HOURS PRN
Qty: 11 TABLET | Refills: 0 | Status: SHIPPED | OUTPATIENT
Start: 2018-12-14 | End: 2019-01-07

## 2018-12-14 RX ORDER — HYOSCYAMINE SULFATE 0.125 MG
125 TABLET ORAL EVERY 4 HOURS PRN
Qty: 21 TABLET | Refills: 0 | Status: SHIPPED | OUTPATIENT
Start: 2018-12-14 | End: 2019-01-07

## 2018-12-14 RX ADMIN — TAMSULOSIN HYDROCHLORIDE 0.4 MG: 0.4 CAPSULE ORAL at 09:12

## 2018-12-14 RX ADMIN — PANTOPRAZOLE SODIUM 40 MG: 40 TABLET, DELAYED RELEASE ORAL at 09:12

## 2018-12-14 RX ADMIN — CARVEDILOL 3.12 MG: 3.12 TABLET, FILM COATED ORAL at 09:12

## 2018-12-14 RX ADMIN — ACETAMINOPHEN 1000 MG: 10 INJECTION, SOLUTION INTRAVENOUS at 12:12

## 2018-12-14 RX ADMIN — NITROFURANTOIN MONOHYDRATE/MACROCRYSTALLINE 100 MG: 25; 75 CAPSULE ORAL at 09:12

## 2018-12-14 RX ADMIN — POLYETHYLENE GLYCOL 3350 17 G: 17 POWDER, FOR SOLUTION ORAL at 09:12

## 2018-12-14 RX ADMIN — INSULIN ASPART 1 UNITS: 100 INJECTION, SOLUTION INTRAVENOUS; SUBCUTANEOUS at 12:12

## 2018-12-14 RX ADMIN — ACETAMINOPHEN 1000 MG: 10 INJECTION, SOLUTION INTRAVENOUS at 05:12

## 2018-12-14 NOTE — PLAN OF CARE
Problem: Adult Inpatient Plan of Care  Goal: Plan of Care Review  Outcome: Ongoing (interventions implemented as appropriate)  Pt. VSS, pt. Ambulated in peres once overnight.  CBI remains in place with pale pink urine draining.  Pain is well managed with current regimen, will continue to monitor.

## 2018-12-14 NOTE — DISCHARGE SUMMARY
[facility]  Urology  Discharge Summary      Patient Name: Mario Sanford  MRN: [id number]  Admission Date: 12/13/2018  Hospital Length of Stay: 0 days  Discharge Date and Time: 12/14/2018  1:44 PM  Attending Physician: No att. providers found   Discharging Provider: Simeon Moctezuma MD  Primary Care Physician: Bereket Rivera DO    HPI:   Mario Sanford is a 63 y.o. male with a history of obesity, HTN, HLD, and type 2 DM who presents to clinic for worsening LUTS and gross hematuria.     He was hospitalized in 7/2018 for a duodenal ulcer in which he experienced urinary retention requiring intermittent straight caths. He was started on Flomax 0.8 mg qd while hospitalized. He was subsequently seen in resident clinic following discharge.     He has had no further issues with urination and has not had to self cath up until 4 days ago. He has felt bladder fullness and intermittently has had to self cath although with only 100 cc output each time. He endorses nocturia x4, straining, decreased FOS, and intermittency. Denies dysuria, dribbling, and urinary incontinence. Has noted an episode of gross hematuria a few days ago.     Most recent PSA 1.6.         Procedure(s) (LRB):  TURBT (TRANSURETHRAL RESECTION OF BLADDER TUMOR) (N/A)  TURP, WITHOUT USING LASER (N/A)  CYSTOLITHOLAPAXY     Indwelling Lines/Drains at time of discharge:   Lines/Drains/Airways     Drain                 Urethral Catheter 12/13/18 1714 Latex;Triple-lumen 24 Fr. less than 1 day                Hospital Course     Patient underwent TURBT and TURP of prostatic abnormality and tolerated the procedure well and 60ml were placed in the balloon. He tolerated a regular diet, and ambulated on POD 0. His CBI was clamped this morning and his urine was light pink. 30ml were removed from the balloon and he was taken off of traction. His catheter was rechecked several hours later and it remained clear. He was discharged tolerating a regular diet,  ambulating, with his pain well controlled on oral pain medications    Consults: none    Significant Diagnostic Studies: none     Pending Diagnostic Studies:     None          Final Active Diagnoses:    Diagnosis Date Noted POA    Lesion of bladder [N32.9] 12/13/2018 Yes    Bladder tumor [D49.4] 12/13/2018 Yes      Problems Resolved During this Admission:       Discharged Condition: good    Disposition: Home or Self Care    Follow Up:  Follow-up Information     [facility] In 1 week.    Why:  for voiding trial               Patient Instructions:      Notify your health care provider if you experience any of the following:  temperature >100.4     Notify your health care provider if you experience any of the following:  persistent nausea and vomiting or diarrhea     Notify your health care provider if you experience any of the following:  severe uncontrolled pain     Notify your health care provider if you experience any of the following:  difficulty breathing or increased cough     Notify your health care provider if you experience any of the following:  severe persistent headache     Notify your health care provider if you experience any of the following:  worsening rash     Notify your health care provider if you experience any of the following:  persistent dizziness, light-headedness, or visual disturbances     Notify your health care provider if you experience any of the following:  increased confusion or weakness     Medications:  Reconciled Home Medications:      Medication List      START taking these medications    hyoscyamine 0.125 mg Tab  Commonly known as:  ANASPAZ,LEVSIN  Take 1 tablet (125 mcg total) by mouth every 4 (four) hours as needed (bladder spasms).     oxyCODONE-acetaminophen 5-325 mg per tablet  Commonly known as:  PERCOCET  Take 1 tablet by mouth every 4 (four) hours as needed.     polyethylene glycol 17 gram Pwpk  Commonly known as:  GLYCOLAX  Take 17 g by mouth once  daily.        CONTINUE taking these medications    atorvastatin 40 MG tablet  Commonly known as:  LIPITOR  Take 1 tablet (40 mg total) by mouth nightly.     blood sugar diagnostic Strp  1 strip by Misc.(Non-Drug; Combo Route) route once daily. Test strips for glucometer insurance covers to check blood sugar twice daily.     blood-glucose meter Misc  Glucometer that insurance covers to check blood sugar twice daily     carvedilol 3.125 MG tablet  Commonly known as:  COREG  TAKE 1 TABLET (3.125 MG TOTAL) BY MOUTH 2 (TWO) TIMES DAILY.     glimepiride 2 MG tablet  Commonly known as:  AMARYL  Take 1 tablet (2 mg total) by mouth before breakfast.     lancets Misc  1 Device by Misc.(Non-Drug; Combo Route) route once daily. Lancets for glucometer insurance covers to check blood sugar twice daily.     nitrofurantoin (macrocrystal-monohydrate) 100 MG capsule  Commonly known as:  MACROBID  Take 1 capsule (100 mg total) by mouth 2 (two) times daily. for 7 days     pantoprazole 40 MG tablet  Commonly known as:  PROTONIX  Take 40 mg by mouth once daily.     tamsulosin 0.4 mg Cap  Commonly known as:  FLOMAX  Take 2 capsules (0.8 mg total) by mouth once daily.            Time spent on the discharge of patient: 25 minutes    Simeon Moctezuma MD  Urology  [facility]

## 2018-12-14 NOTE — ANESTHESIA POSTPROCEDURE EVALUATION
"Anesthesia Post Evaluation    Patient: Mairo Sanford    Procedure(s) Performed: Procedure(s) (LRB):  TURBT (TRANSURETHRAL RESECTION OF BLADDER TUMOR) (N/A)  TURP, WITHOUT USING LASER (N/A)  CYSTOLITHOLAPAXY    Final Anesthesia Type: general  Patient location during evaluation: PACU  Patient participation: Yes- Able to Participate  Level of consciousness: awake and alert  Post-procedure vital signs: reviewed and stable  Pain management: adequate  Airway patency: patent  PONV status at discharge: No PONV  Anesthetic complications: no      Cardiovascular status: blood pressure returned to baseline and hemodynamically stable  Respiratory status: unassisted, spontaneous ventilation and room air  Hydration status: euvolemic  Follow-up not needed.        Visit Vitals  BP (!) 159/70   Pulse 88   Temp 36.3 °C (97.3 °F) (Temporal)   Resp 17   Ht 5' 6" (1.676 m)   Wt 77.1 kg (170 lb)   SpO2 (!) 94%   BMI 27.44 kg/m²       Pain/Renate Score: No Data Recorded      "

## 2018-12-14 NOTE — ASSESSMENT & PLAN NOTE
Patient underwent TURP yesterday    Frozen section, negative for malignancy  CBI clamped, urine clear, 30ml in balloon  Ambulate this AM  IVF Hep lock  PO pain meds  Regular diet  DVT ppx: RISHIs, [place]

## 2018-12-14 NOTE — PROGRESS NOTES
[facility]  Urology  Progress Note    Patient Name: Mario Sanford  MRN: [id number]  Admission Date: 12/13/2018  Hospital Length of Stay: 0 days  Code Status: Prior   Attending Provider: Neil Thornton MD   Primary Care Physician: Bereket Rivera DO    Subjective:     HPI:  No notes on file    Interval History:   No acute events overnight  Urine clear off of CBI  30ml out of balloon, off of traction    Review of Systems  Objective:     Temp:  [97.3 °F (36.3 °C)-98.6 °F (37 °C)] 98.3 °F (36.8 °C)  Pulse:  [] 93  Resp:  [15-21] 18  SpO2:  [94 %-100 %] 96 %  BP: (118-164)/(64-81) 118/64     Body mass index is 27.44 kg/m².            Drains     Drain                 Urethral Catheter 12/13/18 1714 Latex;Triple-lumen 24 Fr. less than 1 day                Physical Exam   Nursing note and vitals reviewed.  Constitutional: He is oriented to person, place, and time. He appears well-developed. No distress.   HENT:   Head: Normocephalic and atraumatic.   Eyes: Pupils are equal, round, and reactive to light. Right eye exhibits no discharge. Left eye exhibits no discharge.   Neck: Normal range of motion.   Cardiovascular: Normal rate and intact distal pulses.    Pulmonary/Chest: Effort normal.   Abdominal: Soft. He exhibits no distension.   Genitourinary:   Genitourinary Comments: Catheter off of traction  30ml in balloon  urine clear   Musculoskeletal: Normal range of motion.   Neurological: He is alert and oriented to person, place, and time.   Skin: Skin is warm and dry. He is not diaphoretic.     Psychiatric: He has a normal mood and affect. His behavior is normal. Judgment and thought content normal.       Significant Labs:    BMP:  Recent Labs   Lab 12/13/18  1800 12/14/18  0505    137   K 3.6 4.2    106   CO2 25 24   BUN 23 23   CREATININE 1.2 1.3   CALCIUM 8.4* 8.3*       CBC:   Recent Labs   Lab 12/13/18  1800 12/14/18  0505   WBC 11.23 11.75   HGB 12.3* 11.6*   HCT 38.3* 36.4*     263       All pertinent labs results from the past 24 hours have been reviewed.    Significant Imaging:  All pertinent imaging results/findings from the past 24 hours have been reviewed.                  Assessment/Plan:     Bladder tumor    Patient underwent TURP yesterday    Frozen section, negative for malignancy  CBI clamped, urine clear, 30ml in balloon  Ambulate this AM  IVF Hep lock  PO pain meds  Regular diet  DVT ppx: SCDs, TEDs           VTE Risk Mitigation (From admission, onward)        Ordered     Place JOAO hose  Until discontinued      12/13/18 1733     Place sequential compression device  Until discontinued      12/13/18 1733     IP VTE HIGH RISK PATIENT  Once      12/13/18 1733     Place JOAO hose  Until discontinued      12/13/18 1250     Place sequential compression device  Until discontinued      12/13/18 1250          Simeon Moctezuma MD  Urology  [facility]

## 2018-12-14 NOTE — NURSING TRANSFER
Nursing Transfer Note      12/13/2018     Transfer To: Rm 544A From [facility]    Transfer via stretcher    Transfer with catheter    Transported by pct    Medicines sent: CBI bags    Chart send with patient: Yes    Notified: spouse    Patient reassessed at:1900 12/13/18    Upon arrival to floor: Rosa COOPER

## 2018-12-14 NOTE — ANESTHESIA RELEASE NOTE
"Anesthesia Release from PACU Note    Patient: Mario Sanford    Procedure(s) Performed: Procedure(s) (LRB):  TURBT (TRANSURETHRAL RESECTION OF BLADDER TUMOR) (N/A)  TURP, WITHOUT USING LASER (N/A)  CYSTOLITHOLAPAXY    Anesthesia type: general    Post pain: Adequate analgesia    Post assessment: no apparent anesthetic complications, tolerated procedure well and no evidence of recall    Last Vitals:   Visit Vitals  BP (!) 159/70   Pulse 88   Temp 36.3 °C (97.3 °F) (Temporal)   Resp 17   Ht 5' 6" (1.676 m)   Wt 77.1 kg (170 lb)   SpO2 (!) 94%   BMI 27.44 kg/m²       Post vital signs: stable    Level of consciousness: awake    Nausea/Vomiting: no nausea/no vomiting    Complications: none    Airway Patency: patent    Respiratory: unassisted    Cardiovascular: stable and blood pressure at baseline    Hydration: euvolemic  "

## 2018-12-14 NOTE — PROGRESS NOTES
Discharge Note: Pt discharged home alert and oriented x4, skin warm to touch.hill intact and patent draining yellow color urine. Pt and wife verbalized understanding  Of discharge teaching.

## 2018-12-14 NOTE — SUBJECTIVE & OBJECTIVE
Interval History:   No acute events overnight  Urine clear off of CBI  30ml out of balloon, off of traction    Review of Systems  Objective:     Temp:  [97.3 °F (36.3 °C)-98.6 °F (37 °C)] 98.3 °F (36.8 °C)  Pulse:  [] 93  Resp:  [15-21] 18  SpO2:  [94 %-100 %] 96 %  BP: (118-164)/(64-81) 118/64     Body mass index is 27.44 kg/m².            Drains     Drain                 Urethral Catheter 12/13/18 1714 Latex;Triple-lumen 24 Fr. less than 1 day                Physical Exam   Nursing note and vitals reviewed.  Constitutional: He is oriented to person, place, and time. He appears well-developed. No distress.   HENT:   Head: Normocephalic and atraumatic.   Eyes: Pupils are equal, round, and reactive to light. Right eye exhibits no discharge. Left eye exhibits no discharge.   Neck: Normal range of motion.   Cardiovascular: Normal rate and intact distal pulses.    Pulmonary/Chest: Effort normal.   Abdominal: Soft. He exhibits no distension.   Genitourinary:   Genitourinary Comments: Catheter off of traction  30ml in balloon  urine clear   Musculoskeletal: Normal range of motion.   Neurological: He is alert and oriented to person, place, and time.   Skin: Skin is warm and dry. He is not diaphoretic.     Psychiatric: He has a normal mood and affect. His behavior is normal. Judgment and thought content normal.       Significant Labs:    BMP:  Recent Labs   Lab 12/13/18  1800 12/14/18  0505    137   K 3.6 4.2    106   CO2 25 24   BUN 23 23   CREATININE 1.2 1.3   CALCIUM 8.4* 8.3*       CBC:   Recent Labs   Lab 12/13/18  1800 12/14/18  0505   WBC 11.23 11.75   HGB 12.3* 11.6*   HCT 38.3* 36.4*    263       All pertinent labs results from the past 24 hours have been reviewed.    Significant Imaging:  All pertinent imaging results/findings from the past 24 hours have been reviewed.

## 2018-12-17 ENCOUNTER — PATIENT MESSAGE (OUTPATIENT)
Dept: UROLOGY | Facility: CLINIC | Age: 63
End: 2018-12-17

## 2018-12-21 ENCOUNTER — OFFICE VISIT (OUTPATIENT)
Dept: UROLOGY | Facility: CLINIC | Age: 63
End: 2018-12-21
Payer: MEDICARE

## 2018-12-21 VITALS
SYSTOLIC BLOOD PRESSURE: 125 MMHG | DIASTOLIC BLOOD PRESSURE: 77 MMHG | BODY MASS INDEX: 27.32 KG/M2 | WEIGHT: 170 LBS | HEIGHT: 66 IN | HEART RATE: 105 BPM

## 2018-12-21 DIAGNOSIS — Z90.79 S/P TURP: ICD-10-CM

## 2018-12-21 PROBLEM — N40.1 BPH WITH URINARY OBSTRUCTION: Status: RESOLVED | Noted: 2018-07-09 | Resolved: 2018-12-21

## 2018-12-21 PROBLEM — N13.8 BPH WITH URINARY OBSTRUCTION: Status: RESOLVED | Noted: 2018-07-09 | Resolved: 2018-12-21

## 2018-12-21 PROBLEM — R31.0 GROSS HEMATURIA: Status: RESOLVED | Noted: 2018-11-30 | Resolved: 2018-12-21

## 2018-12-21 PROBLEM — R31.9 HEMATURIA: Status: RESOLVED | Noted: 2018-07-02 | Resolved: 2018-12-21

## 2018-12-21 PROBLEM — N39.0 COMPLICATED URINARY TRACT INFECTION: Status: RESOLVED | Noted: 2018-11-30 | Resolved: 2018-12-21

## 2018-12-21 PROBLEM — R33.9 URINARY RETENTION: Status: RESOLVED | Noted: 2018-07-09 | Resolved: 2018-12-21

## 2018-12-21 PROBLEM — N32.9 LESION OF BLADDER: Status: RESOLVED | Noted: 2018-12-13 | Resolved: 2018-12-21

## 2018-12-21 PROCEDURE — 99999 PR PBB SHADOW E&M-EST. PATIENT-LVL III: CPT | Mod: PBBFAC,,,

## 2018-12-21 PROCEDURE — 99024 POSTOP FOLLOW-UP VISIT: CPT | Mod: S$GLB,,, | Performed by: UROLOGY

## 2018-12-21 NOTE — PROGRESS NOTES
Urology - [facility]  Resident Clinic  Staff - Charli Curiel MD    SUBJECTIVE:     Chief Complaint: LUTS, gross hematuria    History of Present Illness:  Mario Sanford is a 63 y.o. male with a history of obesity, HTN, HLD, and type 2 DM who presents to clinic s/p TURP on 12/13/18.     He removed his catheter at home and was able to void without issue. He feels like his urinary stream is excellent now. No hematuria or dysuria. He complains of occasional urgency with minimal leakage of urine. No stress incontinence. Overall not too bothered by this.    Review of patient's allergies indicates:  No Known Allergies    Past Medical History:   Diagnosis Date    Diabetes mellitus     Dilated cbd, acquired     Duodenal diverticulum     Duodenal fistula     choledochoduodenal    Duodenal ulcer     Esophagitis     Hiatal hernia     Hyperlipidemia     Hypertension     Melena     Nodule of esophagus     Obesity (BMI 30-39.9)      Past Surgical History:   Procedure Laterality Date    COLONOSCOPY N/A 6/19/2018    Performed by Moris Whalen MD at [facility] ENDO (4TH FLR)    CYSTOLITHOLAPAXY  12/13/2018    Performed by Neil Thornton MD at [facility] OR 1ST FLR    EGD (ESOPHAGOGASTRODUODENOSCOPY) N/A 8/10/2018    Performed by Watson Glass MD at [facility] ENDO (2ND FLR)    EGD (ESOPHAGOGASTRODUODENOSCOPY) N/A 7/6/2018    Performed by Newton Iglesias MD at [facility] ENDO (2ND FLR)    EGD (ESOPHAGOGASTRODUODENOSCOPY) N/A 7/3/2018    Performed by Newton Iglesias MD at [facility] ENDO (2ND FLR)    TONSILLECTOMY      TURBT (TRANSURETHRAL RESECTION OF BLADDER TUMOR) N/A 12/13/2018    Performed by Neil Thornton MD at [facility] OR 1ST FLR    TURP, WITHOUT USING LASER N/A 12/13/2018    Performed by Neil Thornton MD at [facility] OR 1ST FLR    ULTRASOUND, ENDOSCOPIC, UPPER GI TRACT N/A 8/10/2018    Performed by Watson Glass MD at [facility] END[facility] (2ND FLR)    UPPER ENDOSCOPIC ULTRASOUND W/ FNA  08/10/2018    without FNA     Family  "History   Problem Relation Age of Onset    Heart disease Mother     Hypertension Mother     Hyperlipidemia Mother     Diabetes Father     Hypertension Father     Hyperlipidemia Sister     Cancer Neg Hx     Stroke Neg Hx      Social History     Tobacco Use    Smoking status: Never Smoker    Smokeless tobacco: Never Used   Substance Use Topics    Alcohol use: No    Drug use: No        Review of Systems   Constitutional: Negative for chills and fever.   HENT: Negative.    Eyes: Negative.    Respiratory: Negative for chest tightness and shortness of breath.    Cardiovascular: Negative for chest pain and palpitations.   Gastrointestinal: Negative for abdominal pain, nausea and vomiting.   Genitourinary: Positive for urgency. Negative for difficulty urinating, dysuria, hematuria and nocturia.   Musculoskeletal: Negative for back pain and joint swelling.   Skin: Negative for rash and wound.   Neurological: Negative for seizures and numbness.   Hematological: Negative for adenopathy. Does not bruise/bleed easily.   Psychiatric/Behavioral: Negative for hallucinations. The patient is not nervous/anxious.        OBJECTIVE:     Estimated body mass index is 27.44 kg/m² as calculated from the following:    Height as of this encounter: 5' 6" (1.676 m).    Weight as of this encounter: 77.1 kg (170 lb).    Vital Signs (Most Recent)  Pulse: 105 (12/21/18 1250)  BP: 125/77 (12/21/18 1250)    Physical Exam   Constitutional: He is oriented to person, place, and time. He appears well-developed. No distress.   HENT:   Head: Normocephalic and atraumatic.   Eyes: No scleral icterus.   Pulmonary/Chest: Effort normal. No respiratory distress.   Abdominal: Soft. He exhibits no distension and no mass. There is no tenderness. There is no rebound and no guarding.   Neurological: He is alert and oriented to person, place, and time.   Skin: Skin is warm and dry.     Psychiatric: He has a normal mood and affect. His behavior is normal. " Judgment and thought content normal.     BMP  Lab Results   Component Value Date     12/14/2018    K 4.2 12/14/2018     12/14/2018    CO2 24 12/14/2018    BUN 23 12/14/2018    CREATININE 1.3 12/14/2018    CALCIUM 8.3 (L) 12/14/2018    ANIONGAP 7 (L) 12/14/2018    ESTGFRAFRICA >60.0 12/14/2018    EGFRNONAA 57.3 (A) 12/14/2018       Lab Results   Component Value Date    WBC 11.75 12/14/2018    HGB 11.6 (L) 12/14/2018    HCT 36.4 (L) 12/14/2018    MCV 91 12/14/2018     12/14/2018       Lab Results   Component Value Date    PSA 1.6 05/25/2018     Urine dipstick today 1+ leuks, negative nitrite, and 2+ blood.    ASSESSMENT     1. S/P TURP      PLAN:     1) BPH with LUTS s/p TURP  - Continue tamsulosin 0.8 mg until completion, then stop  - encouraged Kegel exercises  - F/u in 6 months or sooner PRN    2) Prostate cancer screening  -Continue annual PSA screening via PCP    Sammy Bone MD

## 2018-12-28 LAB
GLUCOSE SERPL-MCNC: 166 MG/DL (ref 70–110)
HCO3 UR-SCNC: 22.9 MMOL/L (ref 24–28)
HCT VFR BLD CALC: 37 %PCV (ref 36–54)
PCO2 BLDA: 31.5 MMHG (ref 35–45)
PH SMN: 7.47 [PH] (ref 7.35–7.45)
PO2 BLDA: 119 MMHG (ref 40–60)
POC BE: -1 MMOL/L
POC IONIZED CALCIUM: 1.04 MMOL/L (ref 1.06–1.42)
POC SATURATED O2: 99 % (ref 95–100)
POC TCO2: 24 MMOL/L (ref 24–29)
POTASSIUM BLD-SCNC: 4 MMOL/L (ref 3.5–5.1)
SAMPLE: ABNORMAL
SODIUM BLD-SCNC: 136 MMOL/L (ref 136–145)

## 2019-01-07 ENCOUNTER — OFFICE VISIT (OUTPATIENT)
Dept: INTERNAL MEDICINE | Facility: CLINIC | Age: 64
End: 2019-01-07
Payer: MEDICARE

## 2019-01-07 VITALS
HEART RATE: 93 BPM | SYSTOLIC BLOOD PRESSURE: 110 MMHG | DIASTOLIC BLOOD PRESSURE: 62 MMHG | HEIGHT: 66 IN | BODY MASS INDEX: 27.67 KG/M2 | WEIGHT: 172.19 LBS | TEMPERATURE: 99 F | RESPIRATION RATE: 18 BRPM

## 2019-01-07 DIAGNOSIS — E11.42 TYPE 2 DIABETES MELLITUS WITH DIABETIC POLYNEUROPATHY, WITH LONG-TERM CURRENT USE OF INSULIN: ICD-10-CM

## 2019-01-07 DIAGNOSIS — Z79.4 TYPE 2 DIABETES MELLITUS WITH DIABETIC POLYNEUROPATHY, WITH LONG-TERM CURRENT USE OF INSULIN: ICD-10-CM

## 2019-01-07 DIAGNOSIS — E11.69 OBESITY, DIABETES, AND HYPERTENSION SYNDROME: ICD-10-CM

## 2019-01-07 DIAGNOSIS — M16.12 PRIMARY OSTEOARTHRITIS OF LEFT HIP: ICD-10-CM

## 2019-01-07 DIAGNOSIS — I10 ESSENTIAL HYPERTENSION: Primary | ICD-10-CM

## 2019-01-07 DIAGNOSIS — E11.69 HYPERLIPIDEMIA ASSOCIATED WITH TYPE 2 DIABETES MELLITUS: ICD-10-CM

## 2019-01-07 DIAGNOSIS — E78.5 HYPERLIPIDEMIA ASSOCIATED WITH TYPE 2 DIABETES MELLITUS: ICD-10-CM

## 2019-01-07 DIAGNOSIS — I15.2 OBESITY, DIABETES, AND HYPERTENSION SYNDROME: ICD-10-CM

## 2019-01-07 DIAGNOSIS — R29.898 BILATERAL LEG WEAKNESS: ICD-10-CM

## 2019-01-07 DIAGNOSIS — E11.59 OBESITY, DIABETES, AND HYPERTENSION SYNDROME: ICD-10-CM

## 2019-01-07 DIAGNOSIS — E66.9 OBESITY, DIABETES, AND HYPERTENSION SYNDROME: ICD-10-CM

## 2019-01-07 PROBLEM — Z12.5 PROSTATE CANCER SCREENING: Status: RESOLVED | Noted: 2018-11-30 | Resolved: 2019-01-07

## 2019-01-07 PROBLEM — E11.9 TYPE 2 DIABETES MELLITUS WITHOUT COMPLICATION, WITHOUT LONG-TERM CURRENT USE OF INSULIN: Status: RESOLVED | Noted: 2018-06-12 | Resolved: 2019-01-07

## 2019-01-07 PROCEDURE — 99999 PR PBB SHADOW E&M-EST. PATIENT-LVL III: ICD-10-PCS | Mod: PBBFAC,,, | Performed by: INTERNAL MEDICINE

## 2019-01-07 PROCEDURE — 3044F PR MOST RECENT HEMOGLOBIN A1C LEVEL <7.0%: ICD-10-PCS | Mod: CPTII,S$GLB,, | Performed by: INTERNAL MEDICINE

## 2019-01-07 PROCEDURE — 3008F BODY MASS INDEX DOCD: CPT | Mod: CPTII,S$GLB,, | Performed by: INTERNAL MEDICINE

## 2019-01-07 PROCEDURE — 3074F SYST BP LT 130 MM HG: CPT | Mod: CPTII,S$GLB,, | Performed by: INTERNAL MEDICINE

## 2019-01-07 PROCEDURE — 3288F PR FALLS RISK ASSESSMENT DOCUMENTED: ICD-10-PCS | Mod: CPTII,S$GLB,, | Performed by: INTERNAL MEDICINE

## 2019-01-07 PROCEDURE — 3044F HG A1C LEVEL LT 7.0%: CPT | Mod: CPTII,S$GLB,, | Performed by: INTERNAL MEDICINE

## 2019-01-07 PROCEDURE — 3074F PR MOST RECENT SYSTOLIC BLOOD PRESSURE < 130 MM HG: ICD-10-PCS | Mod: CPTII,S$GLB,, | Performed by: INTERNAL MEDICINE

## 2019-01-07 PROCEDURE — 99214 OFFICE O/P EST MOD 30 MIN: CPT | Mod: S$GLB,,, | Performed by: INTERNAL MEDICINE

## 2019-01-07 PROCEDURE — 99499 UNLISTED E&M SERVICE: CPT | Mod: S$GLB,,, | Performed by: INTERNAL MEDICINE

## 2019-01-07 PROCEDURE — 3008F PR BODY MASS INDEX (BMI) DOCUMENTED: ICD-10-PCS | Mod: CPTII,S$GLB,, | Performed by: INTERNAL MEDICINE

## 2019-01-07 PROCEDURE — 3078F DIAST BP <80 MM HG: CPT | Mod: CPTII,S$GLB,, | Performed by: INTERNAL MEDICINE

## 2019-01-07 PROCEDURE — 99214 PR OFFICE/OUTPT VISIT, EST, LEVL IV, 30-39 MIN: ICD-10-PCS | Mod: S$GLB,,, | Performed by: INTERNAL MEDICINE

## 2019-01-07 PROCEDURE — 3288F FALL RISK ASSESSMENT DOCD: CPT | Mod: CPTII,S$GLB,, | Performed by: INTERNAL MEDICINE

## 2019-01-07 PROCEDURE — 99999 PR PBB SHADOW E&M-EST. PATIENT-LVL III: CPT | Mod: PBBFAC,,, | Performed by: INTERNAL MEDICINE

## 2019-01-07 PROCEDURE — 3078F PR MOST RECENT DIASTOLIC BLOOD PRESSURE < 80 MM HG: ICD-10-PCS | Mod: CPTII,S$GLB,, | Performed by: INTERNAL MEDICINE

## 2019-01-07 PROCEDURE — 99499 RISK ADDL DX/OHS AUDIT: ICD-10-PCS | Mod: S$GLB,,, | Performed by: INTERNAL MEDICINE

## 2019-01-07 PROCEDURE — 1100F PR PT FALLS ASSESS DOC 2+ FALLS/FALL W/INJURY/YR: ICD-10-PCS | Mod: CPTII,S$GLB,, | Performed by: INTERNAL MEDICINE

## 2019-01-07 PROCEDURE — 1100F PTFALLS ASSESS-DOCD GE2>/YR: CPT | Mod: CPTII,S$GLB,, | Performed by: INTERNAL MEDICINE

## 2019-01-07 RX ORDER — GABAPENTIN 300 MG/1
300 CAPSULE ORAL NIGHTLY
Qty: 90 CAPSULE | Refills: 3 | Status: SHIPPED | OUTPATIENT
Start: 2019-01-07 | End: 2019-12-08 | Stop reason: SDUPTHER

## 2019-01-07 NOTE — PROGRESS NOTES
Subjective:       Patient ID: Mario Sanford is a 63 y.o. male.    Chief Complaint: Follow-up (4 month) and Hip Pain (left)    HPI   Pt with HTN, HLD, Obesity, T2DM is here for f/u. Pt fell 2 wks ago while going down some steps at home. No head trauma/LOC. He feels like his legs are weak. No back pain, numbness/tingling in the legs.   Review of Systems   Constitutional: Positive for activity change. Negative for appetite change, chills, diaphoresis, fatigue, fever and unexpected weight change.   HENT: Negative for hearing loss, postnasal drip, rhinorrhea, sinus pressure, sneezing, sore throat, trouble swallowing and voice change.    Eyes: Negative for discharge and visual disturbance.   Respiratory: Negative for cough, chest tightness, shortness of breath and wheezing.    Cardiovascular: Negative for chest pain, palpitations and leg swelling.   Gastrointestinal: Negative for abdominal pain, blood in stool, constipation, diarrhea, nausea and vomiting.   Endocrine: Negative for polydipsia and polyuria.   Genitourinary: Negative for difficulty urinating, dysuria, hematuria and urgency.   Musculoskeletal: Positive for arthralgias. Negative for joint swelling, myalgias and neck pain.   Skin: Negative for rash and wound.   Allergic/Immunologic: Negative for environmental allergies and food allergies.   Neurological: Positive for weakness. Negative for headaches.   Hematological: Negative for adenopathy. Does not bruise/bleed easily.   Psychiatric/Behavioral: Negative for confusion and dysphoric mood.       Objective:      Physical Exam   Constitutional: He is oriented to person, place, and time. He appears well-developed and well-nourished. No distress.   HENT:   Head: Normocephalic and atraumatic.   Eyes: Conjunctivae and EOM are normal. Pupils are equal, round, and reactive to light. Right eye exhibits no discharge. Left eye exhibits no discharge. No scleral icterus.   Neck: Normal range of motion. Neck supple. No JVD  present.   Cardiovascular: Normal rate, regular rhythm and normal heart sounds.   No murmur heard.  Pulmonary/Chest: Effort normal and breath sounds normal. No respiratory distress. He has no wheezes. He has no rales.   Abdominal: Soft. Bowel sounds are normal. There is no tenderness.   Musculoskeletal: He exhibits no edema.        Left hip: He exhibits tenderness.   Lymphadenopathy:     He has no cervical adenopathy.   Neurological: He is alert and oriented to person, place, and time.   Skin: Skin is warm and dry. No rash noted. He is not diaphoretic. No pallor.       Assessment:       1. Essential hypertension    2. Type 2 diabetes mellitus with diabetic polyneuropathy, with long-term current use of insulin    3. Hyperlipidemia associated with type 2 diabetes mellitus    4. Obesity, diabetes, and hypertension syndrome    5. Primary osteoarthritis of left hip    6. Bilateral leg weakness        Plan:    1. HTN- controlled on Coreg 3.125 mg BID    2. T2DM- last HA1C of 6.4(12/18)<--5.8(8/18)<--8.7(5/18)       Continue Amaryl 2 mg qam   3. HLD- stable on Lipitor 40 mg daily   4. Diabetes, Obesity and HTN- pt advised on proper diet/exercise for weight loss   5. Referral to Ortho    6. Referral to PT for strength training   7. F/u in 4 months

## 2019-01-10 ENCOUNTER — OFFICE VISIT (OUTPATIENT)
Dept: ORTHOPEDICS | Facility: CLINIC | Age: 64
End: 2019-01-10
Payer: MEDICARE

## 2019-01-10 VITALS — HEIGHT: 66 IN | BODY MASS INDEX: 27.64 KG/M2 | WEIGHT: 172 LBS

## 2019-01-10 DIAGNOSIS — M16.12 PRIMARY OSTEOARTHRITIS OF LEFT HIP: Primary | ICD-10-CM

## 2019-01-10 PROCEDURE — 3008F BODY MASS INDEX DOCD: CPT | Mod: CPTII,S$GLB,, | Performed by: ORTHOPAEDIC SURGERY

## 2019-01-10 PROCEDURE — 1101F PR PT FALLS ASSESS DOC 0-1 FALLS W/OUT INJ PAST YR: ICD-10-PCS | Mod: CPTII,S$GLB,, | Performed by: ORTHOPAEDIC SURGERY

## 2019-01-10 PROCEDURE — 3008F PR BODY MASS INDEX (BMI) DOCUMENTED: ICD-10-PCS | Mod: CPTII,S$GLB,, | Performed by: ORTHOPAEDIC SURGERY

## 2019-01-10 PROCEDURE — 1101F PT FALLS ASSESS-DOCD LE1/YR: CPT | Mod: CPTII,S$GLB,, | Performed by: ORTHOPAEDIC SURGERY

## 2019-01-10 PROCEDURE — 99202 OFFICE O/P NEW SF 15 MIN: CPT | Mod: S$GLB,,, | Performed by: ORTHOPAEDIC SURGERY

## 2019-01-10 PROCEDURE — 99999 PR PBB SHADOW E&M-EST. PATIENT-LVL III: ICD-10-PCS | Mod: PBBFAC,,, | Performed by: ORTHOPAEDIC SURGERY

## 2019-01-10 PROCEDURE — 99999 PR PBB SHADOW E&M-EST. PATIENT-LVL III: CPT | Mod: PBBFAC,,, | Performed by: ORTHOPAEDIC SURGERY

## 2019-01-10 PROCEDURE — 99202 PR OFFICE/OUTPT VISIT, NEW, LEVL II, 15-29 MIN: ICD-10-PCS | Mod: S$GLB,,, | Performed by: ORTHOPAEDIC SURGERY

## 2019-01-10 NOTE — PROGRESS NOTES
Subjective:      Patient ID: Mario Sanford is a 63 y.o. male.    Chief Complaint: Pain of the Left Hip    HPI      Mario Sanford is seen for evaluation and treatment of hip pain.  They have experienced problems with their left hip over the past 6 months Pain is located laterally. They have been treated with a cane.  He is unable to take NSAIDs because of gastrointestinal bleeding.   Symptoms have recently stayed the same. Ambulation reportedly has not been impaired. Self care ADLs are not painful.     Review of Systems   Constitution: Negative for fever and weight loss.   HENT: Negative for congestion.    Eyes: Negative for visual disturbance.   Cardiovascular: Negative for chest pain.   Respiratory: Negative for shortness of breath.    Hematologic/Lymphatic: Negative for bleeding problem. Does not bruise/bleed easily.   Skin: Negative for poor wound healing.   Gastrointestinal: Negative for abdominal pain.   Genitourinary: Negative for dysuria.   Neurological: Negative for seizures.   Psychiatric/Behavioral: Negative for altered mental status.   Allergic/Immunologic: Negative for persistent infections.         Objective:            Ortho/SPM Exam      Left hip    The patient is not in acute distress.   Body habitus is:normal.   Sclerae normal  The patient walks without a limp.   none  The skin over the hip is:intact.   There is:no local tenderness.   Range of motion- Flexion 90, External rotation 35, internal rotation 0.  Resisted SLR positive.  Pain with rotation positive  Sciatic tension findings negative.  Shortening/lengthening compared to the contralateral side absent.  Pulses DP present, PT present.  Motor normal 5/5 strength in all tested muscle groups.   Sensory normal.    I reviewed the relevant radiographic images for the patient's condition:  Recent left hip  show pincer impingement moderate joint space narrowing. The right hip is noted be similar.  Assessment:       Encounter Diagnosis   Name Primary?     Primary osteoarthritis of left hip Yes          Plan:       Mario was seen today for pain.    Diagnoses and all orders for this visit:    Primary osteoarthritis of left hip      I explained my diagnostic impression and the reasoning behind it in detail, using layman's terms.  Models and/or pictures were used to help in the explanation.    Tylenol products    Cane    Patient plans to participate in therapy and I explained the limitations and appropriate exercises.    I explained the potential role of surgery in the treatment of this condition to the patient.  They understand that if nonsurgical measures do not adequately control symptoms, surgery will be considered in the future.

## 2019-01-10 NOTE — LETTER
January 10, 2019      Bereket Rivera, DO  [address] [place] [address]           [place] [facility]  [address]  [facility] [address]  Phone: [phone]          Patient: Mario Sanford   MR Number: [id number]   YOB: 1955   Date of Visit: 1/10/2019       Dear Dr. Bereket Rivera:    Thank you for referring Mario Sanford to me for evaluation. Attached you will find relevant portions of my assessment and plan of care.    If you have questions, please do not hesitate to call me. I look forward to following Mario Sanford along with you.    Sincerely,    Roman Bartholomew MD    Enclosure  CC:  No Recipients    If you would like to receive this communication electronically, please contact [email or web address] or [phone] to request more information on [employer] Link access.    For providers and/or their staff who would like to refer a patient to [facility], please contact us through our one-stop-shop provider referral line, [facility] , at [phone].    If you feel you have received this communication in error or would no longer like to receive these types of communications, please e-mail [email or web address]

## 2019-05-03 ENCOUNTER — LAB VISIT (OUTPATIENT)
Dept: LAB | Facility: HOSPITAL | Age: 64
End: 2019-05-03
Attending: INTERNAL MEDICINE
Payer: MEDICARE

## 2019-05-03 ENCOUNTER — OFFICE VISIT (OUTPATIENT)
Dept: INTERNAL MEDICINE | Facility: CLINIC | Age: 64
End: 2019-05-03
Payer: MEDICARE

## 2019-05-03 VITALS
DIASTOLIC BLOOD PRESSURE: 80 MMHG | HEART RATE: 80 BPM | HEIGHT: 66 IN | TEMPERATURE: 98 F | BODY MASS INDEX: 28.93 KG/M2 | WEIGHT: 180 LBS | RESPIRATION RATE: 16 BRPM | SYSTOLIC BLOOD PRESSURE: 138 MMHG

## 2019-05-03 DIAGNOSIS — E11.69 HYPERLIPIDEMIA ASSOCIATED WITH TYPE 2 DIABETES MELLITUS: ICD-10-CM

## 2019-05-03 DIAGNOSIS — E66.9 OBESITY, DIABETES, AND HYPERTENSION SYNDROME: ICD-10-CM

## 2019-05-03 DIAGNOSIS — I15.2 HYPERTENSION ASSOCIATED WITH DIABETES: ICD-10-CM

## 2019-05-03 DIAGNOSIS — I15.2 HYPERTENSION ASSOCIATED WITH DIABETES: Primary | ICD-10-CM

## 2019-05-03 DIAGNOSIS — E11.59 OBESITY, DIABETES, AND HYPERTENSION SYNDROME: ICD-10-CM

## 2019-05-03 DIAGNOSIS — Z79.4 TYPE 2 DIABETES MELLITUS WITH DIABETIC POLYNEUROPATHY, WITH LONG-TERM CURRENT USE OF INSULIN: ICD-10-CM

## 2019-05-03 DIAGNOSIS — E11.42 TYPE 2 DIABETES MELLITUS WITH DIABETIC POLYNEUROPATHY, WITH LONG-TERM CURRENT USE OF INSULIN: ICD-10-CM

## 2019-05-03 DIAGNOSIS — E78.5 HYPERLIPIDEMIA ASSOCIATED WITH TYPE 2 DIABETES MELLITUS: ICD-10-CM

## 2019-05-03 DIAGNOSIS — I15.2 OBESITY, DIABETES, AND HYPERTENSION SYNDROME: ICD-10-CM

## 2019-05-03 DIAGNOSIS — E11.59 HYPERTENSION ASSOCIATED WITH DIABETES: ICD-10-CM

## 2019-05-03 DIAGNOSIS — E11.69 OBESITY, DIABETES, AND HYPERTENSION SYNDROME: ICD-10-CM

## 2019-05-03 DIAGNOSIS — E11.59 HYPERTENSION ASSOCIATED WITH DIABETES: Primary | ICD-10-CM

## 2019-05-03 PROBLEM — I10 ESSENTIAL HYPERTENSION: Status: RESOLVED | Noted: 2018-06-12 | Resolved: 2019-05-03

## 2019-05-03 LAB
ALBUMIN SERPL BCP-MCNC: 4.1 G/DL (ref 3.5–5.2)
ALP SERPL-CCNC: 95 U/L (ref 55–135)
ALT SERPL W/O P-5'-P-CCNC: 17 U/L (ref 10–44)
ANION GAP SERPL CALC-SCNC: 9 MMOL/L (ref 8–16)
AST SERPL-CCNC: 22 U/L (ref 10–40)
BASOPHILS # BLD AUTO: 0.1 K/UL (ref 0–0.2)
BASOPHILS NFR BLD: 0.9 % (ref 0–1.9)
BILIRUB SERPL-MCNC: 1.1 MG/DL (ref 0.1–1)
BUN SERPL-MCNC: 18 MG/DL (ref 8–23)
CALCIUM SERPL-MCNC: 9.8 MG/DL (ref 8.7–10.5)
CHLORIDE SERPL-SCNC: 106 MMOL/L (ref 95–110)
CO2 SERPL-SCNC: 26 MMOL/L (ref 23–29)
CREAT SERPL-MCNC: 1.1 MG/DL (ref 0.5–1.4)
DIFFERENTIAL METHOD: NORMAL
EOSINOPHIL # BLD AUTO: 0.2 K/UL (ref 0–0.5)
EOSINOPHIL NFR BLD: 1.6 % (ref 0–8)
ERYTHROCYTE [DISTWIDTH] IN BLOOD BY AUTOMATED COUNT: 13.6 % (ref 11.5–14.5)
EST. GFR  (AFRICAN AMERICAN): >60 ML/MIN/1.73 M^2
EST. GFR  (NON AFRICAN AMERICAN): >60 ML/MIN/1.73 M^2
ESTIMATED AVG GLUCOSE: 140 MG/DL (ref 68–131)
GLUCOSE SERPL-MCNC: 80 MG/DL (ref 70–110)
HBA1C MFR BLD HPLC: 6.5 % (ref 4–5.6)
HCT VFR BLD AUTO: 46.6 % (ref 40–54)
HGB BLD-MCNC: 14.9 G/DL (ref 14–18)
IMM GRANULOCYTES # BLD AUTO: 0.03 K/UL (ref 0–0.04)
IMM GRANULOCYTES NFR BLD AUTO: 0.3 % (ref 0–0.5)
LYMPHOCYTES # BLD AUTO: 3.2 K/UL (ref 1–4.8)
LYMPHOCYTES NFR BLD: 28 % (ref 18–48)
MCH RBC QN AUTO: 29 PG (ref 27–31)
MCHC RBC AUTO-ENTMCNC: 32 G/DL (ref 32–36)
MCV RBC AUTO: 91 FL (ref 82–98)
MONOCYTES # BLD AUTO: 1 K/UL (ref 0.3–1)
MONOCYTES NFR BLD: 8.5 % (ref 4–15)
NEUTROPHILS # BLD AUTO: 7 K/UL (ref 1.8–7.7)
NEUTROPHILS NFR BLD: 60.7 % (ref 38–73)
NRBC BLD-RTO: 0 /100 WBC
PLATELET # BLD AUTO: 339 K/UL (ref 150–350)
PMV BLD AUTO: 9.7 FL (ref 9.2–12.9)
POTASSIUM SERPL-SCNC: 4.4 MMOL/L (ref 3.5–5.1)
PROT SERPL-MCNC: 7.2 G/DL (ref 6–8.4)
RBC # BLD AUTO: 5.14 M/UL (ref 4.6–6.2)
SODIUM SERPL-SCNC: 141 MMOL/L (ref 136–145)
WBC # BLD AUTO: 11.58 K/UL (ref 3.9–12.7)

## 2019-05-03 PROCEDURE — 99214 PR OFFICE/OUTPT VISIT, EST, LEVL IV, 30-39 MIN: ICD-10-PCS | Mod: S$GLB,,, | Performed by: INTERNAL MEDICINE

## 2019-05-03 PROCEDURE — 1100F PR PT FALLS ASSESS DOC 2+ FALLS/FALL W/INJURY/YR: ICD-10-PCS | Mod: CPTII,S$GLB,, | Performed by: INTERNAL MEDICINE

## 2019-05-03 PROCEDURE — 3044F HG A1C LEVEL LT 7.0%: CPT | Mod: CPTII,S$GLB,, | Performed by: INTERNAL MEDICINE

## 2019-05-03 PROCEDURE — 99999 PR PBB SHADOW E&M-EST. PATIENT-LVL III: CPT | Mod: PBBFAC,,, | Performed by: INTERNAL MEDICINE

## 2019-05-03 PROCEDURE — 99214 OFFICE O/P EST MOD 30 MIN: CPT | Mod: S$GLB,,, | Performed by: INTERNAL MEDICINE

## 2019-05-03 PROCEDURE — 85025 COMPLETE CBC W/AUTO DIFF WBC: CPT

## 2019-05-03 PROCEDURE — 99999 PR PBB SHADOW E&M-EST. PATIENT-LVL III: ICD-10-PCS | Mod: PBBFAC,,, | Performed by: INTERNAL MEDICINE

## 2019-05-03 PROCEDURE — 3075F PR MOST RECENT SYSTOLIC BLOOD PRESS GE 130-139MM HG: ICD-10-PCS | Mod: CPTII,S$GLB,, | Performed by: INTERNAL MEDICINE

## 2019-05-03 PROCEDURE — 3075F SYST BP GE 130 - 139MM HG: CPT | Mod: CPTII,S$GLB,, | Performed by: INTERNAL MEDICINE

## 2019-05-03 PROCEDURE — 3008F BODY MASS INDEX DOCD: CPT | Mod: CPTII,S$GLB,, | Performed by: INTERNAL MEDICINE

## 2019-05-03 PROCEDURE — 3288F PR FALLS RISK ASSESSMENT DOCUMENTED: ICD-10-PCS | Mod: CPTII,S$GLB,, | Performed by: INTERNAL MEDICINE

## 2019-05-03 PROCEDURE — 3044F PR MOST RECENT HEMOGLOBIN A1C LEVEL <7.0%: ICD-10-PCS | Mod: CPTII,S$GLB,, | Performed by: INTERNAL MEDICINE

## 2019-05-03 PROCEDURE — 3079F PR MOST RECENT DIASTOLIC BLOOD PRESSURE 80-89 MM HG: ICD-10-PCS | Mod: CPTII,S$GLB,, | Performed by: INTERNAL MEDICINE

## 2019-05-03 PROCEDURE — 99499 UNLISTED E&M SERVICE: CPT | Mod: S$GLB,,, | Performed by: INTERNAL MEDICINE

## 2019-05-03 PROCEDURE — 3079F DIAST BP 80-89 MM HG: CPT | Mod: CPTII,S$GLB,, | Performed by: INTERNAL MEDICINE

## 2019-05-03 PROCEDURE — 36415 COLL VENOUS BLD VENIPUNCTURE: CPT | Mod: PO

## 2019-05-03 PROCEDURE — 3008F PR BODY MASS INDEX (BMI) DOCUMENTED: ICD-10-PCS | Mod: CPTII,S$GLB,, | Performed by: INTERNAL MEDICINE

## 2019-05-03 PROCEDURE — 99499 RISK ADDL DX/OHS AUDIT: ICD-10-PCS | Mod: S$GLB,,, | Performed by: INTERNAL MEDICINE

## 2019-05-03 PROCEDURE — 3288F FALL RISK ASSESSMENT DOCD: CPT | Mod: CPTII,S$GLB,, | Performed by: INTERNAL MEDICINE

## 2019-05-03 PROCEDURE — 83036 HEMOGLOBIN GLYCOSYLATED A1C: CPT

## 2019-05-03 PROCEDURE — 1100F PTFALLS ASSESS-DOCD GE2>/YR: CPT | Mod: CPTII,S$GLB,, | Performed by: INTERNAL MEDICINE

## 2019-05-03 PROCEDURE — 80053 COMPREHEN METABOLIC PANEL: CPT

## 2019-05-03 RX ORDER — PANTOPRAZOLE SODIUM 40 MG/1
40 TABLET, DELAYED RELEASE ORAL DAILY
Qty: 90 TABLET | Refills: 3 | Status: SHIPPED | OUTPATIENT
Start: 2019-05-03 | End: 2020-02-12 | Stop reason: SDUPTHER

## 2019-05-03 NOTE — PROGRESS NOTES
Subjective:       Patient ID: Mario Sanford is a 63 y.o. male.    Chief Complaint: 4 mos fol up    HPI   Pt with HTN, HLD, Obesity, T2DM is here for f/u. No acute complaints today.   Review of Systems   Constitutional: Positive for activity change. Negative for appetite change, chills, diaphoresis, fatigue, fever and unexpected weight change.   HENT: Negative for hearing loss, postnasal drip, rhinorrhea, sinus pressure, sneezing, sore throat, trouble swallowing and voice change.    Eyes: Negative for discharge and visual disturbance.   Respiratory: Negative for cough, chest tightness, shortness of breath and wheezing.    Cardiovascular: Negative for chest pain, palpitations and leg swelling.   Gastrointestinal: Negative for abdominal pain, blood in stool, constipation, diarrhea, nausea and vomiting.   Endocrine: Negative for polydipsia and polyuria.   Genitourinary: Positive for urgency. Negative for difficulty urinating, dysuria and hematuria.   Musculoskeletal: Positive for arthralgias. Negative for joint swelling, myalgias and neck pain.   Skin: Negative for rash and wound.   Allergic/Immunologic: Negative for environmental allergies and food allergies.   Neurological: Negative for weakness and headaches.   Hematological: Negative for adenopathy. Does not bruise/bleed easily.   Psychiatric/Behavioral: Negative for confusion and dysphoric mood.       Objective:      Physical Exam   Constitutional: He is oriented to person, place, and time. He appears well-developed and well-nourished. No distress.   HENT:   Head: Normocephalic and atraumatic.   Eyes: Pupils are equal, round, and reactive to light. Conjunctivae and EOM are normal. Right eye exhibits no discharge. Left eye exhibits no discharge. No scleral icterus.   Neck: Normal range of motion. Neck supple. No JVD present.   Cardiovascular: Normal rate, regular rhythm and normal heart sounds.   No murmur heard.  Pulmonary/Chest: Effort normal and breath sounds  normal. No respiratory distress. He has no wheezes. He has no rales.   Musculoskeletal: He exhibits no edema.   Lymphadenopathy:     He has no cervical adenopathy.   Neurological: He is alert and oriented to person, place, and time.   Skin: Skin is warm and dry. No rash noted. He is not diaphoretic. No pallor.       Assessment:       1. Hypertension associated with diabetes    2. Type 2 diabetes mellitus with diabetic polyneuropathy, with long-term current use of insulin    3. Hyperlipidemia associated with type 2 diabetes mellitus    4. Obesity, diabetes, and hypertension syndrome        Plan:    1. HTN- controlled on Coreg 3.125 mg BID    2. T2DM- last HA1C of 6.4(12/18)<--5.8(8/18)<--8.7(5/18)       Continue Amaryl 2 mg qam   3. HLD- stable on Lipitor 40 mg daily   4. Diabetes, Obesity and HTN- pt advised on proper diet/exercise for weight loss   5. F/u in 4 months for annual exam

## 2019-06-06 DIAGNOSIS — Z01.00 DIABETIC EYE EXAM: Primary | ICD-10-CM

## 2019-06-06 DIAGNOSIS — E11.9 DIABETIC EYE EXAM: Primary | ICD-10-CM

## 2019-06-21 RX ORDER — GLIMEPIRIDE 2 MG/1
2 TABLET ORAL
Qty: 90 TABLET | Refills: 3 | Status: SHIPPED | OUTPATIENT
Start: 2019-06-21 | End: 2020-02-12 | Stop reason: SDUPTHER

## 2019-07-05 ENCOUNTER — TELEPHONE (OUTPATIENT)
Dept: INTERNAL MEDICINE | Facility: CLINIC | Age: 64
End: 2019-07-05

## 2019-07-05 RX ORDER — CARVEDILOL 3.12 MG/1
TABLET ORAL
Qty: 60 TABLET | Refills: 11 | Status: SHIPPED | OUTPATIENT
Start: 2019-07-05 | End: 2020-02-12 | Stop reason: SDUPTHER

## 2019-07-05 NOTE — TELEPHONE ENCOUNTER
----- Message from Isela Farris sent at 7/5/2019  8:21 AM CDT -----  Contact: self   Patient is calling for an RX refill or new RX.  Is this a refill or new RX:    RX name and strength: carvedilol (COREG) 3.125 MG tablet  Directions (copy/paste from chart):  TAKE 1 TABLET (3.125 MG TOTAL) BY MOUTH 2 (TWO) TIMES DAILY.  Is this a 30 day or 90 day RX:  90  Local pharmacy or mail order pharmacy:    Pharmacy name and phone # (copy/paste from chart):   [id number] - [place] - [address] [phone] (Phone)  [phone] (Fax)  Comments:  Patient states he has been waiting on this medication since 6/29/19, please call to confirm.

## 2019-08-28 ENCOUNTER — OFFICE VISIT (OUTPATIENT)
Dept: OPTOMETRY | Facility: CLINIC | Age: 64
End: 2019-08-28
Payer: MEDICARE

## 2019-08-28 DIAGNOSIS — H25.13 NUCLEAR SCLEROSIS OF BOTH EYES: ICD-10-CM

## 2019-08-28 DIAGNOSIS — E11.9 TYPE 2 DIABETES MELLITUS WITHOUT RETINOPATHY: Primary | ICD-10-CM

## 2019-08-28 PROCEDURE — 99999 PR PBB SHADOW E&M-EST. PATIENT-LVL II: CPT | Mod: PBBFAC,,, | Performed by: OPTOMETRIST

## 2019-08-28 PROCEDURE — 99999 PR PBB SHADOW E&M-EST. PATIENT-LVL II: ICD-10-PCS | Mod: PBBFAC,,, | Performed by: OPTOMETRIST

## 2019-08-28 PROCEDURE — 92004 PR EYE EXAM, NEW PATIENT,COMPREHESV: ICD-10-PCS | Mod: S$GLB,,, | Performed by: OPTOMETRIST

## 2019-08-28 PROCEDURE — 92004 COMPRE OPH EXAM NEW PT 1/>: CPT | Mod: S$GLB,,, | Performed by: OPTOMETRIST

## 2019-08-28 NOTE — PROGRESS NOTES
RHIANNA DAY about 1 yr. Ago elsewhere.  Diabetic  this morning. Patient has   prescription bifocals but rarely wears, problems adjusting to progressive   lenses.  Wears OTC  +2.50 eaders, seems to work fine.  Distance seems fine   without glasses.   Patient defers refraction today.  Not using any drops.       Hemoglobin A1C       Date                     Value               Ref Range             Status                05/03/2019               6.5 (H)             4.0 - 5.6 %           Final             12/13/2018               6.4 (H)             4.0 - 5.6 %           Final           08/29/2018               5.8 (H)             4.0 - 5.6 %           Final          Last edited by Ana Maria Medrano on 8/28/2019  1:24 PM. (History)            Assessment /Plan     For exam results, see Encounter Report.    Type 2 diabetes mellitus without retinopathy    Nuclear sclerosis of both eyes      1. No diabetic retinopathy, no csme. Return in 1 year for dilated eye exam.  2. Educated pt on presence of cataracts and effects on vision. No surgery at this time. Recheck in one year.

## 2019-09-04 ENCOUNTER — TELEPHONE (OUTPATIENT)
Dept: INTERNAL MEDICINE | Facility: CLINIC | Age: 64
End: 2019-09-04

## 2019-09-04 DIAGNOSIS — E11.42 TYPE 2 DIABETES MELLITUS WITH DIABETIC POLYNEUROPATHY, WITH LONG-TERM CURRENT USE OF INSULIN: ICD-10-CM

## 2019-09-04 DIAGNOSIS — E11.69 HYPERLIPIDEMIA ASSOCIATED WITH TYPE 2 DIABETES MELLITUS: ICD-10-CM

## 2019-09-04 DIAGNOSIS — Z12.5 PROSTATE CANCER SCREENING: ICD-10-CM

## 2019-09-04 DIAGNOSIS — E78.5 HYPERLIPIDEMIA ASSOCIATED WITH TYPE 2 DIABETES MELLITUS: ICD-10-CM

## 2019-09-04 DIAGNOSIS — I10 ESSENTIAL HYPERTENSION: ICD-10-CM

## 2019-09-04 DIAGNOSIS — Z00.00 ANNUAL PHYSICAL EXAM: Primary | ICD-10-CM

## 2019-09-04 DIAGNOSIS — Z79.4 TYPE 2 DIABETES MELLITUS WITH DIABETIC POLYNEUROPATHY, WITH LONG-TERM CURRENT USE OF INSULIN: ICD-10-CM

## 2019-09-04 NOTE — TELEPHONE ENCOUNTER
----- Message from Luli Whitney sent at 9/4/2019  2:59 PM CDT -----  Contact: Self [phone]  Patient has an upcoming lab appointment at the [facility] on 09/06    Please enter lab orders and link them to this appointment.    Thanks,  [facility] 5th floor reception desk

## 2019-09-06 ENCOUNTER — LAB VISIT (OUTPATIENT)
Dept: LAB | Facility: HOSPITAL | Age: 64
End: 2019-09-06
Attending: INTERNAL MEDICINE
Payer: MEDICARE

## 2019-09-06 DIAGNOSIS — Z00.00 ANNUAL PHYSICAL EXAM: ICD-10-CM

## 2019-09-06 DIAGNOSIS — Z12.5 PROSTATE CANCER SCREENING: ICD-10-CM

## 2019-09-06 DIAGNOSIS — E11.69 HYPERLIPIDEMIA ASSOCIATED WITH TYPE 2 DIABETES MELLITUS: ICD-10-CM

## 2019-09-06 DIAGNOSIS — E11.42 TYPE 2 DIABETES MELLITUS WITH DIABETIC POLYNEUROPATHY, WITH LONG-TERM CURRENT USE OF INSULIN: ICD-10-CM

## 2019-09-06 DIAGNOSIS — I10 ESSENTIAL HYPERTENSION: ICD-10-CM

## 2019-09-06 DIAGNOSIS — Z79.4 TYPE 2 DIABETES MELLITUS WITH DIABETIC POLYNEUROPATHY, WITH LONG-TERM CURRENT USE OF INSULIN: ICD-10-CM

## 2019-09-06 DIAGNOSIS — E78.5 HYPERLIPIDEMIA ASSOCIATED WITH TYPE 2 DIABETES MELLITUS: ICD-10-CM

## 2019-09-06 LAB
ALBUMIN SERPL BCP-MCNC: 4.1 G/DL (ref 3.5–5.2)
ALP SERPL-CCNC: 99 U/L (ref 55–135)
ALT SERPL W/O P-5'-P-CCNC: 13 U/L (ref 10–44)
ANION GAP SERPL CALC-SCNC: 8 MMOL/L (ref 8–16)
AST SERPL-CCNC: 16 U/L (ref 10–40)
BASOPHILS # BLD AUTO: 0.05 K/UL (ref 0–0.2)
BASOPHILS NFR BLD: 0.6 % (ref 0–1.9)
BILIRUB SERPL-MCNC: 0.6 MG/DL (ref 0.1–1)
BUN SERPL-MCNC: 18 MG/DL (ref 8–23)
CALCIUM SERPL-MCNC: 9.2 MG/DL (ref 8.7–10.5)
CHLORIDE SERPL-SCNC: 106 MMOL/L (ref 95–110)
CHOLEST SERPL-MCNC: 113 MG/DL (ref 120–199)
CHOLEST/HDLC SERPL: 2.9 {RATIO} (ref 2–5)
CO2 SERPL-SCNC: 26 MMOL/L (ref 23–29)
COMPLEXED PSA SERPL-MCNC: 1.5 NG/ML (ref 0–4)
CREAT SERPL-MCNC: 1.3 MG/DL (ref 0.5–1.4)
DIFFERENTIAL METHOD: ABNORMAL
EOSINOPHIL # BLD AUTO: 0.2 K/UL (ref 0–0.5)
EOSINOPHIL NFR BLD: 1.9 % (ref 0–8)
ERYTHROCYTE [DISTWIDTH] IN BLOOD BY AUTOMATED COUNT: 13.3 % (ref 11.5–14.5)
EST. GFR  (AFRICAN AMERICAN): >60 ML/MIN/1.73 M^2
EST. GFR  (NON AFRICAN AMERICAN): 56.9 ML/MIN/1.73 M^2
ESTIMATED AVG GLUCOSE: 131 MG/DL (ref 68–131)
GLUCOSE SERPL-MCNC: 155 MG/DL (ref 70–110)
HBA1C MFR BLD HPLC: 6.2 % (ref 4–5.6)
HCT VFR BLD AUTO: 46.8 % (ref 40–54)
HDLC SERPL-MCNC: 39 MG/DL (ref 40–75)
HDLC SERPL: 34.5 % (ref 20–50)
HGB BLD-MCNC: 14.5 G/DL (ref 14–18)
IMM GRANULOCYTES # BLD AUTO: 0.03 K/UL (ref 0–0.04)
IMM GRANULOCYTES NFR BLD AUTO: 0.4 % (ref 0–0.5)
LDLC SERPL CALC-MCNC: 57.6 MG/DL (ref 63–159)
LYMPHOCYTES # BLD AUTO: 2.6 K/UL (ref 1–4.8)
LYMPHOCYTES NFR BLD: 31.2 % (ref 18–48)
MCH RBC QN AUTO: 29.1 PG (ref 27–31)
MCHC RBC AUTO-ENTMCNC: 31 G/DL (ref 32–36)
MCV RBC AUTO: 94 FL (ref 82–98)
MONOCYTES # BLD AUTO: 0.6 K/UL (ref 0.3–1)
MONOCYTES NFR BLD: 7 % (ref 4–15)
NEUTROPHILS # BLD AUTO: 4.9 K/UL (ref 1.8–7.7)
NEUTROPHILS NFR BLD: 58.9 % (ref 38–73)
NONHDLC SERPL-MCNC: 74 MG/DL
NRBC BLD-RTO: 0 /100 WBC
PLATELET # BLD AUTO: 272 K/UL (ref 150–350)
PMV BLD AUTO: 10.5 FL (ref 9.2–12.9)
POTASSIUM SERPL-SCNC: 4.4 MMOL/L (ref 3.5–5.1)
PROT SERPL-MCNC: 7.1 G/DL (ref 6–8.4)
RBC # BLD AUTO: 4.99 M/UL (ref 4.6–6.2)
SODIUM SERPL-SCNC: 140 MMOL/L (ref 136–145)
TRIGL SERPL-MCNC: 82 MG/DL (ref 30–150)
TSH SERPL DL<=0.005 MIU/L-ACNC: 0.93 UIU/ML (ref 0.4–4)
WBC # BLD AUTO: 8.37 K/UL (ref 3.9–12.7)

## 2019-09-06 PROCEDURE — 80053 COMPREHEN METABOLIC PANEL: CPT

## 2019-09-06 PROCEDURE — 80061 LIPID PANEL: CPT

## 2019-09-06 PROCEDURE — 84153 ASSAY OF PSA TOTAL: CPT

## 2019-09-06 PROCEDURE — 84443 ASSAY THYROID STIM HORMONE: CPT

## 2019-09-06 PROCEDURE — 85025 COMPLETE CBC W/AUTO DIFF WBC: CPT

## 2019-09-06 PROCEDURE — 83036 HEMOGLOBIN GLYCOSYLATED A1C: CPT

## 2019-09-06 PROCEDURE — 36415 COLL VENOUS BLD VENIPUNCTURE: CPT | Mod: PO

## 2019-09-13 ENCOUNTER — PATIENT MESSAGE (OUTPATIENT)
Dept: INTERNAL MEDICINE | Facility: CLINIC | Age: 64
End: 2019-09-13

## 2019-09-13 ENCOUNTER — OFFICE VISIT (OUTPATIENT)
Dept: INTERNAL MEDICINE | Facility: CLINIC | Age: 64
End: 2019-09-13
Payer: MEDICARE

## 2019-09-13 VITALS
DIASTOLIC BLOOD PRESSURE: 80 MMHG | HEART RATE: 97 BPM | WEIGHT: 183.19 LBS | BODY MASS INDEX: 29.44 KG/M2 | HEIGHT: 66 IN | SYSTOLIC BLOOD PRESSURE: 120 MMHG | TEMPERATURE: 99 F | RESPIRATION RATE: 18 BRPM

## 2019-09-13 DIAGNOSIS — Z00.00 ANNUAL PHYSICAL EXAM: Primary | ICD-10-CM

## 2019-09-13 DIAGNOSIS — E11.22 CKD STAGE 3 DUE TO TYPE 2 DIABETES MELLITUS: ICD-10-CM

## 2019-09-13 DIAGNOSIS — E11.69 HYPERLIPIDEMIA ASSOCIATED WITH TYPE 2 DIABETES MELLITUS: ICD-10-CM

## 2019-09-13 DIAGNOSIS — Z79.4 TYPE 2 DIABETES MELLITUS WITH DIABETIC POLYNEUROPATHY, WITH LONG-TERM CURRENT USE OF INSULIN: ICD-10-CM

## 2019-09-13 DIAGNOSIS — E11.42 TYPE 2 DIABETES MELLITUS WITH DIABETIC POLYNEUROPATHY, WITH LONG-TERM CURRENT USE OF INSULIN: ICD-10-CM

## 2019-09-13 DIAGNOSIS — E11.59 OBESITY, DIABETES, AND HYPERTENSION SYNDROME: ICD-10-CM

## 2019-09-13 DIAGNOSIS — E11.59 HYPERTENSION ASSOCIATED WITH DIABETES: ICD-10-CM

## 2019-09-13 DIAGNOSIS — N18.30 CKD STAGE 3 DUE TO TYPE 2 DIABETES MELLITUS: ICD-10-CM

## 2019-09-13 DIAGNOSIS — E11.69 OBESITY, DIABETES, AND HYPERTENSION SYNDROME: ICD-10-CM

## 2019-09-13 DIAGNOSIS — I15.2 HYPERTENSION ASSOCIATED WITH DIABETES: ICD-10-CM

## 2019-09-13 DIAGNOSIS — E66.9 OBESITY, DIABETES, AND HYPERTENSION SYNDROME: ICD-10-CM

## 2019-09-13 DIAGNOSIS — E78.5 HYPERLIPIDEMIA ASSOCIATED WITH TYPE 2 DIABETES MELLITUS: ICD-10-CM

## 2019-09-13 DIAGNOSIS — I15.2 OBESITY, DIABETES, AND HYPERTENSION SYNDROME: ICD-10-CM

## 2019-09-13 PROCEDURE — 3044F PR MOST RECENT HEMOGLOBIN A1C LEVEL <7.0%: ICD-10-PCS | Mod: CPTII,S$GLB,, | Performed by: INTERNAL MEDICINE

## 2019-09-13 PROCEDURE — 99214 OFFICE O/P EST MOD 30 MIN: CPT | Mod: S$GLB,,, | Performed by: INTERNAL MEDICINE

## 2019-09-13 PROCEDURE — 99499 UNLISTED E&M SERVICE: CPT | Mod: S$GLB,,, | Performed by: INTERNAL MEDICINE

## 2019-09-13 PROCEDURE — 99499 RISK ADDL DX/OHS AUDIT: ICD-10-PCS | Mod: S$GLB,,, | Performed by: INTERNAL MEDICINE

## 2019-09-13 PROCEDURE — 99999 PR PBB SHADOW E&M-EST. PATIENT-LVL III: ICD-10-PCS | Mod: PBBFAC,,, | Performed by: INTERNAL MEDICINE

## 2019-09-13 PROCEDURE — 3074F PR MOST RECENT SYSTOLIC BLOOD PRESSURE < 130 MM HG: ICD-10-PCS | Mod: CPTII,S$GLB,, | Performed by: INTERNAL MEDICINE

## 2019-09-13 PROCEDURE — 3079F DIAST BP 80-89 MM HG: CPT | Mod: CPTII,S$GLB,, | Performed by: INTERNAL MEDICINE

## 2019-09-13 PROCEDURE — 3079F PR MOST RECENT DIASTOLIC BLOOD PRESSURE 80-89 MM HG: ICD-10-PCS | Mod: CPTII,S$GLB,, | Performed by: INTERNAL MEDICINE

## 2019-09-13 PROCEDURE — 99999 PR PBB SHADOW E&M-EST. PATIENT-LVL III: CPT | Mod: PBBFAC,,, | Performed by: INTERNAL MEDICINE

## 2019-09-13 PROCEDURE — 3074F SYST BP LT 130 MM HG: CPT | Mod: CPTII,S$GLB,, | Performed by: INTERNAL MEDICINE

## 2019-09-13 PROCEDURE — 3044F HG A1C LEVEL LT 7.0%: CPT | Mod: CPTII,S$GLB,, | Performed by: INTERNAL MEDICINE

## 2019-09-13 PROCEDURE — 99214 PR OFFICE/OUTPT VISIT, EST, LEVL IV, 30-39 MIN: ICD-10-PCS | Mod: S$GLB,,, | Performed by: INTERNAL MEDICINE

## 2019-09-13 RX ORDER — CICLOPIROX 80 MG/ML
SOLUTION TOPICAL NIGHTLY
Qty: 6.6 ML | Refills: 11 | Status: SHIPPED | OUTPATIENT
Start: 2019-09-13 | End: 2020-09-18 | Stop reason: SDUPTHER

## 2019-09-13 RX ORDER — LOSARTAN POTASSIUM 25 MG/1
25 TABLET ORAL DAILY
Qty: 90 TABLET | Refills: 3 | Status: SHIPPED | OUTPATIENT
Start: 2019-09-13 | End: 2020-02-12 | Stop reason: SDUPTHER

## 2019-09-13 NOTE — PROGRESS NOTES
Subjective:       Patient ID: Mario Sanford is a 64 y.o. male.    Chief Complaint: Annual Exam    HPI   64 y.o. Male here for annual exam.      Vaccines: Influenza (declined); Tetanus (2018); Pneumovax (2018); Shingrix (will get)  Eye exam: declined  Colonoscopy: 6/18     Exercise: walks   Diet: regular     Past Medical History:  No date: Obesity (BMI 30-39.9)  No date: HTN  No date: HLD  No date: T2DM  No past surgical history on file.  Social History    Marital status:              Spouse name:                       Years of education:                 Number of children: 1              Occupational History  Occupation          Employer            Comment               Retired from sales                           Social History Main Topics    Smoking status: Never Smoker                                                                 Smokeless tobacco: Never Used                        Alcohol use: No              Drug use: No              Sexual activity: Yes               Partners with: Female     Review of patient's allergies indicates:  No Known Allergies  Review of Systems   Constitutional: Positive for activity change. Negative for appetite change, chills, diaphoresis, fatigue, fever and unexpected weight change.   HENT: Negative for congestion, hearing loss, mouth sores, postnasal drip, rhinorrhea, sinus pressure, sneezing, sore throat, trouble swallowing and voice change.    Eyes: Negative for discharge, itching and visual disturbance.   Respiratory: Negative for cough, chest tightness, shortness of breath and wheezing.    Cardiovascular: Negative for chest pain, palpitations and leg swelling.   Gastrointestinal: Negative for abdominal pain, blood in stool, constipation, diarrhea, nausea and vomiting.   Endocrine: Negative for cold intolerance, heat intolerance, polydipsia and polyuria.   Genitourinary: Negative for difficulty urinating, dysuria, flank pain, hematuria and urgency.   Musculoskeletal:  Positive for arthralgias. Negative for back pain, joint swelling, myalgias and neck pain.   Skin: Negative for rash and wound.   Allergic/Immunologic: Negative for environmental allergies and food allergies.   Neurological: Negative for dizziness, tremors, seizures, syncope, weakness and headaches.   Hematological: Negative for adenopathy. Does not bruise/bleed easily.   Psychiatric/Behavioral: Negative for confusion, dysphoric mood, sleep disturbance and suicidal ideas. The patient is not nervous/anxious.        Objective:      Physical Exam   Constitutional: He is oriented to person, place, and time. He appears well-developed and well-nourished. No distress.   HENT:   Head: Normocephalic and atraumatic.   Right Ear: External ear normal.   Left Ear: External ear normal.   Nose: Nose normal.   Mouth/Throat: Oropharynx is clear and moist. No oropharyngeal exudate.   Eyes: Pupils are equal, round, and reactive to light. Conjunctivae and EOM are normal. Right eye exhibits no discharge. Left eye exhibits no discharge. No scleral icterus.   Neck: Normal range of motion. Neck supple. No JVD present. No thyromegaly present.   Cardiovascular: Normal rate, regular rhythm, normal heart sounds and intact distal pulses.   No murmur heard.  Pulses:       Dorsalis pedis pulses are 2+ on the right side, and 2+ on the left side.        Posterior tibial pulses are 2+ on the right side, and 2+ on the left side.   Pulmonary/Chest: Effort normal and breath sounds normal. No respiratory distress. He has no wheezes. He has no rales.   Abdominal: Soft. Bowel sounds are normal. He exhibits no distension. There is no tenderness. There is no guarding.   Musculoskeletal: He exhibits no edema.   Feet:   Right Foot:   Protective Sensation: 4 sites tested. 4 sites sensed.   Skin Integrity: Negative for ulcer, blister or skin breakdown.   Left Foot:   Protective Sensation: 4 sites tested. 4 sites sensed.   Skin Integrity: Negative for ulcer,  blister or skin breakdown.   Lymphadenopathy:     He has no cervical adenopathy.   Neurological: He is alert and oriented to person, place, and time.   Skin: Skin is warm and dry. No rash noted. He is not diaphoretic. No pallor.   Psychiatric: He has a normal mood and affect. Judgment normal.       Assessment:       1. Annual physical exam    2. Hypertension associated with diabetes    3. Type 2 diabetes mellitus with diabetic polyneuropathy, with long-term current use of insulin    4. Hyperlipidemia associated with type 2 diabetes mellitus    5. Obesity, diabetes, and hypertension syndrome    6. CKD stage 3 due to type 2 diabetes mellitus        Plan:    1. Blood work ordered       Vaccines: Influenza (declined); Tetanus (2018); Pneumovax (2018); Shingrix (will get)       Eye exam: declined       Colonoscopy: 6/18   2. HTN- controlled on Coreg 3.125 mg BID    3. T2DM- last HA1C of 6.2(9/19)<--6.4(12/18)<--5.8(8/18)<--8.7(5/18)       Continue Amaryl 2 mg qam       Losartan added 2/2 microalbuminuria    4. HLD- stable on Lipitor 40 mg daily   5. Diabetes, Obesity and HTN- pt advised on proper diet/exercise for weight loss   6. CKD III- stable   7. F/u in 6 months

## 2019-09-17 ENCOUNTER — PATIENT MESSAGE (OUTPATIENT)
Dept: INTERNAL MEDICINE | Facility: CLINIC | Age: 64
End: 2019-09-17

## 2019-12-09 RX ORDER — GABAPENTIN 300 MG/1
300 CAPSULE ORAL NIGHTLY
Qty: 90 CAPSULE | Refills: 3 | Status: SHIPPED | OUTPATIENT
Start: 2019-12-09 | End: 2020-02-12 | Stop reason: SDUPTHER

## 2020-01-08 RX ORDER — ATORVASTATIN CALCIUM 40 MG/1
TABLET, FILM COATED ORAL
Qty: 90 TABLET | Refills: 3 | Status: SHIPPED | OUTPATIENT
Start: 2020-01-08 | End: 2020-02-12 | Stop reason: SDUPTHER

## 2020-02-11 ENCOUNTER — PATIENT MESSAGE (OUTPATIENT)
Dept: INTERNAL MEDICINE | Facility: CLINIC | Age: 65
End: 2020-02-11

## 2020-02-12 RX ORDER — ATORVASTATIN CALCIUM 40 MG/1
40 TABLET, FILM COATED ORAL NIGHTLY
Qty: 90 TABLET | Refills: 3 | Status: SHIPPED | OUTPATIENT
Start: 2020-02-12 | End: 2021-01-20

## 2020-02-12 RX ORDER — DEXTROSE 4 G
TABLET,CHEWABLE ORAL
Qty: 1 EACH | Refills: 0 | Status: SHIPPED | OUTPATIENT
Start: 2020-02-12 | End: 2020-02-27 | Stop reason: SDUPTHER

## 2020-02-12 RX ORDER — GABAPENTIN 300 MG/1
300 CAPSULE ORAL NIGHTLY
Qty: 90 CAPSULE | Refills: 3 | Status: SHIPPED | OUTPATIENT
Start: 2020-02-12 | End: 2021-01-20

## 2020-02-12 RX ORDER — CARVEDILOL 3.12 MG/1
TABLET ORAL
Qty: 60 TABLET | Refills: 11 | Status: SHIPPED | OUTPATIENT
Start: 2020-02-12 | End: 2020-03-17 | Stop reason: SDUPTHER

## 2020-02-12 RX ORDER — LOSARTAN POTASSIUM 25 MG/1
25 TABLET ORAL DAILY
Qty: 90 TABLET | Refills: 3 | Status: SHIPPED | OUTPATIENT
Start: 2020-02-12 | End: 2020-05-11

## 2020-02-12 RX ORDER — GLIMEPIRIDE 2 MG/1
2 TABLET ORAL
Qty: 90 TABLET | Refills: 3 | Status: SHIPPED | OUTPATIENT
Start: 2020-02-12 | End: 2020-12-28

## 2020-02-12 RX ORDER — PANTOPRAZOLE SODIUM 40 MG/1
40 TABLET, DELAYED RELEASE ORAL DAILY
Qty: 90 TABLET | Refills: 3 | Status: SHIPPED | OUTPATIENT
Start: 2020-02-12 | End: 2020-12-28

## 2020-02-12 RX ORDER — LANCETS
1 EACH MISCELLANEOUS DAILY
Qty: 200 EACH | Refills: 3 | Status: SHIPPED | OUTPATIENT
Start: 2020-02-12 | End: 2020-02-27 | Stop reason: SDUPTHER

## 2020-02-23 ENCOUNTER — PATIENT MESSAGE (OUTPATIENT)
Dept: INTERNAL MEDICINE | Facility: CLINIC | Age: 65
End: 2020-02-23

## 2020-02-23 DIAGNOSIS — E11.42 TYPE 2 DIABETES MELLITUS WITH DIABETIC POLYNEUROPATHY, WITH LONG-TERM CURRENT USE OF INSULIN: Primary | ICD-10-CM

## 2020-02-23 DIAGNOSIS — I15.2 HYPERTENSION ASSOCIATED WITH DIABETES: ICD-10-CM

## 2020-02-23 DIAGNOSIS — Z79.4 TYPE 2 DIABETES MELLITUS WITH DIABETIC POLYNEUROPATHY, WITH LONG-TERM CURRENT USE OF INSULIN: Primary | ICD-10-CM

## 2020-02-23 DIAGNOSIS — E11.59 HYPERTENSION ASSOCIATED WITH DIABETES: ICD-10-CM

## 2020-02-24 ENCOUNTER — PATIENT MESSAGE (OUTPATIENT)
Dept: INTERNAL MEDICINE | Facility: CLINIC | Age: 65
End: 2020-02-24

## 2020-02-27 ENCOUNTER — PATIENT MESSAGE (OUTPATIENT)
Dept: INTERNAL MEDICINE | Facility: CLINIC | Age: 65
End: 2020-02-27

## 2020-02-27 DIAGNOSIS — Z79.4 TYPE 2 DIABETES MELLITUS WITH DIABETIC POLYNEUROPATHY, WITH LONG-TERM CURRENT USE OF INSULIN: Primary | ICD-10-CM

## 2020-02-27 DIAGNOSIS — E11.42 TYPE 2 DIABETES MELLITUS WITH DIABETIC POLYNEUROPATHY, WITH LONG-TERM CURRENT USE OF INSULIN: Primary | ICD-10-CM

## 2020-02-27 DIAGNOSIS — E11.22 CKD STAGE 3 DUE TO TYPE 2 DIABETES MELLITUS: ICD-10-CM

## 2020-02-27 DIAGNOSIS — N18.30 CKD STAGE 3 DUE TO TYPE 2 DIABETES MELLITUS: ICD-10-CM

## 2020-02-28 ENCOUNTER — PATIENT MESSAGE (OUTPATIENT)
Dept: INTERNAL MEDICINE | Facility: CLINIC | Age: 65
End: 2020-02-28

## 2020-02-28 RX ORDER — DEXTROSE 4 G
TABLET,CHEWABLE ORAL
Qty: 1 EACH | Refills: 0 | Status: ON HOLD | OUTPATIENT
Start: 2020-02-28 | End: 2021-07-22 | Stop reason: HOSPADM

## 2020-02-28 RX ORDER — LANCETS
1 EACH MISCELLANEOUS DAILY
Qty: 200 EACH | Refills: 3 | Status: SHIPPED | OUTPATIENT
Start: 2020-02-28

## 2020-03-09 ENCOUNTER — LAB VISIT (OUTPATIENT)
Dept: LAB | Facility: HOSPITAL | Age: 65
End: 2020-03-09
Attending: INTERNAL MEDICINE
Payer: MEDICARE

## 2020-03-09 DIAGNOSIS — I15.2 HYPERTENSION ASSOCIATED WITH DIABETES: ICD-10-CM

## 2020-03-09 DIAGNOSIS — E11.59 HYPERTENSION ASSOCIATED WITH DIABETES: ICD-10-CM

## 2020-03-09 DIAGNOSIS — Z79.4 TYPE 2 DIABETES MELLITUS WITH DIABETIC POLYNEUROPATHY, WITH LONG-TERM CURRENT USE OF INSULIN: ICD-10-CM

## 2020-03-09 DIAGNOSIS — E11.42 TYPE 2 DIABETES MELLITUS WITH DIABETIC POLYNEUROPATHY, WITH LONG-TERM CURRENT USE OF INSULIN: ICD-10-CM

## 2020-03-09 LAB
ALBUMIN SERPL BCP-MCNC: 3.8 G/DL (ref 3.5–5.2)
ALP SERPL-CCNC: 101 U/L (ref 55–135)
ALT SERPL W/O P-5'-P-CCNC: 13 U/L (ref 10–44)
ANION GAP SERPL CALC-SCNC: 8 MMOL/L (ref 8–16)
AST SERPL-CCNC: 16 U/L (ref 10–40)
BASOPHILS # BLD AUTO: 0.08 K/UL (ref 0–0.2)
BASOPHILS NFR BLD: 0.9 % (ref 0–1.9)
BILIRUB SERPL-MCNC: 0.8 MG/DL (ref 0.1–1)
BUN SERPL-MCNC: 17 MG/DL (ref 8–23)
CALCIUM SERPL-MCNC: 9.5 MG/DL (ref 8.7–10.5)
CHLORIDE SERPL-SCNC: 108 MMOL/L (ref 95–110)
CO2 SERPL-SCNC: 26 MMOL/L (ref 23–29)
CREAT SERPL-MCNC: 1.1 MG/DL (ref 0.5–1.4)
DIFFERENTIAL METHOD: ABNORMAL
EOSINOPHIL # BLD AUTO: 0.2 K/UL (ref 0–0.5)
EOSINOPHIL NFR BLD: 2.2 % (ref 0–8)
ERYTHROCYTE [DISTWIDTH] IN BLOOD BY AUTOMATED COUNT: 13.2 % (ref 11.5–14.5)
EST. GFR  (AFRICAN AMERICAN): >60 ML/MIN/1.73 M^2
EST. GFR  (NON AFRICAN AMERICAN): >60 ML/MIN/1.73 M^2
ESTIMATED AVG GLUCOSE: 134 MG/DL (ref 68–131)
GLUCOSE SERPL-MCNC: 132 MG/DL (ref 70–110)
HBA1C MFR BLD HPLC: 6.3 % (ref 4–5.6)
HCT VFR BLD AUTO: 47.9 % (ref 40–54)
HGB BLD-MCNC: 14.7 G/DL (ref 14–18)
IMM GRANULOCYTES # BLD AUTO: 0.01 K/UL (ref 0–0.04)
IMM GRANULOCYTES NFR BLD AUTO: 0.1 % (ref 0–0.5)
LYMPHOCYTES # BLD AUTO: 2.8 K/UL (ref 1–4.8)
LYMPHOCYTES NFR BLD: 32.8 % (ref 18–48)
MCH RBC QN AUTO: 28.7 PG (ref 27–31)
MCHC RBC AUTO-ENTMCNC: 30.7 G/DL (ref 32–36)
MCV RBC AUTO: 93 FL (ref 82–98)
MONOCYTES # BLD AUTO: 0.7 K/UL (ref 0.3–1)
MONOCYTES NFR BLD: 8 % (ref 4–15)
NEUTROPHILS # BLD AUTO: 4.7 K/UL (ref 1.8–7.7)
NEUTROPHILS NFR BLD: 56 % (ref 38–73)
NRBC BLD-RTO: 0 /100 WBC
PLATELET # BLD AUTO: 283 K/UL (ref 150–350)
PMV BLD AUTO: 10.4 FL (ref 9.2–12.9)
POTASSIUM SERPL-SCNC: 4.2 MMOL/L (ref 3.5–5.1)
PROT SERPL-MCNC: 6.9 G/DL (ref 6–8.4)
RBC # BLD AUTO: 5.13 M/UL (ref 4.6–6.2)
SODIUM SERPL-SCNC: 142 MMOL/L (ref 136–145)
WBC # BLD AUTO: 8.48 K/UL (ref 3.9–12.7)

## 2020-03-09 PROCEDURE — 80053 COMPREHEN METABOLIC PANEL: CPT

## 2020-03-09 PROCEDURE — 36415 COLL VENOUS BLD VENIPUNCTURE: CPT | Mod: PO

## 2020-03-09 PROCEDURE — 85025 COMPLETE CBC W/AUTO DIFF WBC: CPT

## 2020-03-09 PROCEDURE — 83036 HEMOGLOBIN GLYCOSYLATED A1C: CPT

## 2020-03-16 ENCOUNTER — PATIENT MESSAGE (OUTPATIENT)
Dept: INTERNAL MEDICINE | Facility: CLINIC | Age: 65
End: 2020-03-16

## 2020-03-17 RX ORDER — CARVEDILOL 3.12 MG/1
TABLET ORAL
Qty: 180 TABLET | Refills: 3 | Status: SHIPPED | OUTPATIENT
Start: 2020-03-17 | End: 2020-12-28

## 2020-03-20 ENCOUNTER — OFFICE VISIT (OUTPATIENT)
Dept: INTERNAL MEDICINE | Facility: CLINIC | Age: 65
End: 2020-03-20
Payer: MEDICARE

## 2020-03-20 VITALS
TEMPERATURE: 99 F | HEIGHT: 66 IN | WEIGHT: 192 LBS | SYSTOLIC BLOOD PRESSURE: 134 MMHG | DIASTOLIC BLOOD PRESSURE: 70 MMHG | HEART RATE: 84 BPM | RESPIRATION RATE: 18 BRPM | BODY MASS INDEX: 30.86 KG/M2

## 2020-03-20 DIAGNOSIS — I15.2 OBESITY, DIABETES, AND HYPERTENSION SYNDROME: ICD-10-CM

## 2020-03-20 DIAGNOSIS — Z79.4 TYPE 2 DIABETES MELLITUS WITH DIABETIC POLYNEUROPATHY, WITH LONG-TERM CURRENT USE OF INSULIN: ICD-10-CM

## 2020-03-20 DIAGNOSIS — E11.69 OBESITY, DIABETES, AND HYPERTENSION SYNDROME: ICD-10-CM

## 2020-03-20 DIAGNOSIS — I15.2 HYPERTENSION ASSOCIATED WITH DIABETES: Primary | ICD-10-CM

## 2020-03-20 DIAGNOSIS — E66.9 OBESITY, DIABETES, AND HYPERTENSION SYNDROME: ICD-10-CM

## 2020-03-20 DIAGNOSIS — E11.22 CKD STAGE 3 DUE TO TYPE 2 DIABETES MELLITUS: ICD-10-CM

## 2020-03-20 DIAGNOSIS — E11.59 HYPERTENSION ASSOCIATED WITH DIABETES: Primary | ICD-10-CM

## 2020-03-20 DIAGNOSIS — E11.42 TYPE 2 DIABETES MELLITUS WITH DIABETIC POLYNEUROPATHY, WITH LONG-TERM CURRENT USE OF INSULIN: ICD-10-CM

## 2020-03-20 DIAGNOSIS — N18.30 CKD STAGE 3 DUE TO TYPE 2 DIABETES MELLITUS: ICD-10-CM

## 2020-03-20 DIAGNOSIS — E78.5 HYPERLIPIDEMIA ASSOCIATED WITH TYPE 2 DIABETES MELLITUS: ICD-10-CM

## 2020-03-20 DIAGNOSIS — E11.69 HYPERLIPIDEMIA ASSOCIATED WITH TYPE 2 DIABETES MELLITUS: ICD-10-CM

## 2020-03-20 DIAGNOSIS — E11.59 OBESITY, DIABETES, AND HYPERTENSION SYNDROME: ICD-10-CM

## 2020-03-20 PROCEDURE — 3075F PR MOST RECENT SYSTOLIC BLOOD PRESS GE 130-139MM HG: ICD-10-PCS | Mod: CPTII,S$GLB,, | Performed by: INTERNAL MEDICINE

## 2020-03-20 PROCEDURE — 99214 OFFICE O/P EST MOD 30 MIN: CPT | Mod: S$GLB,,, | Performed by: INTERNAL MEDICINE

## 2020-03-20 PROCEDURE — 1126F PR PAIN SEVERITY QUANTIFIED, NO PAIN PRESENT: ICD-10-PCS | Mod: S$GLB,,, | Performed by: INTERNAL MEDICINE

## 2020-03-20 PROCEDURE — 1159F MED LIST DOCD IN RCRD: CPT | Mod: S$GLB,,, | Performed by: INTERNAL MEDICINE

## 2020-03-20 PROCEDURE — 3044F HG A1C LEVEL LT 7.0%: CPT | Mod: CPTII,S$GLB,, | Performed by: INTERNAL MEDICINE

## 2020-03-20 PROCEDURE — 99214 PR OFFICE/OUTPT VISIT, EST, LEVL IV, 30-39 MIN: ICD-10-PCS | Mod: S$GLB,,, | Performed by: INTERNAL MEDICINE

## 2020-03-20 PROCEDURE — 1101F PR PT FALLS ASSESS DOC 0-1 FALLS W/OUT INJ PAST YR: ICD-10-PCS | Mod: CPTII,S$GLB,, | Performed by: INTERNAL MEDICINE

## 2020-03-20 PROCEDURE — 3078F DIAST BP <80 MM HG: CPT | Mod: CPTII,S$GLB,, | Performed by: INTERNAL MEDICINE

## 2020-03-20 PROCEDURE — 3078F PR MOST RECENT DIASTOLIC BLOOD PRESSURE < 80 MM HG: ICD-10-PCS | Mod: CPTII,S$GLB,, | Performed by: INTERNAL MEDICINE

## 2020-03-20 PROCEDURE — 99999 PR PBB SHADOW E&M-EST. PATIENT-LVL III: ICD-10-PCS | Mod: PBBFAC,,, | Performed by: INTERNAL MEDICINE

## 2020-03-20 PROCEDURE — 99999 PR PBB SHADOW E&M-EST. PATIENT-LVL III: CPT | Mod: PBBFAC,,, | Performed by: INTERNAL MEDICINE

## 2020-03-20 PROCEDURE — 1126F AMNT PAIN NOTED NONE PRSNT: CPT | Mod: S$GLB,,, | Performed by: INTERNAL MEDICINE

## 2020-03-20 PROCEDURE — 3075F SYST BP GE 130 - 139MM HG: CPT | Mod: CPTII,S$GLB,, | Performed by: INTERNAL MEDICINE

## 2020-03-20 PROCEDURE — 1101F PT FALLS ASSESS-DOCD LE1/YR: CPT | Mod: CPTII,S$GLB,, | Performed by: INTERNAL MEDICINE

## 2020-03-20 PROCEDURE — 3044F PR MOST RECENT HEMOGLOBIN A1C LEVEL <7.0%: ICD-10-PCS | Mod: CPTII,S$GLB,, | Performed by: INTERNAL MEDICINE

## 2020-03-20 PROCEDURE — 1159F PR MEDICATION LIST DOCUMENTED IN MEDICAL RECORD: ICD-10-PCS | Mod: S$GLB,,, | Performed by: INTERNAL MEDICINE

## 2020-03-20 NOTE — PROGRESS NOTES
Subjective:       Patient ID: Mario Sanford is a 64 y.o. male.    Chief Complaint: Follow-up (6 month)    HPI   Pt with HTN, HLD, Obesity, T2DM is here for 6 month f/u. No acute complaints today.   Review of Systems   Constitutional: Negative for activity change, appetite change, chills, diaphoresis, fatigue, fever and unexpected weight change.   HENT: Negative for hearing loss, postnasal drip, rhinorrhea, sinus pressure, sneezing, sore throat, trouble swallowing and voice change.    Eyes: Negative for discharge and visual disturbance.   Respiratory: Negative for cough, chest tightness, shortness of breath and wheezing.    Cardiovascular: Negative for chest pain, palpitations and leg swelling.   Gastrointestinal: Negative for abdominal pain, blood in stool, constipation, diarrhea, nausea and vomiting.   Endocrine: Negative for polydipsia and polyuria.   Genitourinary: Negative for difficulty urinating, dysuria, hematuria and urgency.   Musculoskeletal: Positive for arthralgias. Negative for joint swelling, myalgias and neck pain.   Skin: Negative for rash and wound.   Allergic/Immunologic: Negative for environmental allergies and food allergies.   Neurological: Negative for weakness and headaches.   Hematological: Negative for adenopathy. Does not bruise/bleed easily.   Psychiatric/Behavioral: Negative for confusion and dysphoric mood.       Objective:      Physical Exam   Constitutional: He is oriented to person, place, and time. He appears well-developed and well-nourished. No distress.   HENT:   Head: Normocephalic and atraumatic.   Right Ear: External ear normal.   Left Ear: External ear normal.   Nose: Nose normal.   Mouth/Throat: Oropharynx is clear and moist. No oropharyngeal exudate.   Eyes: Pupils are equal, round, and reactive to light. Conjunctivae and EOM are normal. Right eye exhibits no discharge. Left eye exhibits no discharge. No scleral icterus.   Neck: Normal range of motion. Neck supple. No JVD  present.   Cardiovascular: Normal rate, regular rhythm and normal heart sounds.   No murmur heard.  Pulmonary/Chest: Effort normal and breath sounds normal. No respiratory distress. He has no wheezes. He has no rales.   Abdominal: Soft. Bowel sounds are normal. There is no tenderness.   Musculoskeletal: He exhibits no edema.   Lymphadenopathy:     He has no cervical adenopathy.   Neurological: He is alert and oriented to person, place, and time.   Skin: Skin is warm and dry. No rash noted. He is not diaphoretic. No pallor.       Assessment:       1. Hypertension associated with diabetes    2. Type 2 diabetes mellitus with diabetic polyneuropathy, with long-term current use of insulin    3. Hyperlipidemia associated with type 2 diabetes mellitus    4. Obesity, diabetes, and hypertension syndrome    5. CKD stage 3 due to type 2 diabetes mellitus        Plan:    1. HTN- controlled on Coreg 3.125 mg BID    2. T2DM- last HA1C of 6.3(3/20)<--6.2(9/19)<--6.4(12/18)<--5.8(8/18)<--8.7(5/18)       Continue Amaryl 2 mg qam       Losartan added 2/2 microalbuminuria    3. HLD- stable on Lipitor 40 mg daily   4. Diabetes, Obesity and HTN- pt advised on proper diet/exercise for weight loss   5. CKD III- stable   6. F/u in 6 months for annual exam

## 2020-05-10 ENCOUNTER — PATIENT MESSAGE (OUTPATIENT)
Dept: INTERNAL MEDICINE | Facility: CLINIC | Age: 65
End: 2020-05-10

## 2020-05-10 NOTE — TELEPHONE ENCOUNTER
Spoke with the pt, he denies SOB, Chest Pain , Dizziness, numbness/tingling in extremities, or slurred speech. Pt requested an appointment in clinic tomorrow. Appointment scheduled with Dr. Rivera tomorrow.

## 2020-05-11 ENCOUNTER — PATIENT MESSAGE (OUTPATIENT)
Dept: INTERNAL MEDICINE | Facility: CLINIC | Age: 65
End: 2020-05-11

## 2020-05-11 ENCOUNTER — OFFICE VISIT (OUTPATIENT)
Dept: INTERNAL MEDICINE | Facility: CLINIC | Age: 65
End: 2020-05-11
Payer: MEDICARE

## 2020-05-11 VITALS
RESPIRATION RATE: 16 BRPM | TEMPERATURE: 99 F | DIASTOLIC BLOOD PRESSURE: 84 MMHG | WEIGHT: 196.19 LBS | HEART RATE: 85 BPM | BODY MASS INDEX: 31.53 KG/M2 | HEIGHT: 66 IN | OXYGEN SATURATION: 98 % | SYSTOLIC BLOOD PRESSURE: 136 MMHG

## 2020-05-11 DIAGNOSIS — E11.22 CKD STAGE 3 DUE TO TYPE 2 DIABETES MELLITUS: ICD-10-CM

## 2020-05-11 DIAGNOSIS — E11.69 HYPERLIPIDEMIA ASSOCIATED WITH TYPE 2 DIABETES MELLITUS: ICD-10-CM

## 2020-05-11 DIAGNOSIS — E78.5 HYPERLIPIDEMIA ASSOCIATED WITH TYPE 2 DIABETES MELLITUS: ICD-10-CM

## 2020-05-11 DIAGNOSIS — I15.2 HYPERTENSION ASSOCIATED WITH DIABETES: Primary | ICD-10-CM

## 2020-05-11 DIAGNOSIS — E66.9 OBESITY, DIABETES, AND HYPERTENSION SYNDROME: ICD-10-CM

## 2020-05-11 DIAGNOSIS — E11.69 OBESITY, DIABETES, AND HYPERTENSION SYNDROME: ICD-10-CM

## 2020-05-11 DIAGNOSIS — N18.30 CKD STAGE 3 DUE TO TYPE 2 DIABETES MELLITUS: ICD-10-CM

## 2020-05-11 DIAGNOSIS — E11.59 OBESITY, DIABETES, AND HYPERTENSION SYNDROME: ICD-10-CM

## 2020-05-11 DIAGNOSIS — F41.9 ANXIETY: ICD-10-CM

## 2020-05-11 DIAGNOSIS — I15.2 OBESITY, DIABETES, AND HYPERTENSION SYNDROME: ICD-10-CM

## 2020-05-11 DIAGNOSIS — E11.59 HYPERTENSION ASSOCIATED WITH DIABETES: Primary | ICD-10-CM

## 2020-05-11 DIAGNOSIS — E11.42 TYPE 2 DIABETES MELLITUS WITH DIABETIC POLYNEUROPATHY, WITH LONG-TERM CURRENT USE OF INSULIN: ICD-10-CM

## 2020-05-11 DIAGNOSIS — Z79.4 TYPE 2 DIABETES MELLITUS WITH DIABETIC POLYNEUROPATHY, WITH LONG-TERM CURRENT USE OF INSULIN: ICD-10-CM

## 2020-05-11 PROCEDURE — 1159F PR MEDICATION LIST DOCUMENTED IN MEDICAL RECORD: ICD-10-PCS | Mod: S$GLB,,, | Performed by: INTERNAL MEDICINE

## 2020-05-11 PROCEDURE — 99499 UNLISTED E&M SERVICE: CPT | Mod: S$GLB,,, | Performed by: INTERNAL MEDICINE

## 2020-05-11 PROCEDURE — 3079F PR MOST RECENT DIASTOLIC BLOOD PRESSURE 80-89 MM HG: ICD-10-PCS | Mod: CPTII,S$GLB,, | Performed by: INTERNAL MEDICINE

## 2020-05-11 PROCEDURE — 1126F AMNT PAIN NOTED NONE PRSNT: CPT | Mod: S$GLB,,, | Performed by: INTERNAL MEDICINE

## 2020-05-11 PROCEDURE — 3044F PR MOST RECENT HEMOGLOBIN A1C LEVEL <7.0%: ICD-10-PCS | Mod: CPTII,S$GLB,, | Performed by: INTERNAL MEDICINE

## 2020-05-11 PROCEDURE — 99499 RISK ADDL DX/OHS AUDIT: ICD-10-PCS | Mod: S$GLB,,, | Performed by: INTERNAL MEDICINE

## 2020-05-11 PROCEDURE — 3075F PR MOST RECENT SYSTOLIC BLOOD PRESS GE 130-139MM HG: ICD-10-PCS | Mod: CPTII,S$GLB,, | Performed by: INTERNAL MEDICINE

## 2020-05-11 PROCEDURE — 3079F DIAST BP 80-89 MM HG: CPT | Mod: CPTII,S$GLB,, | Performed by: INTERNAL MEDICINE

## 2020-05-11 PROCEDURE — 1101F PT FALLS ASSESS-DOCD LE1/YR: CPT | Mod: CPTII,S$GLB,, | Performed by: INTERNAL MEDICINE

## 2020-05-11 PROCEDURE — 3044F HG A1C LEVEL LT 7.0%: CPT | Mod: CPTII,S$GLB,, | Performed by: INTERNAL MEDICINE

## 2020-05-11 PROCEDURE — 99999 PR PBB SHADOW E&M-EST. PATIENT-LVL III: CPT | Mod: PBBFAC,,, | Performed by: INTERNAL MEDICINE

## 2020-05-11 PROCEDURE — 1101F PR PT FALLS ASSESS DOC 0-1 FALLS W/OUT INJ PAST YR: ICD-10-PCS | Mod: CPTII,S$GLB,, | Performed by: INTERNAL MEDICINE

## 2020-05-11 PROCEDURE — 99999 PR PBB SHADOW E&M-EST. PATIENT-LVL III: ICD-10-PCS | Mod: PBBFAC,,, | Performed by: INTERNAL MEDICINE

## 2020-05-11 PROCEDURE — 3075F SYST BP GE 130 - 139MM HG: CPT | Mod: CPTII,S$GLB,, | Performed by: INTERNAL MEDICINE

## 2020-05-11 PROCEDURE — 99214 OFFICE O/P EST MOD 30 MIN: CPT | Mod: S$GLB,,, | Performed by: INTERNAL MEDICINE

## 2020-05-11 PROCEDURE — 1126F PR PAIN SEVERITY QUANTIFIED, NO PAIN PRESENT: ICD-10-PCS | Mod: S$GLB,,, | Performed by: INTERNAL MEDICINE

## 2020-05-11 PROCEDURE — 99214 PR OFFICE/OUTPT VISIT, EST, LEVL IV, 30-39 MIN: ICD-10-PCS | Mod: S$GLB,,, | Performed by: INTERNAL MEDICINE

## 2020-05-11 PROCEDURE — 1159F MED LIST DOCD IN RCRD: CPT | Mod: S$GLB,,, | Performed by: INTERNAL MEDICINE

## 2020-05-11 RX ORDER — BUPROPION HYDROCHLORIDE 150 MG/1
150 TABLET ORAL NIGHTLY
Qty: 30 TABLET | Refills: 1 | Status: SHIPPED | OUTPATIENT
Start: 2020-05-11 | End: 2020-06-09 | Stop reason: SDUPTHER

## 2020-05-11 RX ORDER — LOSARTAN POTASSIUM 50 MG/1
50 TABLET ORAL DAILY
Qty: 90 TABLET | Refills: 3 | Status: SHIPPED | OUTPATIENT
Start: 2020-05-11 | End: 2020-10-05 | Stop reason: SDUPTHER

## 2020-05-11 RX ORDER — HYDROXYZINE HYDROCHLORIDE 25 MG/1
TABLET, FILM COATED ORAL
Qty: 60 TABLET | Refills: 1 | Status: SHIPPED | OUTPATIENT
Start: 2020-05-11

## 2020-05-11 NOTE — PROGRESS NOTES
Subjective:       Patient ID: Mario Sanford is a 64 y.o. male.    Chief Complaint: Hypertension    HPI   Pt with HTN, T2DM, HLD, Obesity, CKD III is here for f/u. Pt has been experiencing worsening anxiety over the last few months and especially at night. A/s of mood instability, excessive worrying, insomnia. No SI/HI.   Review of Systems   Constitutional: Negative for activity change, appetite change, chills, diaphoresis, fatigue, fever and unexpected weight change.   HENT: Negative for postnasal drip, rhinorrhea, sinus pressure, sneezing, sore throat, trouble swallowing and voice change.    Respiratory: Negative for cough, shortness of breath and wheezing.    Cardiovascular: Negative for chest pain, palpitations and leg swelling.   Gastrointestinal: Negative for abdominal pain, blood in stool, constipation, diarrhea, nausea and vomiting.   Genitourinary: Negative for dysuria.   Musculoskeletal: Negative for arthralgias, myalgias and neck pain.   Skin: Negative for rash and wound.   Allergic/Immunologic: Negative for environmental allergies and food allergies.   Neurological: Negative for headaches.   Hematological: Negative for adenopathy. Does not bruise/bleed easily.   Psychiatric/Behavioral: Negative for self-injury and suicidal ideas. The patient is nervous/anxious.        Objective:      Physical Exam   Constitutional: He is oriented to person, place, and time. He appears well-developed and well-nourished. No distress.   HENT:   Head: Normocephalic and atraumatic.   Eyes: Pupils are equal, round, and reactive to light. Conjunctivae and EOM are normal. Right eye exhibits no discharge. Left eye exhibits no discharge. No scleral icterus.   Neck: Normal range of motion. Neck supple. No JVD present.   Cardiovascular: Normal rate, regular rhythm and normal heart sounds.   No murmur heard.  Pulmonary/Chest: Effort normal and breath sounds normal. No respiratory distress. He has no wheezes. He has no rales.    Musculoskeletal: He exhibits no edema.   Lymphadenopathy:     He has no cervical adenopathy.   Neurological: He is alert and oriented to person, place, and time.   Skin: Skin is warm and dry. No rash noted. He is not diaphoretic. No pallor.       Assessment:       1. Hypertension associated with diabetes    2. Type 2 diabetes mellitus with diabetic polyneuropathy, with long-term current use of insulin    3. Hyperlipidemia associated with type 2 diabetes mellitus    4. Obesity, diabetes, and hypertension syndrome    5. CKD stage 3 due to type 2 diabetes mellitus    6. Anxiety        Plan:    1. HTN- controlled on Coreg 3.125 mg BID and Losartan 50 mg daily   2. T2DM- last HA1C of 6.3(3/20)<--6.2(9/19)<--6.4(12/18)<--5.8(8/18)<--8.7(5/18)       Continue Amaryl 2 mg qam       Losartan added 2/2 microalbuminuria    3. HLD- stable on Lipitor 40 mg daily   4. Diabetes, Obesity and HTN- pt advised on proper diet/exercise for weight loss   5. CKD III- stable   6. Anxiety- Rx Wellbutrin  mg qHS and Atarax 25 mg TID PRN   7. F/u in 1 month

## 2020-06-09 DIAGNOSIS — F41.9 ANXIETY: ICD-10-CM

## 2020-06-09 RX ORDER — BUPROPION HYDROCHLORIDE 150 MG/1
150 TABLET ORAL NIGHTLY
Qty: 30 TABLET | Refills: 0 | Status: SHIPPED | OUTPATIENT
Start: 2020-06-09 | End: 2020-06-11

## 2020-06-11 ENCOUNTER — OFFICE VISIT (OUTPATIENT)
Dept: INTERNAL MEDICINE | Facility: CLINIC | Age: 65
End: 2020-06-11
Payer: MEDICARE

## 2020-06-11 VITALS
HEIGHT: 66 IN | DIASTOLIC BLOOD PRESSURE: 74 MMHG | SYSTOLIC BLOOD PRESSURE: 132 MMHG | HEART RATE: 93 BPM | RESPIRATION RATE: 16 BRPM | BODY MASS INDEX: 31.39 KG/M2 | TEMPERATURE: 97 F | WEIGHT: 195.31 LBS

## 2020-06-11 DIAGNOSIS — F41.9 ANXIETY: Primary | ICD-10-CM

## 2020-06-11 PROCEDURE — 1101F PR PT FALLS ASSESS DOC 0-1 FALLS W/OUT INJ PAST YR: ICD-10-PCS | Mod: CPTII,S$GLB,, | Performed by: INTERNAL MEDICINE

## 2020-06-11 PROCEDURE — 3078F DIAST BP <80 MM HG: CPT | Mod: CPTII,S$GLB,, | Performed by: INTERNAL MEDICINE

## 2020-06-11 PROCEDURE — 99999 PR PBB SHADOW E&M-EST. PATIENT-LVL III: CPT | Mod: PBBFAC,,, | Performed by: INTERNAL MEDICINE

## 2020-06-11 PROCEDURE — 3078F PR MOST RECENT DIASTOLIC BLOOD PRESSURE < 80 MM HG: ICD-10-PCS | Mod: CPTII,S$GLB,, | Performed by: INTERNAL MEDICINE

## 2020-06-11 PROCEDURE — 1159F MED LIST DOCD IN RCRD: CPT | Mod: S$GLB,,, | Performed by: INTERNAL MEDICINE

## 2020-06-11 PROCEDURE — 1126F AMNT PAIN NOTED NONE PRSNT: CPT | Mod: S$GLB,,, | Performed by: INTERNAL MEDICINE

## 2020-06-11 PROCEDURE — 1159F PR MEDICATION LIST DOCUMENTED IN MEDICAL RECORD: ICD-10-PCS | Mod: S$GLB,,, | Performed by: INTERNAL MEDICINE

## 2020-06-11 PROCEDURE — 3075F SYST BP GE 130 - 139MM HG: CPT | Mod: CPTII,S$GLB,, | Performed by: INTERNAL MEDICINE

## 2020-06-11 PROCEDURE — 99213 PR OFFICE/OUTPT VISIT, EST, LEVL III, 20-29 MIN: ICD-10-PCS | Mod: S$GLB,,, | Performed by: INTERNAL MEDICINE

## 2020-06-11 PROCEDURE — 1126F PR PAIN SEVERITY QUANTIFIED, NO PAIN PRESENT: ICD-10-PCS | Mod: S$GLB,,, | Performed by: INTERNAL MEDICINE

## 2020-06-11 PROCEDURE — 1101F PT FALLS ASSESS-DOCD LE1/YR: CPT | Mod: CPTII,S$GLB,, | Performed by: INTERNAL MEDICINE

## 2020-06-11 PROCEDURE — 99999 PR PBB SHADOW E&M-EST. PATIENT-LVL III: ICD-10-PCS | Mod: PBBFAC,,, | Performed by: INTERNAL MEDICINE

## 2020-06-11 PROCEDURE — 99213 OFFICE O/P EST LOW 20 MIN: CPT | Mod: S$GLB,,, | Performed by: INTERNAL MEDICINE

## 2020-06-11 PROCEDURE — 3075F PR MOST RECENT SYSTOLIC BLOOD PRESS GE 130-139MM HG: ICD-10-PCS | Mod: CPTII,S$GLB,, | Performed by: INTERNAL MEDICINE

## 2020-06-11 RX ORDER — BUPROPION HYDROCHLORIDE 300 MG/1
300 TABLET ORAL DAILY
Qty: 90 TABLET | Refills: 3 | Status: SHIPPED | OUTPATIENT
Start: 2020-06-11 | End: 2021-05-27

## 2020-06-11 NOTE — PROGRESS NOTES
Subjective:       Patient ID: Mario Sanford is a 65 y.o. male.    Chief Complaint: Follow-up    HPI   Pt here for 1 month f/u regarding anxiety. He was started on Wellbutrin which has helped some but he still has spells of anger. No further panic attacks.   Review of Systems   Constitutional: Negative for activity change, appetite change, chills, diaphoresis, fatigue, fever and unexpected weight change.   HENT: Negative for postnasal drip, rhinorrhea, sinus pressure, sneezing, sore throat, trouble swallowing and voice change.    Respiratory: Negative for cough, shortness of breath and wheezing.    Cardiovascular: Negative for chest pain, palpitations and leg swelling.   Gastrointestinal: Negative for abdominal pain, blood in stool, constipation, diarrhea, nausea and vomiting.   Genitourinary: Negative for dysuria.   Musculoskeletal: Negative for arthralgias, myalgias and neck pain.   Skin: Negative for rash and wound.   Allergic/Immunologic: Negative for environmental allergies and food allergies.   Neurological: Negative for headaches.   Hematological: Negative for adenopathy. Does not bruise/bleed easily.   Psychiatric/Behavioral: Negative for self-injury and suicidal ideas. The patient is nervous/anxious.        Objective:      Physical Exam   Constitutional: He is oriented to person, place, and time. He appears well-developed and well-nourished. No distress.   HENT:   Head: Normocephalic and atraumatic.   Eyes: Pupils are equal, round, and reactive to light. Conjunctivae and EOM are normal. Right eye exhibits no discharge. Left eye exhibits no discharge. No scleral icterus.   Neck: Normal range of motion. Neck supple. No JVD present.   Cardiovascular: Normal rate, regular rhythm and normal heart sounds.   No murmur heard.  Pulmonary/Chest: Effort normal and breath sounds normal. No respiratory distress. He has no wheezes. He has no rales.   Musculoskeletal: He exhibits no edema.   Lymphadenopathy:     He has no  cervical adenopathy.   Neurological: He is alert and oriented to person, place, and time.   Skin: Skin is warm and dry. No rash noted. He is not diaphoretic. No pallor.       Assessment:       1. Anxiety        Plan:    1. Increase Wellbutrin XL to 300 mg daily

## 2020-08-20 ENCOUNTER — PATIENT MESSAGE (OUTPATIENT)
Dept: INTERNAL MEDICINE | Facility: CLINIC | Age: 65
End: 2020-08-20

## 2020-08-20 NOTE — LETTER
[facility]  [address].  [place] [address]  Phone: [phone]  Fax: [phone] August 21, 2020    Mario Sanford  [address] [place] [address]      To Whom It May Concern:    Mario Sanford is unable to participate in jury duty due to his health risk/ medical limitations and would not qualify as a juror at this time.    If you have any questions or concerns, please feel free to call my office.    Sincerely,        Bereket Rivera, DO

## 2020-08-20 NOTE — LETTER
[facility]  [address].  [place] [address]  Phone: [phone]  Fax: [phone] August 21, 2020    Mario Sanford  [address] [place] [address]      To Whom It May Concern:    Mario Sanford is unable to participate for jury duty on 9- due to his health risks/ medical limitations. He would not qualify for   a juror at this time.    If you have any questions or concerns, please feel free to call my office.    Sincerely,        Bereket Rivera, DO

## 2020-08-21 ENCOUNTER — TELEPHONE (OUTPATIENT)
Dept: INTERNAL MEDICINE | Facility: CLINIC | Age: 65
End: 2020-08-21

## 2020-08-21 NOTE — TELEPHONE ENCOUNTER
----- Message from Ambar Husain sent at 8/21/2020  9:12 AM CDT -----  Contact: self/[phone]  Patient called and stated that the dates that he needs to be out of jury duty is 09/10/20. Thank you

## 2020-08-21 NOTE — TELEPHONE ENCOUNTER
----- Message from Ellen Villafana sent at 8/21/2020  9:34 AM CDT -----  Regarding: return call  Contact: Mario@ [phone]  Patient Returning Call from [facility]    Who Left Message for Patient: Anusha Jon    Communication Preference: Mario@ [phone]    Additional Information:  Pt was calling back to give you the dates:  9/10/2020 please make sure put Mario Sanford Jr. on the letter. Pt would also like to know if you would mail it to him or if he has to pick it up.

## 2020-09-18 ENCOUNTER — OFFICE VISIT (OUTPATIENT)
Dept: INTERNAL MEDICINE | Facility: CLINIC | Age: 65
End: 2020-09-18
Payer: MEDICARE

## 2020-09-18 VITALS
RESPIRATION RATE: 16 BRPM | DIASTOLIC BLOOD PRESSURE: 74 MMHG | HEIGHT: 66 IN | TEMPERATURE: 98 F | WEIGHT: 197.31 LBS | OXYGEN SATURATION: 98 % | SYSTOLIC BLOOD PRESSURE: 120 MMHG | BODY MASS INDEX: 31.71 KG/M2 | HEART RATE: 96 BPM

## 2020-09-18 DIAGNOSIS — E11.42 TYPE 2 DIABETES MELLITUS WITH DIABETIC POLYNEUROPATHY, WITH LONG-TERM CURRENT USE OF INSULIN: ICD-10-CM

## 2020-09-18 DIAGNOSIS — E11.59 OBESITY, DIABETES, AND HYPERTENSION SYNDROME: ICD-10-CM

## 2020-09-18 DIAGNOSIS — F41.9 ANXIETY: ICD-10-CM

## 2020-09-18 DIAGNOSIS — N18.30 CKD STAGE 3 DUE TO TYPE 2 DIABETES MELLITUS: ICD-10-CM

## 2020-09-18 DIAGNOSIS — Z79.4 TYPE 2 DIABETES MELLITUS WITH DIABETIC POLYNEUROPATHY, WITH LONG-TERM CURRENT USE OF INSULIN: ICD-10-CM

## 2020-09-18 DIAGNOSIS — I15.2 OBESITY, DIABETES, AND HYPERTENSION SYNDROME: ICD-10-CM

## 2020-09-18 DIAGNOSIS — E11.22 CKD STAGE 3 DUE TO TYPE 2 DIABETES MELLITUS: ICD-10-CM

## 2020-09-18 DIAGNOSIS — E11.69 OBESITY, DIABETES, AND HYPERTENSION SYNDROME: ICD-10-CM

## 2020-09-18 DIAGNOSIS — Z00.00 ANNUAL PHYSICAL EXAM: Primary | ICD-10-CM

## 2020-09-18 DIAGNOSIS — E78.5 HYPERLIPIDEMIA ASSOCIATED WITH TYPE 2 DIABETES MELLITUS: ICD-10-CM

## 2020-09-18 DIAGNOSIS — I15.2 HYPERTENSION ASSOCIATED WITH DIABETES: ICD-10-CM

## 2020-09-18 DIAGNOSIS — E11.59 HYPERTENSION ASSOCIATED WITH DIABETES: ICD-10-CM

## 2020-09-18 DIAGNOSIS — E66.9 OBESITY, DIABETES, AND HYPERTENSION SYNDROME: ICD-10-CM

## 2020-09-18 DIAGNOSIS — E11.69 HYPERLIPIDEMIA ASSOCIATED WITH TYPE 2 DIABETES MELLITUS: ICD-10-CM

## 2020-09-18 PROCEDURE — 90670 PCV13 VACCINE IM: CPT | Mod: S$GLB,,, | Performed by: INTERNAL MEDICINE

## 2020-09-18 PROCEDURE — 3044F PR MOST RECENT HEMOGLOBIN A1C LEVEL <7.0%: ICD-10-PCS | Mod: CPTII,S$GLB,, | Performed by: INTERNAL MEDICINE

## 2020-09-18 PROCEDURE — 3074F PR MOST RECENT SYSTOLIC BLOOD PRESSURE < 130 MM HG: ICD-10-PCS | Mod: CPTII,S$GLB,, | Performed by: INTERNAL MEDICINE

## 2020-09-18 PROCEDURE — G0009 ADMIN PNEUMOCOCCAL VACCINE: HCPCS | Mod: S$GLB,,, | Performed by: INTERNAL MEDICINE

## 2020-09-18 PROCEDURE — 99499 RISK ADDL DX/OHS AUDIT: ICD-10-PCS | Mod: S$GLB,,, | Performed by: INTERNAL MEDICINE

## 2020-09-18 PROCEDURE — G0009 PNEUMOCOCCAL CONJUGATE VACCINE 13-VALENT LESS THAN 5YO & GREATER THAN: ICD-10-PCS | Mod: S$GLB,,, | Performed by: INTERNAL MEDICINE

## 2020-09-18 PROCEDURE — 90670 PNEUMOCOCCAL CONJUGATE VACCINE 13-VALENT LESS THAN 5YO & GREATER THAN: ICD-10-PCS | Mod: S$GLB,,, | Performed by: INTERNAL MEDICINE

## 2020-09-18 PROCEDURE — 99214 PR OFFICE/OUTPT VISIT, EST, LEVL IV, 30-39 MIN: ICD-10-PCS | Mod: 25,S$GLB,, | Performed by: INTERNAL MEDICINE

## 2020-09-18 PROCEDURE — 3044F HG A1C LEVEL LT 7.0%: CPT | Mod: CPTII,S$GLB,, | Performed by: INTERNAL MEDICINE

## 2020-09-18 PROCEDURE — 3078F PR MOST RECENT DIASTOLIC BLOOD PRESSURE < 80 MM HG: ICD-10-PCS | Mod: CPTII,S$GLB,, | Performed by: INTERNAL MEDICINE

## 2020-09-18 PROCEDURE — 99999 PR PBB SHADOW E&M-EST. PATIENT-LVL IV: ICD-10-PCS | Mod: PBBFAC,,, | Performed by: INTERNAL MEDICINE

## 2020-09-18 PROCEDURE — 99999 PR PBB SHADOW E&M-EST. PATIENT-LVL IV: CPT | Mod: PBBFAC,,, | Performed by: INTERNAL MEDICINE

## 2020-09-18 PROCEDURE — 3074F SYST BP LT 130 MM HG: CPT | Mod: CPTII,S$GLB,, | Performed by: INTERNAL MEDICINE

## 2020-09-18 PROCEDURE — 3078F DIAST BP <80 MM HG: CPT | Mod: CPTII,S$GLB,, | Performed by: INTERNAL MEDICINE

## 2020-09-18 PROCEDURE — 99499 UNLISTED E&M SERVICE: CPT | Mod: S$GLB,,, | Performed by: INTERNAL MEDICINE

## 2020-09-18 PROCEDURE — 99214 OFFICE O/P EST MOD 30 MIN: CPT | Mod: 25,S$GLB,, | Performed by: INTERNAL MEDICINE

## 2020-09-18 RX ORDER — CICLOPIROX 80 MG/ML
SOLUTION TOPICAL NIGHTLY
Qty: 6.6 ML | Refills: 11 | Status: SHIPPED | OUTPATIENT
Start: 2020-09-18 | End: 2021-10-05

## 2020-09-18 NOTE — PROGRESS NOTES
Subjective:       Patient ID: Mario Sanford is a 65 y.o. male.    Chief Complaint: Annual Exam    HPI   65 y.o. Male here for annual exam.      Vaccines: Influenza (declined); Tetanus (2018); Pneumovax (2018); Shingrix (will get)  Eye exam: 8/19  Colonoscopy: 6/18     Exercise: walks   Diet: regular     Past Medical History:  No date: Obesity (BMI 30-39.9)  No date: HTN  No date: HLD  No date: T2DM  No past surgical history on file.  Social History    Marital status:              Spouse name:                       Years of education:                 Number of children: 1              Occupational History  Occupation          Employer            Comment               Retired from sales                           Social History Main Topics    Smoking status: Never Smoker                                                                 Smokeless tobacco: Never Used                        Alcohol use: No              Drug use: No              Sexual activity: Yes               Partners with: Female     Review of patient's allergies indicates:  No Known Allergies  Review of Systems   Constitutional: Negative for activity change, appetite change, chills, diaphoresis, fatigue, fever and unexpected weight change.   HENT: Negative for nasal congestion, hearing loss, mouth sores, postnasal drip, rhinorrhea, sinus pressure/congestion, sneezing, sore throat, trouble swallowing and voice change.    Eyes: Negative for discharge, itching and visual disturbance.   Respiratory: Negative for cough, chest tightness, shortness of breath and wheezing.    Cardiovascular: Negative for chest pain, palpitations and leg swelling.   Gastrointestinal: Negative for abdominal pain, blood in stool, constipation, diarrhea, nausea and vomiting.   Endocrine: Negative for cold intolerance, heat intolerance, polydipsia and polyuria.   Genitourinary: Negative for difficulty urinating, dysuria, flank pain, hematuria and urgency.   Musculoskeletal:  Positive for arthralgias and joint swelling. Negative for back pain, myalgias and neck pain.   Integumentary:  Negative for rash and wound.   Allergic/Immunologic: Negative for environmental allergies and food allergies.   Neurological: Negative for dizziness, tremors, seizures, syncope, weakness and headaches.   Hematological: Negative for adenopathy. Does not bruise/bleed easily.   Psychiatric/Behavioral: Negative for confusion, dysphoric mood, self-injury, sleep disturbance and suicidal ideas. The patient is nervous/anxious.          Objective:      Physical Exam  Constitutional:       General: He is not in acute distress.     Appearance: He is well-developed. He is not diaphoretic.   HENT:      Head: Normocephalic and atraumatic.      Right Ear: External ear normal.      Left Ear: External ear normal.      Nose: Nose normal.      Mouth/Throat:      Pharynx: No oropharyngeal exudate.   Eyes:      General: No scleral icterus.        Right eye: No discharge.         Left eye: No discharge.      Conjunctiva/sclera: Conjunctivae normal.      Pupils: Pupils are equal, round, and reactive to light.   Neck:      Musculoskeletal: Normal range of motion and neck supple.      Vascular: No JVD.   Cardiovascular:      Rate and Rhythm: Normal rate and regular rhythm.      Heart sounds: Normal heart sounds. No murmur.   Pulmonary:      Effort: Pulmonary effort is normal. No respiratory distress.      Breath sounds: Normal breath sounds. No wheezing or rales.   Abdominal:      General: Bowel sounds are normal.      Palpations: Abdomen is soft.   Lymphadenopathy:      Cervical: No cervical adenopathy.   Skin:     General: Skin is warm and dry.      Coloration: Skin is not pale.      Findings: No rash.   Neurological:      Mental Status: He is alert and oriented to person, place, and time.         Assessment:       1. Annual physical exam    2. Hypertension associated with diabetes    3. Hyperlipidemia associated with type 2  diabetes mellitus    4. Type 2 diabetes mellitus with diabetic polyneuropathy, with long-term current use of insulin    5. Obesity, diabetes, and hypertension syndrome    6. CKD stage 3 due to type 2 diabetes mellitus    7. Anxiety        Plan:    Blood work ordered   Vaccines: Influenza (declined); Tetanus (2018); Pneumovax (2018); Shingrix (will get)   Eye exam: 8/19   Colonoscopy: 6/18      1. HTN- controlled on Coreg 3.125 mg BID and Losartan 50 mg daily   2. T2DM- last HA1C of 6.3(3/20)<--6.2(9/19)<--6.4(12/18)<--5.8(8/18)<--8.7(5/18)       Continue Amaryl 2 mg qam       Losartan added 2/2 microalbuminuria    3. HLD- stable on Lipitor 40 mg daily   4. Diabetes, Obesity and HTN- pt advised on proper diet/exercise for weight loss   5. CKD III- stable   6. Anxiety- stable on Wellbutrin  mg qHS and Atarax 25 mg TID PRN

## 2020-09-21 ENCOUNTER — LAB VISIT (OUTPATIENT)
Dept: LAB | Facility: HOSPITAL | Age: 65
End: 2020-09-21
Attending: INTERNAL MEDICINE
Payer: MEDICARE

## 2020-09-21 DIAGNOSIS — E66.9 OBESITY, DIABETES, AND HYPERTENSION SYNDROME: ICD-10-CM

## 2020-09-21 DIAGNOSIS — E11.42 TYPE 2 DIABETES MELLITUS WITH DIABETIC POLYNEUROPATHY, WITH LONG-TERM CURRENT USE OF INSULIN: ICD-10-CM

## 2020-09-21 DIAGNOSIS — Z79.4 TYPE 2 DIABETES MELLITUS WITH DIABETIC POLYNEUROPATHY, WITH LONG-TERM CURRENT USE OF INSULIN: ICD-10-CM

## 2020-09-21 DIAGNOSIS — E11.22 CKD STAGE 3 DUE TO TYPE 2 DIABETES MELLITUS: ICD-10-CM

## 2020-09-21 DIAGNOSIS — E11.59 OBESITY, DIABETES, AND HYPERTENSION SYNDROME: ICD-10-CM

## 2020-09-21 DIAGNOSIS — E11.59 HYPERTENSION ASSOCIATED WITH DIABETES: ICD-10-CM

## 2020-09-21 DIAGNOSIS — F41.9 ANXIETY: ICD-10-CM

## 2020-09-21 DIAGNOSIS — E11.69 OBESITY, DIABETES, AND HYPERTENSION SYNDROME: ICD-10-CM

## 2020-09-21 DIAGNOSIS — I15.2 HYPERTENSION ASSOCIATED WITH DIABETES: ICD-10-CM

## 2020-09-21 DIAGNOSIS — Z00.00 ANNUAL PHYSICAL EXAM: ICD-10-CM

## 2020-09-21 DIAGNOSIS — E78.5 HYPERLIPIDEMIA ASSOCIATED WITH TYPE 2 DIABETES MELLITUS: ICD-10-CM

## 2020-09-21 DIAGNOSIS — I15.2 OBESITY, DIABETES, AND HYPERTENSION SYNDROME: ICD-10-CM

## 2020-09-21 DIAGNOSIS — E11.69 HYPERLIPIDEMIA ASSOCIATED WITH TYPE 2 DIABETES MELLITUS: ICD-10-CM

## 2020-09-21 DIAGNOSIS — N18.30 CKD STAGE 3 DUE TO TYPE 2 DIABETES MELLITUS: ICD-10-CM

## 2020-09-21 LAB
BASOPHILS # BLD AUTO: 0.08 K/UL (ref 0–0.2)
BASOPHILS NFR BLD: 0.9 % (ref 0–1.9)
DIFFERENTIAL METHOD: ABNORMAL
EOSINOPHIL # BLD AUTO: 0.2 K/UL (ref 0–0.5)
EOSINOPHIL NFR BLD: 1.9 % (ref 0–8)
ERYTHROCYTE [DISTWIDTH] IN BLOOD BY AUTOMATED COUNT: 13.5 % (ref 11.5–14.5)
HCT VFR BLD AUTO: 47 % (ref 40–54)
HGB BLD-MCNC: 14.6 G/DL (ref 14–18)
IMM GRANULOCYTES # BLD AUTO: 0.02 K/UL (ref 0–0.04)
IMM GRANULOCYTES NFR BLD AUTO: 0.2 % (ref 0–0.5)
LYMPHOCYTES # BLD AUTO: 2.6 K/UL (ref 1–4.8)
LYMPHOCYTES NFR BLD: 29.2 % (ref 18–48)
MCH RBC QN AUTO: 29.3 PG (ref 27–31)
MCHC RBC AUTO-ENTMCNC: 31.1 G/DL (ref 32–36)
MCV RBC AUTO: 94 FL (ref 82–98)
MONOCYTES # BLD AUTO: 0.7 K/UL (ref 0.3–1)
MONOCYTES NFR BLD: 7.7 % (ref 4–15)
NEUTROPHILS # BLD AUTO: 5.3 K/UL (ref 1.8–7.7)
NEUTROPHILS NFR BLD: 60.1 % (ref 38–73)
NRBC BLD-RTO: 0 /100 WBC
PLATELET # BLD AUTO: 303 K/UL (ref 150–350)
PMV BLD AUTO: 10.4 FL (ref 9.2–12.9)
RBC # BLD AUTO: 4.99 M/UL (ref 4.6–6.2)
WBC # BLD AUTO: 8.85 K/UL (ref 3.9–12.7)

## 2020-09-21 PROCEDURE — 36415 COLL VENOUS BLD VENIPUNCTURE: CPT | Mod: PO

## 2020-09-21 PROCEDURE — 80053 COMPREHEN METABOLIC PANEL: CPT

## 2020-09-21 PROCEDURE — 85025 COMPLETE CBC W/AUTO DIFF WBC: CPT

## 2020-09-21 PROCEDURE — 83036 HEMOGLOBIN GLYCOSYLATED A1C: CPT

## 2020-09-21 PROCEDURE — 84443 ASSAY THYROID STIM HORMONE: CPT

## 2020-09-21 PROCEDURE — 80061 LIPID PANEL: CPT

## 2020-09-22 ENCOUNTER — LAB VISIT (OUTPATIENT)
Dept: LAB | Facility: HOSPITAL | Age: 65
End: 2020-09-22
Attending: INTERNAL MEDICINE
Payer: MEDICARE

## 2020-09-22 ENCOUNTER — TELEPHONE (OUTPATIENT)
Dept: INTERNAL MEDICINE | Facility: CLINIC | Age: 65
End: 2020-09-22

## 2020-09-22 ENCOUNTER — OFFICE VISIT (OUTPATIENT)
Dept: OPTOMETRY | Facility: CLINIC | Age: 65
End: 2020-09-22
Payer: MEDICARE

## 2020-09-22 ENCOUNTER — PATIENT OUTREACH (OUTPATIENT)
Dept: ADMINISTRATIVE | Facility: OTHER | Age: 65
End: 2020-09-22

## 2020-09-22 DIAGNOSIS — E11.9 TYPE 2 DIABETES MELLITUS WITHOUT RETINOPATHY: Primary | ICD-10-CM

## 2020-09-22 DIAGNOSIS — Z12.5 PROSTATE CANCER SCREENING: ICD-10-CM

## 2020-09-22 DIAGNOSIS — H25.13 NUCLEAR SCLEROSIS OF BOTH EYES: ICD-10-CM

## 2020-09-22 DIAGNOSIS — Z12.5 PROSTATE CANCER SCREENING: Primary | ICD-10-CM

## 2020-09-22 LAB
ALBUMIN SERPL BCP-MCNC: 4 G/DL (ref 3.5–5.2)
ALP SERPL-CCNC: 112 U/L (ref 55–135)
ALT SERPL W/O P-5'-P-CCNC: 21 U/L (ref 10–44)
ANION GAP SERPL CALC-SCNC: 11 MMOL/L (ref 8–16)
AST SERPL-CCNC: 18 U/L (ref 10–40)
BILIRUB SERPL-MCNC: 0.5 MG/DL (ref 0.1–1)
BUN SERPL-MCNC: 16 MG/DL (ref 8–23)
CALCIUM SERPL-MCNC: 9 MG/DL (ref 8.7–10.5)
CHLORIDE SERPL-SCNC: 104 MMOL/L (ref 95–110)
CHOLEST SERPL-MCNC: 131 MG/DL (ref 120–199)
CHOLEST/HDLC SERPL: 3.4 {RATIO} (ref 2–5)
CO2 SERPL-SCNC: 23 MMOL/L (ref 23–29)
CREAT SERPL-MCNC: 1.3 MG/DL (ref 0.5–1.4)
EST. GFR  (AFRICAN AMERICAN): >60 ML/MIN/1.73 M^2
EST. GFR  (NON AFRICAN AMERICAN): 56.5 ML/MIN/1.73 M^2
ESTIMATED AVG GLUCOSE: 128 MG/DL (ref 68–131)
GLUCOSE SERPL-MCNC: 106 MG/DL (ref 70–110)
HBA1C MFR BLD HPLC: 6.1 % (ref 4–5.6)
HDLC SERPL-MCNC: 39 MG/DL (ref 40–75)
HDLC SERPL: 29.8 % (ref 20–50)
LDLC SERPL CALC-MCNC: 68.8 MG/DL (ref 63–159)
NONHDLC SERPL-MCNC: 92 MG/DL
POTASSIUM SERPL-SCNC: 4.5 MMOL/L (ref 3.5–5.1)
PROT SERPL-MCNC: 7 G/DL (ref 6–8.4)
SODIUM SERPL-SCNC: 138 MMOL/L (ref 136–145)
TRIGL SERPL-MCNC: 116 MG/DL (ref 30–150)
TSH SERPL DL<=0.005 MIU/L-ACNC: 1.53 UIU/ML (ref 0.4–4)

## 2020-09-22 PROCEDURE — 99999 PR PBB SHADOW E&M-EST. PATIENT-LVL II: CPT | Mod: PBBFAC,,, | Performed by: OPTOMETRIST

## 2020-09-22 PROCEDURE — 92014 PR EYE EXAM, EST PATIENT,COMPREHESV: ICD-10-PCS | Mod: S$GLB,,, | Performed by: OPTOMETRIST

## 2020-09-22 PROCEDURE — 92014 COMPRE OPH EXAM EST PT 1/>: CPT | Mod: S$GLB,,, | Performed by: OPTOMETRIST

## 2020-09-22 PROCEDURE — 99999 PR PBB SHADOW E&M-EST. PATIENT-LVL II: ICD-10-PCS | Mod: PBBFAC,,, | Performed by: OPTOMETRIST

## 2020-09-22 PROCEDURE — 36415 COLL VENOUS BLD VENIPUNCTURE: CPT | Mod: PO

## 2020-09-22 PROCEDURE — 84153 ASSAY OF PSA TOTAL: CPT

## 2020-09-22 NOTE — PROGRESS NOTES
Care Everywhere:   Immunization: updated  Health Maintenance: updated  Media Review:   Legacy Review:   Order placed:   Upcoming appts:optometry 9/22

## 2020-09-22 NOTE — PROGRESS NOTES
HPI     DLS08/28/2019  Pt here for routine Diabetic eye exam. BS THIS   Hemoglobin A1C       Date                     Value               Ref Range             Status                09/21/2020               6.1 (H)             4.0 - 5.6 %           Final              Comment:    ADA Screening Guidelines:  5.7-6.4%  Consistent with   prediabetes  >or=6.5%  Consistent with diabetes  High levels of fetal   hemoglobin interfere with the HbA1C  assay. Heterozygous hemoglobin   variants (HbS, HgC, etc)do  not significantly interfere with this assay.     However, presence of multiple variants may affect accuracy.         03/09/2020               6.3 (H)             4.0 - 5.6 %           Final              Comment:    ADA Screening Guidelines:  5.7-6.4%  Consistent with   prediabetes  >or=6.5%  Consistent with diabetes  High levels of fetal   hemoglobin interfere with the HbA1C  assay. Heterozygous hemoglobin   variants (HbS, HgC, etc)do  not significantly interfere with this assay.     However, presence of multiple variants may affect accuracy.         09/06/2019               6.2 (H)             4.0 - 5.6 %           Final              Comment:    ADA Screening Guidelines:  5.7-6.4%  Consistent with   prediabetes  >or=6.5%  Consistent with diabetes  High levels of fetal   hemoglobin interfere with the HbA1C  assay. Heterozygous hemoglobin   variants (HbS, HgC, etc)do  not significantly interfere with this assay.     However, presence of multiple variants may affect accuracy.    ----------      Last edited by Newton Gordon on 9/22/2020  2:21 PM. (History)            Assessment /Plan     For exam results, see Encounter Report.    Type 2 diabetes mellitus without retinopathy    Nuclear sclerosis of both eyes      1. No diabetic retinopathy, no csme. Return in 1 year for dilated eye exam.  2. Educated pt on presence of cataracts and effects on vision. No surgery at this time. Recheck in one year.

## 2020-09-23 ENCOUNTER — PATIENT MESSAGE (OUTPATIENT)
Dept: INTERNAL MEDICINE | Facility: CLINIC | Age: 65
End: 2020-09-23

## 2020-09-23 ENCOUNTER — TELEPHONE (OUTPATIENT)
Dept: INTERNAL MEDICINE | Facility: CLINIC | Age: 65
End: 2020-09-23

## 2020-09-23 LAB — COMPLEXED PSA SERPL-MCNC: 1.3 NG/ML (ref 0–4)

## 2020-09-27 ENCOUNTER — PATIENT MESSAGE (OUTPATIENT)
Dept: INTERNAL MEDICINE | Facility: CLINIC | Age: 65
End: 2020-09-27

## 2020-10-01 ENCOUNTER — PATIENT MESSAGE (OUTPATIENT)
Dept: INTERNAL MEDICINE | Facility: CLINIC | Age: 65
End: 2020-10-01

## 2020-10-02 NOTE — TELEPHONE ENCOUNTER
I'm having a problem with my prescription for Losartan.  [facility] sent an automatic refill to me but it's for 2 5mg and no [facility] needs a [facility] for the 5mg that Dr. Rivera changed me to. Could you please help me with this, maybe a new prescription called in to [facility].     Thank you   Mario Sanford

## 2020-10-05 ENCOUNTER — PATIENT MESSAGE (OUTPATIENT)
Dept: INTERNAL MEDICINE | Facility: CLINIC | Age: 65
End: 2020-10-05

## 2020-10-05 NOTE — TELEPHONE ENCOUNTER
""I have sent in a question on my urine test results on 9/24 and a request for a problem with my prescription on losartan on 9/28 and a reminder that I have not heard from Dr. Rivera on 10/2. I would like a reply on my issues from him. Is there a problem that I should be concerned about, is he even in the office. I think I have been patient waiting for a response but this is a little long.   Mario Sanford"    - Msg from pt via "[employer] I sent a msg to the pt asking specifically what question he had on his urine test results and what the problem was with his rx b/c I'm unable to see any previous msg's he's sent. Please Advise   "

## 2020-10-06 ENCOUNTER — PATIENT MESSAGE (OUTPATIENT)
Dept: INTERNAL MEDICINE | Facility: CLINIC | Age: 65
End: 2020-10-06

## 2020-10-06 RX ORDER — LOSARTAN POTASSIUM 50 MG/1
50 TABLET ORAL DAILY
Qty: 90 TABLET | Refills: 3 | Status: SHIPPED | OUTPATIENT
Start: 2020-10-06 | End: 2021-09-14

## 2020-12-28 RX ORDER — PANTOPRAZOLE SODIUM 40 MG/1
TABLET, DELAYED RELEASE ORAL
Qty: 90 TABLET | Refills: 2 | Status: SHIPPED | OUTPATIENT
Start: 2020-12-28 | End: 2021-08-31

## 2020-12-28 RX ORDER — GLIMEPIRIDE 2 MG/1
TABLET ORAL
Qty: 90 TABLET | Refills: 2 | Status: SHIPPED | OUTPATIENT
Start: 2020-12-28 | End: 2021-08-31

## 2020-12-28 RX ORDER — CARVEDILOL 3.12 MG/1
TABLET ORAL
Qty: 180 TABLET | Refills: 2 | Status: SHIPPED | OUTPATIENT
Start: 2020-12-28 | End: 2021-09-02

## 2020-12-29 NOTE — PROGRESS NOTES
Refill Authorization Note   Mario Sanford  is requesting a refill authorization.  Brief Assessment and Rationale for Refill:  Approve     Medication Therapy Plan:  CDMR; Esophagitis active on problem list; Approved Protonix    Medication Reconciliation Completed: No   Comments:   Orders Placed This Encounter    glimepiride (AMARYL) 2 MG tablet    pantoprazole (PROTONIX) 40 MG tablet    carvediloL (COREG) 3.125 MG tablet      Requested Prescriptions   Signed Prescriptions Disp Refills    glimepiride (AMARYL) 2 MG tablet 90 tablet 2     Sig: TAKE 1 TABLET BY MOUTH  BEFORE BREAKFAST       Endocrinology:  Diabetes - Sulfonylureas Passed - 12/25/2020 10:00 PM        Passed - Patient is at least 18 years old        Passed - Office visit in past 12 months or future 90 days     Recent Outpatient Visits            3 months ago Type 2 diabetes mellitus without retinopathy    [facility] 1st Fl Nabil Samano, OD    3 months ago Annual physical exam    [facility] Bereket Rivera, DO    6 months ago Anxiety    [facility]    7 months ago Hypertension associated with diabetes    [facility] Bereket Rivera, DO    9 months ago Hypertension associated with diabetes    [facility] Bereket Rivera, DO                    Passed - HBA1C is 8 or below and within 180 days     Hemoglobin A1C   Date Value Ref Range Status   09/21/2020 6.1 (H) 4.0 - 5.6 % Final     Comment:     ADA Screening Guidelines:  5.7-6.4%  Consistent with prediabetes  >or=6.5%  Consistent with diabetes  High levels of fetal hemoglobin interfere with the HbA1C  assay. Heterozygous hemoglobin variants (HbS, HgC, etc)do  not significantly interfere with this assay.   However, presence of multiple variants may affect accuracy.     03/09/2020 6.3 (H) 4.0 - 5.6 % Final     Comment:     ADA Screening Guidelines:  5.7-6.4%   Consistent with prediabetes  >or=6.5%  Consistent with diabetes  High levels of fetal hemoglobin interfere with the HbA1C  assay. Heterozygous hemoglobin variants (HbS, HgC, etc)do  not significantly interfere with this assay.   However, presence of multiple variants may affect accuracy.     09/06/2019 6.2 (H) 4.0 - 5.6 % Final     Comment:     ADA Screening Guidelines:  5.7-6.4%  Consistent with prediabetes  >or=6.5%  Consistent with diabetes  High levels of fetal hemoglobin interfere with the HbA1C  assay. Heterozygous hemoglobin variants (HbS, HgC, etc)do  not significantly interfere with this assay.   However, presence of multiple variants may affect accuracy.                Passed - Cr is 1.4 or below and within 360 days     Creatinine   Date Value Ref Range Status   09/21/2020 1.3 0.5 - 1.4 mg/dL Final   03/09/2020 1.1 0.5 - 1.4 mg/dL Final   09/06/2019 1.3 0.5 - 1.4 mg/dL Final              Passed - eGFR is 30 or above and within 360 days     eGFR if non    Date Value Ref Range Status   09/21/2020 56.5 (A) >60 mL/min/1.73 m^2 Final     Comment:     Calculation used to obtain the estimated glomerular filtration  rate (eGFR) is the CKD-EPI equation.      03/09/2020 >60.0 >60 mL/min/1.73 m^2 Final     Comment:     Calculation used to obtain the estimated glomerular filtration  rate (eGFR) is the CKD-EPI equation.      09/06/2019 56.9 (A) >60 mL/min/1.73 m^2 Final     Comment:     Calculation used to obtain the estimated glomerular filtration  rate (eGFR) is the CKD-EPI equation.        eGFR if    Date Value Ref Range Status   09/21/2020 >60.0 >60 mL/min/1.73 m^2 Final   03/09/2020 >60.0 >60 mL/min/1.73 m^2 Final   09/06/2019 >60.0 >60 mL/min/1.73 m^2 Final                pantoprazole (PROTONIX) 40 MG tablet 90 tablet 2     Sig: TAKE 1 TABLET BY MOUTH ONCE DAILY       Gastroenterology: Proton Pump Inhibitors 2 Failed - 12/25/2020 10:00 PM        Failed - An appropriate indication  is on the problem list        Passed - Patient is at least 18 years old        Passed - Osteoporosis is not on problem list        Passed - Negative Pregnancy Status Check        Passed - Office visit in past 12 months or future 90 days     Recent Outpatient Visits            3 months ago Type 2 diabetes mellitus without retinopathy    [facility] - [facility]ometry 1st Fl Nabil Samano, OD    3 months ago Annual physical exam    [facility] Bereket Rivera, DO    6 months ago Anxiety    [facility] Bereket Rivera, DO    7 months ago Hypertension associated with diabetes    [facility] - [facility] Bereket Rivera, DO    9 months ago Hypertension associated with diabetes    [facility] Bereket Rivera, DO                      carvediloL (COREG) 3.125 MG tablet 180 tablet 2     Sig: TAKE 1 TABLET BY MOUTH  TWICE DAILY       Cardiovascular:  Beta Blockers Passed - 12/25/2020 10:00 PM        Passed - Patient is at least 18 years old        Passed - Last BP in normal range within 360 days.     BP Readings from Last 3 Encounters:   09/18/20 120/74   06/11/20 132/74   05/11/20 136/84              Passed - Last Heart Rate in normal range within 360 days.     Pulse Readings from Last 3 Encounters:   09/18/20 96   06/11/20 93   05/11/20 85             Passed - Office visit in past 12 months or future 90 days     Recent Outpatient Visits            3 months ago Type 2 diabetes mellitus without retinopathy    [facility] 1st Fl Nabil Samano, OD    3 months ago Annual physical exam    [facility] Bereket Rivera, DO    6 months ago Anxiety    [facility] Bereket Rivera, DO    7 months ago Hypertension associated with diabetes    [facility] Bereket Rivera, DO    9 months ago Hypertension associated with diabetes    [facility]  Internal Med Bereket Rivera DO                        Appointments  past 12m or future 3m with PCP    Date Provider   Last Visit   9/18/2020 Bereket Rivera DO   Next Visit   Visit date not found Bereket Rivera DO   ED visits in past 90 days: 0     Note composed:7:38 PM 12/28/2020

## 2021-01-18 DIAGNOSIS — Z79.4 TYPE 2 DIABETES MELLITUS WITH DIABETIC POLYNEUROPATHY, WITH LONG-TERM CURRENT USE OF INSULIN: Primary | ICD-10-CM

## 2021-01-18 DIAGNOSIS — E11.42 TYPE 2 DIABETES MELLITUS WITH DIABETIC POLYNEUROPATHY, WITH LONG-TERM CURRENT USE OF INSULIN: Primary | ICD-10-CM

## 2021-01-20 RX ORDER — GABAPENTIN 300 MG/1
CAPSULE ORAL
Qty: 90 CAPSULE | Refills: 3 | Status: SHIPPED | OUTPATIENT
Start: 2021-01-20 | End: 2021-12-21

## 2021-01-20 RX ORDER — ATORVASTATIN CALCIUM 40 MG/1
TABLET, FILM COATED ORAL
Qty: 90 TABLET | Refills: 2 | Status: SHIPPED | OUTPATIENT
Start: 2021-01-20 | End: 2021-09-29

## 2021-03-05 ENCOUNTER — IMMUNIZATION (OUTPATIENT)
Dept: PRIMARY CARE CLINIC | Facility: CLINIC | Age: 66
End: 2021-03-05

## 2021-03-05 DIAGNOSIS — Z23 NEED FOR VACCINATION: Primary | ICD-10-CM

## 2021-03-05 PROCEDURE — 0031A PR IMMUNIZ ADMIN, SARS-COV-2 COVID-19 VACC, 5X10VP/0.5ML: ICD-10-PCS | Mod: CV19,S$GLB,,

## 2021-03-05 PROCEDURE — 91303 PR SARSCOV2 VAC AD26 .5ML IM: ICD-10-PCS | Mod: S$GLB,,,

## 2021-03-05 PROCEDURE — 0031A PR IMMUNIZ ADMIN, SARS-COV-2 COVID-19 VACC, 5X10VP/0.5ML: CPT | Mod: CV19,S$GLB,,

## 2021-03-05 PROCEDURE — 91303 PR SARSCOV2 VAC AD26 .5ML IM: CPT | Mod: S$GLB,,,

## 2021-03-18 ENCOUNTER — OFFICE VISIT (OUTPATIENT)
Dept: INTERNAL MEDICINE | Facility: CLINIC | Age: 66
End: 2021-03-18
Payer: MEDICARE

## 2021-03-18 ENCOUNTER — LAB VISIT (OUTPATIENT)
Dept: LAB | Facility: HOSPITAL | Age: 66
End: 2021-03-18
Attending: INTERNAL MEDICINE
Payer: MEDICARE

## 2021-03-18 ENCOUNTER — TELEPHONE (OUTPATIENT)
Dept: INTERNAL MEDICINE | Facility: CLINIC | Age: 66
End: 2021-03-18

## 2021-03-18 VITALS
HEIGHT: 66 IN | BODY MASS INDEX: 32.92 KG/M2 | TEMPERATURE: 98 F | HEART RATE: 97 BPM | SYSTOLIC BLOOD PRESSURE: 130 MMHG | DIASTOLIC BLOOD PRESSURE: 78 MMHG | WEIGHT: 204.81 LBS | OXYGEN SATURATION: 97 %

## 2021-03-18 DIAGNOSIS — E78.5 HYPERLIPIDEMIA ASSOCIATED WITH TYPE 2 DIABETES MELLITUS: ICD-10-CM

## 2021-03-18 DIAGNOSIS — E11.22 CKD STAGE 3 DUE TO TYPE 2 DIABETES MELLITUS: ICD-10-CM

## 2021-03-18 DIAGNOSIS — I15.2 HYPERTENSION ASSOCIATED WITH DIABETES: ICD-10-CM

## 2021-03-18 DIAGNOSIS — Z79.4 TYPE 2 DIABETES MELLITUS WITH DIABETIC POLYNEUROPATHY, WITH LONG-TERM CURRENT USE OF INSULIN: ICD-10-CM

## 2021-03-18 DIAGNOSIS — E11.42 TYPE 2 DIABETES MELLITUS WITH DIABETIC POLYNEUROPATHY, WITH LONG-TERM CURRENT USE OF INSULIN: ICD-10-CM

## 2021-03-18 DIAGNOSIS — E11.59 HYPERTENSION ASSOCIATED WITH DIABETES: ICD-10-CM

## 2021-03-18 DIAGNOSIS — R35.1 NOCTURIA: ICD-10-CM

## 2021-03-18 DIAGNOSIS — I10 ESSENTIAL HYPERTENSION: ICD-10-CM

## 2021-03-18 DIAGNOSIS — F41.9 ANXIETY: ICD-10-CM

## 2021-03-18 DIAGNOSIS — E11.59 HYPERTENSION ASSOCIATED WITH DIABETES: Primary | ICD-10-CM

## 2021-03-18 DIAGNOSIS — E11.69 HYPERLIPIDEMIA ASSOCIATED WITH TYPE 2 DIABETES MELLITUS: ICD-10-CM

## 2021-03-18 DIAGNOSIS — E11.59 OBESITY, DIABETES, AND HYPERTENSION SYNDROME: ICD-10-CM

## 2021-03-18 DIAGNOSIS — N18.30 CKD STAGE 3 DUE TO TYPE 2 DIABETES MELLITUS: ICD-10-CM

## 2021-03-18 DIAGNOSIS — E66.9 OBESITY, DIABETES, AND HYPERTENSION SYNDROME: ICD-10-CM

## 2021-03-18 DIAGNOSIS — I15.2 HYPERTENSION ASSOCIATED WITH DIABETES: Primary | ICD-10-CM

## 2021-03-18 DIAGNOSIS — E11.69 OBESITY, DIABETES, AND HYPERTENSION SYNDROME: ICD-10-CM

## 2021-03-18 DIAGNOSIS — Z00.00 ANNUAL PHYSICAL EXAM: Primary | ICD-10-CM

## 2021-03-18 DIAGNOSIS — I15.2 OBESITY, DIABETES, AND HYPERTENSION SYNDROME: ICD-10-CM

## 2021-03-18 DIAGNOSIS — Z12.5 PROSTATE CANCER SCREENING: ICD-10-CM

## 2021-03-18 LAB
ALBUMIN SERPL BCP-MCNC: 3.9 G/DL (ref 3.5–5.2)
ALP SERPL-CCNC: 102 U/L (ref 55–135)
ALT SERPL W/O P-5'-P-CCNC: 18 U/L (ref 10–44)
ANION GAP SERPL CALC-SCNC: 9 MMOL/L (ref 8–16)
AST SERPL-CCNC: 28 U/L (ref 10–40)
BASOPHILS # BLD AUTO: 0.07 K/UL (ref 0–0.2)
BASOPHILS NFR BLD: 0.8 % (ref 0–1.9)
BILIRUB SERPL-MCNC: 0.7 MG/DL (ref 0.1–1)
BUN SERPL-MCNC: 22 MG/DL (ref 8–23)
CALCIUM SERPL-MCNC: 8.6 MG/DL (ref 8.7–10.5)
CHLORIDE SERPL-SCNC: 111 MMOL/L (ref 95–110)
CO2 SERPL-SCNC: 20 MMOL/L (ref 23–29)
CREAT SERPL-MCNC: 1.4 MG/DL (ref 0.5–1.4)
DIFFERENTIAL METHOD: NORMAL
EOSINOPHIL # BLD AUTO: 0.2 K/UL (ref 0–0.5)
EOSINOPHIL NFR BLD: 1.8 % (ref 0–8)
ERYTHROCYTE [DISTWIDTH] IN BLOOD BY AUTOMATED COUNT: 13.5 % (ref 11.5–14.5)
EST. GFR  (AFRICAN AMERICAN): 59.7 ML/MIN/1.73 M^2
EST. GFR  (NON AFRICAN AMERICAN): 51.6 ML/MIN/1.73 M^2
GLUCOSE SERPL-MCNC: 70 MG/DL (ref 70–110)
HCT VFR BLD AUTO: 44.4 % (ref 40–54)
HGB BLD-MCNC: 14.5 G/DL (ref 14–18)
IMM GRANULOCYTES # BLD AUTO: 0.01 K/UL (ref 0–0.04)
IMM GRANULOCYTES NFR BLD AUTO: 0.1 % (ref 0–0.5)
LYMPHOCYTES # BLD AUTO: 3.1 K/UL (ref 1–4.8)
LYMPHOCYTES NFR BLD: 36.7 % (ref 18–48)
MCH RBC QN AUTO: 30 PG (ref 27–31)
MCHC RBC AUTO-ENTMCNC: 32.7 G/DL (ref 32–36)
MCV RBC AUTO: 92 FL (ref 82–98)
MONOCYTES # BLD AUTO: 0.8 K/UL (ref 0.3–1)
MONOCYTES NFR BLD: 9.4 % (ref 4–15)
NEUTROPHILS # BLD AUTO: 4.3 K/UL (ref 1.8–7.7)
NEUTROPHILS NFR BLD: 51.2 % (ref 38–73)
NRBC BLD-RTO: 0 /100 WBC
PLATELET # BLD AUTO: 254 K/UL (ref 150–350)
PMV BLD AUTO: 10 FL (ref 9.2–12.9)
POTASSIUM SERPL-SCNC: 5.1 MMOL/L (ref 3.5–5.1)
PROT SERPL-MCNC: 7.3 G/DL (ref 6–8.4)
RBC # BLD AUTO: 4.83 M/UL (ref 4.6–6.2)
SODIUM SERPL-SCNC: 140 MMOL/L (ref 136–145)
WBC # BLD AUTO: 8.36 K/UL (ref 3.9–12.7)

## 2021-03-18 PROCEDURE — 1125F PR PAIN SEVERITY QUANTIFIED, PAIN PRESENT: ICD-10-PCS | Mod: S$GLB,,, | Performed by: INTERNAL MEDICINE

## 2021-03-18 PROCEDURE — 3078F DIAST BP <80 MM HG: CPT | Mod: CPTII,S$GLB,, | Performed by: INTERNAL MEDICINE

## 2021-03-18 PROCEDURE — 99214 PR OFFICE/OUTPT VISIT, EST, LEVL IV, 30-39 MIN: ICD-10-PCS | Mod: S$GLB,,, | Performed by: INTERNAL MEDICINE

## 2021-03-18 PROCEDURE — 85025 COMPLETE CBC W/AUTO DIFF WBC: CPT | Performed by: INTERNAL MEDICINE

## 2021-03-18 PROCEDURE — 80053 COMPREHEN METABOLIC PANEL: CPT | Performed by: INTERNAL MEDICINE

## 2021-03-18 PROCEDURE — 83036 HEMOGLOBIN GLYCOSYLATED A1C: CPT | Performed by: INTERNAL MEDICINE

## 2021-03-18 PROCEDURE — 3075F SYST BP GE 130 - 139MM HG: CPT | Mod: CPTII,S$GLB,, | Performed by: INTERNAL MEDICINE

## 2021-03-18 PROCEDURE — 3044F HG A1C LEVEL LT 7.0%: CPT | Mod: CPTII,S$GLB,, | Performed by: INTERNAL MEDICINE

## 2021-03-18 PROCEDURE — 36415 COLL VENOUS BLD VENIPUNCTURE: CPT | Mod: PO | Performed by: INTERNAL MEDICINE

## 2021-03-18 PROCEDURE — 99999 PR PBB SHADOW E&M-EST. PATIENT-LVL IV: ICD-10-PCS | Mod: PBBFAC,,, | Performed by: INTERNAL MEDICINE

## 2021-03-18 PROCEDURE — 99499 RISK ADDL DX/OHS AUDIT: ICD-10-PCS | Mod: S$GLB,,, | Performed by: INTERNAL MEDICINE

## 2021-03-18 PROCEDURE — 3008F PR BODY MASS INDEX (BMI) DOCUMENTED: ICD-10-PCS | Mod: CPTII,S$GLB,, | Performed by: INTERNAL MEDICINE

## 2021-03-18 PROCEDURE — 3008F BODY MASS INDEX DOCD: CPT | Mod: CPTII,S$GLB,, | Performed by: INTERNAL MEDICINE

## 2021-03-18 PROCEDURE — 99214 OFFICE O/P EST MOD 30 MIN: CPT | Mod: S$GLB,,, | Performed by: INTERNAL MEDICINE

## 2021-03-18 PROCEDURE — 3288F FALL RISK ASSESSMENT DOCD: CPT | Mod: CPTII,S$GLB,, | Performed by: INTERNAL MEDICINE

## 2021-03-18 PROCEDURE — 3075F PR MOST RECENT SYSTOLIC BLOOD PRESS GE 130-139MM HG: ICD-10-PCS | Mod: CPTII,S$GLB,, | Performed by: INTERNAL MEDICINE

## 2021-03-18 PROCEDURE — 99499 UNLISTED E&M SERVICE: CPT | Mod: S$GLB,,, | Performed by: INTERNAL MEDICINE

## 2021-03-18 PROCEDURE — 3288F PR FALLS RISK ASSESSMENT DOCUMENTED: ICD-10-PCS | Mod: CPTII,S$GLB,, | Performed by: INTERNAL MEDICINE

## 2021-03-18 PROCEDURE — 3078F PR MOST RECENT DIASTOLIC BLOOD PRESSURE < 80 MM HG: ICD-10-PCS | Mod: CPTII,S$GLB,, | Performed by: INTERNAL MEDICINE

## 2021-03-18 PROCEDURE — 1125F AMNT PAIN NOTED PAIN PRSNT: CPT | Mod: S$GLB,,, | Performed by: INTERNAL MEDICINE

## 2021-03-18 PROCEDURE — 1101F PR PT FALLS ASSESS DOC 0-1 FALLS W/OUT INJ PAST YR: ICD-10-PCS | Mod: CPTII,S$GLB,, | Performed by: INTERNAL MEDICINE

## 2021-03-18 PROCEDURE — 1159F PR MEDICATION LIST DOCUMENTED IN MEDICAL RECORD: ICD-10-PCS | Mod: S$GLB,,, | Performed by: INTERNAL MEDICINE

## 2021-03-18 PROCEDURE — 3044F PR MOST RECENT HEMOGLOBIN A1C LEVEL <7.0%: ICD-10-PCS | Mod: CPTII,S$GLB,, | Performed by: INTERNAL MEDICINE

## 2021-03-18 PROCEDURE — 3072F PR LOW RISK FOR RETINOPATHY: ICD-10-PCS | Mod: S$GLB,,, | Performed by: INTERNAL MEDICINE

## 2021-03-18 PROCEDURE — 99999 PR PBB SHADOW E&M-EST. PATIENT-LVL IV: CPT | Mod: PBBFAC,,, | Performed by: INTERNAL MEDICINE

## 2021-03-18 PROCEDURE — 3072F LOW RISK FOR RETINOPATHY: CPT | Mod: S$GLB,,, | Performed by: INTERNAL MEDICINE

## 2021-03-18 PROCEDURE — 1159F MED LIST DOCD IN RCRD: CPT | Mod: S$GLB,,, | Performed by: INTERNAL MEDICINE

## 2021-03-18 PROCEDURE — 1101F PT FALLS ASSESS-DOCD LE1/YR: CPT | Mod: CPTII,S$GLB,, | Performed by: INTERNAL MEDICINE

## 2021-03-19 LAB
ESTIMATED AVG GLUCOSE: 131 MG/DL (ref 68–131)
HBA1C MFR BLD: 6.2 % (ref 4–5.6)

## 2021-05-27 RX ORDER — BUPROPION HYDROCHLORIDE 300 MG/1
TABLET ORAL
Qty: 90 TABLET | Refills: 3 | Status: SHIPPED | OUTPATIENT
Start: 2021-05-27 | End: 2022-07-05

## 2021-06-07 ENCOUNTER — OFFICE VISIT (OUTPATIENT)
Dept: URGENT CARE | Facility: CLINIC | Age: 66
End: 2021-06-07
Payer: MEDICARE

## 2021-06-07 VITALS
WEIGHT: 210 LBS | SYSTOLIC BLOOD PRESSURE: 135 MMHG | RESPIRATION RATE: 14 BRPM | OXYGEN SATURATION: 96 % | BODY MASS INDEX: 33.75 KG/M2 | TEMPERATURE: 99 F | HEIGHT: 66 IN | HEART RATE: 94 BPM | DIASTOLIC BLOOD PRESSURE: 85 MMHG

## 2021-06-07 DIAGNOSIS — L03.116 CELLULITIS OF LEFT LOWER EXTREMITY: ICD-10-CM

## 2021-06-07 DIAGNOSIS — M25.572 ACUTE LEFT ANKLE PAIN: Primary | ICD-10-CM

## 2021-06-07 PROCEDURE — 99203 PR OFFICE/OUTPT VISIT, NEW, LEVL III, 30-44 MIN: ICD-10-PCS | Mod: S$GLB,,, | Performed by: NURSE PRACTITIONER

## 2021-06-07 PROCEDURE — 73610 XR ANKLE COMPLETE 3 VIEW LEFT: ICD-10-PCS | Mod: FY,LT,S$GLB, | Performed by: RADIOLOGY

## 2021-06-07 PROCEDURE — 73610 X-RAY EXAM OF ANKLE: CPT | Mod: FY,LT,S$GLB, | Performed by: RADIOLOGY

## 2021-06-07 PROCEDURE — 3008F PR BODY MASS INDEX (BMI) DOCUMENTED: ICD-10-PCS | Mod: CPTII,S$GLB,, | Performed by: NURSE PRACTITIONER

## 2021-06-07 PROCEDURE — 99203 OFFICE O/P NEW LOW 30 MIN: CPT | Mod: S$GLB,,, | Performed by: NURSE PRACTITIONER

## 2021-06-07 PROCEDURE — 3072F LOW RISK FOR RETINOPATHY: CPT | Mod: S$GLB,,, | Performed by: NURSE PRACTITIONER

## 2021-06-07 PROCEDURE — 3072F PR LOW RISK FOR RETINOPATHY: ICD-10-PCS | Mod: S$GLB,,, | Performed by: NURSE PRACTITIONER

## 2021-06-07 PROCEDURE — 3008F BODY MASS INDEX DOCD: CPT | Mod: CPTII,S$GLB,, | Performed by: NURSE PRACTITIONER

## 2021-06-07 RX ORDER — AMOXICILLIN AND CLAVULANATE POTASSIUM 875; 125 MG/1; MG/1
1 TABLET, FILM COATED ORAL 2 TIMES DAILY
Qty: 20 TABLET | Refills: 0 | Status: SHIPPED | OUTPATIENT
Start: 2021-06-07 | End: 2021-06-17 | Stop reason: SDUPTHER

## 2021-06-17 DIAGNOSIS — L03.116 CELLULITIS OF LEFT LOWER EXTREMITY: ICD-10-CM

## 2021-06-17 RX ORDER — AMOXICILLIN AND CLAVULANATE POTASSIUM 875; 125 MG/1; MG/1
1 TABLET, FILM COATED ORAL 2 TIMES DAILY
Qty: 20 TABLET | Refills: 0 | Status: SHIPPED | OUTPATIENT
Start: 2021-06-17 | End: 2021-06-27

## 2021-06-25 ENCOUNTER — PATIENT MESSAGE (OUTPATIENT)
Dept: INTERNAL MEDICINE | Facility: CLINIC | Age: 66
End: 2021-06-25

## 2021-06-29 ENCOUNTER — OFFICE VISIT (OUTPATIENT)
Dept: INTERNAL MEDICINE | Facility: CLINIC | Age: 66
End: 2021-06-29
Payer: MEDICARE

## 2021-06-29 VITALS
HEIGHT: 66 IN | BODY MASS INDEX: 32.88 KG/M2 | HEART RATE: 94 BPM | SYSTOLIC BLOOD PRESSURE: 138 MMHG | DIASTOLIC BLOOD PRESSURE: 82 MMHG | WEIGHT: 204.56 LBS | OXYGEN SATURATION: 98 % | TEMPERATURE: 98 F | RESPIRATION RATE: 16 BRPM

## 2021-06-29 DIAGNOSIS — L03.116 CELLULITIS OF LEFT ANKLE: Primary | ICD-10-CM

## 2021-06-29 PROCEDURE — 99999 PR PBB SHADOW E&M-EST. PATIENT-LVL IV: CPT | Mod: PBBFAC,,, | Performed by: INTERNAL MEDICINE

## 2021-06-29 PROCEDURE — 99999 PR PBB SHADOW E&M-EST. PATIENT-LVL IV: ICD-10-PCS | Mod: PBBFAC,,, | Performed by: INTERNAL MEDICINE

## 2021-06-29 PROCEDURE — 1159F PR MEDICATION LIST DOCUMENTED IN MEDICAL RECORD: ICD-10-PCS | Mod: S$GLB,,, | Performed by: INTERNAL MEDICINE

## 2021-06-29 PROCEDURE — 1125F PR PAIN SEVERITY QUANTIFIED, PAIN PRESENT: ICD-10-PCS | Mod: S$GLB,,, | Performed by: INTERNAL MEDICINE

## 2021-06-29 PROCEDURE — 3008F PR BODY MASS INDEX (BMI) DOCUMENTED: ICD-10-PCS | Mod: CPTII,S$GLB,, | Performed by: INTERNAL MEDICINE

## 2021-06-29 PROCEDURE — 99214 PR OFFICE/OUTPT VISIT, EST, LEVL IV, 30-39 MIN: ICD-10-PCS | Mod: S$GLB,,, | Performed by: INTERNAL MEDICINE

## 2021-06-29 PROCEDURE — 3288F PR FALLS RISK ASSESSMENT DOCUMENTED: ICD-10-PCS | Mod: CPTII,S$GLB,, | Performed by: INTERNAL MEDICINE

## 2021-06-29 PROCEDURE — 1101F PR PT FALLS ASSESS DOC 0-1 FALLS W/OUT INJ PAST YR: ICD-10-PCS | Mod: CPTII,S$GLB,, | Performed by: INTERNAL MEDICINE

## 2021-06-29 PROCEDURE — 3072F LOW RISK FOR RETINOPATHY: CPT | Mod: S$GLB,,, | Performed by: INTERNAL MEDICINE

## 2021-06-29 PROCEDURE — 1125F AMNT PAIN NOTED PAIN PRSNT: CPT | Mod: S$GLB,,, | Performed by: INTERNAL MEDICINE

## 2021-06-29 PROCEDURE — 3288F FALL RISK ASSESSMENT DOCD: CPT | Mod: CPTII,S$GLB,, | Performed by: INTERNAL MEDICINE

## 2021-06-29 PROCEDURE — 1101F PT FALLS ASSESS-DOCD LE1/YR: CPT | Mod: CPTII,S$GLB,, | Performed by: INTERNAL MEDICINE

## 2021-06-29 PROCEDURE — 3072F PR LOW RISK FOR RETINOPATHY: ICD-10-PCS | Mod: S$GLB,,, | Performed by: INTERNAL MEDICINE

## 2021-06-29 PROCEDURE — 1159F MED LIST DOCD IN RCRD: CPT | Mod: S$GLB,,, | Performed by: INTERNAL MEDICINE

## 2021-06-29 PROCEDURE — 99214 OFFICE O/P EST MOD 30 MIN: CPT | Mod: S$GLB,,, | Performed by: INTERNAL MEDICINE

## 2021-06-29 PROCEDURE — 3008F BODY MASS INDEX DOCD: CPT | Mod: CPTII,S$GLB,, | Performed by: INTERNAL MEDICINE

## 2021-06-29 RX ORDER — SULFAMETHOXAZOLE AND TRIMETHOPRIM 800; 160 MG/1; MG/1
1 TABLET ORAL 2 TIMES DAILY
Qty: 20 TABLET | Refills: 0 | Status: SHIPPED | OUTPATIENT
Start: 2021-06-29 | End: 2021-07-09

## 2021-07-18 ENCOUNTER — HOSPITAL ENCOUNTER (INPATIENT)
Facility: HOSPITAL | Age: 66
LOS: 3 days | Discharge: HOME | DRG: 854 | End: 2021-07-23
Attending: EMERGENCY MEDICINE | Admitting: INTERNAL MEDICINE
Payer: MEDICARE

## 2021-07-18 DIAGNOSIS — Z87.19 H/O: UPPER GI BLEED: ICD-10-CM

## 2021-07-18 DIAGNOSIS — R00.0 TACHYCARDIA: ICD-10-CM

## 2021-07-18 DIAGNOSIS — K83.09 CHOLANGITIS: ICD-10-CM

## 2021-07-18 DIAGNOSIS — A41.50 GRAM NEGATIVE SEPSIS: ICD-10-CM

## 2021-07-18 DIAGNOSIS — A05.3: ICD-10-CM

## 2021-07-18 DIAGNOSIS — R74.01 TRANSAMINITIS: Primary | ICD-10-CM

## 2021-07-18 DIAGNOSIS — R10.13 EPIGASTRIC ABDOMINAL PAIN: ICD-10-CM

## 2021-07-18 DIAGNOSIS — K83.09 ASCENDING CHOLANGITIS: ICD-10-CM

## 2021-07-18 DIAGNOSIS — R07.9 CHEST PAIN: ICD-10-CM

## 2021-07-18 PROBLEM — N18.31 STAGE 3A CHRONIC KIDNEY DISEASE: Status: ACTIVE | Noted: 2019-09-13

## 2021-07-18 PROBLEM — N40.0 BPH (BENIGN PROSTATIC HYPERPLASIA): Status: ACTIVE | Noted: 2021-07-18

## 2021-07-18 PROBLEM — E87.20 LACTIC ACIDOSIS: Status: ACTIVE | Noted: 2021-07-18

## 2021-07-18 PROBLEM — R11.10 EMESIS: Status: ACTIVE | Noted: 2021-07-18

## 2021-07-18 PROBLEM — E11.9 TYPE 2 DIABETES MELLITUS, WITHOUT LONG-TERM CURRENT USE OF INSULIN: Status: ACTIVE | Noted: 2019-01-07

## 2021-07-18 PROBLEM — I10 HYPERTENSION: Status: ACTIVE | Noted: 2019-05-03

## 2021-07-18 PROBLEM — I95.9 HYPOTENSION: Status: ACTIVE | Noted: 2021-07-18

## 2021-07-18 LAB
ALBUMIN SERPL BCP-MCNC: 4.1 G/DL (ref 3.5–5.2)
ALP SERPL-CCNC: 292 U/L (ref 55–135)
ALT SERPL W/O P-5'-P-CCNC: 212 U/L (ref 10–44)
ANION GAP SERPL CALC-SCNC: 14 MMOL/L (ref 8–16)
AST SERPL-CCNC: 271 U/L (ref 10–40)
BACTERIA #/AREA URNS AUTO: ABNORMAL /HPF
BASOPHILS # BLD AUTO: 0.03 K/UL (ref 0–0.2)
BASOPHILS NFR BLD: 0.5 % (ref 0–1.9)
BILIRUB SERPL-MCNC: 2.2 MG/DL (ref 0.1–1)
BILIRUB UR QL STRIP: NEGATIVE
BUN SERPL-MCNC: 17 MG/DL (ref 8–23)
BUN SERPL-MCNC: 20 MG/DL (ref 6–30)
CALCIUM SERPL-MCNC: 9.2 MG/DL (ref 8.7–10.5)
CHLORIDE SERPL-SCNC: 102 MMOL/L (ref 95–110)
CHLORIDE SERPL-SCNC: 103 MMOL/L (ref 95–110)
CLARITY UR REFRACT.AUTO: CLEAR
CO2 SERPL-SCNC: 22 MMOL/L (ref 23–29)
COLOR UR AUTO: YELLOW
CREAT SERPL-MCNC: 1.3 MG/DL (ref 0.5–1.4)
CREAT SERPL-MCNC: 1.4 MG/DL (ref 0.5–1.4)
DIFFERENTIAL METHOD: ABNORMAL
EOSINOPHIL # BLD AUTO: 0 K/UL (ref 0–0.5)
EOSINOPHIL NFR BLD: 0.2 % (ref 0–8)
ERYTHROCYTE [DISTWIDTH] IN BLOOD BY AUTOMATED COUNT: 13.2 % (ref 11.5–14.5)
EST. GFR  (AFRICAN AMERICAN): 59.3 ML/MIN/1.73 M^2
EST. GFR  (NON AFRICAN AMERICAN): 51.3 ML/MIN/1.73 M^2
GGT SERPL-CCNC: 1057 U/L (ref 8–55)
GLUCOSE SERPL-MCNC: 261 MG/DL (ref 70–110)
GLUCOSE SERPL-MCNC: 265 MG/DL (ref 70–110)
GLUCOSE UR QL STRIP: ABNORMAL
HCT VFR BLD AUTO: 47.5 % (ref 40–54)
HCT VFR BLD CALC: 50 %PCV (ref 36–54)
HGB BLD-MCNC: 15.3 G/DL (ref 14–18)
HGB UR QL STRIP: ABNORMAL
HYALINE CASTS UR QL AUTO: 0 /LPF
IGG SERPL-MCNC: 712 MG/DL (ref 650–1600)
IMM GRANULOCYTES # BLD AUTO: 0.02 K/UL (ref 0–0.04)
IMM GRANULOCYTES NFR BLD AUTO: 0.3 % (ref 0–0.5)
INR PPP: 1.1 (ref 0.8–1.2)
KETONES UR QL STRIP: NEGATIVE
LACTATE SERPL-SCNC: 3.1 MMOL/L (ref 0.5–2.2)
LACTATE SERPL-SCNC: 3.4 MMOL/L (ref 0.5–2.2)
LEUKOCYTE ESTERASE UR QL STRIP: NEGATIVE
LIPASE SERPL-CCNC: 57 U/L (ref 4–60)
LYMPHOCYTES # BLD AUTO: 0.7 K/UL (ref 1–4.8)
LYMPHOCYTES NFR BLD: 11.2 % (ref 18–48)
MCH RBC QN AUTO: 29.7 PG (ref 27–31)
MCHC RBC AUTO-ENTMCNC: 32.2 G/DL (ref 32–36)
MCV RBC AUTO: 92 FL (ref 82–98)
MICROSCOPIC COMMENT: ABNORMAL
MONOCYTES # BLD AUTO: 0.1 K/UL (ref 0.3–1)
MONOCYTES NFR BLD: 1.1 % (ref 4–15)
NEUTROPHILS # BLD AUTO: 5.7 K/UL (ref 1.8–7.7)
NEUTROPHILS NFR BLD: 86.7 % (ref 38–73)
NITRITE UR QL STRIP: NEGATIVE
NRBC BLD-RTO: 0 /100 WBC
PH UR STRIP: 5 [PH] (ref 5–8)
PLATELET # BLD AUTO: 299 K/UL (ref 150–450)
PMV BLD AUTO: 9.5 FL (ref 9.2–12.9)
POC IONIZED CALCIUM: 1.11 MMOL/L (ref 1.06–1.42)
POC TCO2 (MEASURED): 23 MMOL/L (ref 23–29)
POCT GLUCOSE: 170 MG/DL (ref 70–110)
POCT GLUCOSE: 182 MG/DL (ref 70–110)
POTASSIUM BLD-SCNC: 4.5 MMOL/L (ref 3.5–5.1)
POTASSIUM SERPL-SCNC: 4.7 MMOL/L (ref 3.5–5.1)
PROT SERPL-MCNC: 7.6 G/DL (ref 6–8.4)
PROT UR QL STRIP: ABNORMAL
PROTHROMBIN TIME: 12.4 SEC (ref 9–12.5)
RBC # BLD AUTO: 5.16 M/UL (ref 4.6–6.2)
RBC #/AREA URNS AUTO: 9 /HPF (ref 0–4)
SAMPLE: ABNORMAL
SODIUM BLD-SCNC: 140 MMOL/L (ref 136–145)
SODIUM SERPL-SCNC: 139 MMOL/L (ref 136–145)
SP GR UR STRIP: 1.01 (ref 1–1.03)
SQUAMOUS #/AREA URNS AUTO: 0 /HPF
URN SPEC COLLECT METH UR: ABNORMAL
WBC # BLD AUTO: 6.53 K/UL (ref 3.9–12.7)
WBC #/AREA URNS AUTO: 2 /HPF (ref 0–5)
YEAST UR QL AUTO: ABNORMAL

## 2021-07-18 PROCEDURE — 25000003 PHARM REV CODE 250: Performed by: PHYSICIAN ASSISTANT

## 2021-07-18 PROCEDURE — 25000003 PHARM REV CODE 250: Performed by: EMERGENCY MEDICINE

## 2021-07-18 PROCEDURE — 85025 COMPLETE CBC W/AUTO DIFF WBC: CPT | Performed by: STUDENT IN AN ORGANIZED HEALTH CARE EDUCATION/TRAINING PROGRAM

## 2021-07-18 PROCEDURE — 96375 TX/PRO/DX INJ NEW DRUG ADDON: CPT

## 2021-07-18 PROCEDURE — 87389 HIV-1 AG W/HIV-1&-2 AB AG IA: CPT | Performed by: EMERGENCY MEDICINE

## 2021-07-18 PROCEDURE — 99285 EMERGENCY DEPT VISIT HI MDM: CPT | Mod: 25

## 2021-07-18 PROCEDURE — 63600175 PHARM REV CODE 636 W HCPCS: Performed by: PHYSICIAN ASSISTANT

## 2021-07-18 PROCEDURE — 82390 ASSAY OF CERULOPLASMIN: CPT | Performed by: PHYSICIAN ASSISTANT

## 2021-07-18 PROCEDURE — 85610 PROTHROMBIN TIME: CPT | Performed by: PHYSICIAN ASSISTANT

## 2021-07-18 PROCEDURE — G0378 HOSPITAL OBSERVATION PER HR: HCPCS

## 2021-07-18 PROCEDURE — 87040 BLOOD CULTURE FOR BACTERIA: CPT | Performed by: STUDENT IN AN ORGANIZED HEALTH CARE EDUCATION/TRAINING PROGRAM

## 2021-07-18 PROCEDURE — 96361 HYDRATE IV INFUSION ADD-ON: CPT

## 2021-07-18 PROCEDURE — 87517 HEPATITIS B DNA QUANT: CPT | Performed by: PHYSICIAN ASSISTANT

## 2021-07-18 PROCEDURE — 80053 COMPREHEN METABOLIC PANEL: CPT | Performed by: STUDENT IN AN ORGANIZED HEALTH CARE EDUCATION/TRAINING PROGRAM

## 2021-07-18 PROCEDURE — 80321 ALCOHOLS BIOMARKERS 1OR 2: CPT | Performed by: PHYSICIAN ASSISTANT

## 2021-07-18 PROCEDURE — 83605 ASSAY OF LACTIC ACID: CPT | Performed by: STUDENT IN AN ORGANIZED HEALTH CARE EDUCATION/TRAINING PROGRAM

## 2021-07-18 PROCEDURE — 93010 ELECTROCARDIOGRAM REPORT: CPT | Mod: ,,, | Performed by: INTERNAL MEDICINE

## 2021-07-18 PROCEDURE — 82962 GLUCOSE BLOOD TEST: CPT

## 2021-07-18 PROCEDURE — 93005 ELECTROCARDIOGRAM TRACING: CPT

## 2021-07-18 PROCEDURE — 86038 ANTINUCLEAR ANTIBODIES: CPT | Performed by: PHYSICIAN ASSISTANT

## 2021-07-18 PROCEDURE — 82103 ALPHA-1-ANTITRYPSIN TOTAL: CPT | Performed by: PHYSICIAN ASSISTANT

## 2021-07-18 PROCEDURE — 82550 ASSAY OF CK (CPK): CPT | Performed by: PHYSICIAN ASSISTANT

## 2021-07-18 PROCEDURE — 99220 PR INITIAL OBSERVATION CARE,LEVL III: ICD-10-PCS | Mod: ,,, | Performed by: PHYSICIAN ASSISTANT

## 2021-07-18 PROCEDURE — 99285 PR EMERGENCY DEPT VISIT,LEVEL V: ICD-10-PCS | Mod: GC,,, | Performed by: EMERGENCY MEDICINE

## 2021-07-18 PROCEDURE — 63600175 PHARM REV CODE 636 W HCPCS: Performed by: STUDENT IN AN ORGANIZED HEALTH CARE EDUCATION/TRAINING PROGRAM

## 2021-07-18 PROCEDURE — 82784 ASSAY IGA/IGD/IGG/IGM EACH: CPT | Performed by: PHYSICIAN ASSISTANT

## 2021-07-18 PROCEDURE — 87186 SC STD MICRODIL/AGAR DIL: CPT | Mod: 59 | Performed by: STUDENT IN AN ORGANIZED HEALTH CARE EDUCATION/TRAINING PROGRAM

## 2021-07-18 PROCEDURE — 99223 PR INITIAL HOSPITAL CARE,LEVL III: ICD-10-PCS | Mod: ,,, | Performed by: SURGERY

## 2021-07-18 PROCEDURE — 87799 DETECT AGENT NOS DNA QUANT: CPT | Performed by: PHYSICIAN ASSISTANT

## 2021-07-18 PROCEDURE — 86803 HEPATITIS C AB TEST: CPT | Performed by: EMERGENCY MEDICINE

## 2021-07-18 PROCEDURE — 99223 1ST HOSP IP/OBS HIGH 75: CPT | Mod: ,,, | Performed by: SURGERY

## 2021-07-18 PROCEDURE — 96374 THER/PROPH/DIAG INJ IV PUSH: CPT

## 2021-07-18 PROCEDURE — 86256 FLUORESCENT ANTIBODY TITER: CPT | Mod: 91 | Performed by: PHYSICIAN ASSISTANT

## 2021-07-18 PROCEDURE — 83690 ASSAY OF LIPASE: CPT | Performed by: STUDENT IN AN ORGANIZED HEALTH CARE EDUCATION/TRAINING PROGRAM

## 2021-07-18 PROCEDURE — 80047 BASIC METABLC PNL IONIZED CA: CPT

## 2021-07-18 PROCEDURE — 87086 URINE CULTURE/COLONY COUNT: CPT | Performed by: PHYSICIAN ASSISTANT

## 2021-07-18 PROCEDURE — 87077 CULTURE AEROBIC IDENTIFY: CPT | Mod: 59 | Performed by: STUDENT IN AN ORGANIZED HEALTH CARE EDUCATION/TRAINING PROGRAM

## 2021-07-18 PROCEDURE — 80074 ACUTE HEPATITIS PANEL: CPT | Performed by: PHYSICIAN ASSISTANT

## 2021-07-18 PROCEDURE — 81001 URINALYSIS AUTO W/SCOPE: CPT | Performed by: STUDENT IN AN ORGANIZED HEALTH CARE EDUCATION/TRAINING PROGRAM

## 2021-07-18 PROCEDURE — 87522 HEPATITIS C REVRS TRNSCRPJ: CPT | Performed by: PHYSICIAN ASSISTANT

## 2021-07-18 PROCEDURE — 99220 PR INITIAL OBSERVATION CARE,LEVL III: CPT | Mod: ,,, | Performed by: PHYSICIAN ASSISTANT

## 2021-07-18 PROCEDURE — 25500020 PHARM REV CODE 255: Performed by: EMERGENCY MEDICINE

## 2021-07-18 PROCEDURE — 99285 EMERGENCY DEPT VISIT HI MDM: CPT | Mod: GC,,, | Performed by: EMERGENCY MEDICINE

## 2021-07-18 PROCEDURE — 93010 EKG 12-LEAD: ICD-10-PCS | Mod: ,,, | Performed by: INTERNAL MEDICINE

## 2021-07-18 PROCEDURE — 87529 HSV DNA AMP PROBE: CPT | Performed by: PHYSICIAN ASSISTANT

## 2021-07-18 PROCEDURE — 86235 NUCLEAR ANTIGEN ANTIBODY: CPT | Performed by: PHYSICIAN ASSISTANT

## 2021-07-18 PROCEDURE — 82977 ASSAY OF GGT: CPT | Performed by: PHYSICIAN ASSISTANT

## 2021-07-18 PROCEDURE — 83605 ASSAY OF LACTIC ACID: CPT | Mod: 91 | Performed by: PHYSICIAN ASSISTANT

## 2021-07-18 RX ORDER — HYDROXYZINE HYDROCHLORIDE 10 MG/1
10 TABLET, FILM COATED ORAL 3 TIMES DAILY PRN
Status: DISCONTINUED | OUTPATIENT
Start: 2021-07-18 | End: 2021-07-23 | Stop reason: HOSPADM

## 2021-07-18 RX ORDER — SENNOSIDES 8.6 MG/1
8.6 TABLET ORAL DAILY
Status: DISCONTINUED | OUTPATIENT
Start: 2021-07-18 | End: 2021-07-23 | Stop reason: HOSPADM

## 2021-07-18 RX ORDER — IBUPROFEN 200 MG
24 TABLET ORAL
Status: DISCONTINUED | OUTPATIENT
Start: 2021-07-18 | End: 2021-07-23 | Stop reason: HOSPADM

## 2021-07-18 RX ORDER — TALC
6 POWDER (GRAM) TOPICAL NIGHTLY PRN
Status: DISCONTINUED | OUTPATIENT
Start: 2021-07-18 | End: 2021-07-23 | Stop reason: HOSPADM

## 2021-07-18 RX ORDER — POLYETHYLENE GLYCOL 3350 17 G/17G
17 POWDER, FOR SOLUTION ORAL 2 TIMES DAILY PRN
Status: DISCONTINUED | OUTPATIENT
Start: 2021-07-18 | End: 2021-07-18

## 2021-07-18 RX ORDER — PANTOPRAZOLE SODIUM 40 MG/1
40 TABLET, DELAYED RELEASE ORAL DAILY
Status: DISCONTINUED | OUTPATIENT
Start: 2021-07-19 | End: 2021-07-23 | Stop reason: HOSPADM

## 2021-07-18 RX ORDER — IPRATROPIUM BROMIDE AND ALBUTEROL SULFATE 2.5; .5 MG/3ML; MG/3ML
3 SOLUTION RESPIRATORY (INHALATION) EVERY 4 HOURS PRN
Status: DISCONTINUED | OUTPATIENT
Start: 2021-07-18 | End: 2021-07-22

## 2021-07-18 RX ORDER — HYDROMORPHONE HYDROCHLORIDE 1 MG/ML
0.5 INJECTION, SOLUTION INTRAMUSCULAR; INTRAVENOUS; SUBCUTANEOUS EVERY 6 HOURS PRN
Status: DISCONTINUED | OUTPATIENT
Start: 2021-07-18 | End: 2021-07-23 | Stop reason: HOSPADM

## 2021-07-18 RX ORDER — HYDROMORPHONE HYDROCHLORIDE 1 MG/ML
0.2 INJECTION, SOLUTION INTRAMUSCULAR; INTRAVENOUS; SUBCUTANEOUS EVERY 6 HOURS PRN
Status: DISCONTINUED | OUTPATIENT
Start: 2021-07-18 | End: 2021-07-23 | Stop reason: HOSPADM

## 2021-07-18 RX ORDER — IBUPROFEN 200 MG
16 TABLET ORAL
Status: DISCONTINUED | OUTPATIENT
Start: 2021-07-18 | End: 2021-07-23 | Stop reason: HOSPADM

## 2021-07-18 RX ORDER — GLUCAGON 1 MG
1 KIT INJECTION
Status: DISCONTINUED | OUTPATIENT
Start: 2021-07-18 | End: 2021-07-23 | Stop reason: HOSPADM

## 2021-07-18 RX ORDER — HYDROMORPHONE HYDROCHLORIDE 1 MG/ML
1 INJECTION, SOLUTION INTRAMUSCULAR; INTRAVENOUS; SUBCUTANEOUS
Status: COMPLETED | OUTPATIENT
Start: 2021-07-18 | End: 2021-07-18

## 2021-07-18 RX ORDER — ONDANSETRON 2 MG/ML
4 INJECTION INTRAMUSCULAR; INTRAVENOUS EVERY 6 HOURS PRN
Status: DISCONTINUED | OUTPATIENT
Start: 2021-07-18 | End: 2021-07-23 | Stop reason: HOSPADM

## 2021-07-18 RX ORDER — INSULIN ASPART 100 [IU]/ML
0-5 INJECTION, SOLUTION INTRAVENOUS; SUBCUTANEOUS
Status: DISCONTINUED | OUTPATIENT
Start: 2021-07-18 | End: 2021-07-23 | Stop reason: HOSPADM

## 2021-07-18 RX ORDER — BISACODYL 10 MG
10 SUPPOSITORY, RECTAL RECTAL DAILY PRN
Status: DISCONTINUED | OUTPATIENT
Start: 2021-07-18 | End: 2021-07-23 | Stop reason: HOSPADM

## 2021-07-18 RX ORDER — ONDANSETRON 2 MG/ML
4 INJECTION INTRAMUSCULAR; INTRAVENOUS
Status: COMPLETED | OUTPATIENT
Start: 2021-07-18 | End: 2021-07-18

## 2021-07-18 RX ORDER — ACETAMINOPHEN 325 MG/1
650 TABLET ORAL EVERY 4 HOURS PRN
Status: DISCONTINUED | OUTPATIENT
Start: 2021-07-18 | End: 2021-07-23 | Stop reason: HOSPADM

## 2021-07-18 RX ORDER — POLYETHYLENE GLYCOL 3350 17 G/17G
17 POWDER, FOR SOLUTION ORAL DAILY
Status: DISCONTINUED | OUTPATIENT
Start: 2021-07-18 | End: 2021-07-23 | Stop reason: HOSPADM

## 2021-07-18 RX ORDER — GABAPENTIN 300 MG/1
300 CAPSULE ORAL NIGHTLY
Status: DISCONTINUED | OUTPATIENT
Start: 2021-07-18 | End: 2021-07-23 | Stop reason: HOSPADM

## 2021-07-18 RX ORDER — ONDANSETRON 8 MG/1
8 TABLET, ORALLY DISINTEGRATING ORAL EVERY 8 HOURS PRN
Status: DISCONTINUED | OUTPATIENT
Start: 2021-07-18 | End: 2021-07-18

## 2021-07-18 RX ORDER — SODIUM CHLORIDE 9 MG/ML
INJECTION, SOLUTION INTRAVENOUS CONTINUOUS
Status: ACTIVE | OUTPATIENT
Start: 2021-07-18 | End: 2021-07-19

## 2021-07-18 RX ORDER — BUPROPION HYDROCHLORIDE 150 MG/1
300 TABLET ORAL DAILY
Status: DISCONTINUED | OUTPATIENT
Start: 2021-07-19 | End: 2021-07-23 | Stop reason: HOSPADM

## 2021-07-18 RX ADMIN — SODIUM CHLORIDE 1000 ML: 0.9 INJECTION, SOLUTION INTRAVENOUS at 03:07

## 2021-07-18 RX ADMIN — SODIUM CHLORIDE: 0.9 INJECTION, SOLUTION INTRAVENOUS at 09:07

## 2021-07-18 RX ADMIN — HYDROMORPHONE HYDROCHLORIDE 1 MG: 1 INJECTION, SOLUTION INTRAMUSCULAR; INTRAVENOUS; SUBCUTANEOUS at 02:07

## 2021-07-18 RX ADMIN — SODIUM CHLORIDE, SODIUM LACTATE, POTASSIUM CHLORIDE, AND CALCIUM CHLORIDE 1000 ML: .6; .31; .03; .02 INJECTION, SOLUTION INTRAVENOUS at 08:07

## 2021-07-18 RX ADMIN — IOHEXOL 100 ML: 350 INJECTION, SOLUTION INTRAVENOUS at 04:07

## 2021-07-18 RX ADMIN — SENNOSIDES 8.6 MG: 8.6 TABLET, FILM COATED ORAL at 11:07

## 2021-07-18 RX ADMIN — POLYETHYLENE GLYCOL 3350 17 G: 17 POWDER, FOR SOLUTION ORAL at 11:07

## 2021-07-18 RX ADMIN — ONDANSETRON 4 MG: 2 INJECTION INTRAMUSCULAR; INTRAVENOUS at 06:07

## 2021-07-18 RX ADMIN — GABAPENTIN 300 MG: 300 CAPSULE ORAL at 11:07

## 2021-07-18 RX ADMIN — PIPERACILLIN SODIUM AND TAZOBACTAM SODIUM 4.5 G: 4; .5 INJECTION, POWDER, FOR SOLUTION INTRAVENOUS at 09:07

## 2021-07-18 RX ADMIN — ACETAMINOPHEN 650 MG: 325 TABLET ORAL at 10:07

## 2021-07-19 LAB
A1AT SERPL-MCNC: 140 MG/DL (ref 100–190)
ALBUMIN SERPL BCP-MCNC: 3 G/DL (ref 3.5–5.2)
ALP SERPL-CCNC: 200 U/L (ref 55–135)
ALT SERPL W/O P-5'-P-CCNC: 366 U/L (ref 10–44)
ANA SER QL IF: NORMAL
ANION GAP SERPL CALC-SCNC: 12 MMOL/L (ref 8–16)
AST SERPL-CCNC: 262 U/L (ref 10–40)
BACTERIA UR CULT: NORMAL
BASOPHILS # BLD AUTO: 0.03 K/UL (ref 0–0.2)
BASOPHILS NFR BLD: 0.3 % (ref 0–1.9)
BILIRUB SERPL-MCNC: 4.7 MG/DL (ref 0.1–1)
BUN SERPL-MCNC: 20 MG/DL (ref 8–23)
CALCIUM SERPL-MCNC: 7.8 MG/DL (ref 8.7–10.5)
CERULOPLASMIN SERPL-MCNC: 26 MG/DL (ref 15–45)
CHLORIDE SERPL-SCNC: 105 MMOL/L (ref 95–110)
CK SERPL-CCNC: 57 U/L (ref 20–200)
CO2 SERPL-SCNC: 20 MMOL/L (ref 23–29)
CREAT SERPL-MCNC: 1.7 MG/DL (ref 0.5–1.4)
CRP SERPL-MCNC: 40.6 MG/L (ref 0–8.2)
CYTOMEGALOVIRUS DNA: NOT DETECTED
CYTOMEGALOVIRUS LOG (IU/ML): NOT DETECTED LOGIU/ML
CYTOMEGALOVIRUS PCR, QUANT: NOT DETECTED IU/ML
DIFFERENTIAL METHOD: ABNORMAL
EOSINOPHIL # BLD AUTO: 0 K/UL (ref 0–0.5)
EOSINOPHIL NFR BLD: 0.1 % (ref 0–8)
ERYTHROCYTE [DISTWIDTH] IN BLOOD BY AUTOMATED COUNT: 13.5 % (ref 11.5–14.5)
ERYTHROCYTE [SEDIMENTATION RATE] IN BLOOD BY WESTERGREN METHOD: 18 MM/HR (ref 0–23)
EST. GFR  (AFRICAN AMERICAN): 46.9 ML/MIN/1.73 M^2
EST. GFR  (NON AFRICAN AMERICAN): 40.5 ML/MIN/1.73 M^2
ESTIMATED AVG GLUCOSE: 131 MG/DL (ref 68–131)
GLUCOSE SERPL-MCNC: 190 MG/DL (ref 70–110)
HAV IGM SERPL QL IA: NEGATIVE
HBA1C MFR BLD: 6.2 % (ref 4–5.6)
HBV CORE IGM SERPL QL IA: NEGATIVE
HBV SURFACE AG SERPL QL IA: NEGATIVE
HCT VFR BLD AUTO: 38.6 % (ref 40–54)
HCV AB SERPL QL IA: NEGATIVE
HCV AB SERPL QL IA: NEGATIVE
HGB BLD-MCNC: 12.4 G/DL (ref 14–18)
HIV 1+2 AB+HIV1 P24 AG SERPL QL IA: NEGATIVE
IMM GRANULOCYTES # BLD AUTO: 0.04 K/UL (ref 0–0.04)
IMM GRANULOCYTES NFR BLD AUTO: 0.3 % (ref 0–0.5)
INR PPP: 1.2 (ref 0.8–1.2)
LACTATE SERPL-SCNC: 3.2 MMOL/L (ref 0.5–2.2)
LYMPHOCYTES # BLD AUTO: 0.4 K/UL (ref 1–4.8)
LYMPHOCYTES NFR BLD: 3.1 % (ref 18–48)
MAGNESIUM SERPL-MCNC: 1.3 MG/DL (ref 1.6–2.6)
MCH RBC QN AUTO: 29.7 PG (ref 27–31)
MCHC RBC AUTO-ENTMCNC: 32.1 G/DL (ref 32–36)
MCV RBC AUTO: 93 FL (ref 82–98)
MONOCYTES # BLD AUTO: 0.2 K/UL (ref 0.3–1)
MONOCYTES NFR BLD: 1.7 % (ref 4–15)
NEUTROPHILS # BLD AUTO: 11 K/UL (ref 1.8–7.7)
NEUTROPHILS NFR BLD: 94.5 % (ref 38–73)
NRBC BLD-RTO: 0 /100 WBC
PHOSPHATE SERPL-MCNC: 3.3 MG/DL (ref 2.7–4.5)
PLATELET # BLD AUTO: 203 K/UL (ref 150–450)
PLATELET BLD QL SMEAR: ABNORMAL
PMV BLD AUTO: 9.9 FL (ref 9.2–12.9)
POCT GLUCOSE: 115 MG/DL (ref 70–110)
POCT GLUCOSE: 133 MG/DL (ref 70–110)
POCT GLUCOSE: 74 MG/DL (ref 70–110)
POCT GLUCOSE: 79 MG/DL (ref 70–110)
POCT GLUCOSE: 90 MG/DL (ref 70–110)
POTASSIUM SERPL-SCNC: 4.8 MMOL/L (ref 3.5–5.1)
PROCALCITONIN SERPL IA-MCNC: 51.09 NG/ML
PROT SERPL-MCNC: 5.7 G/DL (ref 6–8.4)
PROTHROMBIN TIME: 13 SEC (ref 9–12.5)
RBC # BLD AUTO: 4.17 M/UL (ref 4.6–6.2)
SODIUM SERPL-SCNC: 137 MMOL/L (ref 136–145)
WBC # BLD AUTO: 11.62 K/UL (ref 3.9–12.7)

## 2021-07-19 PROCEDURE — 84145 PROCALCITONIN (PCT): CPT | Performed by: PHYSICIAN ASSISTANT

## 2021-07-19 PROCEDURE — 85025 COMPLETE CBC W/AUTO DIFF WBC: CPT | Performed by: PHYSICIAN ASSISTANT

## 2021-07-19 PROCEDURE — 85610 PROTHROMBIN TIME: CPT | Performed by: PHYSICIAN ASSISTANT

## 2021-07-19 PROCEDURE — 63600175 PHARM REV CODE 636 W HCPCS: Performed by: PHYSICIAN ASSISTANT

## 2021-07-19 PROCEDURE — 25000003 PHARM REV CODE 250: Performed by: PHYSICIAN ASSISTANT

## 2021-07-19 PROCEDURE — 96375 TX/PRO/DX INJ NEW DRUG ADDON: CPT | Performed by: EMERGENCY MEDICINE

## 2021-07-19 PROCEDURE — 96376 TX/PRO/DX INJ SAME DRUG ADON: CPT | Performed by: EMERGENCY MEDICINE

## 2021-07-19 PROCEDURE — 99232 PR SUBSEQUENT HOSPITAL CARE,LEVL II: ICD-10-PCS | Mod: ,,, | Performed by: SURGERY

## 2021-07-19 PROCEDURE — 84100 ASSAY OF PHOSPHORUS: CPT | Performed by: PHYSICIAN ASSISTANT

## 2021-07-19 PROCEDURE — 83036 HEMOGLOBIN GLYCOSYLATED A1C: CPT | Performed by: PHYSICIAN ASSISTANT

## 2021-07-19 PROCEDURE — 83735 ASSAY OF MAGNESIUM: CPT | Performed by: PHYSICIAN ASSISTANT

## 2021-07-19 PROCEDURE — 82525 ASSAY OF COPPER: CPT | Performed by: PHYSICIAN ASSISTANT

## 2021-07-19 PROCEDURE — G0378 HOSPITAL OBSERVATION PER HR: HCPCS

## 2021-07-19 PROCEDURE — 99232 SBSQ HOSP IP/OBS MODERATE 35: CPT | Mod: ,,, | Performed by: SURGERY

## 2021-07-19 PROCEDURE — 63600175 PHARM REV CODE 636 W HCPCS: Performed by: INTERNAL MEDICINE

## 2021-07-19 PROCEDURE — 86140 C-REACTIVE PROTEIN: CPT | Performed by: PHYSICIAN ASSISTANT

## 2021-07-19 PROCEDURE — 85652 RBC SED RATE AUTOMATED: CPT | Performed by: PHYSICIAN ASSISTANT

## 2021-07-19 PROCEDURE — 96361 HYDRATE IV INFUSION ADD-ON: CPT | Performed by: EMERGENCY MEDICINE

## 2021-07-19 PROCEDURE — 83605 ASSAY OF LACTIC ACID: CPT | Performed by: PHYSICIAN ASSISTANT

## 2021-07-19 PROCEDURE — 99226 PR SUBSEQUENT OBSERVATION CARE,LEVEL III: CPT | Mod: ,,, | Performed by: INTERNAL MEDICINE

## 2021-07-19 PROCEDURE — 36415 COLL VENOUS BLD VENIPUNCTURE: CPT | Performed by: PHYSICIAN ASSISTANT

## 2021-07-19 PROCEDURE — 80053 COMPREHEN METABOLIC PANEL: CPT | Performed by: PHYSICIAN ASSISTANT

## 2021-07-19 PROCEDURE — 99226 PR SUBSEQUENT OBSERVATION CARE,LEVEL III: ICD-10-PCS | Mod: ,,, | Performed by: INTERNAL MEDICINE

## 2021-07-19 RX ORDER — MAGNESIUM SULFATE HEPTAHYDRATE 40 MG/ML
2 INJECTION, SOLUTION INTRAVENOUS
Status: COMPLETED | OUTPATIENT
Start: 2021-07-19 | End: 2021-07-19

## 2021-07-19 RX ADMIN — HYDROMORPHONE HYDROCHLORIDE 0.5 MG: 1 INJECTION, SOLUTION INTRAMUSCULAR; INTRAVENOUS; SUBCUTANEOUS at 12:07

## 2021-07-19 RX ADMIN — SENNOSIDES 8.6 MG: 8.6 TABLET, FILM COATED ORAL at 09:07

## 2021-07-19 RX ADMIN — PIPERACILLIN SODIUM AND TAZOBACTAM SODIUM 4.5 G: 4; .5 INJECTION, POWDER, FOR SOLUTION INTRAVENOUS at 05:07

## 2021-07-19 RX ADMIN — MAGNESIUM SULFATE 2 G: 2 INJECTION INTRAVENOUS at 05:07

## 2021-07-19 RX ADMIN — BUPROPION HYDROCHLORIDE 300 MG: 150 TABLET, FILM COATED, EXTENDED RELEASE ORAL at 09:07

## 2021-07-19 RX ADMIN — PROMETHAZINE HYDROCHLORIDE 12.5 MG: 25 INJECTION INTRAMUSCULAR; INTRAVENOUS at 02:07

## 2021-07-19 RX ADMIN — MAGNESIUM SULFATE 2 G: 2 INJECTION INTRAVENOUS at 02:07

## 2021-07-19 RX ADMIN — PROMETHAZINE HYDROCHLORIDE 12.5 MG: 25 INJECTION INTRAMUSCULAR; INTRAVENOUS at 01:07

## 2021-07-19 RX ADMIN — GABAPENTIN 300 MG: 300 CAPSULE ORAL at 09:07

## 2021-07-19 RX ADMIN — MAGNESIUM SULFATE 2 G: 2 INJECTION INTRAVENOUS at 04:07

## 2021-07-19 RX ADMIN — POLYETHYLENE GLYCOL 3350 17 G: 17 POWDER, FOR SOLUTION ORAL at 09:07

## 2021-07-19 RX ADMIN — SODIUM CHLORIDE: 0.9 INJECTION, SOLUTION INTRAVENOUS at 05:07

## 2021-07-19 RX ADMIN — PIPERACILLIN SODIUM AND TAZOBACTAM SODIUM 4.5 G: 4; .5 INJECTION, POWDER, FOR SOLUTION INTRAVENOUS at 10:07

## 2021-07-19 RX ADMIN — PIPERACILLIN SODIUM AND TAZOBACTAM SODIUM 4.5 G: 4; .5 INJECTION, POWDER, FOR SOLUTION INTRAVENOUS at 01:07

## 2021-07-19 RX ADMIN — PANTOPRAZOLE SODIUM 40 MG: 40 TABLET, DELAYED RELEASE ORAL at 09:07

## 2021-07-19 RX ADMIN — PROMETHAZINE HYDROCHLORIDE 12.5 MG: 25 INJECTION INTRAMUSCULAR; INTRAVENOUS at 09:07

## 2021-07-20 ENCOUNTER — ANESTHESIA EVENT (OUTPATIENT)
Dept: SURGERY | Facility: HOSPITAL | Age: 66
DRG: 854 | End: 2021-07-20
Payer: MEDICARE

## 2021-07-20 ENCOUNTER — ANESTHESIA EVENT (OUTPATIENT)
Dept: ENDOSCOPY | Facility: HOSPITAL | Age: 66
DRG: 854 | End: 2021-07-20
Payer: MEDICARE

## 2021-07-20 ENCOUNTER — ANESTHESIA (OUTPATIENT)
Dept: ENDOSCOPY | Facility: HOSPITAL | Age: 66
DRG: 854 | End: 2021-07-20
Payer: MEDICARE

## 2021-07-20 ENCOUNTER — TELEPHONE (OUTPATIENT)
Dept: ENDOSCOPY | Facility: HOSPITAL | Age: 66
End: 2021-07-20

## 2021-07-20 DIAGNOSIS — K80.50 COMMON BILE DUCT STONE: Primary | ICD-10-CM

## 2021-07-20 DIAGNOSIS — Z46.89 ENCOUNTER FOR REMOVAL OF BILIARY STENT: Primary | ICD-10-CM

## 2021-07-20 PROBLEM — K83.09 CHOLANGITIS: Status: ACTIVE | Noted: 2021-07-20

## 2021-07-20 LAB
ALBUMIN SERPL BCP-MCNC: 2.9 G/DL (ref 3.5–5.2)
ALP SERPL-CCNC: 179 U/L (ref 55–135)
ALT SERPL W/O P-5'-P-CCNC: 235 U/L (ref 10–44)
ANION GAP SERPL CALC-SCNC: 12 MMOL/L (ref 8–16)
AST SERPL-CCNC: 105 U/L (ref 10–40)
BASOPHILS # BLD AUTO: 0.04 K/UL (ref 0–0.2)
BASOPHILS NFR BLD: 0.3 % (ref 0–1.9)
BILIRUB SERPL-MCNC: 5.8 MG/DL (ref 0.1–1)
BUN SERPL-MCNC: 16 MG/DL (ref 8–23)
CALCIUM SERPL-MCNC: 8.4 MG/DL (ref 8.7–10.5)
CHLORIDE SERPL-SCNC: 108 MMOL/L (ref 95–110)
CO2 SERPL-SCNC: 19 MMOL/L (ref 23–29)
CREAT SERPL-MCNC: 1.5 MG/DL (ref 0.5–1.4)
DIFFERENTIAL METHOD: ABNORMAL
EOSINOPHIL # BLD AUTO: 0.1 K/UL (ref 0–0.5)
EOSINOPHIL NFR BLD: 0.9 % (ref 0–8)
ERYTHROCYTE [DISTWIDTH] IN BLOOD BY AUTOMATED COUNT: 13.8 % (ref 11.5–14.5)
EST. GFR  (AFRICAN AMERICAN): 54.5 ML/MIN/1.73 M^2
EST. GFR  (NON AFRICAN AMERICAN): 47.2 ML/MIN/1.73 M^2
GLUCOSE SERPL-MCNC: 66 MG/DL (ref 70–110)
HCT VFR BLD AUTO: 38.9 % (ref 40–54)
HGB BLD-MCNC: 12.5 G/DL (ref 14–18)
HSV-1 DNA BY PCR: NEGATIVE
HSV-2 DNA BY PCR: NEGATIVE
IMM GRANULOCYTES # BLD AUTO: 0.22 K/UL (ref 0–0.04)
IMM GRANULOCYTES NFR BLD AUTO: 1.7 % (ref 0–0.5)
INR PPP: 1.2 (ref 0.8–1.2)
LYMPHOCYTES # BLD AUTO: 0.9 K/UL (ref 1–4.8)
LYMPHOCYTES NFR BLD: 6.9 % (ref 18–48)
MAGNESIUM SERPL-MCNC: 2.7 MG/DL (ref 1.6–2.6)
MCH RBC QN AUTO: 29.1 PG (ref 27–31)
MCHC RBC AUTO-ENTMCNC: 32.1 G/DL (ref 32–36)
MCV RBC AUTO: 91 FL (ref 82–98)
MITOCHONDRIA AB TITR SER IF: NORMAL {TITER}
MONOCYTES # BLD AUTO: 0.7 K/UL (ref 0.3–1)
MONOCYTES NFR BLD: 5.7 % (ref 4–15)
NEUTROPHILS # BLD AUTO: 11 K/UL (ref 1.8–7.7)
NEUTROPHILS NFR BLD: 84.5 % (ref 38–73)
NRBC BLD-RTO: 0 /100 WBC
PHOSPHATE SERPL-MCNC: 2.1 MG/DL (ref 2.7–4.5)
PLATELET # BLD AUTO: 183 K/UL (ref 150–450)
PMV BLD AUTO: 10.5 FL (ref 9.2–12.9)
POCT GLUCOSE: 113 MG/DL (ref 70–110)
POCT GLUCOSE: 113 MG/DL (ref 70–110)
POCT GLUCOSE: 118 MG/DL (ref 70–110)
POCT GLUCOSE: 74 MG/DL (ref 70–110)
POCT GLUCOSE: 99 MG/DL (ref 70–110)
POTASSIUM SERPL-SCNC: 3.8 MMOL/L (ref 3.5–5.1)
PROT SERPL-MCNC: 5.9 G/DL (ref 6–8.4)
PROTHROMBIN TIME: 12.9 SEC (ref 9–12.5)
RBC # BLD AUTO: 4.29 M/UL (ref 4.6–6.2)
SMOOTH MUSCLE AB TITR SER IF: NORMAL {TITER}
SODIUM SERPL-SCNC: 139 MMOL/L (ref 136–145)
WBC # BLD AUTO: 13.02 K/UL (ref 3.9–12.7)

## 2021-07-20 PROCEDURE — 85610 PROTHROMBIN TIME: CPT | Performed by: PHYSICIAN ASSISTANT

## 2021-07-20 PROCEDURE — 25000003 PHARM REV CODE 250: Performed by: NURSE ANESTHETIST, CERTIFIED REGISTERED

## 2021-07-20 PROCEDURE — 36415 COLL VENOUS BLD VENIPUNCTURE: CPT | Performed by: INTERNAL MEDICINE

## 2021-07-20 PROCEDURE — 20600001 HC STEP DOWN PRIVATE ROOM

## 2021-07-20 PROCEDURE — D9220A PRA ANESTHESIA: ICD-10-PCS | Mod: CRNA,,, | Performed by: NURSE ANESTHETIST, CERTIFIED REGISTERED

## 2021-07-20 PROCEDURE — 63600175 PHARM REV CODE 636 W HCPCS: Performed by: PHYSICIAN ASSISTANT

## 2021-07-20 PROCEDURE — 27202125 HC BALLOON, EXTRACTION (ANY): Performed by: INTERNAL MEDICINE

## 2021-07-20 PROCEDURE — 37000008 HC ANESTHESIA 1ST 15 MINUTES: Performed by: INTERNAL MEDICINE

## 2021-07-20 PROCEDURE — 74328 X-RAY BILE DUCT ENDOSCOPY: CPT | Mod: 26,,, | Performed by: INTERNAL MEDICINE

## 2021-07-20 PROCEDURE — 96376 TX/PRO/DX INJ SAME DRUG ADON: CPT | Performed by: EMERGENCY MEDICINE

## 2021-07-20 PROCEDURE — 82962 GLUCOSE BLOOD TEST: CPT | Performed by: INTERNAL MEDICINE

## 2021-07-20 PROCEDURE — 99223 1ST HOSP IP/OBS HIGH 75: CPT | Mod: 25,,, | Performed by: INTERNAL MEDICINE

## 2021-07-20 PROCEDURE — 83735 ASSAY OF MAGNESIUM: CPT | Performed by: PHYSICIAN ASSISTANT

## 2021-07-20 PROCEDURE — 43264 PR ERCP,W/REMOVAL STONE,BIL/PANCR DUCTS: ICD-10-PCS | Mod: 51,,, | Performed by: INTERNAL MEDICINE

## 2021-07-20 PROCEDURE — 43274 ERCP DUCT STENT PLACEMENT: CPT | Mod: ,,, | Performed by: INTERNAL MEDICINE

## 2021-07-20 PROCEDURE — 43264 ERCP REMOVE DUCT CALCULI: CPT | Performed by: INTERNAL MEDICINE

## 2021-07-20 PROCEDURE — 84100 ASSAY OF PHOSPHORUS: CPT | Performed by: PHYSICIAN ASSISTANT

## 2021-07-20 PROCEDURE — 99233 SBSQ HOSP IP/OBS HIGH 50: CPT | Mod: ,,, | Performed by: INTERNAL MEDICINE

## 2021-07-20 PROCEDURE — 99232 SBSQ HOSP IP/OBS MODERATE 35: CPT | Mod: ,,, | Performed by: SURGERY

## 2021-07-20 PROCEDURE — 25000003 PHARM REV CODE 250: Performed by: PHYSICIAN ASSISTANT

## 2021-07-20 PROCEDURE — D9220A PRA ANESTHESIA: Mod: CRNA,,, | Performed by: NURSE ANESTHETIST, CERTIFIED REGISTERED

## 2021-07-20 PROCEDURE — D9220A PRA ANESTHESIA: Mod: ANES,,, | Performed by: ANESTHESIOLOGY

## 2021-07-20 PROCEDURE — 43274 PR ERCP W/STENT PLCMNT BILIARY/PANCREATIC DUCT: ICD-10-PCS | Mod: ,,, | Performed by: INTERNAL MEDICINE

## 2021-07-20 PROCEDURE — 25500020 PHARM REV CODE 255: Performed by: INTERNAL MEDICINE

## 2021-07-20 PROCEDURE — 36415 COLL VENOUS BLD VENIPUNCTURE: CPT | Performed by: PHYSICIAN ASSISTANT

## 2021-07-20 PROCEDURE — C2617 STENT, NON-COR, TEM W/O DEL: HCPCS | Performed by: INTERNAL MEDICINE

## 2021-07-20 PROCEDURE — 94761 N-INVAS EAR/PLS OXIMETRY MLT: CPT

## 2021-07-20 PROCEDURE — 74328 X-RAY BILE DUCT ENDOSCOPY: CPT | Performed by: INTERNAL MEDICINE

## 2021-07-20 PROCEDURE — 99232 PR SUBSEQUENT HOSPITAL CARE,LEVL II: ICD-10-PCS | Mod: ,,, | Performed by: SURGERY

## 2021-07-20 PROCEDURE — 27201674 HC SPHINCTERTOME: Performed by: INTERNAL MEDICINE

## 2021-07-20 PROCEDURE — 63600175 PHARM REV CODE 636 W HCPCS: Performed by: NURSE ANESTHETIST, CERTIFIED REGISTERED

## 2021-07-20 PROCEDURE — 43264 ERCP REMOVE DUCT CALCULI: CPT | Mod: 51,,, | Performed by: INTERNAL MEDICINE

## 2021-07-20 PROCEDURE — D9220A PRA ANESTHESIA: ICD-10-PCS | Mod: ANES,,, | Performed by: ANESTHESIOLOGY

## 2021-07-20 PROCEDURE — 99223 PR INITIAL HOSPITAL CARE,LEVL III: ICD-10-PCS | Mod: 25,,, | Performed by: INTERNAL MEDICINE

## 2021-07-20 PROCEDURE — 87040 BLOOD CULTURE FOR BACTERIA: CPT | Performed by: INTERNAL MEDICINE

## 2021-07-20 PROCEDURE — 43274 ERCP DUCT STENT PLACEMENT: CPT | Performed by: INTERNAL MEDICINE

## 2021-07-20 PROCEDURE — 99233 PR SUBSEQUENT HOSPITAL CARE,LEVL III: ICD-10-PCS | Mod: ,,, | Performed by: INTERNAL MEDICINE

## 2021-07-20 PROCEDURE — 74328 PR  X-RAY FOR BILE DUCT ENDOSCOPY: ICD-10-PCS | Mod: 26,,, | Performed by: INTERNAL MEDICINE

## 2021-07-20 PROCEDURE — 37000009 HC ANESTHESIA EA ADD 15 MINS: Performed by: INTERNAL MEDICINE

## 2021-07-20 PROCEDURE — 80053 COMPREHEN METABOLIC PANEL: CPT | Performed by: PHYSICIAN ASSISTANT

## 2021-07-20 PROCEDURE — 27202127 HC STENT INTRODUCER: Performed by: INTERNAL MEDICINE

## 2021-07-20 PROCEDURE — C1769 GUIDE WIRE: HCPCS | Performed by: INTERNAL MEDICINE

## 2021-07-20 PROCEDURE — 85025 COMPLETE CBC W/AUTO DIFF WBC: CPT | Performed by: PHYSICIAN ASSISTANT

## 2021-07-20 DEVICE — BILIARY STENT
Type: IMPLANTABLE DEVICE | Site: BILE DUCT | Status: NON-FUNCTIONAL
Brand: ADVANIX™ BILIARY
Removed: 2021-10-13

## 2021-07-20 RX ORDER — PROPOFOL 10 MG/ML
VIAL (ML) INTRAVENOUS
Status: DISCONTINUED | OUTPATIENT
Start: 2021-07-20 | End: 2021-07-20

## 2021-07-20 RX ORDER — ONDANSETRON 2 MG/ML
INJECTION INTRAMUSCULAR; INTRAVENOUS
Status: DISCONTINUED | OUTPATIENT
Start: 2021-07-20 | End: 2021-07-20

## 2021-07-20 RX ORDER — FENTANYL CITRATE 50 UG/ML
INJECTION, SOLUTION INTRAMUSCULAR; INTRAVENOUS
Status: DISCONTINUED | OUTPATIENT
Start: 2021-07-20 | End: 2021-07-20

## 2021-07-20 RX ORDER — LIDOCAINE HYDROCHLORIDE 20 MG/ML
INJECTION, SOLUTION EPIDURAL; INFILTRATION; INTRACAUDAL; PERINEURAL
Status: DISCONTINUED | OUTPATIENT
Start: 2021-07-20 | End: 2021-07-20

## 2021-07-20 RX ORDER — PROPOFOL 10 MG/ML
VIAL (ML) INTRAVENOUS CONTINUOUS PRN
Status: DISCONTINUED | OUTPATIENT
Start: 2021-07-20 | End: 2021-07-20

## 2021-07-20 RX ADMIN — SODIUM CHLORIDE: 9 INJECTION, SOLUTION INTRAVENOUS at 11:07

## 2021-07-20 RX ADMIN — FENTANYL CITRATE 25 MCG: 50 INJECTION INTRAMUSCULAR; INTRAVENOUS at 11:07

## 2021-07-20 RX ADMIN — PIPERACILLIN SODIUM AND TAZOBACTAM SODIUM 4.5 G: 4; .5 INJECTION, POWDER, FOR SOLUTION INTRAVENOUS at 05:07

## 2021-07-20 RX ADMIN — FENTANYL CITRATE 50 MCG: 50 INJECTION INTRAMUSCULAR; INTRAVENOUS at 11:07

## 2021-07-20 RX ADMIN — GABAPENTIN 300 MG: 300 CAPSULE ORAL at 08:07

## 2021-07-20 RX ADMIN — LIDOCAINE HYDROCHLORIDE 100 MG: 20 INJECTION, SOLUTION EPIDURAL; INFILTRATION; INTRACAUDAL at 11:07

## 2021-07-20 RX ADMIN — Medication 200 MCG/KG/MIN: at 11:07

## 2021-07-20 RX ADMIN — IOHEXOL 6 ML: 300 INJECTION, SOLUTION INTRAVENOUS at 11:07

## 2021-07-20 RX ADMIN — ACETAMINOPHEN 650 MG: 325 TABLET ORAL at 08:07

## 2021-07-20 RX ADMIN — PIPERACILLIN SODIUM AND TAZOBACTAM SODIUM 4.5 G: 4; .5 INJECTION, POWDER, FOR SOLUTION INTRAVENOUS at 01:07

## 2021-07-20 RX ADMIN — PROPOFOL 60 MG: 10 INJECTION, EMULSION INTRAVENOUS at 11:07

## 2021-07-20 RX ADMIN — ONDANSETRON 4 MG: 2 INJECTION INTRAMUSCULAR; INTRAVENOUS at 11:07

## 2021-07-20 RX ADMIN — PANTOPRAZOLE SODIUM 40 MG: 40 TABLET, DELAYED RELEASE ORAL at 09:07

## 2021-07-20 RX ADMIN — PIPERACILLIN SODIUM AND TAZOBACTAM SODIUM 4.5 G: 4; .5 INJECTION, POWDER, FOR SOLUTION INTRAVENOUS at 08:07

## 2021-07-20 RX ADMIN — BUPROPION HYDROCHLORIDE 300 MG: 150 TABLET, FILM COATED, EXTENDED RELEASE ORAL at 09:07

## 2021-07-21 ENCOUNTER — ANESTHESIA (OUTPATIENT)
Dept: SURGERY | Facility: HOSPITAL | Age: 66
DRG: 854 | End: 2021-07-21
Payer: MEDICARE

## 2021-07-21 PROBLEM — A05.3: Status: ACTIVE | Noted: 2021-07-21

## 2021-07-21 PROBLEM — A00.0: Status: ACTIVE | Noted: 2021-07-21

## 2021-07-21 PROBLEM — A41.59 SEPSIS DUE TO VIBRIO VULNIFICUS: Status: ACTIVE | Noted: 2021-07-21

## 2021-07-21 LAB
ALBUMIN SERPL BCP-MCNC: 2.8 G/DL (ref 3.5–5.2)
ALP SERPL-CCNC: 281 U/L (ref 55–135)
ALT SERPL W/O P-5'-P-CCNC: 168 U/L (ref 10–44)
ANION GAP SERPL CALC-SCNC: 13 MMOL/L (ref 8–16)
AST SERPL-CCNC: 68 U/L (ref 10–40)
BASOPHILS # BLD AUTO: 0.03 K/UL (ref 0–0.2)
BASOPHILS NFR BLD: 0.3 % (ref 0–1.9)
BILIRUB SERPL-MCNC: 6.8 MG/DL (ref 0.1–1)
BUN SERPL-MCNC: 17 MG/DL (ref 8–23)
CALCIUM SERPL-MCNC: 8.3 MG/DL (ref 8.7–10.5)
CHLORIDE SERPL-SCNC: 106 MMOL/L (ref 95–110)
CO2 SERPL-SCNC: 20 MMOL/L (ref 23–29)
CREAT SERPL-MCNC: 1.3 MG/DL (ref 0.5–1.4)
DIFFERENTIAL METHOD: ABNORMAL
EBV DNA SERPL NAA+PROBE-ACNC: NORMAL IU/ML
EOSINOPHIL # BLD AUTO: 0.2 K/UL (ref 0–0.5)
EOSINOPHIL NFR BLD: 2.6 % (ref 0–8)
ERYTHROCYTE [DISTWIDTH] IN BLOOD BY AUTOMATED COUNT: 13.8 % (ref 11.5–14.5)
EST. GFR  (AFRICAN AMERICAN): >60 ML/MIN/1.73 M^2
EST. GFR  (NON AFRICAN AMERICAN): 56.1 ML/MIN/1.73 M^2
GLUCOSE SERPL-MCNC: 90 MG/DL (ref 70–110)
HCT VFR BLD AUTO: 40.9 % (ref 40–54)
HEPATITIS C VIRUS (HCV) RNA DETECTION/QUANTIFICATION RT-PCR: NORMAL IU/ML
HGB BLD-MCNC: 13.4 G/DL (ref 14–18)
IMM GRANULOCYTES # BLD AUTO: 0.18 K/UL (ref 0–0.04)
IMM GRANULOCYTES NFR BLD AUTO: 1.9 % (ref 0–0.5)
INR PPP: 1 (ref 0.8–1.2)
LYMPHOCYTES # BLD AUTO: 1 K/UL (ref 1–4.8)
LYMPHOCYTES NFR BLD: 10.6 % (ref 18–48)
MAGNESIUM SERPL-MCNC: 2.2 MG/DL (ref 1.6–2.6)
MCH RBC QN AUTO: 29.7 PG (ref 27–31)
MCHC RBC AUTO-ENTMCNC: 32.8 G/DL (ref 32–36)
MCV RBC AUTO: 91 FL (ref 82–98)
MONOCYTES # BLD AUTO: 0.7 K/UL (ref 0.3–1)
MONOCYTES NFR BLD: 7.5 % (ref 4–15)
NEUTROPHILS # BLD AUTO: 7.2 K/UL (ref 1.8–7.7)
NEUTROPHILS NFR BLD: 77.1 % (ref 38–73)
NRBC BLD-RTO: 0 /100 WBC
PHOSPHATE SERPL-MCNC: 2.2 MG/DL (ref 2.7–4.5)
PLATELET # BLD AUTO: 175 K/UL (ref 150–450)
PLATELET BLD QL SMEAR: ABNORMAL
PMV BLD AUTO: 9.9 FL (ref 9.2–12.9)
POCT GLUCOSE: 128 MG/DL (ref 70–110)
POCT GLUCOSE: 159 MG/DL (ref 70–110)
POCT GLUCOSE: 308 MG/DL (ref 70–110)
POCT GLUCOSE: 80 MG/DL (ref 70–110)
POCT GLUCOSE: 81 MG/DL (ref 70–110)
POCT GLUCOSE: 82 MG/DL (ref 70–110)
POTASSIUM SERPL-SCNC: 3.9 MMOL/L (ref 3.5–5.1)
PROT SERPL-MCNC: 6.3 G/DL (ref 6–8.4)
PROTHROMBIN TIME: 11.4 SEC (ref 9–12.5)
RBC # BLD AUTO: 4.51 M/UL (ref 4.6–6.2)
SODIUM SERPL-SCNC: 139 MMOL/L (ref 136–145)
WBC # BLD AUTO: 9.36 K/UL (ref 3.9–12.7)

## 2021-07-21 PROCEDURE — 88304 PR  SURG PATH,LEVEL III: ICD-10-PCS | Mod: 26,,, | Performed by: PATHOLOGY

## 2021-07-21 PROCEDURE — 20600001 HC STEP DOWN PRIVATE ROOM

## 2021-07-21 PROCEDURE — 80053 COMPREHEN METABOLIC PANEL: CPT | Performed by: PHYSICIAN ASSISTANT

## 2021-07-21 PROCEDURE — 25000003 PHARM REV CODE 250: Performed by: STUDENT IN AN ORGANIZED HEALTH CARE EDUCATION/TRAINING PROGRAM

## 2021-07-21 PROCEDURE — 84100 ASSAY OF PHOSPHORUS: CPT | Performed by: PHYSICIAN ASSISTANT

## 2021-07-21 PROCEDURE — 25000003 PHARM REV CODE 250: Performed by: INTERNAL MEDICINE

## 2021-07-21 PROCEDURE — 88304 TISSUE EXAM BY PATHOLOGIST: CPT | Mod: 26,,, | Performed by: PATHOLOGY

## 2021-07-21 PROCEDURE — 27201423 OPTIME MED/SURG SUP & DEVICES STERILE SUPPLY: Performed by: SURGERY

## 2021-07-21 PROCEDURE — 37000009 HC ANESTHESIA EA ADD 15 MINS: Performed by: SURGERY

## 2021-07-21 PROCEDURE — 99232 SBSQ HOSP IP/OBS MODERATE 35: CPT | Mod: ,,, | Performed by: INTERNAL MEDICINE

## 2021-07-21 PROCEDURE — D9220A PRA ANESTHESIA: ICD-10-PCS | Mod: ,,, | Performed by: ANESTHESIOLOGY

## 2021-07-21 PROCEDURE — 36000709 HC OR TIME LEV III EA ADD 15 MIN: Performed by: SURGERY

## 2021-07-21 PROCEDURE — 85610 PROTHROMBIN TIME: CPT | Performed by: PHYSICIAN ASSISTANT

## 2021-07-21 PROCEDURE — 63600175 PHARM REV CODE 636 W HCPCS: Performed by: STUDENT IN AN ORGANIZED HEALTH CARE EDUCATION/TRAINING PROGRAM

## 2021-07-21 PROCEDURE — 63600175 PHARM REV CODE 636 W HCPCS: Performed by: PHYSICIAN ASSISTANT

## 2021-07-21 PROCEDURE — 47562 LAPAROSCOPIC CHOLECYSTECTOMY: CPT | Mod: ,,, | Performed by: SURGERY

## 2021-07-21 PROCEDURE — 25000003 PHARM REV CODE 250: Performed by: PHYSICIAN ASSISTANT

## 2021-07-21 PROCEDURE — 36415 COLL VENOUS BLD VENIPUNCTURE: CPT | Performed by: PHYSICIAN ASSISTANT

## 2021-07-21 PROCEDURE — 99223 1ST HOSP IP/OBS HIGH 75: CPT | Mod: GC,,, | Performed by: INTERNAL MEDICINE

## 2021-07-21 PROCEDURE — 82962 GLUCOSE BLOOD TEST: CPT | Performed by: SURGERY

## 2021-07-21 PROCEDURE — 83735 ASSAY OF MAGNESIUM: CPT | Performed by: PHYSICIAN ASSISTANT

## 2021-07-21 PROCEDURE — 36000708 HC OR TIME LEV III 1ST 15 MIN: Performed by: SURGERY

## 2021-07-21 PROCEDURE — 94761 N-INVAS EAR/PLS OXIMETRY MLT: CPT

## 2021-07-21 PROCEDURE — 63600175 PHARM REV CODE 636 W HCPCS: Performed by: INTERNAL MEDICINE

## 2021-07-21 PROCEDURE — D9220A PRA ANESTHESIA: Mod: ,,, | Performed by: ANESTHESIOLOGY

## 2021-07-21 PROCEDURE — 25000003 PHARM REV CODE 250: Performed by: SURGERY

## 2021-07-21 PROCEDURE — 85025 COMPLETE CBC W/AUTO DIFF WBC: CPT | Performed by: PHYSICIAN ASSISTANT

## 2021-07-21 PROCEDURE — 71000033 HC RECOVERY, INTIAL HOUR: Performed by: SURGERY

## 2021-07-21 PROCEDURE — 99223 PR INITIAL HOSPITAL CARE,LEVL III: ICD-10-PCS | Mod: GC,,, | Performed by: INTERNAL MEDICINE

## 2021-07-21 PROCEDURE — 99232 PR SUBSEQUENT HOSPITAL CARE,LEVL II: ICD-10-PCS | Mod: ,,, | Performed by: INTERNAL MEDICINE

## 2021-07-21 PROCEDURE — 37000008 HC ANESTHESIA 1ST 15 MINUTES: Performed by: SURGERY

## 2021-07-21 PROCEDURE — 88304 TISSUE EXAM BY PATHOLOGIST: CPT | Performed by: PATHOLOGY

## 2021-07-21 PROCEDURE — 47562 PR LAP,CHOLECYSTECTOMY: ICD-10-PCS | Mod: ,,, | Performed by: SURGERY

## 2021-07-21 PROCEDURE — 71000015 HC POSTOP RECOV 1ST HR: Performed by: SURGERY

## 2021-07-21 RX ORDER — DOXYCYCLINE HYCLATE 100 MG
100 TABLET ORAL EVERY 12 HOURS
Status: DISCONTINUED | OUTPATIENT
Start: 2021-07-21 | End: 2021-07-23 | Stop reason: HOSPADM

## 2021-07-21 RX ORDER — FENTANYL CITRATE 50 UG/ML
INJECTION, SOLUTION INTRAMUSCULAR; INTRAVENOUS
Status: DISCONTINUED | OUTPATIENT
Start: 2021-07-21 | End: 2021-07-21

## 2021-07-21 RX ORDER — DEXAMETHASONE SODIUM PHOSPHATE 4 MG/ML
INJECTION, SOLUTION INTRA-ARTICULAR; INTRALESIONAL; INTRAMUSCULAR; INTRAVENOUS; SOFT TISSUE
Status: DISCONTINUED | OUTPATIENT
Start: 2021-07-21 | End: 2021-07-21

## 2021-07-21 RX ORDER — ACETAMINOPHEN 10 MG/ML
INJECTION, SOLUTION INTRAVENOUS
Status: DISCONTINUED | OUTPATIENT
Start: 2021-07-21 | End: 2021-07-21

## 2021-07-21 RX ORDER — SODIUM CHLORIDE 0.9 % (FLUSH) 0.9 %
3 SYRINGE (ML) INJECTION
Status: DISCONTINUED | OUTPATIENT
Start: 2021-07-21 | End: 2021-07-23 | Stop reason: HOSPADM

## 2021-07-21 RX ORDER — PROPOFOL 10 MG/ML
VIAL (ML) INTRAVENOUS
Status: DISCONTINUED | OUTPATIENT
Start: 2021-07-21 | End: 2021-07-21

## 2021-07-21 RX ORDER — CEFTRIAXONE 1 G/1
1 INJECTION, POWDER, FOR SOLUTION INTRAMUSCULAR; INTRAVENOUS
Status: DISCONTINUED | OUTPATIENT
Start: 2021-07-21 | End: 2021-07-21

## 2021-07-21 RX ORDER — BUPIVACAINE HYDROCHLORIDE 2.5 MG/ML
INJECTION, SOLUTION EPIDURAL; INFILTRATION; INTRACAUDAL
Status: DISCONTINUED | OUTPATIENT
Start: 2021-07-21 | End: 2021-07-21 | Stop reason: HOSPADM

## 2021-07-21 RX ORDER — HYDROMORPHONE HYDROCHLORIDE 1 MG/ML
0.2 INJECTION, SOLUTION INTRAMUSCULAR; INTRAVENOUS; SUBCUTANEOUS EVERY 5 MIN PRN
Status: DISCONTINUED | OUTPATIENT
Start: 2021-07-21 | End: 2021-07-21 | Stop reason: HOSPADM

## 2021-07-21 RX ORDER — LIDOCAINE HYDROCHLORIDE 20 MG/ML
INJECTION, SOLUTION EPIDURAL; INFILTRATION; INTRACAUDAL; PERINEURAL
Status: DISCONTINUED | OUTPATIENT
Start: 2021-07-21 | End: 2021-07-21

## 2021-07-21 RX ORDER — CEFAZOLIN SODIUM 1 G/3ML
INJECTION, POWDER, FOR SOLUTION INTRAMUSCULAR; INTRAVENOUS
Status: DISCONTINUED | OUTPATIENT
Start: 2021-07-21 | End: 2021-07-21

## 2021-07-21 RX ORDER — KETAMINE HCL IN 0.9 % NACL 50 MG/5 ML
SYRINGE (ML) INTRAVENOUS
Status: DISCONTINUED | OUTPATIENT
Start: 2021-07-21 | End: 2021-07-21

## 2021-07-21 RX ORDER — ROCURONIUM BROMIDE 10 MG/ML
INJECTION, SOLUTION INTRAVENOUS
Status: DISCONTINUED | OUTPATIENT
Start: 2021-07-21 | End: 2021-07-21

## 2021-07-21 RX ORDER — MIDAZOLAM HYDROCHLORIDE 1 MG/ML
INJECTION, SOLUTION INTRAMUSCULAR; INTRAVENOUS
Status: DISCONTINUED | OUTPATIENT
Start: 2021-07-21 | End: 2021-07-21

## 2021-07-21 RX ADMIN — GABAPENTIN 300 MG: 300 CAPSULE ORAL at 08:07

## 2021-07-21 RX ADMIN — CEFAZOLIN 2 G: 330 INJECTION, POWDER, FOR SOLUTION INTRAMUSCULAR; INTRAVENOUS at 11:07

## 2021-07-21 RX ADMIN — FENTANYL CITRATE 50 MCG: 50 INJECTION INTRAMUSCULAR; INTRAVENOUS at 11:07

## 2021-07-21 RX ADMIN — INSULIN ASPART 2 UNITS: 100 INJECTION, SOLUTION INTRAVENOUS; SUBCUTANEOUS at 08:07

## 2021-07-21 RX ADMIN — LIDOCAINE HYDROCHLORIDE 80 MG: 20 INJECTION, SOLUTION EPIDURAL; INFILTRATION; INTRACAUDAL at 11:07

## 2021-07-21 RX ADMIN — DOXYCYCLINE HYCLATE 100 MG: 100 TABLET, COATED ORAL at 08:07

## 2021-07-21 RX ADMIN — PROPOFOL 140 MG: 10 INJECTION, EMULSION INTRAVENOUS at 11:07

## 2021-07-21 RX ADMIN — FENTANYL CITRATE 25 MCG: 50 INJECTION INTRAMUSCULAR; INTRAVENOUS at 11:07

## 2021-07-21 RX ADMIN — SUGAMMADEX 200 MG: 100 INJECTION, SOLUTION INTRAVENOUS at 12:07

## 2021-07-21 RX ADMIN — ROCURONIUM BROMIDE 10 MG: 10 INJECTION INTRAVENOUS at 11:07

## 2021-07-21 RX ADMIN — MELATONIN TAB 3 MG 6 MG: 3 TAB at 08:07

## 2021-07-21 RX ADMIN — SODIUM CHLORIDE: 0.9 INJECTION, SOLUTION INTRAVENOUS at 10:07

## 2021-07-21 RX ADMIN — Medication 10 MG: at 11:07

## 2021-07-21 RX ADMIN — CEFTRIAXONE 1 G: 1 INJECTION, POWDER, FOR SOLUTION INTRAMUSCULAR; INTRAVENOUS at 09:07

## 2021-07-21 RX ADMIN — ACETAMINOPHEN 1000 MG: 10 INJECTION INTRAVENOUS at 12:07

## 2021-07-21 RX ADMIN — PIPERACILLIN SODIUM AND TAZOBACTAM SODIUM 4.5 G: 4; .5 INJECTION, POWDER, FOR SOLUTION INTRAVENOUS at 06:07

## 2021-07-21 RX ADMIN — ONDANSETRON 4 MG: 2 INJECTION INTRAMUSCULAR; INTRAVENOUS at 12:07

## 2021-07-21 RX ADMIN — ACETAMINOPHEN 650 MG: 325 TABLET ORAL at 08:07

## 2021-07-21 RX ADMIN — ROCURONIUM BROMIDE 50 MG: 10 INJECTION INTRAVENOUS at 11:07

## 2021-07-21 RX ADMIN — MIDAZOLAM 1 MG: 1 INJECTION INTRAMUSCULAR; INTRAVENOUS at 10:07

## 2021-07-21 RX ADMIN — DEXAMETHASONE SODIUM PHOSPHATE 8 MG: 4 INJECTION INTRA-ARTICULAR; INTRALESIONAL; INTRAMUSCULAR; INTRAVENOUS; SOFT TISSUE at 12:07

## 2021-07-21 RX ADMIN — Medication 10 MG: at 12:07

## 2021-07-22 LAB
ALBUMIN SERPL BCP-MCNC: 2.6 G/DL (ref 3.5–5.2)
ALP SERPL-CCNC: 329 U/L (ref 55–135)
ALT SERPL W/O P-5'-P-CCNC: 176 U/L (ref 10–44)
ANION GAP SERPL CALC-SCNC: 11 MMOL/L (ref 8–16)
ANISOCYTOSIS BLD QL SMEAR: SLIGHT
AST SERPL-CCNC: 84 U/L (ref 10–40)
BACTERIA BLD CULT: ABNORMAL
BASOPHILS # BLD AUTO: 0.02 K/UL (ref 0–0.2)
BASOPHILS NFR BLD: 0.3 % (ref 0–1.9)
BILIRUB SERPL-MCNC: 4.9 MG/DL (ref 0.1–1)
BUN SERPL-MCNC: 16 MG/DL (ref 8–23)
CALCIUM SERPL-MCNC: 8.9 MG/DL (ref 8.7–10.5)
CHLORIDE SERPL-SCNC: 107 MMOL/L (ref 95–110)
CO2 SERPL-SCNC: 20 MMOL/L (ref 23–29)
COPPER SERPL-MCNC: 1044 UG/L (ref 665–1480)
CREAT SERPL-MCNC: 1.2 MG/DL (ref 0.5–1.4)
DIFFERENTIAL METHOD: ABNORMAL
EOSINOPHIL # BLD AUTO: 0 K/UL (ref 0–0.5)
EOSINOPHIL NFR BLD: 0 % (ref 0–8)
ERYTHROCYTE [DISTWIDTH] IN BLOOD BY AUTOMATED COUNT: 13.9 % (ref 11.5–14.5)
EST. GFR  (AFRICAN AMERICAN): >60 ML/MIN/1.73 M^2
EST. GFR  (NON AFRICAN AMERICAN): >60 ML/MIN/1.73 M^2
GLUCOSE SERPL-MCNC: 217 MG/DL (ref 70–110)
HBV DNA SERPL NAA+PROBE-ACNC: <10 IU/ML
HBV DNA SERPL NAA+PROBE-LOG IU: <1 LOG (10) IU/ML
HBV DNA SERPL QL NAA+PROBE: NOT DETECTED
HCT VFR BLD AUTO: 41.6 % (ref 40–54)
HGB BLD-MCNC: 14 G/DL (ref 14–18)
IMM GRANULOCYTES # BLD AUTO: 0.04 K/UL (ref 0–0.04)
IMM GRANULOCYTES NFR BLD AUTO: 0.5 % (ref 0–0.5)
INR PPP: 1 (ref 0.8–1.2)
LYMPHOCYTES # BLD AUTO: 0.8 K/UL (ref 1–4.8)
LYMPHOCYTES NFR BLD: 9.6 % (ref 18–48)
MAGNESIUM SERPL-MCNC: 2.1 MG/DL (ref 1.6–2.6)
MCH RBC QN AUTO: 30.2 PG (ref 27–31)
MCHC RBC AUTO-ENTMCNC: 33.7 G/DL (ref 32–36)
MCV RBC AUTO: 90 FL (ref 82–98)
MONOCYTES # BLD AUTO: 0.3 K/UL (ref 0.3–1)
MONOCYTES NFR BLD: 3.2 % (ref 4–15)
NEUTROPHILS # BLD AUTO: 6.8 K/UL (ref 1.8–7.7)
NEUTROPHILS NFR BLD: 86.4 % (ref 38–73)
NRBC BLD-RTO: 0 /100 WBC
PHOSPHATE SERPL-MCNC: 1.7 MG/DL (ref 2.7–4.5)
PLATELET # BLD AUTO: 196 K/UL (ref 150–450)
PLATELET BLD QL SMEAR: ABNORMAL
PMV BLD AUTO: 10.7 FL (ref 9.2–12.9)
POCT GLUCOSE: 204 MG/DL (ref 70–110)
POCT GLUCOSE: 217 MG/DL (ref 70–110)
POCT GLUCOSE: 229 MG/DL (ref 70–110)
POCT GLUCOSE: 242 MG/DL (ref 70–110)
POCT GLUCOSE: 271 MG/DL (ref 70–110)
POTASSIUM SERPL-SCNC: 4.9 MMOL/L (ref 3.5–5.1)
PROT SERPL-MCNC: 6.4 G/DL (ref 6–8.4)
PROTHROMBIN TIME: 10.9 SEC (ref 9–12.5)
RBC # BLD AUTO: 4.63 M/UL (ref 4.6–6.2)
SODIUM SERPL-SCNC: 138 MMOL/L (ref 136–145)
WBC # BLD AUTO: 7.91 K/UL (ref 3.9–12.7)
WBC TOXIC VACUOLES BLD QL SMEAR: PRESENT

## 2021-07-22 PROCEDURE — 99233 SBSQ HOSP IP/OBS HIGH 50: CPT | Mod: GC,,, | Performed by: INTERNAL MEDICINE

## 2021-07-22 PROCEDURE — 85610 PROTHROMBIN TIME: CPT | Performed by: PHYSICIAN ASSISTANT

## 2021-07-22 PROCEDURE — 25000003 PHARM REV CODE 250: Performed by: INTERNAL MEDICINE

## 2021-07-22 PROCEDURE — 85025 COMPLETE CBC W/AUTO DIFF WBC: CPT | Performed by: PHYSICIAN ASSISTANT

## 2021-07-22 PROCEDURE — 99233 PR SUBSEQUENT HOSPITAL CARE,LEVL III: ICD-10-PCS | Mod: GC,,, | Performed by: INTERNAL MEDICINE

## 2021-07-22 PROCEDURE — 36415 COLL VENOUS BLD VENIPUNCTURE: CPT | Performed by: PHYSICIAN ASSISTANT

## 2021-07-22 PROCEDURE — 20600001 HC STEP DOWN PRIVATE ROOM

## 2021-07-22 PROCEDURE — 63600175 PHARM REV CODE 636 W HCPCS: Performed by: STUDENT IN AN ORGANIZED HEALTH CARE EDUCATION/TRAINING PROGRAM

## 2021-07-22 PROCEDURE — 99232 SBSQ HOSP IP/OBS MODERATE 35: CPT | Mod: ,,, | Performed by: INTERNAL MEDICINE

## 2021-07-22 PROCEDURE — 99232 PR SUBSEQUENT HOSPITAL CARE,LEVL II: ICD-10-PCS | Mod: ,,, | Performed by: INTERNAL MEDICINE

## 2021-07-22 PROCEDURE — 25000003 PHARM REV CODE 250: Performed by: PHYSICIAN ASSISTANT

## 2021-07-22 PROCEDURE — 83735 ASSAY OF MAGNESIUM: CPT | Performed by: PHYSICIAN ASSISTANT

## 2021-07-22 PROCEDURE — 84100 ASSAY OF PHOSPHORUS: CPT | Performed by: PHYSICIAN ASSISTANT

## 2021-07-22 PROCEDURE — 80053 COMPREHEN METABOLIC PANEL: CPT | Performed by: PHYSICIAN ASSISTANT

## 2021-07-22 RX ORDER — DOXYCYCLINE HYCLATE 100 MG
100 TABLET ORAL EVERY 12 HOURS
Start: 2021-07-22 | End: 2021-07-23

## 2021-07-22 RX ADMIN — DIBASIC SODIUM PHOSPHATE, MONOBASIC POTASSIUM PHOSPHATE AND MONOBASIC SODIUM PHOSPHATE 1 TABLET: 852; 155; 130 TABLET ORAL at 06:07

## 2021-07-22 RX ADMIN — INSULIN ASPART 2 UNITS: 100 INJECTION, SOLUTION INTRAVENOUS; SUBCUTANEOUS at 08:07

## 2021-07-22 RX ADMIN — SENNOSIDES 8.6 MG: 8.6 TABLET, FILM COATED ORAL at 08:07

## 2021-07-22 RX ADMIN — DIBASIC SODIUM PHOSPHATE, MONOBASIC POTASSIUM PHOSPHATE AND MONOBASIC SODIUM PHOSPHATE 1 TABLET: 852; 155; 130 TABLET ORAL at 11:07

## 2021-07-22 RX ADMIN — BUPROPION HYDROCHLORIDE 300 MG: 150 TABLET, FILM COATED, EXTENDED RELEASE ORAL at 08:07

## 2021-07-22 RX ADMIN — PANTOPRAZOLE SODIUM 40 MG: 40 TABLET, DELAYED RELEASE ORAL at 08:07

## 2021-07-22 RX ADMIN — INSULIN ASPART 3 UNITS: 100 INJECTION, SOLUTION INTRAVENOUS; SUBCUTANEOUS at 04:07

## 2021-07-22 RX ADMIN — DOXYCYCLINE HYCLATE 100 MG: 100 TABLET, COATED ORAL at 08:07

## 2021-07-22 RX ADMIN — POLYETHYLENE GLYCOL 3350 17 G: 17 POWDER, FOR SOLUTION ORAL at 08:07

## 2021-07-22 RX ADMIN — INSULIN ASPART 2 UNITS: 100 INJECTION, SOLUTION INTRAVENOUS; SUBCUTANEOUS at 11:07

## 2021-07-22 RX ADMIN — CEFTRIAXONE 2 G: 2 INJECTION, SOLUTION INTRAVENOUS at 08:07

## 2021-07-22 RX ADMIN — INSULIN ASPART 1 UNITS: 100 INJECTION, SOLUTION INTRAVENOUS; SUBCUTANEOUS at 08:07

## 2021-07-22 RX ADMIN — GABAPENTIN 300 MG: 300 CAPSULE ORAL at 08:07

## 2021-07-23 VITALS
WEIGHT: 203.94 LBS | BODY MASS INDEX: 32.78 KG/M2 | RESPIRATION RATE: 18 BRPM | HEART RATE: 102 BPM | SYSTOLIC BLOOD PRESSURE: 144 MMHG | DIASTOLIC BLOOD PRESSURE: 80 MMHG | TEMPERATURE: 99 F | OXYGEN SATURATION: 96 % | HEIGHT: 66 IN

## 2021-07-23 LAB
ALBUMIN SERPL BCP-MCNC: 2.8 G/DL (ref 3.5–5.2)
ALP SERPL-CCNC: 346 U/L (ref 55–135)
ALT SERPL W/O P-5'-P-CCNC: 165 U/L (ref 10–44)
ANION GAP SERPL CALC-SCNC: 12 MMOL/L (ref 8–16)
AST SERPL-CCNC: 91 U/L (ref 10–40)
BACTERIA BLD CULT: ABNORMAL
BASOPHILS # BLD AUTO: 0.05 K/UL (ref 0–0.2)
BASOPHILS NFR BLD: 0.4 % (ref 0–1.9)
BILIRUB SERPL-MCNC: 2.8 MG/DL (ref 0.1–1)
BUN SERPL-MCNC: 19 MG/DL (ref 8–23)
CALCIUM SERPL-MCNC: 8.8 MG/DL (ref 8.7–10.5)
CHLORIDE SERPL-SCNC: 107 MMOL/L (ref 95–110)
CO2 SERPL-SCNC: 19 MMOL/L (ref 23–29)
CREAT SERPL-MCNC: 1.3 MG/DL (ref 0.5–1.4)
DIFFERENTIAL METHOD: ABNORMAL
EOSINOPHIL # BLD AUTO: 0.1 K/UL (ref 0–0.5)
EOSINOPHIL NFR BLD: 0.8 % (ref 0–8)
ERYTHROCYTE [DISTWIDTH] IN BLOOD BY AUTOMATED COUNT: 14.5 % (ref 11.5–14.5)
EST. GFR  (AFRICAN AMERICAN): >60 ML/MIN/1.73 M^2
EST. GFR  (NON AFRICAN AMERICAN): 56.1 ML/MIN/1.73 M^2
GLUCOSE SERPL-MCNC: 194 MG/DL (ref 70–110)
HCT VFR BLD AUTO: 41.5 % (ref 40–54)
HGB BLD-MCNC: 13.7 G/DL (ref 14–18)
IMM GRANULOCYTES # BLD AUTO: 0.1 K/UL (ref 0–0.04)
IMM GRANULOCYTES NFR BLD AUTO: 0.8 % (ref 0–0.5)
INR PPP: 1 (ref 0.8–1.2)
LYMPHOCYTES # BLD AUTO: 2.4 K/UL (ref 1–4.8)
LYMPHOCYTES NFR BLD: 20.1 % (ref 18–48)
MAGNESIUM SERPL-MCNC: 1.9 MG/DL (ref 1.6–2.6)
MCH RBC QN AUTO: 29.9 PG (ref 27–31)
MCHC RBC AUTO-ENTMCNC: 33 G/DL (ref 32–36)
MCV RBC AUTO: 91 FL (ref 82–98)
MONOCYTES # BLD AUTO: 0.9 K/UL (ref 0.3–1)
MONOCYTES NFR BLD: 7.9 % (ref 4–15)
NEUTROPHILS # BLD AUTO: 8.3 K/UL (ref 1.8–7.7)
NEUTROPHILS NFR BLD: 70 % (ref 38–73)
NRBC BLD-RTO: 0 /100 WBC
PHOSPHATE SERPL-MCNC: 2.2 MG/DL (ref 2.7–4.5)
PLATELET # BLD AUTO: 236 K/UL (ref 150–450)
PMV BLD AUTO: 10.5 FL (ref 9.2–12.9)
POCT GLUCOSE: 145 MG/DL (ref 70–110)
POTASSIUM SERPL-SCNC: 3.8 MMOL/L (ref 3.5–5.1)
PROT SERPL-MCNC: 6.3 G/DL (ref 6–8.4)
PROTHROMBIN TIME: 10.6 SEC (ref 9–12.5)
RBC # BLD AUTO: 4.58 M/UL (ref 4.6–6.2)
SODIUM SERPL-SCNC: 138 MMOL/L (ref 136–145)
WBC # BLD AUTO: 11.9 K/UL (ref 3.9–12.7)

## 2021-07-23 PROCEDURE — 84100 ASSAY OF PHOSPHORUS: CPT | Performed by: PHYSICIAN ASSISTANT

## 2021-07-23 PROCEDURE — 85025 COMPLETE CBC W/AUTO DIFF WBC: CPT | Performed by: PHYSICIAN ASSISTANT

## 2021-07-23 PROCEDURE — 25000003 PHARM REV CODE 250: Performed by: PHYSICIAN ASSISTANT

## 2021-07-23 PROCEDURE — 36415 COLL VENOUS BLD VENIPUNCTURE: CPT | Performed by: PHYSICIAN ASSISTANT

## 2021-07-23 PROCEDURE — 99239 HOSP IP/OBS DSCHRG MGMT >30: CPT | Mod: ,,, | Performed by: INTERNAL MEDICINE

## 2021-07-23 PROCEDURE — 99233 PR SUBSEQUENT HOSPITAL CARE,LEVL III: ICD-10-PCS | Mod: GC,,, | Performed by: INTERNAL MEDICINE

## 2021-07-23 PROCEDURE — 25000003 PHARM REV CODE 250: Performed by: INTERNAL MEDICINE

## 2021-07-23 PROCEDURE — 99233 SBSQ HOSP IP/OBS HIGH 50: CPT | Mod: GC,,, | Performed by: INTERNAL MEDICINE

## 2021-07-23 PROCEDURE — 83735 ASSAY OF MAGNESIUM: CPT | Performed by: PHYSICIAN ASSISTANT

## 2021-07-23 PROCEDURE — 63600175 PHARM REV CODE 636 W HCPCS: Performed by: STUDENT IN AN ORGANIZED HEALTH CARE EDUCATION/TRAINING PROGRAM

## 2021-07-23 PROCEDURE — 80053 COMPREHEN METABOLIC PANEL: CPT | Performed by: PHYSICIAN ASSISTANT

## 2021-07-23 PROCEDURE — 85610 PROTHROMBIN TIME: CPT | Performed by: PHYSICIAN ASSISTANT

## 2021-07-23 PROCEDURE — 99239 PR HOSPITAL DISCHARGE DAY,>30 MIN: ICD-10-PCS | Mod: ,,, | Performed by: INTERNAL MEDICINE

## 2021-07-23 RX ORDER — DOXYCYCLINE HYCLATE 100 MG
100 TABLET ORAL EVERY 12 HOURS
Qty: 24 TABLET | Refills: 0 | Status: SHIPPED | OUTPATIENT
Start: 2021-07-23 | End: 2021-08-04

## 2021-07-23 RX ORDER — CIPROFLOXACIN 500 MG/1
500 TABLET ORAL 2 TIMES DAILY
Qty: 24 TABLET | Refills: 0 | Status: SHIPPED | OUTPATIENT
Start: 2021-07-23 | End: 2021-08-04

## 2021-07-23 RX ORDER — DOXYCYCLINE HYCLATE 100 MG
100 TABLET ORAL EVERY 12 HOURS
Qty: 24 TABLET | Refills: 0
Start: 2021-07-23 | End: 2021-07-23

## 2021-07-23 RX ADMIN — CEFTRIAXONE 2 G: 2 INJECTION, SOLUTION INTRAVENOUS at 09:07

## 2021-07-23 RX ADMIN — SENNOSIDES 8.6 MG: 8.6 TABLET, FILM COATED ORAL at 09:07

## 2021-07-23 RX ADMIN — PANTOPRAZOLE SODIUM 40 MG: 40 TABLET, DELAYED RELEASE ORAL at 09:07

## 2021-07-23 RX ADMIN — DOXYCYCLINE HYCLATE 100 MG: 100 TABLET, COATED ORAL at 09:07

## 2021-07-23 RX ADMIN — POLYETHYLENE GLYCOL 3350 17 G: 17 POWDER, FOR SOLUTION ORAL at 09:07

## 2021-07-23 RX ADMIN — DIBASIC SODIUM PHOSPHATE, MONOBASIC POTASSIUM PHOSPHATE AND MONOBASIC SODIUM PHOSPHATE 1 TABLET: 852; 155; 130 TABLET ORAL at 12:07

## 2021-07-23 RX ADMIN — BUPROPION HYDROCHLORIDE 300 MG: 150 TABLET, FILM COATED, EXTENDED RELEASE ORAL at 09:07

## 2021-07-23 RX ADMIN — DIBASIC SODIUM PHOSPHATE, MONOBASIC POTASSIUM PHOSPHATE AND MONOBASIC SODIUM PHOSPHATE 1 TABLET: 852; 155; 130 TABLET ORAL at 09:07

## 2021-07-24 PROCEDURE — G0180 MD CERTIFICATION HHA PATIENT: HCPCS | Mod: ,,, | Performed by: INTERNAL MEDICINE

## 2021-07-24 PROCEDURE — G0180 PR HOME HEALTH MD CERTIFICATION: ICD-10-PCS | Mod: ,,, | Performed by: INTERNAL MEDICINE

## 2021-07-25 LAB — BACTERIA BLD CULT: NORMAL

## 2021-07-26 ENCOUNTER — PATIENT MESSAGE (OUTPATIENT)
Dept: GASTROENTEROLOGY | Facility: CLINIC | Age: 66
End: 2021-07-26

## 2021-07-26 ENCOUNTER — PATIENT MESSAGE (OUTPATIENT)
Dept: SURGERY | Facility: CLINIC | Age: 66
End: 2021-07-26

## 2021-07-27 LAB — PHOSPHATIDYLETHANOL (PETH): NEGATIVE NG/ML

## 2021-07-28 LAB
FINAL PATHOLOGIC DIAGNOSIS: NORMAL
Lab: NORMAL

## 2021-08-05 ENCOUNTER — OFFICE VISIT (OUTPATIENT)
Dept: INTERNAL MEDICINE | Facility: CLINIC | Age: 66
End: 2021-08-05
Payer: MEDICARE

## 2021-08-05 VITALS
SYSTOLIC BLOOD PRESSURE: 120 MMHG | DIASTOLIC BLOOD PRESSURE: 80 MMHG | HEIGHT: 66 IN | WEIGHT: 189.63 LBS | TEMPERATURE: 98 F | RESPIRATION RATE: 15 BRPM | BODY MASS INDEX: 30.47 KG/M2 | OXYGEN SATURATION: 98 % | HEART RATE: 107 BPM

## 2021-08-05 DIAGNOSIS — F41.9 ANXIETY: ICD-10-CM

## 2021-08-05 DIAGNOSIS — E11.22 CKD STAGE 3 DUE TO TYPE 2 DIABETES MELLITUS: ICD-10-CM

## 2021-08-05 DIAGNOSIS — K81.0 CHOLECYSTITIS, ACUTE: ICD-10-CM

## 2021-08-05 DIAGNOSIS — I10 HYPERTENSION, UNSPECIFIED TYPE: ICD-10-CM

## 2021-08-05 DIAGNOSIS — E11.42 TYPE 2 DIABETES MELLITUS WITH DIABETIC POLYNEUROPATHY, WITH LONG-TERM CURRENT USE OF INSULIN: ICD-10-CM

## 2021-08-05 DIAGNOSIS — E11.59 OBESITY, DIABETES, AND HYPERTENSION SYNDROME: ICD-10-CM

## 2021-08-05 DIAGNOSIS — E11.69 OBESITY, DIABETES, AND HYPERTENSION SYNDROME: ICD-10-CM

## 2021-08-05 DIAGNOSIS — E78.5 HYPERLIPIDEMIA ASSOCIATED WITH TYPE 2 DIABETES MELLITUS: ICD-10-CM

## 2021-08-05 DIAGNOSIS — A41.50 GRAM NEGATIVE SEPSIS: Primary | ICD-10-CM

## 2021-08-05 DIAGNOSIS — E66.9 OBESITY, DIABETES, AND HYPERTENSION SYNDROME: ICD-10-CM

## 2021-08-05 DIAGNOSIS — N18.30 CKD STAGE 3 DUE TO TYPE 2 DIABETES MELLITUS: ICD-10-CM

## 2021-08-05 DIAGNOSIS — K83.09 CHOLANGITIS: ICD-10-CM

## 2021-08-05 DIAGNOSIS — E11.69 HYPERLIPIDEMIA ASSOCIATED WITH TYPE 2 DIABETES MELLITUS: ICD-10-CM

## 2021-08-05 DIAGNOSIS — I15.2 OBESITY, DIABETES, AND HYPERTENSION SYNDROME: ICD-10-CM

## 2021-08-05 DIAGNOSIS — Z79.4 TYPE 2 DIABETES MELLITUS WITH DIABETIC POLYNEUROPATHY, WITH LONG-TERM CURRENT USE OF INSULIN: ICD-10-CM

## 2021-08-05 PROCEDURE — 1126F PR PAIN SEVERITY QUANTIFIED, NO PAIN PRESENT: ICD-10-PCS | Mod: CPTII,S$GLB,, | Performed by: INTERNAL MEDICINE

## 2021-08-05 PROCEDURE — 1101F PR PT FALLS ASSESS DOC 0-1 FALLS W/OUT INJ PAST YR: ICD-10-PCS | Mod: CPTII,S$GLB,, | Performed by: INTERNAL MEDICINE

## 2021-08-05 PROCEDURE — 3072F PR LOW RISK FOR RETINOPATHY: ICD-10-PCS | Mod: CPTII,S$GLB,, | Performed by: INTERNAL MEDICINE

## 2021-08-05 PROCEDURE — 4010F ACE/ARB THERAPY RXD/TAKEN: CPT | Mod: CPTII,S$GLB,, | Performed by: INTERNAL MEDICINE

## 2021-08-05 PROCEDURE — 3288F PR FALLS RISK ASSESSMENT DOCUMENTED: ICD-10-PCS | Mod: CPTII,S$GLB,, | Performed by: INTERNAL MEDICINE

## 2021-08-05 PROCEDURE — 1111F PR DISCHARGE MEDS RECONCILED W/ CURRENT OUTPATIENT MED LIST: ICD-10-PCS | Mod: CPTII,S$GLB,, | Performed by: INTERNAL MEDICINE

## 2021-08-05 PROCEDURE — 3288F FALL RISK ASSESSMENT DOCD: CPT | Mod: CPTII,S$GLB,, | Performed by: INTERNAL MEDICINE

## 2021-08-05 PROCEDURE — 3074F SYST BP LT 130 MM HG: CPT | Mod: CPTII,S$GLB,, | Performed by: INTERNAL MEDICINE

## 2021-08-05 PROCEDURE — 3072F LOW RISK FOR RETINOPATHY: CPT | Mod: CPTII,S$GLB,, | Performed by: INTERNAL MEDICINE

## 2021-08-05 PROCEDURE — 3079F PR MOST RECENT DIASTOLIC BLOOD PRESSURE 80-89 MM HG: ICD-10-PCS | Mod: CPTII,S$GLB,, | Performed by: INTERNAL MEDICINE

## 2021-08-05 PROCEDURE — 1126F AMNT PAIN NOTED NONE PRSNT: CPT | Mod: CPTII,S$GLB,, | Performed by: INTERNAL MEDICINE

## 2021-08-05 PROCEDURE — 3044F HG A1C LEVEL LT 7.0%: CPT | Mod: CPTII,S$GLB,, | Performed by: INTERNAL MEDICINE

## 2021-08-05 PROCEDURE — 1101F PT FALLS ASSESS-DOCD LE1/YR: CPT | Mod: CPTII,S$GLB,, | Performed by: INTERNAL MEDICINE

## 2021-08-05 PROCEDURE — 3044F PR MOST RECENT HEMOGLOBIN A1C LEVEL <7.0%: ICD-10-PCS | Mod: CPTII,S$GLB,, | Performed by: INTERNAL MEDICINE

## 2021-08-05 PROCEDURE — 3008F BODY MASS INDEX DOCD: CPT | Mod: CPTII,S$GLB,, | Performed by: INTERNAL MEDICINE

## 2021-08-05 PROCEDURE — 3074F PR MOST RECENT SYSTOLIC BLOOD PRESSURE < 130 MM HG: ICD-10-PCS | Mod: CPTII,S$GLB,, | Performed by: INTERNAL MEDICINE

## 2021-08-05 PROCEDURE — 99499 RISK ADDL DX/OHS AUDIT: ICD-10-PCS | Mod: S$GLB,,, | Performed by: INTERNAL MEDICINE

## 2021-08-05 PROCEDURE — 1111F DSCHRG MED/CURRENT MED MERGE: CPT | Mod: CPTII,S$GLB,, | Performed by: INTERNAL MEDICINE

## 2021-08-05 PROCEDURE — 4010F PR ACE/ARB THEARPY RXD/TAKEN: ICD-10-PCS | Mod: CPTII,S$GLB,, | Performed by: INTERNAL MEDICINE

## 2021-08-05 PROCEDURE — 99999 PR PBB SHADOW E&M-EST. PATIENT-LVL V: CPT | Mod: PBBFAC,,, | Performed by: INTERNAL MEDICINE

## 2021-08-05 PROCEDURE — 99999 PR PBB SHADOW E&M-EST. PATIENT-LVL V: ICD-10-PCS | Mod: PBBFAC,,, | Performed by: INTERNAL MEDICINE

## 2021-08-05 PROCEDURE — 99499 UNLISTED E&M SERVICE: CPT | Mod: S$GLB,,, | Performed by: INTERNAL MEDICINE

## 2021-08-05 PROCEDURE — 99215 PR OFFICE/OUTPT VISIT, EST, LEVL V, 40-54 MIN: ICD-10-PCS | Mod: S$GLB,,, | Performed by: INTERNAL MEDICINE

## 2021-08-05 PROCEDURE — 3008F PR BODY MASS INDEX (BMI) DOCUMENTED: ICD-10-PCS | Mod: CPTII,S$GLB,, | Performed by: INTERNAL MEDICINE

## 2021-08-05 PROCEDURE — 99215 OFFICE O/P EST HI 40 MIN: CPT | Mod: S$GLB,,, | Performed by: INTERNAL MEDICINE

## 2021-08-05 PROCEDURE — 3079F DIAST BP 80-89 MM HG: CPT | Mod: CPTII,S$GLB,, | Performed by: INTERNAL MEDICINE

## 2021-08-12 ENCOUNTER — OFFICE VISIT (OUTPATIENT)
Dept: SURGERY | Facility: CLINIC | Age: 66
End: 2021-08-12
Payer: MEDICARE

## 2021-08-12 VITALS
DIASTOLIC BLOOD PRESSURE: 71 MMHG | HEART RATE: 92 BPM | SYSTOLIC BLOOD PRESSURE: 121 MMHG | BODY MASS INDEX: 31.32 KG/M2 | HEIGHT: 66 IN | RESPIRATION RATE: 20 BRPM | WEIGHT: 194.88 LBS | OXYGEN SATURATION: 97 %

## 2021-08-12 DIAGNOSIS — K81.9 CHOLECYSTITIS: Primary | ICD-10-CM

## 2021-08-12 PROCEDURE — 99024 POSTOP FOLLOW-UP VISIT: CPT | Mod: S$GLB,,, | Performed by: SURGERY

## 2021-08-12 PROCEDURE — 99999 PR PBB SHADOW E&M-EST. PATIENT-LVL IV: CPT | Mod: PBBFAC,,, | Performed by: SURGERY

## 2021-08-12 PROCEDURE — 1126F AMNT PAIN NOTED NONE PRSNT: CPT | Mod: CPTII,S$GLB,, | Performed by: SURGERY

## 2021-08-12 PROCEDURE — 3008F BODY MASS INDEX DOCD: CPT | Mod: CPTII,S$GLB,, | Performed by: SURGERY

## 2021-08-12 PROCEDURE — 1101F PR PT FALLS ASSESS DOC 0-1 FALLS W/OUT INJ PAST YR: ICD-10-PCS | Mod: CPTII,S$GLB,, | Performed by: SURGERY

## 2021-08-12 PROCEDURE — 1160F PR REVIEW ALL MEDS BY PRESCRIBER/CLIN PHARMACIST DOCUMENTED: ICD-10-PCS | Mod: CPTII,S$GLB,, | Performed by: SURGERY

## 2021-08-12 PROCEDURE — 3044F PR MOST RECENT HEMOGLOBIN A1C LEVEL <7.0%: ICD-10-PCS | Mod: CPTII,S$GLB,, | Performed by: SURGERY

## 2021-08-12 PROCEDURE — 3044F HG A1C LEVEL LT 7.0%: CPT | Mod: CPTII,S$GLB,, | Performed by: SURGERY

## 2021-08-12 PROCEDURE — 3074F SYST BP LT 130 MM HG: CPT | Mod: CPTII,S$GLB,, | Performed by: SURGERY

## 2021-08-12 PROCEDURE — 1101F PT FALLS ASSESS-DOCD LE1/YR: CPT | Mod: CPTII,S$GLB,, | Performed by: SURGERY

## 2021-08-12 PROCEDURE — 99024 PR POST-OP FOLLOW-UP VISIT: ICD-10-PCS | Mod: S$GLB,,, | Performed by: SURGERY

## 2021-08-12 PROCEDURE — 3074F PR MOST RECENT SYSTOLIC BLOOD PRESSURE < 130 MM HG: ICD-10-PCS | Mod: CPTII,S$GLB,, | Performed by: SURGERY

## 2021-08-12 PROCEDURE — 3072F PR LOW RISK FOR RETINOPATHY: ICD-10-PCS | Mod: CPTII,S$GLB,, | Performed by: SURGERY

## 2021-08-12 PROCEDURE — 1160F RVW MEDS BY RX/DR IN RCRD: CPT | Mod: CPTII,S$GLB,, | Performed by: SURGERY

## 2021-08-12 PROCEDURE — 3008F PR BODY MASS INDEX (BMI) DOCUMENTED: ICD-10-PCS | Mod: CPTII,S$GLB,, | Performed by: SURGERY

## 2021-08-12 PROCEDURE — 1159F MED LIST DOCD IN RCRD: CPT | Mod: CPTII,S$GLB,, | Performed by: SURGERY

## 2021-08-12 PROCEDURE — 3288F PR FALLS RISK ASSESSMENT DOCUMENTED: ICD-10-PCS | Mod: CPTII,S$GLB,, | Performed by: SURGERY

## 2021-08-12 PROCEDURE — 3078F PR MOST RECENT DIASTOLIC BLOOD PRESSURE < 80 MM HG: ICD-10-PCS | Mod: CPTII,S$GLB,, | Performed by: SURGERY

## 2021-08-12 PROCEDURE — 99999 PR PBB SHADOW E&M-EST. PATIENT-LVL IV: ICD-10-PCS | Mod: PBBFAC,,, | Performed by: SURGERY

## 2021-08-12 PROCEDURE — 1126F PR PAIN SEVERITY QUANTIFIED, NO PAIN PRESENT: ICD-10-PCS | Mod: CPTII,S$GLB,, | Performed by: SURGERY

## 2021-08-12 PROCEDURE — 3078F DIAST BP <80 MM HG: CPT | Mod: CPTII,S$GLB,, | Performed by: SURGERY

## 2021-08-12 PROCEDURE — 3072F LOW RISK FOR RETINOPATHY: CPT | Mod: CPTII,S$GLB,, | Performed by: SURGERY

## 2021-08-12 PROCEDURE — 3288F FALL RISK ASSESSMENT DOCD: CPT | Mod: CPTII,S$GLB,, | Performed by: SURGERY

## 2021-08-12 PROCEDURE — 1159F PR MEDICATION LIST DOCUMENTED IN MEDICAL RECORD: ICD-10-PCS | Mod: CPTII,S$GLB,, | Performed by: SURGERY

## 2021-08-17 ENCOUNTER — LAB VISIT (OUTPATIENT)
Dept: LAB | Facility: HOSPITAL | Age: 66
End: 2021-08-17
Payer: MEDICARE

## 2021-08-17 ENCOUNTER — OFFICE VISIT (OUTPATIENT)
Dept: INFECTIOUS DISEASES | Facility: CLINIC | Age: 66
End: 2021-08-17
Payer: MEDICARE

## 2021-08-17 VITALS
DIASTOLIC BLOOD PRESSURE: 84 MMHG | SYSTOLIC BLOOD PRESSURE: 164 MMHG | HEART RATE: 99 BPM | BODY MASS INDEX: 32.2 KG/M2 | HEIGHT: 66 IN | TEMPERATURE: 98 F | WEIGHT: 200.38 LBS

## 2021-08-17 DIAGNOSIS — A41.50 GRAM NEGATIVE SEPSIS: ICD-10-CM

## 2021-08-17 DIAGNOSIS — A05.3: Primary | ICD-10-CM

## 2021-08-17 LAB
ALBUMIN SERPL BCP-MCNC: 3.3 G/DL (ref 3.5–5.2)
ALP SERPL-CCNC: 455 U/L (ref 55–135)
ALT SERPL W/O P-5'-P-CCNC: 139 U/L (ref 10–44)
ANION GAP SERPL CALC-SCNC: 8 MMOL/L (ref 8–16)
AST SERPL-CCNC: 59 U/L (ref 10–40)
BASOPHILS # BLD AUTO: 0.05 K/UL (ref 0–0.2)
BASOPHILS NFR BLD: 0.8 % (ref 0–1.9)
BILIRUB SERPL-MCNC: 0.8 MG/DL (ref 0.1–1)
BUN SERPL-MCNC: 15 MG/DL (ref 8–23)
CALCIUM SERPL-MCNC: 9.5 MG/DL (ref 8.7–10.5)
CHLORIDE SERPL-SCNC: 104 MMOL/L (ref 95–110)
CO2 SERPL-SCNC: 25 MMOL/L (ref 23–29)
CREAT SERPL-MCNC: 1.3 MG/DL (ref 0.5–1.4)
DIFFERENTIAL METHOD: ABNORMAL
EOSINOPHIL # BLD AUTO: 0.3 K/UL (ref 0–0.5)
EOSINOPHIL NFR BLD: 4.5 % (ref 0–8)
ERYTHROCYTE [DISTWIDTH] IN BLOOD BY AUTOMATED COUNT: 14.1 % (ref 11.5–14.5)
EST. GFR  (AFRICAN AMERICAN): >60 ML/MIN/1.73 M^2
EST. GFR  (NON AFRICAN AMERICAN): 56.1 ML/MIN/1.73 M^2
GLUCOSE SERPL-MCNC: 189 MG/DL (ref 70–110)
HCT VFR BLD AUTO: 42.9 % (ref 40–54)
HGB BLD-MCNC: 13.7 G/DL (ref 14–18)
IMM GRANULOCYTES # BLD AUTO: 0.02 K/UL (ref 0–0.04)
IMM GRANULOCYTES NFR BLD AUTO: 0.3 % (ref 0–0.5)
LYMPHOCYTES # BLD AUTO: 2.3 K/UL (ref 1–4.8)
LYMPHOCYTES NFR BLD: 35.5 % (ref 18–48)
MCH RBC QN AUTO: 29.5 PG (ref 27–31)
MCHC RBC AUTO-ENTMCNC: 31.9 G/DL (ref 32–36)
MCV RBC AUTO: 92 FL (ref 82–98)
MONOCYTES # BLD AUTO: 0.7 K/UL (ref 0.3–1)
MONOCYTES NFR BLD: 10.4 % (ref 4–15)
NEUTROPHILS # BLD AUTO: 3.1 K/UL (ref 1.8–7.7)
NEUTROPHILS NFR BLD: 48.5 % (ref 38–73)
NRBC BLD-RTO: 0 /100 WBC
PLATELET # BLD AUTO: 288 K/UL (ref 150–450)
PMV BLD AUTO: 10.2 FL (ref 9.2–12.9)
POTASSIUM SERPL-SCNC: 4.6 MMOL/L (ref 3.5–5.1)
PROT SERPL-MCNC: 6.4 G/DL (ref 6–8.4)
RBC # BLD AUTO: 4.65 M/UL (ref 4.6–6.2)
SODIUM SERPL-SCNC: 137 MMOL/L (ref 136–145)
WBC # BLD AUTO: 6.43 K/UL (ref 3.9–12.7)

## 2021-08-17 PROCEDURE — 1101F PT FALLS ASSESS-DOCD LE1/YR: CPT | Mod: CPTII,S$GLB,, | Performed by: PHYSICIAN ASSISTANT

## 2021-08-17 PROCEDURE — 3288F FALL RISK ASSESSMENT DOCD: CPT | Mod: CPTII,S$GLB,, | Performed by: PHYSICIAN ASSISTANT

## 2021-08-17 PROCEDURE — 1126F PR PAIN SEVERITY QUANTIFIED, NO PAIN PRESENT: ICD-10-PCS | Mod: CPTII,S$GLB,, | Performed by: PHYSICIAN ASSISTANT

## 2021-08-17 PROCEDURE — 3072F LOW RISK FOR RETINOPATHY: CPT | Mod: CPTII,S$GLB,, | Performed by: PHYSICIAN ASSISTANT

## 2021-08-17 PROCEDURE — 1111F DSCHRG MED/CURRENT MED MERGE: CPT | Mod: CPTII,S$GLB,, | Performed by: PHYSICIAN ASSISTANT

## 2021-08-17 PROCEDURE — 99999 PR PBB SHADOW E&M-EST. PATIENT-LVL IV: CPT | Mod: PBBFAC,,, | Performed by: PHYSICIAN ASSISTANT

## 2021-08-17 PROCEDURE — 99214 PR OFFICE/OUTPT VISIT, EST, LEVL IV, 30-39 MIN: ICD-10-PCS | Mod: S$GLB,,, | Performed by: PHYSICIAN ASSISTANT

## 2021-08-17 PROCEDURE — 1111F PR DISCHARGE MEDS RECONCILED W/ CURRENT OUTPATIENT MED LIST: ICD-10-PCS | Mod: CPTII,S$GLB,, | Performed by: PHYSICIAN ASSISTANT

## 2021-08-17 PROCEDURE — 85025 COMPLETE CBC W/AUTO DIFF WBC: CPT | Performed by: PHYSICIAN ASSISTANT

## 2021-08-17 PROCEDURE — 3044F HG A1C LEVEL LT 7.0%: CPT | Mod: CPTII,S$GLB,, | Performed by: PHYSICIAN ASSISTANT

## 2021-08-17 PROCEDURE — 99999 PR PBB SHADOW E&M-EST. PATIENT-LVL IV: ICD-10-PCS | Mod: PBBFAC,,, | Performed by: PHYSICIAN ASSISTANT

## 2021-08-17 PROCEDURE — 1159F PR MEDICATION LIST DOCUMENTED IN MEDICAL RECORD: ICD-10-PCS | Mod: CPTII,S$GLB,, | Performed by: PHYSICIAN ASSISTANT

## 2021-08-17 PROCEDURE — 3077F PR MOST RECENT SYSTOLIC BLOOD PRESSURE >= 140 MM HG: ICD-10-PCS | Mod: CPTII,S$GLB,, | Performed by: PHYSICIAN ASSISTANT

## 2021-08-17 PROCEDURE — 3079F PR MOST RECENT DIASTOLIC BLOOD PRESSURE 80-89 MM HG: ICD-10-PCS | Mod: CPTII,S$GLB,, | Performed by: PHYSICIAN ASSISTANT

## 2021-08-17 PROCEDURE — 3079F DIAST BP 80-89 MM HG: CPT | Mod: CPTII,S$GLB,, | Performed by: PHYSICIAN ASSISTANT

## 2021-08-17 PROCEDURE — 3288F PR FALLS RISK ASSESSMENT DOCUMENTED: ICD-10-PCS | Mod: CPTII,S$GLB,, | Performed by: PHYSICIAN ASSISTANT

## 2021-08-17 PROCEDURE — 3077F SYST BP >= 140 MM HG: CPT | Mod: CPTII,S$GLB,, | Performed by: PHYSICIAN ASSISTANT

## 2021-08-17 PROCEDURE — 1101F PR PT FALLS ASSESS DOC 0-1 FALLS W/OUT INJ PAST YR: ICD-10-PCS | Mod: CPTII,S$GLB,, | Performed by: PHYSICIAN ASSISTANT

## 2021-08-17 PROCEDURE — 3008F BODY MASS INDEX DOCD: CPT | Mod: CPTII,S$GLB,, | Performed by: PHYSICIAN ASSISTANT

## 2021-08-17 PROCEDURE — 3008F PR BODY MASS INDEX (BMI) DOCUMENTED: ICD-10-PCS | Mod: CPTII,S$GLB,, | Performed by: PHYSICIAN ASSISTANT

## 2021-08-17 PROCEDURE — 3072F PR LOW RISK FOR RETINOPATHY: ICD-10-PCS | Mod: CPTII,S$GLB,, | Performed by: PHYSICIAN ASSISTANT

## 2021-08-17 PROCEDURE — 1126F AMNT PAIN NOTED NONE PRSNT: CPT | Mod: CPTII,S$GLB,, | Performed by: PHYSICIAN ASSISTANT

## 2021-08-17 PROCEDURE — 80053 COMPREHEN METABOLIC PANEL: CPT | Performed by: PHYSICIAN ASSISTANT

## 2021-08-17 PROCEDURE — 99214 OFFICE O/P EST MOD 30 MIN: CPT | Mod: S$GLB,,, | Performed by: PHYSICIAN ASSISTANT

## 2021-08-17 PROCEDURE — 3044F PR MOST RECENT HEMOGLOBIN A1C LEVEL <7.0%: ICD-10-PCS | Mod: CPTII,S$GLB,, | Performed by: PHYSICIAN ASSISTANT

## 2021-08-17 PROCEDURE — 36415 COLL VENOUS BLD VENIPUNCTURE: CPT | Performed by: PHYSICIAN ASSISTANT

## 2021-08-17 PROCEDURE — 1159F MED LIST DOCD IN RCRD: CPT | Mod: CPTII,S$GLB,, | Performed by: PHYSICIAN ASSISTANT

## 2021-08-31 RX ORDER — PANTOPRAZOLE SODIUM 40 MG/1
TABLET, DELAYED RELEASE ORAL
Qty: 90 TABLET | Refills: 3 | Status: SHIPPED | OUTPATIENT
Start: 2021-08-31 | End: 2021-09-01

## 2021-08-31 RX ORDER — GLIMEPIRIDE 2 MG/1
TABLET ORAL
Qty: 90 TABLET | Refills: 1 | Status: SHIPPED | OUTPATIENT
Start: 2021-08-31 | End: 2021-09-01

## 2021-09-01 RX ORDER — PANTOPRAZOLE SODIUM 40 MG/1
40 TABLET, DELAYED RELEASE ORAL DAILY
Qty: 90 TABLET | Refills: 3 | Status: SHIPPED | OUTPATIENT
Start: 2021-09-01 | End: 2022-08-05

## 2021-09-01 RX ORDER — GLIMEPIRIDE 2 MG/1
2 TABLET ORAL
Qty: 90 TABLET | Refills: 1 | Status: SHIPPED | OUTPATIENT
Start: 2021-09-01 | End: 2022-02-14

## 2021-09-02 RX ORDER — CARVEDILOL 3.12 MG/1
TABLET ORAL
Qty: 180 TABLET | Refills: 0 | Status: SHIPPED | OUTPATIENT
Start: 2021-09-02 | End: 2021-11-23

## 2021-09-09 ENCOUNTER — EXTERNAL HOME HEALTH (OUTPATIENT)
Dept: HOME HEALTH SERVICES | Facility: HOSPITAL | Age: 66
End: 2021-09-09
Payer: MEDICARE

## 2021-09-14 RX ORDER — LOSARTAN POTASSIUM 50 MG/1
TABLET ORAL
Qty: 90 TABLET | Refills: 3 | Status: SHIPPED | OUTPATIENT
Start: 2021-09-14 | End: 2022-09-03

## 2021-09-22 ENCOUNTER — TELEPHONE (OUTPATIENT)
Dept: ENDOSCOPY | Facility: HOSPITAL | Age: 66
End: 2021-09-22

## 2021-09-29 RX ORDER — ATORVASTATIN CALCIUM 40 MG/1
TABLET, FILM COATED ORAL
Qty: 90 TABLET | Refills: 3 | Status: SHIPPED | OUTPATIENT
Start: 2021-09-29 | End: 2022-09-01

## 2021-10-01 ENCOUNTER — TELEPHONE (OUTPATIENT)
Dept: ENDOSCOPY | Facility: HOSPITAL | Age: 66
End: 2021-10-01

## 2021-10-04 ENCOUNTER — TELEPHONE (OUTPATIENT)
Dept: ENDOSCOPY | Facility: HOSPITAL | Age: 66
End: 2021-10-04

## 2021-10-04 DIAGNOSIS — Z01.818 PRE-OP TESTING: ICD-10-CM

## 2021-10-05 RX ORDER — CICLOPIROX 80 MG/ML
SOLUTION TOPICAL
Qty: 6.6 ML | Refills: 11 | Status: SHIPPED | OUTPATIENT
Start: 2021-10-05

## 2021-10-10 ENCOUNTER — LAB VISIT (OUTPATIENT)
Dept: SPORTS MEDICINE | Facility: CLINIC | Age: 66
End: 2021-10-10
Payer: MEDICARE

## 2021-10-10 DIAGNOSIS — Z01.818 PRE-OP TESTING: ICD-10-CM

## 2021-10-10 PROCEDURE — U0003 INFECTIOUS AGENT DETECTION BY NUCLEIC ACID (DNA OR RNA); SEVERE ACUTE RESPIRATORY SYNDROME CORONAVIRUS 2 (SARS-COV-2) (CORONAVIRUS DISEASE [COVID-19]), AMPLIFIED PROBE TECHNIQUE, MAKING USE OF HIGH THROUGHPUT TECHNOLOGIES AS DESCRIBED BY CMS-2020-01-R: HCPCS | Performed by: FAMILY MEDICINE

## 2021-10-10 PROCEDURE — U0005 INFEC AGEN DETEC AMPLI PROBE: HCPCS | Performed by: FAMILY MEDICINE

## 2021-10-11 LAB
SARS-COV-2 RNA RESP QL NAA+PROBE: NOT DETECTED
SARS-COV-2- CYCLE NUMBER: NORMAL

## 2021-10-13 ENCOUNTER — ANESTHESIA EVENT (OUTPATIENT)
Dept: ENDOSCOPY | Facility: HOSPITAL | Age: 66
End: 2021-10-13
Payer: MEDICARE

## 2021-10-13 ENCOUNTER — ANESTHESIA (OUTPATIENT)
Dept: ENDOSCOPY | Facility: HOSPITAL | Age: 66
End: 2021-10-13
Payer: MEDICARE

## 2021-10-13 ENCOUNTER — HOSPITAL ENCOUNTER (OUTPATIENT)
Facility: HOSPITAL | Age: 66
Discharge: HOME | End: 2021-10-13
Attending: INTERNAL MEDICINE | Admitting: INTERNAL MEDICINE
Payer: MEDICARE

## 2021-10-13 VITALS
HEIGHT: 66 IN | HEART RATE: 73 BPM | SYSTOLIC BLOOD PRESSURE: 136 MMHG | WEIGHT: 200 LBS | DIASTOLIC BLOOD PRESSURE: 82 MMHG | OXYGEN SATURATION: 99 % | BODY MASS INDEX: 32.14 KG/M2 | RESPIRATION RATE: 20 BRPM | TEMPERATURE: 97 F

## 2021-10-13 DIAGNOSIS — Z46.89 ENCOUNTER FOR REMOVAL OF BILIARY STENT: Primary | ICD-10-CM

## 2021-10-13 LAB — POCT GLUCOSE: 112 MG/DL (ref 70–110)

## 2021-10-13 PROCEDURE — 43264 ERCP REMOVE DUCT CALCULI: CPT | Mod: 51,,, | Performed by: INTERNAL MEDICINE

## 2021-10-13 PROCEDURE — 37000008 HC ANESTHESIA 1ST 15 MINUTES: Performed by: INTERNAL MEDICINE

## 2021-10-13 PROCEDURE — C1769 GUIDE WIRE: HCPCS | Performed by: INTERNAL MEDICINE

## 2021-10-13 PROCEDURE — 27201089 HC SNARE, DISP (ANY): Performed by: INTERNAL MEDICINE

## 2021-10-13 PROCEDURE — 43275 ERCP REMOVE FORGN BODY DUCT: CPT | Performed by: INTERNAL MEDICINE

## 2021-10-13 PROCEDURE — 25500020 PHARM REV CODE 255: Performed by: PEDIATRICS

## 2021-10-13 PROCEDURE — 43275 ERCP REMOVE FORGN BODY DUCT: CPT | Mod: ,,, | Performed by: INTERNAL MEDICINE

## 2021-10-13 PROCEDURE — 37000009 HC ANESTHESIA EA ADD 15 MINS: Performed by: INTERNAL MEDICINE

## 2021-10-13 PROCEDURE — D9220A PRA ANESTHESIA: Mod: CRNA,,, | Performed by: NURSE ANESTHETIST, CERTIFIED REGISTERED

## 2021-10-13 PROCEDURE — 43264 PR ERCP,W/REMOVAL STONE,BIL/PANCR DUCTS: ICD-10-PCS | Mod: 51,,, | Performed by: INTERNAL MEDICINE

## 2021-10-13 PROCEDURE — 43264 ERCP REMOVE DUCT CALCULI: CPT | Performed by: INTERNAL MEDICINE

## 2021-10-13 PROCEDURE — 27202125 HC BALLOON, EXTRACTION (ANY): Performed by: INTERNAL MEDICINE

## 2021-10-13 PROCEDURE — 43275 PR ERCP W/REMOVAL FOREIGN BODY/STENT FROM BILIARY/PANCREATIC DUCT: ICD-10-PCS | Mod: ,,, | Performed by: INTERNAL MEDICINE

## 2021-10-13 PROCEDURE — D9220A PRA ANESTHESIA: ICD-10-PCS | Mod: CRNA,,, | Performed by: NURSE ANESTHETIST, CERTIFIED REGISTERED

## 2021-10-13 PROCEDURE — 63600175 PHARM REV CODE 636 W HCPCS: Performed by: NURSE ANESTHETIST, CERTIFIED REGISTERED

## 2021-10-13 PROCEDURE — 74328 X-RAY BILE DUCT ENDOSCOPY: CPT | Performed by: INTERNAL MEDICINE

## 2021-10-13 PROCEDURE — D9220A PRA ANESTHESIA: ICD-10-PCS | Mod: ANES,,, | Performed by: ANESTHESIOLOGY

## 2021-10-13 PROCEDURE — 25000003 PHARM REV CODE 250: Performed by: NURSE ANESTHETIST, CERTIFIED REGISTERED

## 2021-10-13 PROCEDURE — 74328 X-RAY BILE DUCT ENDOSCOPY: CPT | Mod: 26,,, | Performed by: INTERNAL MEDICINE

## 2021-10-13 PROCEDURE — 74328 PR  X-RAY FOR BILE DUCT ENDOSCOPY: ICD-10-PCS | Mod: 26,,, | Performed by: INTERNAL MEDICINE

## 2021-10-13 PROCEDURE — D9220A PRA ANESTHESIA: Mod: ANES,,, | Performed by: ANESTHESIOLOGY

## 2021-10-13 PROCEDURE — 25000003 PHARM REV CODE 250: Performed by: INTERNAL MEDICINE

## 2021-10-13 RX ORDER — ONDANSETRON 2 MG/ML
INJECTION INTRAMUSCULAR; INTRAVENOUS
Status: DISCONTINUED | OUTPATIENT
Start: 2021-10-13 | End: 2021-10-13

## 2021-10-13 RX ORDER — PROPOFOL 10 MG/ML
VIAL (ML) INTRAVENOUS
Status: DISCONTINUED | OUTPATIENT
Start: 2021-10-13 | End: 2021-10-13

## 2021-10-13 RX ORDER — SODIUM CHLORIDE 9 MG/ML
INJECTION, SOLUTION INTRAVENOUS CONTINUOUS
Status: DISCONTINUED | OUTPATIENT
Start: 2021-10-13 | End: 2021-10-13 | Stop reason: HOSPADM

## 2021-10-13 RX ORDER — SODIUM CHLORIDE 0.9 % (FLUSH) 0.9 %
3 SYRINGE (ML) INJECTION
Status: DISCONTINUED | OUTPATIENT
Start: 2021-10-13 | End: 2021-10-13 | Stop reason: HOSPADM

## 2021-10-13 RX ORDER — PROPOFOL 10 MG/ML
VIAL (ML) INTRAVENOUS CONTINUOUS PRN
Status: DISCONTINUED | OUTPATIENT
Start: 2021-10-13 | End: 2021-10-13

## 2021-10-13 RX ORDER — FENTANYL CITRATE 50 UG/ML
INJECTION, SOLUTION INTRAMUSCULAR; INTRAVENOUS
Status: DISCONTINUED | OUTPATIENT
Start: 2021-10-13 | End: 2021-10-13

## 2021-10-13 RX ORDER — LIDOCAINE HYDROCHLORIDE 20 MG/ML
INJECTION INTRAVENOUS
Status: DISCONTINUED | OUTPATIENT
Start: 2021-10-13 | End: 2021-10-13

## 2021-10-13 RX ORDER — SODIUM CHLORIDE 0.9 % (FLUSH) 0.9 %
10 SYRINGE (ML) INJECTION
Status: DISCONTINUED | OUTPATIENT
Start: 2021-10-13 | End: 2021-10-13 | Stop reason: HOSPADM

## 2021-10-13 RX ORDER — FENTANYL CITRATE 50 UG/ML
25 INJECTION, SOLUTION INTRAMUSCULAR; INTRAVENOUS EVERY 5 MIN PRN
Status: DISCONTINUED | OUTPATIENT
Start: 2021-10-13 | End: 2021-10-13 | Stop reason: HOSPADM

## 2021-10-13 RX ADMIN — SODIUM CHLORIDE: 0.9 INJECTION, SOLUTION INTRAVENOUS at 08:10

## 2021-10-13 RX ADMIN — FENTANYL CITRATE 50 MCG: 50 INJECTION, SOLUTION INTRAMUSCULAR; INTRAVENOUS at 09:10

## 2021-10-13 RX ADMIN — ONDANSETRON 4 MG: 2 INJECTION INTRAMUSCULAR; INTRAVENOUS at 09:10

## 2021-10-13 RX ADMIN — IOHEXOL 8 ML: 300 INJECTION, SOLUTION INTRAVENOUS at 09:10

## 2021-10-13 RX ADMIN — LIDOCAINE HYDROCHLORIDE 100 MG: 20 INJECTION, SOLUTION INTRAVENOUS at 09:10

## 2021-10-13 RX ADMIN — Medication 175 MCG/KG/MIN: at 09:10

## 2021-10-13 RX ADMIN — PROPOFOL 50 MG: 10 INJECTION, EMULSION INTRAVENOUS at 09:10

## 2021-10-19 ENCOUNTER — PES CALL (OUTPATIENT)
Dept: ADMINISTRATIVE | Facility: CLINIC | Age: 66
End: 2021-10-19

## 2021-11-05 ENCOUNTER — LAB VISIT (OUTPATIENT)
Dept: LAB | Facility: HOSPITAL | Age: 66
End: 2021-11-05
Attending: INTERNAL MEDICINE
Payer: MEDICARE

## 2021-11-05 DIAGNOSIS — Z79.4 TYPE 2 DIABETES MELLITUS WITH DIABETIC POLYNEUROPATHY, WITH LONG-TERM CURRENT USE OF INSULIN: ICD-10-CM

## 2021-11-05 DIAGNOSIS — E11.42 TYPE 2 DIABETES MELLITUS WITH DIABETIC POLYNEUROPATHY, WITH LONG-TERM CURRENT USE OF INSULIN: ICD-10-CM

## 2021-11-05 DIAGNOSIS — Z00.00 ANNUAL PHYSICAL EXAM: ICD-10-CM

## 2021-11-05 DIAGNOSIS — I10 ESSENTIAL HYPERTENSION: ICD-10-CM

## 2021-11-05 DIAGNOSIS — E11.69 HYPERLIPIDEMIA ASSOCIATED WITH TYPE 2 DIABETES MELLITUS: ICD-10-CM

## 2021-11-05 DIAGNOSIS — R35.1 NOCTURIA: ICD-10-CM

## 2021-11-05 DIAGNOSIS — E78.5 HYPERLIPIDEMIA ASSOCIATED WITH TYPE 2 DIABETES MELLITUS: ICD-10-CM

## 2021-11-05 LAB
ALBUMIN SERPL BCP-MCNC: 4 G/DL (ref 3.5–5.2)
ALP SERPL-CCNC: 310 U/L (ref 55–135)
ALT SERPL W/O P-5'-P-CCNC: 68 U/L (ref 10–44)
ANION GAP SERPL CALC-SCNC: 11 MMOL/L (ref 8–16)
AST SERPL-CCNC: 38 U/L (ref 10–40)
BASOPHILS # BLD AUTO: 0.07 K/UL (ref 0–0.2)
BASOPHILS NFR BLD: 0.8 % (ref 0–1.9)
BILIRUB SERPL-MCNC: 0.6 MG/DL (ref 0.1–1)
BUN SERPL-MCNC: 24 MG/DL (ref 8–23)
CALCIUM SERPL-MCNC: 9.3 MG/DL (ref 8.7–10.5)
CHLORIDE SERPL-SCNC: 106 MMOL/L (ref 95–110)
CHOLEST SERPL-MCNC: 160 MG/DL (ref 120–199)
CHOLEST/HDLC SERPL: 3.6 {RATIO} (ref 2–5)
CO2 SERPL-SCNC: 24 MMOL/L (ref 23–29)
COMPLEXED PSA SERPL-MCNC: 2 NG/ML (ref 0–4)
CREAT SERPL-MCNC: 1.2 MG/DL (ref 0.5–1.4)
DIFFERENTIAL METHOD: ABNORMAL
EOSINOPHIL # BLD AUTO: 0.2 K/UL (ref 0–0.5)
EOSINOPHIL NFR BLD: 2.4 % (ref 0–8)
ERYTHROCYTE [DISTWIDTH] IN BLOOD BY AUTOMATED COUNT: 13.5 % (ref 11.5–14.5)
EST. GFR  (AFRICAN AMERICAN): >60 ML/MIN/1.73 M^2
EST. GFR  (NON AFRICAN AMERICAN): >60 ML/MIN/1.73 M^2
ESTIMATED AVG GLUCOSE: 163 MG/DL (ref 68–131)
GLUCOSE SERPL-MCNC: 167 MG/DL (ref 70–110)
HBA1C MFR BLD: 7.3 % (ref 4–5.6)
HCT VFR BLD AUTO: 48 % (ref 40–54)
HDLC SERPL-MCNC: 45 MG/DL (ref 40–75)
HDLC SERPL: 28.1 % (ref 20–50)
HGB BLD-MCNC: 15 G/DL (ref 14–18)
IMM GRANULOCYTES # BLD AUTO: 0.02 K/UL (ref 0–0.04)
IMM GRANULOCYTES NFR BLD AUTO: 0.2 % (ref 0–0.5)
LDLC SERPL CALC-MCNC: 91.2 MG/DL (ref 63–159)
LYMPHOCYTES # BLD AUTO: 2.7 K/UL (ref 1–4.8)
LYMPHOCYTES NFR BLD: 32.4 % (ref 18–48)
MCH RBC QN AUTO: 29.2 PG (ref 27–31)
MCHC RBC AUTO-ENTMCNC: 31.3 G/DL (ref 32–36)
MCV RBC AUTO: 93 FL (ref 82–98)
MONOCYTES # BLD AUTO: 0.7 K/UL (ref 0.3–1)
MONOCYTES NFR BLD: 8.8 % (ref 4–15)
NEUTROPHILS # BLD AUTO: 4.6 K/UL (ref 1.8–7.7)
NEUTROPHILS NFR BLD: 55.4 % (ref 38–73)
NONHDLC SERPL-MCNC: 115 MG/DL
NRBC BLD-RTO: 0 /100 WBC
PLATELET # BLD AUTO: 279 K/UL (ref 150–450)
PMV BLD AUTO: 10.3 FL (ref 9.2–12.9)
POTASSIUM SERPL-SCNC: 4.2 MMOL/L (ref 3.5–5.1)
PROT SERPL-MCNC: 7.3 G/DL (ref 6–8.4)
RBC # BLD AUTO: 5.14 M/UL (ref 4.6–6.2)
SODIUM SERPL-SCNC: 141 MMOL/L (ref 136–145)
TRIGL SERPL-MCNC: 119 MG/DL (ref 30–150)
TSH SERPL DL<=0.005 MIU/L-ACNC: 1.06 UIU/ML (ref 0.4–4)
WBC # BLD AUTO: 8.25 K/UL (ref 3.9–12.7)

## 2021-11-05 PROCEDURE — 85025 COMPLETE CBC W/AUTO DIFF WBC: CPT | Performed by: INTERNAL MEDICINE

## 2021-11-05 PROCEDURE — 84153 ASSAY OF PSA TOTAL: CPT | Performed by: INTERNAL MEDICINE

## 2021-11-05 PROCEDURE — 80053 COMPREHEN METABOLIC PANEL: CPT | Performed by: INTERNAL MEDICINE

## 2021-11-05 PROCEDURE — 83036 HEMOGLOBIN GLYCOSYLATED A1C: CPT | Performed by: INTERNAL MEDICINE

## 2021-11-05 PROCEDURE — 80061 LIPID PANEL: CPT | Performed by: INTERNAL MEDICINE

## 2021-11-05 PROCEDURE — 84443 ASSAY THYROID STIM HORMONE: CPT | Performed by: INTERNAL MEDICINE

## 2021-11-05 PROCEDURE — 36415 COLL VENOUS BLD VENIPUNCTURE: CPT | Mod: PO | Performed by: INTERNAL MEDICINE

## 2021-11-10 ENCOUNTER — OFFICE VISIT (OUTPATIENT)
Dept: INTERNAL MEDICINE | Facility: CLINIC | Age: 66
End: 2021-11-10
Payer: MEDICARE

## 2021-11-10 VITALS
HEIGHT: 65 IN | BODY MASS INDEX: 33.21 KG/M2 | HEART RATE: 88 BPM | DIASTOLIC BLOOD PRESSURE: 78 MMHG | WEIGHT: 199.31 LBS | TEMPERATURE: 98 F | OXYGEN SATURATION: 99 % | SYSTOLIC BLOOD PRESSURE: 120 MMHG

## 2021-11-10 DIAGNOSIS — N18.31 TYPE 2 DIABETES MELLITUS WITH STAGE 3A CHRONIC KIDNEY DISEASE, WITHOUT LONG-TERM CURRENT USE OF INSULIN: ICD-10-CM

## 2021-11-10 DIAGNOSIS — I15.2 HYPERTENSION ASSOCIATED WITH DIABETES: ICD-10-CM

## 2021-11-10 DIAGNOSIS — E66.9 OBESITY, DIABETES, AND HYPERTENSION SYNDROME: ICD-10-CM

## 2021-11-10 DIAGNOSIS — E11.59 HYPERTENSION ASSOCIATED WITH DIABETES: ICD-10-CM

## 2021-11-10 DIAGNOSIS — E11.69 OBESITY, DIABETES, AND HYPERTENSION SYNDROME: ICD-10-CM

## 2021-11-10 DIAGNOSIS — N18.31 STAGE 3A CHRONIC KIDNEY DISEASE: ICD-10-CM

## 2021-11-10 DIAGNOSIS — E11.69 HYPERLIPIDEMIA ASSOCIATED WITH TYPE 2 DIABETES MELLITUS: ICD-10-CM

## 2021-11-10 DIAGNOSIS — I15.2 OBESITY, DIABETES, AND HYPERTENSION SYNDROME: ICD-10-CM

## 2021-11-10 DIAGNOSIS — E11.59 OBESITY, DIABETES, AND HYPERTENSION SYNDROME: ICD-10-CM

## 2021-11-10 DIAGNOSIS — E78.5 HYPERLIPIDEMIA ASSOCIATED WITH TYPE 2 DIABETES MELLITUS: ICD-10-CM

## 2021-11-10 DIAGNOSIS — E11.22 TYPE 2 DIABETES MELLITUS WITH STAGE 3A CHRONIC KIDNEY DISEASE, WITHOUT LONG-TERM CURRENT USE OF INSULIN: ICD-10-CM

## 2021-11-10 DIAGNOSIS — Z00.00 ANNUAL PHYSICAL EXAM: Primary | ICD-10-CM

## 2021-11-10 DIAGNOSIS — I10 HYPERTENSION, UNSPECIFIED TYPE: Primary | ICD-10-CM

## 2021-11-10 DIAGNOSIS — H91.90 HEARING LOSS, UNSPECIFIED HEARING LOSS TYPE, UNSPECIFIED LATERALITY: ICD-10-CM

## 2021-11-10 PROCEDURE — 3060F PR POS MICROALBUMINURIA RESULT DOCUMENTED/REVIEW: ICD-10-PCS | Mod: CPTII,S$GLB,, | Performed by: INTERNAL MEDICINE

## 2021-11-10 PROCEDURE — 4010F PR ACE/ARB THEARPY RXD/TAKEN: ICD-10-PCS | Mod: CPTII,S$GLB,, | Performed by: INTERNAL MEDICINE

## 2021-11-10 PROCEDURE — 99499 UNLISTED E&M SERVICE: CPT | Mod: S$GLB,,, | Performed by: INTERNAL MEDICINE

## 2021-11-10 PROCEDURE — 3066F NEPHROPATHY DOC TX: CPT | Mod: CPTII,S$GLB,, | Performed by: INTERNAL MEDICINE

## 2021-11-10 PROCEDURE — 99214 OFFICE O/P EST MOD 30 MIN: CPT | Mod: S$GLB,,, | Performed by: INTERNAL MEDICINE

## 2021-11-10 PROCEDURE — 3060F POS MICROALBUMINURIA REV: CPT | Mod: CPTII,S$GLB,, | Performed by: INTERNAL MEDICINE

## 2021-11-10 PROCEDURE — 99999 PR PBB SHADOW E&M-EST. PATIENT-LVL V: ICD-10-PCS | Mod: PBBFAC,,, | Performed by: INTERNAL MEDICINE

## 2021-11-10 PROCEDURE — 3072F LOW RISK FOR RETINOPATHY: CPT | Mod: CPTII,S$GLB,, | Performed by: INTERNAL MEDICINE

## 2021-11-10 PROCEDURE — 99999 PR PBB SHADOW E&M-EST. PATIENT-LVL V: CPT | Mod: PBBFAC,,, | Performed by: INTERNAL MEDICINE

## 2021-11-10 PROCEDURE — 4010F ACE/ARB THERAPY RXD/TAKEN: CPT | Mod: CPTII,S$GLB,, | Performed by: INTERNAL MEDICINE

## 2021-11-10 PROCEDURE — 3066F PR DOCUMENTATION OF TREATMENT FOR NEPHROPATHY: ICD-10-PCS | Mod: CPTII,S$GLB,, | Performed by: INTERNAL MEDICINE

## 2021-11-10 PROCEDURE — 99214 PR OFFICE/OUTPT VISIT, EST, LEVL IV, 30-39 MIN: ICD-10-PCS | Mod: S$GLB,,, | Performed by: INTERNAL MEDICINE

## 2021-11-10 PROCEDURE — 99499 RISK ADDL DX/OHS AUDIT: ICD-10-PCS | Mod: S$GLB,,, | Performed by: INTERNAL MEDICINE

## 2021-11-10 PROCEDURE — 3072F PR LOW RISK FOR RETINOPATHY: ICD-10-PCS | Mod: CPTII,S$GLB,, | Performed by: INTERNAL MEDICINE

## 2021-11-16 ENCOUNTER — CLINICAL SUPPORT (OUTPATIENT)
Dept: AUDIOLOGY | Facility: CLINIC | Age: 66
End: 2021-11-16
Payer: MEDICARE

## 2021-11-16 DIAGNOSIS — H91.90 HEARING LOSS, UNSPECIFIED HEARING LOSS TYPE, UNSPECIFIED LATERALITY: ICD-10-CM

## 2021-11-16 DIAGNOSIS — H90.3 BILATERAL SENSORINEURAL HEARING LOSS: Primary | ICD-10-CM

## 2021-11-16 PROCEDURE — 92567 PR TYMPA2METRY: ICD-10-PCS | Mod: S$GLB,,, | Performed by: AUDIOLOGIST

## 2021-11-16 PROCEDURE — 99999 PR PBB SHADOW E&M-EST. PATIENT-LVL I: ICD-10-PCS | Mod: PBBFAC,,, | Performed by: AUDIOLOGIST

## 2021-11-16 PROCEDURE — 99999 PR PBB SHADOW E&M-EST. PATIENT-LVL I: CPT | Mod: PBBFAC,,, | Performed by: AUDIOLOGIST

## 2021-11-16 PROCEDURE — 92557 PR COMPREHENSIVE HEARING TEST: ICD-10-PCS | Mod: S$GLB,,, | Performed by: AUDIOLOGIST

## 2021-11-16 PROCEDURE — 92557 COMPREHENSIVE HEARING TEST: CPT | Mod: S$GLB,,, | Performed by: AUDIOLOGIST

## 2021-11-16 PROCEDURE — 92567 TYMPANOMETRY: CPT | Mod: S$GLB,,, | Performed by: AUDIOLOGIST

## 2021-12-19 PROBLEM — I10 HYPERTENSION: Status: RESOLVED | Noted: 2019-05-03 | Resolved: 2021-12-19

## 2021-12-21 RX ORDER — GABAPENTIN 300 MG/1
CAPSULE ORAL
Qty: 90 CAPSULE | Refills: 3 | Status: SHIPPED | OUTPATIENT
Start: 2021-12-21 | End: 2022-11-26

## 2022-01-18 ENCOUNTER — PATIENT MESSAGE (OUTPATIENT)
Dept: ADMINISTRATIVE | Facility: HOSPITAL | Age: 67
End: 2022-01-18
Payer: MEDICARE

## 2022-01-28 ENCOUNTER — PES CALL (OUTPATIENT)
Dept: ADMINISTRATIVE | Facility: CLINIC | Age: 67
End: 2022-01-28
Payer: MEDICARE

## 2022-03-15 ENCOUNTER — OFFICE VISIT (OUTPATIENT)
Dept: OPTOMETRY | Facility: CLINIC | Age: 67
End: 2022-03-15
Payer: MEDICARE

## 2022-03-15 DIAGNOSIS — E11.22 TYPE 2 DIABETES MELLITUS WITH STAGE 3A CHRONIC KIDNEY DISEASE, WITHOUT LONG-TERM CURRENT USE OF INSULIN: ICD-10-CM

## 2022-03-15 DIAGNOSIS — H25.13 NUCLEAR SCLEROSIS OF BOTH EYES: ICD-10-CM

## 2022-03-15 DIAGNOSIS — N18.31 TYPE 2 DIABETES MELLITUS WITH STAGE 3A CHRONIC KIDNEY DISEASE, WITHOUT LONG-TERM CURRENT USE OF INSULIN: ICD-10-CM

## 2022-03-15 DIAGNOSIS — E11.9 TYPE 2 DIABETES MELLITUS WITHOUT RETINOPATHY: Primary | ICD-10-CM

## 2022-03-15 LAB
LEFT EYE DM RETINOPATHY: NEGATIVE
RIGHT EYE DM RETINOPATHY: NEGATIVE

## 2022-03-15 PROCEDURE — 1126F AMNT PAIN NOTED NONE PRSNT: CPT | Mod: CPTII,S$GLB,, | Performed by: OPTOMETRIST

## 2022-03-15 PROCEDURE — 3288F PR FALLS RISK ASSESSMENT DOCUMENTED: ICD-10-PCS | Mod: CPTII,S$GLB,, | Performed by: OPTOMETRIST

## 2022-03-15 PROCEDURE — 92014 PR EYE EXAM, EST PATIENT,COMPREHESV: ICD-10-PCS | Mod: S$GLB,,, | Performed by: OPTOMETRIST

## 2022-03-15 PROCEDURE — 1160F PR REVIEW ALL MEDS BY PRESCRIBER/CLIN PHARMACIST DOCUMENTED: ICD-10-PCS | Mod: CPTII,S$GLB,, | Performed by: OPTOMETRIST

## 2022-03-15 PROCEDURE — 99999 PR PBB SHADOW E&M-EST. PATIENT-LVL III: CPT | Mod: PBBFAC,,, | Performed by: OPTOMETRIST

## 2022-03-15 PROCEDURE — 2023F PR DILATED RETINAL EXAM W/O EVID OF RETINOPATHY: ICD-10-PCS | Mod: CPTII,S$GLB,, | Performed by: OPTOMETRIST

## 2022-03-15 PROCEDURE — 2023F DILAT RTA XM W/O RTNOPTHY: CPT | Mod: CPTII,S$GLB,, | Performed by: OPTOMETRIST

## 2022-03-15 PROCEDURE — 4010F PR ACE/ARB THEARPY RXD/TAKEN: ICD-10-PCS | Mod: CPTII,S$GLB,, | Performed by: OPTOMETRIST

## 2022-03-15 PROCEDURE — 1126F PR PAIN SEVERITY QUANTIFIED, NO PAIN PRESENT: ICD-10-PCS | Mod: CPTII,S$GLB,, | Performed by: OPTOMETRIST

## 2022-03-15 PROCEDURE — 99999 PR PBB SHADOW E&M-EST. PATIENT-LVL III: ICD-10-PCS | Mod: PBBFAC,,, | Performed by: OPTOMETRIST

## 2022-03-15 PROCEDURE — 1101F PR PT FALLS ASSESS DOC 0-1 FALLS W/OUT INJ PAST YR: ICD-10-PCS | Mod: CPTII,S$GLB,, | Performed by: OPTOMETRIST

## 2022-03-15 PROCEDURE — 1159F PR MEDICATION LIST DOCUMENTED IN MEDICAL RECORD: ICD-10-PCS | Mod: CPTII,S$GLB,, | Performed by: OPTOMETRIST

## 2022-03-15 PROCEDURE — 92014 COMPRE OPH EXAM EST PT 1/>: CPT | Mod: S$GLB,,, | Performed by: OPTOMETRIST

## 2022-03-15 PROCEDURE — 1159F MED LIST DOCD IN RCRD: CPT | Mod: CPTII,S$GLB,, | Performed by: OPTOMETRIST

## 2022-03-15 PROCEDURE — 1101F PT FALLS ASSESS-DOCD LE1/YR: CPT | Mod: CPTII,S$GLB,, | Performed by: OPTOMETRIST

## 2022-03-15 PROCEDURE — 3288F FALL RISK ASSESSMENT DOCD: CPT | Mod: CPTII,S$GLB,, | Performed by: OPTOMETRIST

## 2022-03-15 PROCEDURE — 1160F RVW MEDS BY RX/DR IN RCRD: CPT | Mod: CPTII,S$GLB,, | Performed by: OPTOMETRIST

## 2022-03-15 PROCEDURE — 4010F ACE/ARB THERAPY RXD/TAKEN: CPT | Mod: CPTII,S$GLB,, | Performed by: OPTOMETRIST

## 2022-03-15 NOTE — PROGRESS NOTES
HPI     Diabetic Eye Exam      Additional comments: Patient Mario Sanford is a 66 year old male.              Comments     CC: Pt here for annual diabetic eye exam. Pt states that he is happy with   OTC +2.50 readers for near. Pt states that distance VA OU is stable since   BARB. Patient denies diplopia, headaches, flashes/floaters, and pain.    BARB: 09/22/2020 with Dr. Samano    Gtts: None    POHx: (+)cataracts OU    FOHx: (+)cataracts - father    (+)DM LBS: 130 this A.M.  Hemoglobin A1C       Date                     Value               Ref Range             Status                11/05/2021               7.3 (H)             4.0 - 5.6 %           Final              Comment:    ADA Screening Guidelines:  5.7-6.4%  Consistent with   prediabetes  >or=6.5%  Consistent with diabetes    High levels of fetal   hemoglobin interfere with the HbA1C  assay. Heterozygous hemoglobin   variants (HbS, HgC, etc)do  not significantly interfere with this assay.     However, presence of multiple variants may affect accuracy.         07/19/2021               6.2 (H)             4.0 - 5.6 %           Final              Comment:    ADA Screening Guidelines:  5.7-6.4%  Consistent with   prediabetes  >or=6.5%  Consistent with diabetes    High levels of fetal   hemoglobin interfere with the HbA1C  assay. Heterozygous hemoglobin   variants (HbS, HgC, etc)do  not significantly interfere with this assay.     However, presence of multiple variants may affect accuracy.         03/18/2021               6.2 (H)             4.0 - 5.6 %           Final              Comment:    ADA Screening Guidelines:  5.7-6.4%  Consistent with   prediabetes  >or=6.5%  Consistent with diabetes    High levels of fetal   hemoglobin interfere with the HbA1C  assay. Heterozygous hemoglobin   variants (HbS, HgC, etc)do  not significantly interfere with this assay.     However, presence of multiple variants may affect accuracy.    ----------                 Last  edited by Mayi Martinez on 3/15/2022  1:23 PM. (History)            Assessment /Plan     For exam results, see Encounter Report.    Type 2 diabetes mellitus without retinopathy    Type 2 diabetes mellitus with stage 3a chronic kidney disease, without long-term current use of insulin  -     Ambulatory referral/consult to Optometry    Nuclear sclerosis of both eyes      1,2. No diabetic retinopathy, no csme. Return in 1 year for dilated eye exam.  3. Educated pt on presence of cataracts and effects on vision. No surgery at this time. Recheck in one year.

## 2022-04-14 ENCOUNTER — PATIENT MESSAGE (OUTPATIENT)
Dept: INTERNAL MEDICINE | Facility: CLINIC | Age: 67
End: 2022-04-14
Payer: MEDICARE

## 2022-04-14 PROBLEM — E11.9 DM TYPE 2 WITHOUT RETINOPATHY: Status: ACTIVE | Noted: 2022-04-14

## 2022-04-18 ENCOUNTER — LAB VISIT (OUTPATIENT)
Dept: LAB | Facility: HOSPITAL | Age: 67
End: 2022-04-18
Attending: INTERNAL MEDICINE
Payer: MEDICARE

## 2022-04-18 ENCOUNTER — OFFICE VISIT (OUTPATIENT)
Dept: INTERNAL MEDICINE | Facility: CLINIC | Age: 67
End: 2022-04-18
Payer: MEDICARE

## 2022-04-18 ENCOUNTER — PATIENT OUTREACH (OUTPATIENT)
Dept: ADMINISTRATIVE | Facility: HOSPITAL | Age: 67
End: 2022-04-18
Payer: MEDICARE

## 2022-04-18 VITALS
BODY MASS INDEX: 33.38 KG/M2 | HEART RATE: 89 BPM | HEIGHT: 66 IN | SYSTOLIC BLOOD PRESSURE: 138 MMHG | WEIGHT: 207.69 LBS | DIASTOLIC BLOOD PRESSURE: 80 MMHG | RESPIRATION RATE: 15 BRPM | OXYGEN SATURATION: 97 %

## 2022-04-18 DIAGNOSIS — E11.69 HYPERLIPIDEMIA ASSOCIATED WITH TYPE 2 DIABETES MELLITUS: ICD-10-CM

## 2022-04-18 DIAGNOSIS — E11.22 TYPE 2 DIABETES MELLITUS WITH STAGE 3A CHRONIC KIDNEY DISEASE, WITHOUT LONG-TERM CURRENT USE OF INSULIN: ICD-10-CM

## 2022-04-18 DIAGNOSIS — E11.22 TYPE 2 DIABETES MELLITUS WITH STAGE 3 CHRONIC KIDNEY DISEASE, WITHOUT LONG-TERM CURRENT USE OF INSULIN, UNSPECIFIED WHETHER STAGE 3A OR 3B CKD: ICD-10-CM

## 2022-04-18 DIAGNOSIS — F41.9 ANXIETY: ICD-10-CM

## 2022-04-18 DIAGNOSIS — K26.4 DUODENAL ULCER WITH HEMORRHAGE BUT WITHOUT OBSTRUCTION: ICD-10-CM

## 2022-04-18 DIAGNOSIS — E11.69 OBESITY, DIABETES, AND HYPERTENSION SYNDROME: ICD-10-CM

## 2022-04-18 DIAGNOSIS — I15.2 OBESITY, DIABETES, AND HYPERTENSION SYNDROME: ICD-10-CM

## 2022-04-18 DIAGNOSIS — E11.8 DIABETIC FEET: ICD-10-CM

## 2022-04-18 DIAGNOSIS — N18.30 TYPE 2 DIABETES MELLITUS WITH STAGE 3 CHRONIC KIDNEY DISEASE, WITHOUT LONG-TERM CURRENT USE OF INSULIN, UNSPECIFIED WHETHER STAGE 3A OR 3B CKD: ICD-10-CM

## 2022-04-18 DIAGNOSIS — E11.9 DM TYPE 2 WITHOUT RETINOPATHY: ICD-10-CM

## 2022-04-18 DIAGNOSIS — D64.9 NORMOCYTIC ANEMIA: ICD-10-CM

## 2022-04-18 DIAGNOSIS — N18.31 STAGE 3A CHRONIC KIDNEY DISEASE: ICD-10-CM

## 2022-04-18 DIAGNOSIS — R74.8 ELEVATED LIVER ENZYMES: ICD-10-CM

## 2022-04-18 DIAGNOSIS — L91.8 CUTANEOUS SKIN TAGS: ICD-10-CM

## 2022-04-18 DIAGNOSIS — N18.31 TYPE 2 DIABETES MELLITUS WITH STAGE 3A CHRONIC KIDNEY DISEASE, WITHOUT LONG-TERM CURRENT USE OF INSULIN: ICD-10-CM

## 2022-04-18 DIAGNOSIS — E11.59 HYPERTENSION ASSOCIATED WITH DIABETES: ICD-10-CM

## 2022-04-18 DIAGNOSIS — E11.59 HYPERTENSION ASSOCIATED WITH DIABETES: Primary | ICD-10-CM

## 2022-04-18 DIAGNOSIS — E66.9 OBESITY, DIABETES, AND HYPERTENSION SYNDROME: ICD-10-CM

## 2022-04-18 DIAGNOSIS — Z00.00 ENCOUNTER FOR PREVENTIVE HEALTH EXAMINATION: ICD-10-CM

## 2022-04-18 DIAGNOSIS — Z12.83 SKIN CANCER SCREENING: ICD-10-CM

## 2022-04-18 DIAGNOSIS — I10 HYPERTENSION, UNSPECIFIED TYPE: ICD-10-CM

## 2022-04-18 DIAGNOSIS — R93.3 ABNORMAL FINDING ON GI TRACT IMAGING: ICD-10-CM

## 2022-04-18 DIAGNOSIS — Z87.19 HISTORY OF DUODENAL ULCER: ICD-10-CM

## 2022-04-18 DIAGNOSIS — R74.01 TRANSAMINITIS: ICD-10-CM

## 2022-04-18 DIAGNOSIS — E66.09 CLASS 1 OBESITY DUE TO EXCESS CALORIES WITH SERIOUS COMORBIDITY AND BODY MASS INDEX (BMI) OF 33.0 TO 33.9 IN ADULT: ICD-10-CM

## 2022-04-18 DIAGNOSIS — Z59.9 FINANCIAL DIFFICULTIES: ICD-10-CM

## 2022-04-18 DIAGNOSIS — Z90.79 S/P TURP: ICD-10-CM

## 2022-04-18 DIAGNOSIS — R26.9 ABNORMALITY OF GAIT AND MOBILITY: ICD-10-CM

## 2022-04-18 DIAGNOSIS — N40.0 BENIGN PROSTATIC HYPERPLASIA, UNSPECIFIED WHETHER LOWER URINARY TRACT SYMPTOMS PRESENT: ICD-10-CM

## 2022-04-18 DIAGNOSIS — I15.2 HYPERTENSION ASSOCIATED WITH DIABETES: Primary | ICD-10-CM

## 2022-04-18 DIAGNOSIS — I15.2 HYPERTENSION ASSOCIATED WITH DIABETES: ICD-10-CM

## 2022-04-18 DIAGNOSIS — K20.80 ESOPHAGITIS, LOS ANGELES GRADE D: ICD-10-CM

## 2022-04-18 DIAGNOSIS — E78.5 HYPERLIPIDEMIA ASSOCIATED WITH TYPE 2 DIABETES MELLITUS: ICD-10-CM

## 2022-04-18 DIAGNOSIS — E11.59 OBESITY, DIABETES, AND HYPERTENSION SYNDROME: ICD-10-CM

## 2022-04-18 PROBLEM — R00.0 TACHYCARDIA: Status: RESOLVED | Noted: 2021-07-18 | Resolved: 2022-04-18

## 2022-04-18 PROBLEM — E87.20 LACTIC ACIDOSIS: Status: RESOLVED | Noted: 2021-07-18 | Resolved: 2022-04-18

## 2022-04-18 PROBLEM — K92.1 MELENA: Status: RESOLVED | Noted: 2018-07-02 | Resolved: 2022-04-18

## 2022-04-18 PROBLEM — D62 ACUTE POSTHEMORRHAGIC ANEMIA: Status: RESOLVED | Noted: 2018-07-09 | Resolved: 2022-04-18

## 2022-04-18 PROBLEM — A05.3: Status: RESOLVED | Noted: 2021-07-21 | Resolved: 2022-04-18

## 2022-04-18 PROBLEM — D49.4 BLADDER TUMOR: Status: RESOLVED | Noted: 2018-12-13 | Resolved: 2022-04-18

## 2022-04-18 PROBLEM — I95.9 HYPOTENSION: Status: RESOLVED | Noted: 2021-07-18 | Resolved: 2022-04-18

## 2022-04-18 PROBLEM — R10.11 RUQ ABDOMINAL PAIN: Status: RESOLVED | Noted: 2018-06-30 | Resolved: 2022-04-18

## 2022-04-18 PROBLEM — Z46.89 ENCOUNTER FOR REMOVAL OF BILIARY STENT: Status: RESOLVED | Noted: 2021-10-13 | Resolved: 2022-04-18

## 2022-04-18 PROBLEM — E66.811 CLASS 1 OBESITY WITH SERIOUS COMORBIDITY IN ADULT: Status: ACTIVE | Noted: 2022-04-18

## 2022-04-18 PROBLEM — K83.09 CHOLANGITIS: Status: RESOLVED | Noted: 2021-07-20 | Resolved: 2022-04-18

## 2022-04-18 PROBLEM — R11.10 EMESIS: Status: RESOLVED | Noted: 2021-07-18 | Resolved: 2022-04-18

## 2022-04-18 LAB
ALBUMIN SERPL BCP-MCNC: 3.9 G/DL (ref 3.5–5.2)
ALP SERPL-CCNC: 237 U/L (ref 55–135)
ALT SERPL W/O P-5'-P-CCNC: 47 U/L (ref 10–44)
ANION GAP SERPL CALC-SCNC: 11 MMOL/L (ref 8–16)
AST SERPL-CCNC: 31 U/L (ref 10–40)
BASOPHILS # BLD AUTO: 0.08 K/UL (ref 0–0.2)
BASOPHILS NFR BLD: 0.9 % (ref 0–1.9)
BILIRUB SERPL-MCNC: 0.7 MG/DL (ref 0.1–1)
BUN SERPL-MCNC: 25 MG/DL (ref 8–23)
CALCIUM SERPL-MCNC: 8.7 MG/DL (ref 8.7–10.5)
CHLORIDE SERPL-SCNC: 106 MMOL/L (ref 95–110)
CO2 SERPL-SCNC: 22 MMOL/L (ref 23–29)
CREAT SERPL-MCNC: 1.2 MG/DL (ref 0.5–1.4)
DIFFERENTIAL METHOD: ABNORMAL
EOSINOPHIL # BLD AUTO: 0.2 K/UL (ref 0–0.5)
EOSINOPHIL NFR BLD: 1.6 % (ref 0–8)
ERYTHROCYTE [DISTWIDTH] IN BLOOD BY AUTOMATED COUNT: 13.2 % (ref 11.5–14.5)
EST. GFR  (AFRICAN AMERICAN): >60 ML/MIN/1.73 M^2
EST. GFR  (NON AFRICAN AMERICAN): >60 ML/MIN/1.73 M^2
ESTIMATED AVG GLUCOSE: 160 MG/DL (ref 68–131)
GLUCOSE SERPL-MCNC: 118 MG/DL (ref 70–110)
HBA1C MFR BLD: 7.2 % (ref 4–5.6)
HCT VFR BLD AUTO: 45.8 % (ref 40–54)
HGB BLD-MCNC: 14.5 G/DL (ref 14–18)
IMM GRANULOCYTES # BLD AUTO: 0.02 K/UL (ref 0–0.04)
IMM GRANULOCYTES NFR BLD AUTO: 0.2 % (ref 0–0.5)
LYMPHOCYTES # BLD AUTO: 3.3 K/UL (ref 1–4.8)
LYMPHOCYTES NFR BLD: 35.5 % (ref 18–48)
MCH RBC QN AUTO: 29.4 PG (ref 27–31)
MCHC RBC AUTO-ENTMCNC: 31.7 G/DL (ref 32–36)
MCV RBC AUTO: 93 FL (ref 82–98)
MONOCYTES # BLD AUTO: 0.7 K/UL (ref 0.3–1)
MONOCYTES NFR BLD: 8.1 % (ref 4–15)
NEUTROPHILS # BLD AUTO: 4.9 K/UL (ref 1.8–7.7)
NEUTROPHILS NFR BLD: 53.7 % (ref 38–73)
NRBC BLD-RTO: 0 /100 WBC
PLATELET # BLD AUTO: 280 K/UL (ref 150–450)
PMV BLD AUTO: 10 FL (ref 9.2–12.9)
POTASSIUM SERPL-SCNC: 4.3 MMOL/L (ref 3.5–5.1)
PROT SERPL-MCNC: 7 G/DL (ref 6–8.4)
RBC # BLD AUTO: 4.93 M/UL (ref 4.6–6.2)
SODIUM SERPL-SCNC: 139 MMOL/L (ref 136–145)
WBC # BLD AUTO: 9.19 K/UL (ref 3.9–12.7)

## 2022-04-18 PROCEDURE — 4010F ACE/ARB THERAPY RXD/TAKEN: CPT | Mod: CPTII,S$GLB,, | Performed by: NURSE PRACTITIONER

## 2022-04-18 PROCEDURE — 3079F DIAST BP 80-89 MM HG: CPT | Mod: CPTII,S$GLB,, | Performed by: NURSE PRACTITIONER

## 2022-04-18 PROCEDURE — 1170F FXNL STATUS ASSESSED: CPT | Mod: CPTII,S$GLB,, | Performed by: NURSE PRACTITIONER

## 2022-04-18 PROCEDURE — G0439 PPPS, SUBSEQ VISIT: HCPCS | Mod: S$GLB,,, | Performed by: NURSE PRACTITIONER

## 2022-04-18 PROCEDURE — 85025 COMPLETE CBC W/AUTO DIFF WBC: CPT | Performed by: INTERNAL MEDICINE

## 2022-04-18 PROCEDURE — 3288F FALL RISK ASSESSMENT DOCD: CPT | Mod: CPTII,S$GLB,, | Performed by: NURSE PRACTITIONER

## 2022-04-18 PROCEDURE — 83036 HEMOGLOBIN GLYCOSYLATED A1C: CPT | Performed by: INTERNAL MEDICINE

## 2022-04-18 PROCEDURE — 1126F PR PAIN SEVERITY QUANTIFIED, NO PAIN PRESENT: ICD-10-PCS | Mod: CPTII,S$GLB,, | Performed by: NURSE PRACTITIONER

## 2022-04-18 PROCEDURE — 80053 COMPREHEN METABOLIC PANEL: CPT | Performed by: INTERNAL MEDICINE

## 2022-04-18 PROCEDURE — G0439 PR MEDICARE ANNUAL WELLNESS SUBSEQUENT VISIT: ICD-10-PCS | Mod: S$GLB,,, | Performed by: NURSE PRACTITIONER

## 2022-04-18 PROCEDURE — 36415 COLL VENOUS BLD VENIPUNCTURE: CPT | Mod: PO | Performed by: INTERNAL MEDICINE

## 2022-04-18 PROCEDURE — 99999 PR PBB SHADOW E&M-EST. PATIENT-LVL V: CPT | Mod: PBBFAC,,, | Performed by: NURSE PRACTITIONER

## 2022-04-18 PROCEDURE — 1101F PT FALLS ASSESS-DOCD LE1/YR: CPT | Mod: CPTII,S$GLB,, | Performed by: NURSE PRACTITIONER

## 2022-04-18 PROCEDURE — 4010F PR ACE/ARB THEARPY RXD/TAKEN: ICD-10-PCS | Mod: CPTII,S$GLB,, | Performed by: NURSE PRACTITIONER

## 2022-04-18 PROCEDURE — 1160F RVW MEDS BY RX/DR IN RCRD: CPT | Mod: CPTII,S$GLB,, | Performed by: NURSE PRACTITIONER

## 2022-04-18 PROCEDURE — 3075F SYST BP GE 130 - 139MM HG: CPT | Mod: CPTII,S$GLB,, | Performed by: NURSE PRACTITIONER

## 2022-04-18 PROCEDURE — 1170F PR FUNCTIONAL STATUS ASSESSED: ICD-10-PCS | Mod: CPTII,S$GLB,, | Performed by: NURSE PRACTITIONER

## 2022-04-18 PROCEDURE — 1160F PR REVIEW ALL MEDS BY PRESCRIBER/CLIN PHARMACIST DOCUMENTED: ICD-10-PCS | Mod: CPTII,S$GLB,, | Performed by: NURSE PRACTITIONER

## 2022-04-18 PROCEDURE — 1159F MED LIST DOCD IN RCRD: CPT | Mod: CPTII,S$GLB,, | Performed by: NURSE PRACTITIONER

## 2022-04-18 PROCEDURE — 3079F PR MOST RECENT DIASTOLIC BLOOD PRESSURE 80-89 MM HG: ICD-10-PCS | Mod: CPTII,S$GLB,, | Performed by: NURSE PRACTITIONER

## 2022-04-18 PROCEDURE — 1159F PR MEDICATION LIST DOCUMENTED IN MEDICAL RECORD: ICD-10-PCS | Mod: CPTII,S$GLB,, | Performed by: NURSE PRACTITIONER

## 2022-04-18 PROCEDURE — 1126F AMNT PAIN NOTED NONE PRSNT: CPT | Mod: CPTII,S$GLB,, | Performed by: NURSE PRACTITIONER

## 2022-04-18 PROCEDURE — 3051F PR MOST RECENT HEMOGLOBIN A1C LEVEL 7.0 - < 8.0%: ICD-10-PCS | Mod: CPTII,S$GLB,, | Performed by: NURSE PRACTITIONER

## 2022-04-18 PROCEDURE — 99999 PR PBB SHADOW E&M-EST. PATIENT-LVL V: ICD-10-PCS | Mod: PBBFAC,,, | Performed by: NURSE PRACTITIONER

## 2022-04-18 PROCEDURE — 1101F PR PT FALLS ASSESS DOC 0-1 FALLS W/OUT INJ PAST YR: ICD-10-PCS | Mod: CPTII,S$GLB,, | Performed by: NURSE PRACTITIONER

## 2022-04-18 PROCEDURE — 3008F BODY MASS INDEX DOCD: CPT | Mod: CPTII,S$GLB,, | Performed by: NURSE PRACTITIONER

## 2022-04-18 PROCEDURE — 3008F PR BODY MASS INDEX (BMI) DOCUMENTED: ICD-10-PCS | Mod: CPTII,S$GLB,, | Performed by: NURSE PRACTITIONER

## 2022-04-18 PROCEDURE — 3288F PR FALLS RISK ASSESSMENT DOCUMENTED: ICD-10-PCS | Mod: CPTII,S$GLB,, | Performed by: NURSE PRACTITIONER

## 2022-04-18 PROCEDURE — 3075F PR MOST RECENT SYSTOLIC BLOOD PRESS GE 130-139MM HG: ICD-10-PCS | Mod: CPTII,S$GLB,, | Performed by: NURSE PRACTITIONER

## 2022-04-18 PROCEDURE — 3051F HG A1C>EQUAL 7.0%<8.0%: CPT | Mod: CPTII,S$GLB,, | Performed by: NURSE PRACTITIONER

## 2022-04-18 SDOH — SOCIAL DETERMINANTS OF HEALTH (SDOH): PROBLEM RELATED TO HOUSING AND ECONOMIC CIRCUMSTANCES, UNSPECIFIED: Z59.9

## 2022-04-18 NOTE — PROGRESS NOTES
"Mario Sanford presented for a  Medicare AWV and comprehensive Health Risk Assessment today. The following components were reviewed and updated:    · Medical history  · Family History  · Social history  · Allergies and Current Medications  · Health Risk Assessment  · Health Maintenance  · Care Team     ** See Completed Assessments for Annual Wellness Visit within the encounter summary.**       The following assessments were completed:  · Living Situation  · CAGE  · Depression Screening  · Timed Get Up and Go  · Whisper Test  · Cognitive Function Screening  · Nutrition Screening  · ADL Screening  · PAQ Screening    Vitals:    04/18/22 1408 04/18/22 1437   BP: (!) 130/90 138/80   BP Location: Left arm    Pulse: 89    Resp: 15    SpO2: 97%    Weight: 94.2 kg (207 lb 10.8 oz)    Height: 5' 6" (1.676 m)      Body mass index is 33.52 kg/m².  Physical Exam  Constitutional:       Appearance: He is well-developed.   HENT:      Head: Normocephalic.      Comments: Wears face mask     Right Ear: External ear normal.      Left Ear: External ear normal.   Eyes:      General: No scleral icterus.  Cardiovascular:      Rate and Rhythm: Normal rate and regular rhythm.   Pulmonary:      Effort: Pulmonary effort is normal.      Breath sounds: Normal breath sounds.   Abdominal:      Palpations: Abdomen is soft.      Comments: protrudent   Musculoskeletal:         General: No swelling.   Skin:     General: Skin is warm and dry.   Neurological:      Mental Status: He is alert and oriented to person, place, and time.      Motor: No abnormal muscle tone.      Deep Tendon Reflexes: Reflexes are normal and symmetric.   Psychiatric:         Mood and Affect: Mood normal.         Behavior: Behavior normal.         Thought Content: Thought content normal.         Judgment: Judgment normal.           Lab Results   Component Value Date    HGBA1C 7.3 (H) 11/05/2021    CHOL 160 11/05/2021    HDL 45 11/05/2021    LDLCALC 91.2 11/05/2021    TRIG 119 " 11/05/2021    CHOLHDL 28.1 11/05/2021      Lab Results   Component Value Date    PSA 1.3 09/22/2020     No results found for this or any previous visit.  No components found for: [id number]          Diagnoses and health risks identified today and associated recommendations/orders:    1. DM type 2 without retinopathy  Stable- followed by optht    2. Hypertension associated with diabetes  Stable- followed by PCP    3. Hyperlipidemia associated with type 2 diabetes mellitus  Stable- followed by PCP    4. History of duodenal ulcer  Stable- followed by PCP    5. Esophagitis, Taylor grade D  Stable- followed by PCP,GI    6. Elevated liver enzymes  Stable- followed by PCP,GI    7. Benign prostatic hyperplasia, unspecified whether lower urinary tract symptoms present  Stable- followed by PCP    8. Abnormal finding on GI tract imaging  Stable- followed by PCP,GI    9. S/P TURP  Stable- followed by PCP    10. Obesity, diabetes, and hypertension syndrome  Stable- followed by PCP    11. Transaminitis  Stable- followed by PCP,GI    12. Stage 3a chronic kidney disease  Stable- followed by PCP    13. Normocytic anemia  Stable- followed by PCP    14. Type 2 diabetes mellitus with stage 3 chronic kidney disease, without long-term current use of insulin, unspecified whether stage 3a or 3b CKD  Stable- followed by PCP    15. Duodenal ulcer with hemorrhage but without obstruction  Stable- followed by PCP,GI    16. Anxiety  Stable- followed by PCP    17. Diabetic feet  Stable- followed by PCP  - Ambulatory referral/consult to Podiatry; Future    18. Financial difficulties  Stable- followed by PCP  - Ambulatory referral/consult to Outpatient Case Management    19. Cutaneous skin tags  Stable- followed by PCP  - Ambulatory referral/consult to Dermatology; Future    20. Skin cancer screening  Stable- followed by PCP  - Ambulatory referral/consult to Dermatology; Future    21. Abnormality of gait and mobility  Stable- followed by  PCP    22. Encounter for preventive health examination  Here for Health Risk Assessment/Annual Wellness Visit.  Health maintenance reviewed and updated. Follow up in one year.     23. Obesity  Chronic problem . Followed by PCP.   Centers for Disease Control and Prevention (CDC)  weight recommendations for current BMI & ideal BMI range discussed with patient.  Recommended  gradual weight loss,  mediterranean diet ,  diabetic diet , no added salt diet structured, regular exercise every day.      Provided Mario with a 5-10 year written screening schedule and personal prevention plan. Recommendations were developed using the USPSTF age appropriate recommendations. Education, counseling, and referrals were provided as needed. After Visit Summary printed and given to patient which includes a list of additional screenings\tests needed. Check with insurance regarding obesity benefits & options. Problem list reviewed & updated with patient.  Home bld sugar 118-135/ Abnormal whisper test- H/O SNHL- financially prohibited- referral placed to outpt case mgt.Also recommended [facility] [employer].He wishes to try OTC.  Had labs today ( A1c)-pending. DM f/U due- schedule F/U with PCP- pt interested in meeting with dietician DM education- not interested in group classes- Check B/P at home. Here with wife. Chronic Hip pain limits exercise level. DM foot exam due- referral to podiatry.    Follow up in about 1 year (around 4/18/2023).    EARL Arguelles offered to discuss advanced care planning, including how to pick a person who would make decisions for you if you were unable to make them for yourself, called a health care power of , and what kind of decisions you might make such as use of life sustaining treatments such as ventilators and tube feeding when faced with a life limiting illness recorded on a living will that they will need to know. (How you want to be cared for as you near the end of your natural  life)     X Patient is interested in learning more about how to make advanced directives.  I provided them paperwork and offered to discuss this with them.

## 2022-04-18 NOTE — PATIENT INSTRUCTIONS
Counseling and Referral of Other Preventative  (Italic type indicates deductible and co-insurance are waived)    Patient Name: Mario Sanford  Today's Date: 4/18/2022    Health Maintenance       Date Due Completion Date    Foot Exam Referral podiatry   9/13/2019 (Done)    COVID-19 Vaccine (2 - Booster for Ailyn series) 04/25/2022 check  web site   3/5/2021    Influenza Vaccine (1) Declined   9/13/2019    Shingles Vaccine (1 of 2) Declined   ---    Hemoglobin A1c Done today   11/5/2021    Diabetes Urine Screening 11/05/2022 11/5/2021    Lipid Panel 11/05/2022 11/5/2021    Eye Exam 03/15/2023 3/15/2022        Low Dose Statin 04/18/2023 4/18/2022    Colorectal Cancer Screening 06/19/2023 6/19/2018    TETANUS VACCINE    Follow up with PCP- DM mgt in 1 month- labs done today    Recheck B/P at home- no added salt    PCP to order to order DM education    Check insurance benefits for obesity    Hearing aides- referral to outpt case mgt             05/29/2028 5/29/2018        Orders Placed This Encounter   Procedures    Ambulatory referral/consult to Podiatry    Ambulatory referral/consult to Outpatient Case Management    Ambulatory referral/consult to Dermatology     The following information is provided to all patients.  This information is to help you find resources for any of the problems found today that may be affecting your health:                Living healthy guide: www.[facility].[place].gov      Understanding Diabetes: www.diabetes.org      Eating healthy: www.cdc.gov/healthyweight      CDC home safety checklist: www.cdc.gov/steadi/patient.html      Agency on Aging: www.goea.[place].[facility]      Alcoholics anonymous (AA): www.aa.org      Physical Activity: www.db.nih.gov/pr1ymfl      Tobacco use: www.quitwithusla.org

## 2022-06-24 ENCOUNTER — OFFICE VISIT (OUTPATIENT)
Dept: INTERNAL MEDICINE | Facility: CLINIC | Age: 67
End: 2022-06-24
Payer: MEDICARE

## 2022-06-24 VITALS
HEART RATE: 90 BPM | BODY MASS INDEX: 33.45 KG/M2 | WEIGHT: 208.13 LBS | HEIGHT: 66 IN | SYSTOLIC BLOOD PRESSURE: 118 MMHG | TEMPERATURE: 97 F | OXYGEN SATURATION: 96 % | DIASTOLIC BLOOD PRESSURE: 74 MMHG

## 2022-06-24 DIAGNOSIS — E11.59 OBESITY, DIABETES, AND HYPERTENSION SYNDROME: ICD-10-CM

## 2022-06-24 DIAGNOSIS — E11.69 HYPERLIPIDEMIA ASSOCIATED WITH TYPE 2 DIABETES MELLITUS: ICD-10-CM

## 2022-06-24 DIAGNOSIS — F41.9 ANXIETY: ICD-10-CM

## 2022-06-24 DIAGNOSIS — E66.9 OBESITY, DIABETES, AND HYPERTENSION SYNDROME: ICD-10-CM

## 2022-06-24 DIAGNOSIS — I15.2 HYPERTENSION ASSOCIATED WITH DIABETES: ICD-10-CM

## 2022-06-24 DIAGNOSIS — N18.30 TYPE 2 DIABETES MELLITUS WITH STAGE 3 CHRONIC KIDNEY DISEASE, WITHOUT LONG-TERM CURRENT USE OF INSULIN, UNSPECIFIED WHETHER STAGE 3A OR 3B CKD: ICD-10-CM

## 2022-06-24 DIAGNOSIS — E11.59 HYPERTENSION ASSOCIATED WITH DIABETES: ICD-10-CM

## 2022-06-24 DIAGNOSIS — E11.22 TYPE 2 DIABETES MELLITUS WITH STAGE 3 CHRONIC KIDNEY DISEASE, WITHOUT LONG-TERM CURRENT USE OF INSULIN, UNSPECIFIED WHETHER STAGE 3A OR 3B CKD: ICD-10-CM

## 2022-06-24 DIAGNOSIS — E11.69 OBESITY, DIABETES, AND HYPERTENSION SYNDROME: ICD-10-CM

## 2022-06-24 DIAGNOSIS — E78.5 HYPERLIPIDEMIA ASSOCIATED WITH TYPE 2 DIABETES MELLITUS: ICD-10-CM

## 2022-06-24 DIAGNOSIS — R74.8 ELEVATED ALKALINE PHOSPHATASE LEVEL: Primary | ICD-10-CM

## 2022-06-24 DIAGNOSIS — I15.2 OBESITY, DIABETES, AND HYPERTENSION SYNDROME: ICD-10-CM

## 2022-06-24 PROCEDURE — 1126F PR PAIN SEVERITY QUANTIFIED, NO PAIN PRESENT: ICD-10-PCS | Mod: CPTII,S$GLB,, | Performed by: INTERNAL MEDICINE

## 2022-06-24 PROCEDURE — 3078F PR MOST RECENT DIASTOLIC BLOOD PRESSURE < 80 MM HG: ICD-10-PCS | Mod: CPTII,S$GLB,, | Performed by: INTERNAL MEDICINE

## 2022-06-24 PROCEDURE — 3288F PR FALLS RISK ASSESSMENT DOCUMENTED: ICD-10-PCS | Mod: CPTII,S$GLB,, | Performed by: INTERNAL MEDICINE

## 2022-06-24 PROCEDURE — 4010F PR ACE/ARB THEARPY RXD/TAKEN: ICD-10-PCS | Mod: CPTII,S$GLB,, | Performed by: INTERNAL MEDICINE

## 2022-06-24 PROCEDURE — 99999 PR PBB SHADOW E&M-EST. PATIENT-LVL V: CPT | Mod: PBBFAC,,, | Performed by: INTERNAL MEDICINE

## 2022-06-24 PROCEDURE — 99999 PR PBB SHADOW E&M-EST. PATIENT-LVL V: ICD-10-PCS | Mod: PBBFAC,,, | Performed by: INTERNAL MEDICINE

## 2022-06-24 PROCEDURE — 1159F PR MEDICATION LIST DOCUMENTED IN MEDICAL RECORD: ICD-10-PCS | Mod: CPTII,S$GLB,, | Performed by: INTERNAL MEDICINE

## 2022-06-24 PROCEDURE — 3008F BODY MASS INDEX DOCD: CPT | Mod: CPTII,S$GLB,, | Performed by: INTERNAL MEDICINE

## 2022-06-24 PROCEDURE — 1159F MED LIST DOCD IN RCRD: CPT | Mod: CPTII,S$GLB,, | Performed by: INTERNAL MEDICINE

## 2022-06-24 PROCEDURE — 3051F PR MOST RECENT HEMOGLOBIN A1C LEVEL 7.0 - < 8.0%: ICD-10-PCS | Mod: CPTII,S$GLB,, | Performed by: INTERNAL MEDICINE

## 2022-06-24 PROCEDURE — 1101F PT FALLS ASSESS-DOCD LE1/YR: CPT | Mod: CPTII,S$GLB,, | Performed by: INTERNAL MEDICINE

## 2022-06-24 PROCEDURE — 1160F RVW MEDS BY RX/DR IN RCRD: CPT | Mod: CPTII,S$GLB,, | Performed by: INTERNAL MEDICINE

## 2022-06-24 PROCEDURE — 99499 UNLISTED E&M SERVICE: CPT | Mod: S$GLB,,, | Performed by: INTERNAL MEDICINE

## 2022-06-24 PROCEDURE — 1126F AMNT PAIN NOTED NONE PRSNT: CPT | Mod: CPTII,S$GLB,, | Performed by: INTERNAL MEDICINE

## 2022-06-24 PROCEDURE — 99499 RISK ADDL DX/OHS AUDIT: ICD-10-PCS | Mod: S$GLB,,, | Performed by: INTERNAL MEDICINE

## 2022-06-24 PROCEDURE — 4010F ACE/ARB THERAPY RXD/TAKEN: CPT | Mod: CPTII,S$GLB,, | Performed by: INTERNAL MEDICINE

## 2022-06-24 PROCEDURE — 3078F DIAST BP <80 MM HG: CPT | Mod: CPTII,S$GLB,, | Performed by: INTERNAL MEDICINE

## 2022-06-24 PROCEDURE — 3074F SYST BP LT 130 MM HG: CPT | Mod: CPTII,S$GLB,, | Performed by: INTERNAL MEDICINE

## 2022-06-24 PROCEDURE — 99214 OFFICE O/P EST MOD 30 MIN: CPT | Mod: S$GLB,,, | Performed by: INTERNAL MEDICINE

## 2022-06-24 PROCEDURE — 99214 PR OFFICE/OUTPT VISIT, EST, LEVL IV, 30-39 MIN: ICD-10-PCS | Mod: S$GLB,,, | Performed by: INTERNAL MEDICINE

## 2022-06-24 PROCEDURE — 1160F PR REVIEW ALL MEDS BY PRESCRIBER/CLIN PHARMACIST DOCUMENTED: ICD-10-PCS | Mod: CPTII,S$GLB,, | Performed by: INTERNAL MEDICINE

## 2022-06-24 PROCEDURE — 3074F PR MOST RECENT SYSTOLIC BLOOD PRESSURE < 130 MM HG: ICD-10-PCS | Mod: CPTII,S$GLB,, | Performed by: INTERNAL MEDICINE

## 2022-06-24 PROCEDURE — 3288F FALL RISK ASSESSMENT DOCD: CPT | Mod: CPTII,S$GLB,, | Performed by: INTERNAL MEDICINE

## 2022-06-24 PROCEDURE — 3008F PR BODY MASS INDEX (BMI) DOCUMENTED: ICD-10-PCS | Mod: CPTII,S$GLB,, | Performed by: INTERNAL MEDICINE

## 2022-06-24 PROCEDURE — 1101F PR PT FALLS ASSESS DOC 0-1 FALLS W/OUT INJ PAST YR: ICD-10-PCS | Mod: CPTII,S$GLB,, | Performed by: INTERNAL MEDICINE

## 2022-06-24 PROCEDURE — 3051F HG A1C>EQUAL 7.0%<8.0%: CPT | Mod: CPTII,S$GLB,, | Performed by: INTERNAL MEDICINE

## 2022-06-24 NOTE — PROGRESS NOTES
Subjective:       Patient ID: Mario Sanford is a 67 y.o. male.    Chief Complaint: Follow-up    HPI   Pt with HTN, T2DM, HLD, Obesity, CKD III, Anxiety, Elevated Alk Phos is here for 6 month f/u. No acute complaints today.   Review of Systems   Constitutional: Negative for activity change, appetite change, chills, diaphoresis, fatigue, fever and unexpected weight change.   HENT: Negative for postnasal drip, rhinorrhea, sinus pressure/congestion, sneezing, sore throat, trouble swallowing and voice change.    Respiratory: Negative for cough, shortness of breath and wheezing.    Cardiovascular: Negative for chest pain, palpitations and leg swelling.   Gastrointestinal: Negative for abdominal pain, blood in stool, constipation, diarrhea, nausea and vomiting.   Genitourinary: Negative for dysuria.   Musculoskeletal: Negative for arthralgias and myalgias.   Integumentary:  Negative for rash and wound.   Allergic/Immunologic: Negative for environmental allergies and food allergies.   Hematological: Negative for adenopathy. Does not bruise/bleed easily.   Psychiatric/Behavioral: Negative for self-injury and suicidal ideas. The patient is nervous/anxious.          Objective:      Physical Exam  Constitutional:       General: He is not in acute distress.     Appearance: He is well-developed. He is not diaphoretic.   HENT:      Head: Normocephalic and atraumatic.      Right Ear: External ear normal.      Left Ear: External ear normal.      Nose: Nose normal.      Mouth/Throat:      Pharynx: No oropharyngeal exudate.   Eyes:      General: No scleral icterus.        Right eye: No discharge.         Left eye: No discharge.      Conjunctiva/sclera: Conjunctivae normal.      Pupils: Pupils are equal, round, and reactive to light.   Neck:      Vascular: No JVD.   Cardiovascular:      Rate and Rhythm: Normal rate and regular rhythm.      Heart sounds: Normal heart sounds. No murmur heard.  Pulmonary:      Effort: Pulmonary effort is  normal. No respiratory distress.      Breath sounds: Normal breath sounds. No wheezing or rales.   Abdominal:      General: Bowel sounds are normal.      Palpations: Abdomen is soft.      Tenderness: There is no abdominal tenderness.   Musculoskeletal:      Cervical back: Normal range of motion and neck supple.   Lymphadenopathy:      Cervical: No cervical adenopathy.   Skin:     General: Skin is warm and dry.      Coloration: Skin is not pale.      Findings: No rash.   Neurological:      Mental Status: He is alert and oriented to person, place, and time.         Assessment:       Problem List Items Addressed This Visit        Psychiatric    Anxiety       Cardiac/Vascular    Hypertension associated with diabetes    Hyperlipidemia associated with type 2 diabetes mellitus       Endocrine    Type 2 diabetes mellitus, without long-term current use of insulin    Obesity, diabetes, and hypertension syndrome      Other Visit Diagnoses     Elevated alkaline phosphatase level    -  Primary    Relevant Orders    Ambulatory referral/consult to Gastroenterology          Plan:    HTN- controlled on Coreg 3.125 mg BID and Losartan 50 mg daily      T2DM- last HA1C of 7.2(4/22)<--7.3(11/21)<--6.1(9/20)<--6.3(3/20)<--6.2(9/19)<--6.4(12/18)<--5.8(8/18)       Continue Amaryl 2 mg qam       Losartan added 2/2 microalbuminuria       HLD- stable on Lipitor 40 mg daily      Diabetes, Obesity and HTN- pt advised on proper diet/exercise for weight loss      CKD III- stable      Anxiety- stable on Wellbutrin  mg qHS and Atarax 25 mg TID PRN     Elevated Alk Phos- referral to GI     F/u in 6 month for annual exam

## 2022-06-30 ENCOUNTER — OFFICE VISIT (OUTPATIENT)
Dept: HEPATOLOGY | Facility: CLINIC | Age: 67
End: 2022-06-30
Payer: MEDICARE

## 2022-06-30 VITALS
DIASTOLIC BLOOD PRESSURE: 68 MMHG | SYSTOLIC BLOOD PRESSURE: 141 MMHG | WEIGHT: 207 LBS | OXYGEN SATURATION: 96 % | HEART RATE: 94 BPM | BODY MASS INDEX: 33.41 KG/M2 | TEMPERATURE: 99 F

## 2022-06-30 DIAGNOSIS — R74.8 ELEVATED ALKALINE PHOSPHATASE LEVEL: Primary | ICD-10-CM

## 2022-06-30 DIAGNOSIS — R93.5 ABNORMAL FINDINGS ON DIAGNOSTIC IMAGING OF OTHER ABDOMINAL REGIONS, INCLUDING RETROPERITONEUM: ICD-10-CM

## 2022-06-30 DIAGNOSIS — R93.2 BILIARY TRACT IMAGING ABNORMALITY: ICD-10-CM

## 2022-06-30 DIAGNOSIS — R74.8 ELEVATED LIVER ENZYMES: ICD-10-CM

## 2022-06-30 PROCEDURE — 1160F PR REVIEW ALL MEDS BY PRESCRIBER/CLIN PHARMACIST DOCUMENTED: ICD-10-PCS | Mod: CPTII,S$GLB,, | Performed by: NURSE PRACTITIONER

## 2022-06-30 PROCEDURE — 3288F FALL RISK ASSESSMENT DOCD: CPT | Mod: CPTII,S$GLB,, | Performed by: NURSE PRACTITIONER

## 2022-06-30 PROCEDURE — 3008F PR BODY MASS INDEX (BMI) DOCUMENTED: ICD-10-PCS | Mod: CPTII,S$GLB,, | Performed by: NURSE PRACTITIONER

## 2022-06-30 PROCEDURE — 1101F PR PT FALLS ASSESS DOC 0-1 FALLS W/OUT INJ PAST YR: ICD-10-PCS | Mod: CPTII,S$GLB,, | Performed by: NURSE PRACTITIONER

## 2022-06-30 PROCEDURE — 3051F PR MOST RECENT HEMOGLOBIN A1C LEVEL 7.0 - < 8.0%: ICD-10-PCS | Mod: CPTII,S$GLB,, | Performed by: NURSE PRACTITIONER

## 2022-06-30 PROCEDURE — 4010F PR ACE/ARB THEARPY RXD/TAKEN: ICD-10-PCS | Mod: CPTII,S$GLB,, | Performed by: NURSE PRACTITIONER

## 2022-06-30 PROCEDURE — 99499 RISK ADDL DX/OHS AUDIT: ICD-10-PCS | Mod: S$GLB,,, | Performed by: NURSE PRACTITIONER

## 2022-06-30 PROCEDURE — 1160F RVW MEDS BY RX/DR IN RCRD: CPT | Mod: CPTII,S$GLB,, | Performed by: NURSE PRACTITIONER

## 2022-06-30 PROCEDURE — 3051F HG A1C>EQUAL 7.0%<8.0%: CPT | Mod: CPTII,S$GLB,, | Performed by: NURSE PRACTITIONER

## 2022-06-30 PROCEDURE — 1101F PT FALLS ASSESS-DOCD LE1/YR: CPT | Mod: CPTII,S$GLB,, | Performed by: NURSE PRACTITIONER

## 2022-06-30 PROCEDURE — 99499 UNLISTED E&M SERVICE: CPT | Mod: S$GLB,,, | Performed by: NURSE PRACTITIONER

## 2022-06-30 PROCEDURE — 1126F AMNT PAIN NOTED NONE PRSNT: CPT | Mod: CPTII,S$GLB,, | Performed by: NURSE PRACTITIONER

## 2022-06-30 PROCEDURE — 3078F DIAST BP <80 MM HG: CPT | Mod: CPTII,S$GLB,, | Performed by: NURSE PRACTITIONER

## 2022-06-30 PROCEDURE — 3077F SYST BP >= 140 MM HG: CPT | Mod: CPTII,S$GLB,, | Performed by: NURSE PRACTITIONER

## 2022-06-30 PROCEDURE — 99204 PR OFFICE/OUTPT VISIT, NEW, LEVL IV, 45-59 MIN: ICD-10-PCS | Mod: S$GLB,,, | Performed by: NURSE PRACTITIONER

## 2022-06-30 PROCEDURE — 3008F BODY MASS INDEX DOCD: CPT | Mod: CPTII,S$GLB,, | Performed by: NURSE PRACTITIONER

## 2022-06-30 PROCEDURE — 99204 OFFICE O/P NEW MOD 45 MIN: CPT | Mod: S$GLB,,, | Performed by: NURSE PRACTITIONER

## 2022-06-30 PROCEDURE — 99999 PR PBB SHADOW E&M-EST. PATIENT-LVL V: ICD-10-PCS | Mod: PBBFAC,,, | Performed by: NURSE PRACTITIONER

## 2022-06-30 PROCEDURE — 1126F PR PAIN SEVERITY QUANTIFIED, NO PAIN PRESENT: ICD-10-PCS | Mod: CPTII,S$GLB,, | Performed by: NURSE PRACTITIONER

## 2022-06-30 PROCEDURE — 3078F PR MOST RECENT DIASTOLIC BLOOD PRESSURE < 80 MM HG: ICD-10-PCS | Mod: CPTII,S$GLB,, | Performed by: NURSE PRACTITIONER

## 2022-06-30 PROCEDURE — 1159F MED LIST DOCD IN RCRD: CPT | Mod: CPTII,S$GLB,, | Performed by: NURSE PRACTITIONER

## 2022-06-30 PROCEDURE — 3077F PR MOST RECENT SYSTOLIC BLOOD PRESSURE >= 140 MM HG: ICD-10-PCS | Mod: CPTII,S$GLB,, | Performed by: NURSE PRACTITIONER

## 2022-06-30 PROCEDURE — 3288F PR FALLS RISK ASSESSMENT DOCUMENTED: ICD-10-PCS | Mod: CPTII,S$GLB,, | Performed by: NURSE PRACTITIONER

## 2022-06-30 PROCEDURE — 99999 PR PBB SHADOW E&M-EST. PATIENT-LVL V: CPT | Mod: PBBFAC,,, | Performed by: NURSE PRACTITIONER

## 2022-06-30 PROCEDURE — 1159F PR MEDICATION LIST DOCUMENTED IN MEDICAL RECORD: ICD-10-PCS | Mod: CPTII,S$GLB,, | Performed by: NURSE PRACTITIONER

## 2022-06-30 PROCEDURE — 4010F ACE/ARB THERAPY RXD/TAKEN: CPT | Mod: CPTII,S$GLB,, | Performed by: NURSE PRACTITIONER

## 2022-06-30 NOTE — PLAN OF CARE
I called the pt. He verified name and . I requested he bring nephrectomy and any urology records to appt or have them faxed to our office. Fax number was given. Pt verbalized understanding and all questions were answered. Problem: Patient Care Overview  Goal: Plan of Care Review  Outcome: Ongoing (interventions implemented as appropriate)  Patient is AAOx4, pleasant, cooperative; denies pain, nausea, discomfort.  Ambulated to bathroom several times during shift.  Vital signs stable.  Son at bedside.  Patient slept little this shift.  No falls or injuries.

## 2022-06-30 NOTE — PROGRESS NOTES
[facility] - New Patient     REFERRING PROVIDER: Dr. Bereket Rivera  PCP: Bereket Rivera DO    Chief Complaint: Elevated alk phos      HISTORY     This is a 67 y.o. male referred for evaluation of elevated alkaline phosphatase.    No known history of liver disease.    He was hospitalized last year with sepsis due to cholangitis. Blood cultures +vibrio species and E. Coli. His imaging at this time showed choledocholithiasis. He underwent ERCP with stone removal; biliary stents placed. Lap dayton done 7/21/21. Last ERCP 10/13/21 with biliary stent removed.    *Noted h/o choledochoduodenal fistula in 2018    During this admission, alk phos up to 455. TBili up to 6.8. Transaminases elevated, 200s-300s.     His liver tests have significantly improved. Synthetic function now normal. Alk phos has improved though remains elevated.    Review of labs:  - Transaminases: almost normal  - Alk phos: 237  - Synthetic liver function: WNL  - Platelets: WNL    Workup thus far:  Serologies:   Lab Results   Component Value Date    SMOOTHMUSCAB Negative 1:40 07/18/2021    AMAIFA Negative 1:40 07/18/2021    IGGSERUM 712 07/18/2021    ANASCREEN Negative <1:80 07/18/2021    FERRITIN 985 (H) 07/02/2018    FESATURATED 24 07/02/2018    PETH Negative 07/18/2021    Q1TZJIGGDY 140 07/18/2021    HEPBSAG Negative 07/18/2021    HEPBIGM Negative 07/18/2021    HEPCAB Negative 07/18/2021    HEPAIGM Negative 07/18/2021    CERULOPLSM 26.0 07/18/2021       Last imaging-  MRCP 7/2021, biliary dilation suspicious for choledocholithiasis   Abd US and CT 7/2021 with stable sub-cm liver lesion, favored to be hemangioma and noted on imaging since 2018    Risk factors for fatty liver:   · Weight -- Body mass index is 33.41 kg/m².                        · Dyslipidemia -- yes                               · Insulin resistance / diabetes -- yes       · Hypertension -- yes  · Alcohol use -- none    Family history:  No family history of  liver disease.    Meds:  -Rx: none concerning from liver standpoint  -OTC: n/a  -Herbs/supplements: n/a  No recent medication changes.     No h/o Crohns or UC.    Feels well, no abdominal pain or fevers.    Denies symptoms of hepatic decompensation including jaundice, ascites, cognitive problems to suggest hepatic encephalopathy, or GI bleeding.           Past medical history, surgical history, problem list, family history, social history, allergies: Reviewed and updated in the appropriate section of the electronic medical record.      Current Outpatient Medications   Medication Sig Dispense Refill    atorvastatin (LIPITOR) 40 MG tablet TAKE 1 TABLET BY MOUTH AT  NIGHT 90 tablet 3    blood sugar diagnostic Strp 1 strip by Misc.(Non-Drug; Combo Route) route once daily. Test strips for glucometer insurance covers to check blood sugar twice daily. 200 strip 3    buPROPion (WELLBUTRIN XL) 300 MG 24 hr tablet TAKE 1 TABLET BY MOUTH EVERY DAY 90 tablet 3    carvediloL (COREG) 3.125 MG tablet TAKE 1 TABLET BY MOUTH  TWICE DAILY 180 tablet 3    ciclopirox (PENLAC) 8 % Soln APPLY TO AFFECTED AREA AT NIGHT 6.6 mL 11    gabapentin (NEURONTIN) 300 MG capsule TAKE 1 CAPSULE BY MOUTH IN  THE EVENING 90 capsule 3    glimepiride (AMARYL) 2 MG tablet TAKE 1 TABLET BY MOUTH  BEFORE BREAKFAST 90 tablet 3    hydroxyzine HCL (ATARAX) 25 MG tablet 1/2-1 tab PO TID PRN anxiety 60 tablet 1    lancets Misc 1 Device by Misc.(Non-Drug; Combo Route) route once daily. Lancets for glucometer insurance covers to check blood sugar twice daily. 200 each 3    losartan (COZAAR) 50 MG tablet TAKE 1 TABLET BY MOUTH EVERY DAY 90 tablet 3    pantoprazole (PROTONIX) 40 MG tablet Take 1 tablet (40 mg total) by mouth once daily. 90 tablet 3    polyethylene glycol (GLYCOLAX) 17 gram PwPk Take 17 g by mouth once daily. 30 packet 0    k phos di & mono-sod phos mono (K-PHOS-NEUTRAL) 250 mg Tab Take 2 tablets by mouth daily with breakfast. for 7  days 14 tablet 0     No current facility-administered medications for this visit.     Medication list reviewed and updated.      Review of Systems   Constitutional: Negative for fatigue or unexpected weight change.   Respiratory: Negative for shortness of breath.    Cardiovascular: Negative for leg swelling.  Gastrointestinal: Negative for abdominal distention, abdominal pain, diarrhea, constipation, nausea, and vomiting. Negative for blood in stool, melena, or hematemesis.  Musculoskeletal: Negative for myalgias.    Skin: Negative for itching.  Neurological: Negative for dizziness or tremors. Negative for confusion, slowed mentation, or memory loss.   Hematological: Does not bruise/bleed easily.   Psychiatric: Negative for mood changes or sleep disturbance.      Physical Exam   Constitutional: Well-nourished. No distress. Alert and oriented.  Eyes: No scleral icterus.   Pulmonary/Chest: Respiratory effort normal. No respiratory distress.   Abdominal: No distension, no ascites appreciated.   Extremities: No edema.   Neurological: No tremor or asterixis. Gait normal.  Skin: No jaundice. No spider telangiectasias or palmar erythema.  Psychiatric: Normal mood and affect. Speech, behavior, and thought content normal. No depression or anxiety noted.       Vitals reviewed.  BP (!) 141/68 (BP Location: Right arm, Patient Position: Sitting, BP Method: Large (Automatic))   Pulse 94   Temp 98.9 °F (37.2 °C) (Oral)   Wt 93.9 kg (207 lb)   SpO2 96% Comment: RA  BMI 33.41 kg/m²         LABS & DIAGNOSTIC STUDIES     I have personally reviewed pertinent laboratory findings:    Lab Results   Component Value Date    ALT 47 (H) 04/18/2022    AST 31 04/18/2022    ALKPHOS 237 (H) 04/18/2022    BILITOT 0.7 04/18/2022    ALBUMIN 3.9 04/18/2022    INR 1.0 07/23/2021       Lab Results   Component Value Date    WBC 9.19 04/18/2022    HGB 14.5 04/18/2022    HCT 45.8 04/18/2022    MCV 93 04/18/2022     04/18/2022       Lab Results    Component Value Date     04/18/2022    K 4.3 04/18/2022    BUN 25 (H) 04/18/2022    CREATININE 1.2 04/18/2022    ESTGFRAFRICA >60.0 04/18/2022    EGFRNONAA >60.0 04/18/2022       Lab Results   Component Value Date    SMOOTHMUSCAB Negative 1:40 07/18/2021    AMAIFA Negative 1:40 07/18/2021    IGGSERUM 712 07/18/2021    ANASCREEN Negative <1:80 07/18/2021    FERRITIN 985 (H) 07/02/2018    FESATURATED 24 07/02/2018    PETH Negative 07/18/2021    CERULOPLSM 26.0 07/18/2021    HEPBSAG Negative 07/18/2021    HEPBIGM Negative 07/18/2021    HEPCAB Negative 07/18/2021    HEPAIGM Negative 07/18/2021    [id number] Negative 07/18/2021       No results found for: AFP    I have personally reviewed the following result reports:  Abdominal US - 7/19/21  CT - 7/18/21  MRI - 7/19/21  ERCP - 10/13/21        ASSESSMENT & PLAN     67 y.o. male with:    1. Elevated alkaline phosphatase  -- Elevated liver enzymes/LFTs due to biliary obstruction and cholangitis during hospitalization last year  -- His liver tests have improved though alk phos has yet to normalize. Last labs in April, will update now.  -- Last ERCP 10/2021, biliary stent removed at that time  -- ?PSC though abnormal liver tests appear to correlate with his admissions and prior biliary obstruction. Periods of normalized ALP noted. Will repeat MRI + MRCP to reevaluate liver and biliary tree.  -- Start with Fibroscan for fibrosis staging though may need to consider liver biopsy if ALP remains elevated       Orders Placed This Encounter   Procedures    FibroScan (Vibration Controlled Transient Elastography)    MRI Abdomen W WO Contrast_INC MRCP    Comprehensive Metabolic Panel       *See AVS for patient education and instructions.       Return to clinic after labs/MRI to review results and complete Fibroscan.      Thank you for allowing me to participate in the care of Mario Armijo, FNP-C  Hepatology        Duration of encounter: 45 min  This  includes face to face time and non-face to face time preparing to see the patient (eg, review of tests), obtaining and/or reviewing separately obtained history, documenting clinical information in the electronic or other health record, independently interpreting results and communicating results to the patient/family/caregiver, or Care coordination.

## 2022-07-01 DIAGNOSIS — E11.59 HYPERTENSION ASSOCIATED WITH DIABETES: ICD-10-CM

## 2022-07-01 DIAGNOSIS — Z00.00 ANNUAL PHYSICAL EXAM: ICD-10-CM

## 2022-07-01 DIAGNOSIS — Z12.5 ENCOUNTER FOR SCREENING FOR MALIGNANT NEOPLASM OF PROSTATE: ICD-10-CM

## 2022-07-01 DIAGNOSIS — E11.22 TYPE 2 DIABETES MELLITUS WITH STAGE 3 CHRONIC KIDNEY DISEASE, WITHOUT LONG-TERM CURRENT USE OF INSULIN, UNSPECIFIED WHETHER STAGE 3A OR 3B CKD: ICD-10-CM

## 2022-07-01 DIAGNOSIS — E11.69 HYPERLIPIDEMIA ASSOCIATED WITH TYPE 2 DIABETES MELLITUS: Primary | ICD-10-CM

## 2022-07-01 DIAGNOSIS — N18.30 TYPE 2 DIABETES MELLITUS WITH STAGE 3 CHRONIC KIDNEY DISEASE, WITHOUT LONG-TERM CURRENT USE OF INSULIN, UNSPECIFIED WHETHER STAGE 3A OR 3B CKD: ICD-10-CM

## 2022-07-01 DIAGNOSIS — I15.2 HYPERTENSION ASSOCIATED WITH DIABETES: ICD-10-CM

## 2022-07-01 DIAGNOSIS — E78.5 HYPERLIPIDEMIA ASSOCIATED WITH TYPE 2 DIABETES MELLITUS: Primary | ICD-10-CM

## 2022-07-01 PROBLEM — R74.01 TRANSAMINITIS: Status: RESOLVED | Noted: 2021-07-18 | Resolved: 2022-07-01

## 2022-07-06 ENCOUNTER — OFFICE VISIT (OUTPATIENT)
Dept: PODIATRY | Facility: CLINIC | Age: 67
End: 2022-07-06
Payer: MEDICARE

## 2022-07-06 VITALS
DIASTOLIC BLOOD PRESSURE: 86 MMHG | SYSTOLIC BLOOD PRESSURE: 155 MMHG | BODY MASS INDEX: 33.41 KG/M2 | WEIGHT: 207 LBS | HEART RATE: 100 BPM

## 2022-07-06 DIAGNOSIS — B35.1 ONYCHOMYCOSIS DUE TO DERMATOPHYTE: ICD-10-CM

## 2022-07-06 DIAGNOSIS — E11.51 DIABETES MELLITUS WITH PERIPHERAL VASCULAR DISEASE: Primary | ICD-10-CM

## 2022-07-06 PROCEDURE — 3077F PR MOST RECENT SYSTOLIC BLOOD PRESSURE >= 140 MM HG: ICD-10-PCS | Mod: CPTII,S$GLB,, | Performed by: PODIATRIST

## 2022-07-06 PROCEDURE — 3077F SYST BP >= 140 MM HG: CPT | Mod: CPTII,S$GLB,, | Performed by: PODIATRIST

## 2022-07-06 PROCEDURE — 1159F PR MEDICATION LIST DOCUMENTED IN MEDICAL RECORD: ICD-10-PCS | Mod: CPTII,S$GLB,, | Performed by: PODIATRIST

## 2022-07-06 PROCEDURE — 3008F PR BODY MASS INDEX (BMI) DOCUMENTED: ICD-10-PCS | Mod: CPTII,S$GLB,, | Performed by: PODIATRIST

## 2022-07-06 PROCEDURE — 99499 RISK ADDL DX/OHS AUDIT: ICD-10-PCS | Mod: S$GLB,,, | Performed by: PODIATRIST

## 2022-07-06 PROCEDURE — 99999 PR PBB SHADOW E&M-EST. PATIENT-LVL III: ICD-10-PCS | Mod: PBBFAC,,, | Performed by: PODIATRIST

## 2022-07-06 PROCEDURE — 3051F PR MOST RECENT HEMOGLOBIN A1C LEVEL 7.0 - < 8.0%: ICD-10-PCS | Mod: CPTII,S$GLB,, | Performed by: PODIATRIST

## 2022-07-06 PROCEDURE — 4010F PR ACE/ARB THEARPY RXD/TAKEN: ICD-10-PCS | Mod: CPTII,S$GLB,, | Performed by: PODIATRIST

## 2022-07-06 PROCEDURE — 99499 UNLISTED E&M SERVICE: CPT | Mod: S$GLB,,, | Performed by: PODIATRIST

## 2022-07-06 PROCEDURE — 11721 DEBRIDE NAIL 6 OR MORE: CPT | Mod: Q8,S$GLB,, | Performed by: PODIATRIST

## 2022-07-06 PROCEDURE — 3008F BODY MASS INDEX DOCD: CPT | Mod: CPTII,S$GLB,, | Performed by: PODIATRIST

## 2022-07-06 PROCEDURE — 3079F DIAST BP 80-89 MM HG: CPT | Mod: CPTII,S$GLB,, | Performed by: PODIATRIST

## 2022-07-06 PROCEDURE — 1126F AMNT PAIN NOTED NONE PRSNT: CPT | Mod: CPTII,S$GLB,, | Performed by: PODIATRIST

## 2022-07-06 PROCEDURE — 1159F MED LIST DOCD IN RCRD: CPT | Mod: CPTII,S$GLB,, | Performed by: PODIATRIST

## 2022-07-06 PROCEDURE — 3051F HG A1C>EQUAL 7.0%<8.0%: CPT | Mod: CPTII,S$GLB,, | Performed by: PODIATRIST

## 2022-07-06 PROCEDURE — 4010F ACE/ARB THERAPY RXD/TAKEN: CPT | Mod: CPTII,S$GLB,, | Performed by: PODIATRIST

## 2022-07-06 PROCEDURE — 3079F PR MOST RECENT DIASTOLIC BLOOD PRESSURE 80-89 MM HG: ICD-10-PCS | Mod: CPTII,S$GLB,, | Performed by: PODIATRIST

## 2022-07-06 PROCEDURE — 99203 PR OFFICE/OUTPT VISIT, NEW, LEVL III, 30-44 MIN: ICD-10-PCS | Mod: 25,S$GLB,, | Performed by: PODIATRIST

## 2022-07-06 PROCEDURE — 1126F PR PAIN SEVERITY QUANTIFIED, NO PAIN PRESENT: ICD-10-PCS | Mod: CPTII,S$GLB,, | Performed by: PODIATRIST

## 2022-07-06 PROCEDURE — 99203 OFFICE O/P NEW LOW 30 MIN: CPT | Mod: 25,S$GLB,, | Performed by: PODIATRIST

## 2022-07-06 PROCEDURE — 99999 PR PBB SHADOW E&M-EST. PATIENT-LVL III: CPT | Mod: PBBFAC,,, | Performed by: PODIATRIST

## 2022-07-06 PROCEDURE — 11721 PR DEBRIDEMENT OF NAILS, 6 OR MORE: ICD-10-PCS | Mod: Q8,S$GLB,, | Performed by: PODIATRIST

## 2022-07-06 NOTE — PROGRESS NOTES
Subjective:      Patient ID: Mario Sanford is a 67 y.o. male.    Chief Complaint: Diabetes Mellitus (/Bereket Rivera,  3/24/22/)    Mario is a 67 y.o. male who presents to the clinic for evaluation and treatment of high risk feet. Mario has a past medical history of Diabetes mellitus, Dilated cbd, acquired, Duodenal diverticulum, Duodenal fistula, Duodenal ulcer, Esophagitis, Hiatal hernia, Hyperlipidemia, Hypertension, Melena, Nodule of esophagus, and Obesity (BMI 30-39.9). The patient's chief complaint is long, thick toenails. This patient has documented high risk feet requiring routine maintenance secondary to peripheral vascular disease.    PCP: Bereket Rivera,     Date Last Seen by PCP:   Chief Complaint   Patient presents with    Diabetes Mellitus       Bereket Rivera,  3/24/22           Current shoe gear:  Affected Foot: Tennis shoes     Unaffected Foot: Tennis shoes    Hemoglobin A1C   Date Value Ref Range Status   04/18/2022 7.2 (H) 4.0 - 5.6 % Final     Comment:     ADA Screening Guidelines:  5.7-6.4%  Consistent with prediabetes  >or=6.5%  Consistent with diabetes    High levels of fetal hemoglobin interfere with the HbA1C  assay. Heterozygous hemoglobin variants (HbS, HgC, etc)do  not significantly interfere with this assay.   However, presence of multiple variants may affect accuracy.     11/05/2021 7.3 (H) 4.0 - 5.6 % Final     Comment:     ADA Screening Guidelines:  5.7-6.4%  Consistent with prediabetes  >or=6.5%  Consistent with diabetes    High levels of fetal hemoglobin interfere with the HbA1C  assay. Heterozygous hemoglobin variants (HbS, HgC, etc)do  not significantly interfere with this assay.   However, presence of multiple variants may affect accuracy.     07/19/2021 6.2 (H) 4.0 - 5.6 % Final     Comment:     ADA Screening Guidelines:  5.7-6.4%  Consistent with prediabetes  >or=6.5%  Consistent with diabetes    High levels of fetal hemoglobin interfere with the  HbA1C  assay. Heterozygous hemoglobin variants (HbS, HgC, etc)do  not significantly interfere with this assay.   However, presence of multiple variants may affect accuracy.         Review of Systems   Constitutional: Negative for chills, fever and malaise/fatigue.   HENT: Negative for hearing loss.    Cardiovascular: Negative for claudication.   Respiratory: Negative for shortness of breath.    Skin: Positive for color change, nail changes and unusual hair distribution. Negative for flushing and rash.   Musculoskeletal: Negative for joint pain and myalgias.   Gastrointestinal: Negative for nausea and vomiting.   Neurological: Negative for loss of balance, numbness, paresthesias and sensory change.   Psychiatric/Behavioral: Negative for altered mental status.           Objective:      Physical Exam  Vitals reviewed.   Constitutional:       Appearance: He is well-developed.   Cardiovascular:      Pulses:           Dorsalis pedis pulses are 0 on the right side and 0 on the left side.        Posterior tibial pulses are 1+ on the right side and 1+ on the left side.   Musculoskeletal:      Right ankle: Decreased range of motion. Abnormal pulse.      Right Achilles Tendon: Ashraf's test negative.      Left ankle: Decreased range of motion. Abnormal pulse.      Left Achilles Tendon: Ashraf's test negative.      Right foot: Decreased range of motion.      Left foot: Decreased range of motion.   Feet:      Right foot:      Protective Sensation: 5 sites tested. 5 sites sensed.      Left foot:      Protective Sensation: 5 sites tested. 5 sites sensed.   Skin:     General: Skin is cool and dry.      Capillary Refill: Capillary refill takes more than 3 seconds.   Neurological:      Mental Status: He is alert.      Comments: intact sensation noted to b/L lower extremities   Psychiatric:         Behavior: Behavior normal. Behavior is cooperative.         Nails x10 are elongated by  5-6mm's, thickened by 2-3 mm's, dystrophic,  and are yellowish in  coloration . Xerosis Bilaterally. No open lesions noted.             Assessment:       Encounter Diagnoses   Name Primary?    Diabetes mellitus with peripheral vascular disease Yes    Onychomycosis due to dermatophyte          Plan:       Mario was seen today for diabetes mellitus.    Diagnoses and all orders for this visit:    Diabetes mellitus with peripheral vascular disease    Onychomycosis due to dermatophyte      I counseled the patient on his conditions, their implications and medical management.      Rx diabetic shoes  - Shoe inspection. Diabetic Foot Education. Patient reminded of the importance of good nutrition and blood sugar control to help prevent podiatric complications of diabetes. Patient instructed on proper foot hygeine. We discussed wearing proper shoe gear, daily foot inspections, never walking without protective shoe gear, never putting sharp instruments to feet, routine podiatric nail visits every 2-3 months.      - With patient's permission, nails were aggressively reduced and debrided x 10 to their soft tissue attachment mechanically and with electric , removing all offending nail and debris. Patient relates relief following the procedure. He will continue to monitor the areas daily, inspect his feet, wear protective shoe gear when ambulatory, moisturizer to maintain skin integrity and follow in this office in approximately 2-3 months, sooner p.r.n.

## 2022-07-22 ENCOUNTER — TELEPHONE (OUTPATIENT)
Dept: HEPATOLOGY | Facility: CLINIC | Age: 67
End: 2022-07-22
Payer: MEDICARE

## 2022-07-22 ENCOUNTER — PATIENT MESSAGE (OUTPATIENT)
Dept: HEPATOLOGY | Facility: CLINIC | Age: 67
End: 2022-07-22
Payer: MEDICARE

## 2022-07-22 DIAGNOSIS — F40.240 CLAUSTROPHOBIA: Primary | ICD-10-CM

## 2022-07-22 RX ORDER — DIAZEPAM 2 MG/1
2 TABLET ORAL ONCE
Qty: 2 TABLET | Refills: 0 | Status: SHIPPED | OUTPATIENT
Start: 2022-07-22 | End: 2022-07-22

## 2022-07-22 NOTE — TELEPHONE ENCOUNTER
Telephone call to patient. RX for limited supply of Valium called in to [facility] pharmacy in [place], as below, to be taken prior to MRI.     diazePAM (VALIUM) 2 MG tablet 2 tablet 0 7/22/2022 7/22/2022 --   Sig - Route: Take 1 tablet (2 mg total) by mouth once. Take 1 tablet by mouth 30-60 minutes prior to MRI. May repeat once as needed. for 1 dose - Oral   Class: Phone In   Order: [id number]   Date/Time Signed: 7/22/2022 13:30            Hepatology Nurse Practitioner  [facility]

## 2022-07-28 ENCOUNTER — HOSPITAL ENCOUNTER (OUTPATIENT)
Dept: RADIOLOGY | Facility: HOSPITAL | Age: 67
Discharge: HOME | End: 2022-07-28
Attending: NURSE PRACTITIONER
Payer: MEDICARE

## 2022-07-28 DIAGNOSIS — R93.5 ABNORMAL FINDINGS ON DIAGNOSTIC IMAGING OF OTHER ABDOMINAL REGIONS, INCLUDING RETROPERITONEUM: ICD-10-CM

## 2022-07-28 DIAGNOSIS — R93.2 BILIARY TRACT IMAGING ABNORMALITY: ICD-10-CM

## 2022-07-28 DIAGNOSIS — R74.8 ELEVATED ALKALINE PHOSPHATASE LEVEL: ICD-10-CM

## 2022-07-28 PROCEDURE — A9585 GADOBUTROL INJECTION: HCPCS | Performed by: NURSE PRACTITIONER

## 2022-07-28 PROCEDURE — 74183 MRI ABD W/O CNTR FLWD CNTR: CPT | Mod: TC

## 2022-07-28 PROCEDURE — 76376 3D RENDER W/INTRP POSTPROCES: CPT | Mod: 26,,, | Performed by: RADIOLOGY

## 2022-07-28 PROCEDURE — 74183 MRI ABD W/O CNTR FLWD CNTR: CPT | Mod: 26,,, | Performed by: RADIOLOGY

## 2022-07-28 PROCEDURE — 76376 MRI ABDOMEN WITH AND WO_INC MRCP: ICD-10-PCS | Mod: 26,,, | Performed by: RADIOLOGY

## 2022-07-28 PROCEDURE — 25500020 PHARM REV CODE 255: Performed by: NURSE PRACTITIONER

## 2022-07-28 PROCEDURE — 74183 MRI ABDOMEN WITH AND WO_INC MRCP: ICD-10-PCS | Mod: 26,,, | Performed by: RADIOLOGY

## 2022-07-28 RX ORDER — GADOBUTROL 604.72 MG/ML
10 INJECTION INTRAVENOUS
Status: COMPLETED | OUTPATIENT
Start: 2022-07-28 | End: 2022-07-28

## 2022-07-28 RX ADMIN — GADOBUTROL 10 ML: 604.72 INJECTION INTRAVENOUS at 03:07

## 2022-08-05 ENCOUNTER — OFFICE VISIT (OUTPATIENT)
Dept: HEPATOLOGY | Facility: CLINIC | Age: 67
End: 2022-08-05
Payer: MEDICARE

## 2022-08-05 ENCOUNTER — PROCEDURE VISIT (OUTPATIENT)
Dept: HEPATOLOGY | Facility: CLINIC | Age: 67
End: 2022-08-05
Payer: MEDICARE

## 2022-08-05 VITALS
SYSTOLIC BLOOD PRESSURE: 164 MMHG | HEART RATE: 86 BPM | DIASTOLIC BLOOD PRESSURE: 78 MMHG | HEIGHT: 66 IN | BODY MASS INDEX: 33.8 KG/M2 | RESPIRATION RATE: 18 BRPM | OXYGEN SATURATION: 95 % | WEIGHT: 210.31 LBS | TEMPERATURE: 97 F

## 2022-08-05 DIAGNOSIS — R74.8 ELEVATED ALKALINE PHOSPHATASE LEVEL: Primary | ICD-10-CM

## 2022-08-05 DIAGNOSIS — K76.0 NAFLD (NONALCOHOLIC FATTY LIVER DISEASE): ICD-10-CM

## 2022-08-05 DIAGNOSIS — R74.8 ELEVATED ALKALINE PHOSPHATASE LEVEL: ICD-10-CM

## 2022-08-05 PROCEDURE — 3078F PR MOST RECENT DIASTOLIC BLOOD PRESSURE < 80 MM HG: ICD-10-PCS | Mod: CPTII,S$GLB,, | Performed by: NURSE PRACTITIONER

## 2022-08-05 PROCEDURE — 3051F PR MOST RECENT HEMOGLOBIN A1C LEVEL 7.0 - < 8.0%: ICD-10-PCS | Mod: CPTII,S$GLB,, | Performed by: NURSE PRACTITIONER

## 2022-08-05 PROCEDURE — 3008F PR BODY MASS INDEX (BMI) DOCUMENTED: ICD-10-PCS | Mod: CPTII,S$GLB,, | Performed by: NURSE PRACTITIONER

## 2022-08-05 PROCEDURE — 3077F PR MOST RECENT SYSTOLIC BLOOD PRESSURE >= 140 MM HG: ICD-10-PCS | Mod: CPTII,S$GLB,, | Performed by: NURSE PRACTITIONER

## 2022-08-05 PROCEDURE — 3078F DIAST BP <80 MM HG: CPT | Mod: CPTII,S$GLB,, | Performed by: NURSE PRACTITIONER

## 2022-08-05 PROCEDURE — 1101F PT FALLS ASSESS-DOCD LE1/YR: CPT | Mod: CPTII,S$GLB,, | Performed by: NURSE PRACTITIONER

## 2022-08-05 PROCEDURE — 3077F SYST BP >= 140 MM HG: CPT | Mod: CPTII,S$GLB,, | Performed by: NURSE PRACTITIONER

## 2022-08-05 PROCEDURE — 3051F HG A1C>EQUAL 7.0%<8.0%: CPT | Mod: CPTII,S$GLB,, | Performed by: NURSE PRACTITIONER

## 2022-08-05 PROCEDURE — 1101F PR PT FALLS ASSESS DOC 0-1 FALLS W/OUT INJ PAST YR: ICD-10-PCS | Mod: CPTII,S$GLB,, | Performed by: NURSE PRACTITIONER

## 2022-08-05 PROCEDURE — 99999 PR PBB SHADOW E&M-EST. PATIENT-LVL IV: ICD-10-PCS | Mod: PBBFAC,,, | Performed by: NURSE PRACTITIONER

## 2022-08-05 PROCEDURE — 3288F FALL RISK ASSESSMENT DOCD: CPT | Mod: CPTII,S$GLB,, | Performed by: NURSE PRACTITIONER

## 2022-08-05 PROCEDURE — 3288F PR FALLS RISK ASSESSMENT DOCUMENTED: ICD-10-PCS | Mod: CPTII,S$GLB,, | Performed by: NURSE PRACTITIONER

## 2022-08-05 PROCEDURE — 99999 PR PBB SHADOW E&M-EST. PATIENT-LVL IV: CPT | Mod: PBBFAC,,, | Performed by: NURSE PRACTITIONER

## 2022-08-05 PROCEDURE — 1159F PR MEDICATION LIST DOCUMENTED IN MEDICAL RECORD: ICD-10-PCS | Mod: CPTII,S$GLB,, | Performed by: NURSE PRACTITIONER

## 2022-08-05 PROCEDURE — 1126F AMNT PAIN NOTED NONE PRSNT: CPT | Mod: CPTII,S$GLB,, | Performed by: NURSE PRACTITIONER

## 2022-08-05 PROCEDURE — 3008F BODY MASS INDEX DOCD: CPT | Mod: CPTII,S$GLB,, | Performed by: NURSE PRACTITIONER

## 2022-08-05 PROCEDURE — 91200 LIVER ELASTOGRAPHY: CPT | Mod: S$GLB,,, | Performed by: NURSE PRACTITIONER

## 2022-08-05 PROCEDURE — 1159F MED LIST DOCD IN RCRD: CPT | Mod: CPTII,S$GLB,, | Performed by: NURSE PRACTITIONER

## 2022-08-05 PROCEDURE — 99214 PR OFFICE/OUTPT VISIT, EST, LEVL IV, 30-39 MIN: ICD-10-PCS | Mod: S$GLB,,, | Performed by: NURSE PRACTITIONER

## 2022-08-05 PROCEDURE — 99214 OFFICE O/P EST MOD 30 MIN: CPT | Mod: S$GLB,,, | Performed by: NURSE PRACTITIONER

## 2022-08-05 PROCEDURE — 4010F PR ACE/ARB THEARPY RXD/TAKEN: ICD-10-PCS | Mod: CPTII,S$GLB,, | Performed by: NURSE PRACTITIONER

## 2022-08-05 PROCEDURE — 1126F PR PAIN SEVERITY QUANTIFIED, NO PAIN PRESENT: ICD-10-PCS | Mod: CPTII,S$GLB,, | Performed by: NURSE PRACTITIONER

## 2022-08-05 PROCEDURE — 4010F ACE/ARB THERAPY RXD/TAKEN: CPT | Mod: CPTII,S$GLB,, | Performed by: NURSE PRACTITIONER

## 2022-08-05 PROCEDURE — 91200 FIBROSCAN (VIBRATION CONTROLLED TRANSIENT ELASTOGRAPHY): ICD-10-PCS | Mod: S$GLB,,, | Performed by: NURSE PRACTITIONER

## 2022-08-05 RX ORDER — PANTOPRAZOLE SODIUM 40 MG/1
TABLET, DELAYED RELEASE ORAL
Qty: 90 TABLET | Refills: 3 | Status: SHIPPED | OUTPATIENT
Start: 2022-08-05 | End: 2023-07-08

## 2022-08-05 NOTE — TELEPHONE ENCOUNTER
Refill Decision Note   Mario Jackpe  is requesting a refill authorization.  Brief Assessment and Rationale for Refill:  Approve     Medication Therapy Plan:       Medication Reconciliation Completed: No   Comments:     No Care Gaps recommended.     Note composed:9:27 AM 08/05/2022

## 2022-08-05 NOTE — PROGRESS NOTES
[facility] - Established Patient    Last Clinic Visit: 6/30/22    Chief Complaint: Follow-up for elevated alk phos        HISTORY     This is a 67 y.o. male with PMH noted below, here for follow-up of elevated alk phos.     No known history of liver disease.     He was hospitalized last year with sepsis due to cholangitis. Blood cultures +vibrio species and E. Coli. His imaging at this time showed choledocholithiasis. He underwent ERCP with stone removal; biliary stents placed. Lap dayton done 7/21/21. Last ERCP 10/13/21 with biliary stent removed.      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 Alk phos subsequently normal from 5243-1493.    During the admission last year, alk phos up to 455. TBili up to 6.8. Transaminases elevated, 200s-300s.      His liver tests have significantly improved. Synthetic function now normal. Alk phos has improved though remains elevated, 200s.    Serologic workup has been negative for Lawson's, alpha-1 antitrypsin deficiency, hemochromatosis, autoimmune etiology, and viral hepatitis. AMA negative.     No h/o Crohn's or UC.    Interval history:  MRI MRCP updated 7/28/22 with normal liver, no suspicious liver masses (stable hemangioma noted), no biliary dilation, no retained stones or strictures     Fibroscan today-  KPa 4.8, F0-F1  , S3 or >67% steatosis     Continues to feel well. No s/s hepatic decompensation.         Past medical history, surgical history, problem list, family history, social history, allergies: Reviewed and updated in the appropriate section of the electronic medical record.      Current Outpatient Medications   Medication Sig Dispense Refill    atorvastatin (LIPITOR) 40 MG tablet TAKE 1 TABLET BY MOUTH AT  NIGHT 90 tablet 3    blood sugar diagnostic Strp 1 strip by Misc.(Non-Drug; Combo Route) route once daily. Test strips for glucometer insurance covers to check blood sugar twice daily. 200 strip 3    buPROPion  (WELLBUTRIN XL) 300 MG 24 hr tablet TAKE 1 TABLET BY MOUTH EVERY DAY 90 tablet 3    carvediloL (COREG) 3.125 MG tablet TAKE 1 TABLET BY MOUTH  TWICE DAILY 180 tablet 3    ciclopirox (PENLAC) 8 % Soln APPLY TO AFFECTED AREA AT NIGHT 6.6 mL 11    gabapentin (NEURONTIN) 300 MG capsule TAKE 1 CAPSULE BY MOUTH IN  THE EVENING 90 capsule 3    glimepiride (AMARYL) 2 MG tablet TAKE 1 TABLET BY MOUTH  BEFORE BREAKFAST 90 tablet 3    hydroxyzine HCL (ATARAX) 25 MG tablet 1/2-1 tab PO TID PRN anxiety 60 tablet 1    lancets Misc 1 Device by Misc.(Non-Drug; Combo Route) route once daily. Lancets for glucometer insurance covers to check blood sugar twice daily. 200 each 3    losartan (COZAAR) 50 MG tablet TAKE 1 TABLET BY MOUTH EVERY DAY 90 tablet 3    pantoprazole (PROTONIX) 40 MG tablet TAKE 1 TABLET BY MOUTH ONCE DAILY 90 tablet 3    polyethylene glycol (GLYCOLAX) 17 gram PwPk Take 17 g by mouth once daily. 30 packet 0    diazePAM (VALIUM) 2 MG tablet Take 1 tablet (2 mg total) by mouth once. Take 1 tablet by mouth 30-60 minutes prior to MRI. May repeat once as needed. for 1 dose 2 tablet 0    k phos di & mono-sod phos mono (K-PHOS-NEUTRAL) 250 mg Tab Take 2 tablets by mouth daily with breakfast. for 7 days 14 tablet 0     No current facility-administered medications for this visit.     Medication list reviewed and updated.      Review of Systems   Constitutional: Negative for fatigue or unexpected weight change.   Respiratory: Negative for shortness of breath.    Cardiovascular: Negative for leg swelling.  Gastrointestinal: Negative for abdominal distention, abdominal pain, diarrhea, constipation, nausea, and vomiting. Negative for blood in stool, melena, or hematemesis.  Musculoskeletal: Negative for myalgias.    Skin: Negative for itching.  Neurological: Negative for dizziness or tremors. Negative for confusion, slowed mentation, or memory loss.   Hematological: Does not bruise/bleed easily.   Psychiatric:  "Negative for mood changes or sleep disturbance.      Physical Exam   Constitutional: Well-nourished. No distress. Alert and oriented.  Eyes: No scleral icterus.   Pulmonary/Chest: Respiratory effort normal. No respiratory distress.   Abdominal: No distension, no ascites appreciated.   Extremities: No edema.   Neurological: No tremor or asterixis. Gait normal.  Skin: No jaundice. No spider telangiectasias or palmar erythema.  Psychiatric: Normal mood and affect. Speech, behavior, and thought content normal. No depression or anxiety noted.       Vitals reviewed.  BP (!) 164/78 (BP Location: Right arm, Patient Position: Sitting, BP Method: Medium (Automatic))   Pulse 86   Temp 96.6 °F (35.9 °C) (Oral)   Resp 18   Ht 5' 6" (1.676 m)   Wt 95.4 kg (210 lb 5.1 oz)   SpO2 95%   BMI 33.95 kg/m²       LABS & DIAGNOSTIC STUDIES     I have personally reviewed pertinent laboratory findings:    Lab Results   Component Value Date    ALT 51 (H) 07/28/2022    AST 31 07/28/2022    ALKPHOS 245 (H) 07/28/2022    BILITOT 0.6 07/28/2022    ALBUMIN 4.1 07/28/2022    INR 1.0 07/23/2021       Lab Results   Component Value Date    WBC 9.19 04/18/2022    HGB 14.5 04/18/2022    HCT 45.8 04/18/2022    MCV 93 04/18/2022     04/18/2022       Lab Results   Component Value Date     07/28/2022    K 4.9 07/28/2022    BUN 20 07/28/2022    CREATININE 1.4 07/28/2022    ESTGFRAFRICA 58.8 (A) 07/28/2022    EGFRNONAA 50.9 (A) 07/28/2022       Lab Results   Component Value Date    SMOOTHMUSCAB Negative 1:40 07/18/2021    AMAIFA Negative 1:40 07/18/2021    IGGSERUM 712 07/18/2021    ANASCREEN Negative <1:80 07/18/2021    FERRITIN 985 (H) 07/02/2018    FESATURATED 24 07/02/2018    PETH Negative 07/18/2021    CERULOPLSM 26.0 07/18/2021    HEPBSAG Negative 07/18/2021    HEPBIGM Negative 07/18/2021    HEPCAB Negative 07/18/2021    HEPAIGM Negative 07/18/2021    [id number] Negative 07/18/2021       No results found for: AFP    I have personally " reviewed the following result reports:  Abdominal US - 7/19/21  CT - 7/18/21  MRI - 7/28/22  ERCP - 10/13/21      ASSESSMENT & PLAN     67 y.o. male with:    1. Elevated alkaline phosphatase  -- Elevated liver enzymes/LFTs due to biliary obstruction and cholangitis during hospitalization last year  -- His liver tests have improved though alk phos has yet to normalize a year later   -- Last ERCP 10/2021, biliary stent removed at that time  -- MRI MRCP updated. No concerning masses, biliary dilation, stones or strictures. No evidence of large duct PSC.  -- Serologic workup unrevealing. AMA negative.  -- Fibroscan today shows fatty liver though suspect his abnormal liver tests may be more biliary related.    Case discussed with Dr. Bravo.  Will plan for liver biopsy to rule out small duct PSC or AMA negative PBC. If indeterminate, may consider trial of ursodiol.     Discussed liver biopsy procedure and possible complications associated with liver biopsy including pain, bleeding, infection, and organ perforation. Reviewed the role of the procedure including confirming the diagnosis and staging of liver disease so patient can be appropriately followed from this point forward. Patient is agreeable to procedure. All questions answered.    2. Fatty liver  -- Discussed that the only treatment for fatty liver is weight loss and maintaining good control of metabolic risk factors (blood pressure, cholesterol, and blood sugar).         Orders Placed This Encounter   Procedures    IR Biopsy Liver    Ambulatory referral/consult  to [facility] Interventional RAD       *See AVS for patient education and instructions.      Return to clinic ~1 week after liver biopsy to review results.       Thank you for allowing me to participate in the care of Mario Armijo, FNP-C  Hepatology        Duration of encounter: 30 min  This includes face to face time and non-face to face time preparing to see the patient (eg, review of  tests), obtaining and/or reviewing separately obtained history, documenting clinical information in the electronic or other health record, independently interpreting results and communicating results to the patient/family/caregiver, or care coordination.

## 2022-08-05 NOTE — TELEPHONE ENCOUNTER
Care Due:                  Date            Visit Type   Department     Provider  --------------------------------------------------------------------------------                                EP -                              PRIMARY      [facility] INTERNAL  Last Visit: 06-      CARE ([facility])   [facility]       Bereket Rivera                              EP -                              PRIMARY      [facility] INTERNAL  Next Visit: 12-      CARE ([facility])   [facility]       Bereket Rivera                                                            Last  Test          Frequency    Reason                     Performed    Due Date  --------------------------------------------------------------------------------    HBA1C.......  6 months...  glimepiride..............  04-   10-    Lipid Panel.  12 months..  atorvastatin.............  11-   10-    [facility] Embedded Care Gaps. Reference number: [id number]. 8/04/2022   10:27:20 PM CDT

## 2022-08-05 NOTE — PATIENT INSTRUCTIONS
Plan for liver biopsy to clarify why alkaline phosphatase remains elevated. We will plan for follow-up visit about a week later to review results.

## 2022-08-05 NOTE — PROCEDURES
FibroScan (Vibration Controlled Transient Elastography)    Date/Time: 8/5/2022 1:30 PM  Performed by: Theresa Armijo NP  Authorized by: Theresa Armijo NP     Diagnosis:  Cholestatic    Probe:  XL    Universal Protocol: Patient's identity, procedure and site were verified, confirmatory pause was performed.  Discussed procedure including risks and potential complications.  Questions answered.  Patient verbalizes understanding and wishes to proceed with VCTE.     Procedure: After providing explanations of the procedure, patient was placed in the supine position with right arm in maximum abduction to allow optimal exposure of right lateral abdomen.  Patient was briefly assessed, Testing was performed in the mid-axillary location, 50Hz Shear Wave pulses were applied and the resulting Shear Wave and Propagation Speed detected with a 3.5 MHz ultrasonic signal, using the FibroScan probe, Skin to liver capsule distance and liver parenchyma were accessed during the entire examination with the FibroScan probe, Patient was instructed to breathe normally and to abstain from sudden movements during the procedure, allowing for random measurements of liver stiffness. At least 10 Shear Waves were produced, Individual measurements of each Shear Wave were calculated.  Patient tolerated the procedure well with no complications.  Meets discharge criteria as was dismissed.  Rates pain 0 out of 10.  Patient will follow up with ordering provider to review results.      Findings  Median liver stiffness score:  4.8  CAP Reading: dB/m:  294    IQR/med %:  6  Interpretation  Fibrosis interpretation is based on medial liver stiffness - Kilopascal (kPa).    Fibrosis Stage:  F 0-1  Steatosis interpretation is based on controlled attenuation parameter - (dB/m).    Steatosis Grade:  S3

## 2022-08-05 NOTE — Clinical Note
Arranging US guided liver biopsy for patient. Please assist with scheduling f/u visit ~1 week after biopsy date has been selected. Thanks!

## 2022-08-09 ENCOUNTER — TELEPHONE (OUTPATIENT)
Dept: INTERVENTIONAL RADIOLOGY/VASCULAR | Facility: CLINIC | Age: 67
End: 2022-08-09
Payer: MEDICARE

## 2022-08-10 ENCOUNTER — TELEPHONE (OUTPATIENT)
Dept: INTERVENTIONAL RADIOLOGY/VASCULAR | Facility: CLINIC | Age: 67
End: 2022-08-10
Payer: MEDICARE

## 2022-08-12 ENCOUNTER — TELEPHONE (OUTPATIENT)
Dept: HEPATOLOGY | Facility: CLINIC | Age: 67
End: 2022-08-12
Payer: MEDICARE

## 2022-08-12 NOTE — TELEPHONE ENCOUNTER
----- Message from Darnell Curran MA sent at 8/6/2022  8:41 AM CDT -----    ----- Message -----  From: Theresa Armijo NP  Sent: 8/5/2022   4:18 PM CDT  To: [facility] Clinical Staff    Arranging US guided liver biopsy for patient. Please assist with scheduling f/u visit ~1 week after biopsy date has been selected. Thanks!

## 2022-08-15 ENCOUNTER — TELEPHONE (OUTPATIENT)
Dept: HEPATOLOGY | Facility: CLINIC | Age: 67
End: 2022-08-15
Payer: MEDICARE

## 2022-08-18 ENCOUNTER — OFFICE VISIT (OUTPATIENT)
Dept: DERMATOLOGY | Facility: CLINIC | Age: 67
End: 2022-08-18
Payer: MEDICARE

## 2022-08-18 DIAGNOSIS — L73.8 SEBACEOUS HYPERPLASIA: ICD-10-CM

## 2022-08-18 DIAGNOSIS — Z12.83 SKIN CANCER SCREENING: ICD-10-CM

## 2022-08-18 DIAGNOSIS — L72.0 EIC (EPIDERMAL INCLUSION CYST): ICD-10-CM

## 2022-08-18 DIAGNOSIS — L91.8 CUTANEOUS SKIN TAGS: ICD-10-CM

## 2022-08-18 DIAGNOSIS — D48.5 NEOPLASM OF UNCERTAIN BEHAVIOR OF SKIN: Primary | ICD-10-CM

## 2022-08-18 DIAGNOSIS — Z12.83 SCREENING EXAM FOR SKIN CANCER: ICD-10-CM

## 2022-08-18 DIAGNOSIS — D18.01 CHERRY ANGIOMA: ICD-10-CM

## 2022-08-18 DIAGNOSIS — L57.0 AK (ACTINIC KERATOSIS): ICD-10-CM

## 2022-08-18 DIAGNOSIS — L82.1 SEBORRHEIC KERATOSES: ICD-10-CM

## 2022-08-18 PROCEDURE — 99203 OFFICE O/P NEW LOW 30 MIN: CPT | Mod: 25,S$GLB,, | Performed by: DERMATOLOGY

## 2022-08-18 PROCEDURE — 3051F PR MOST RECENT HEMOGLOBIN A1C LEVEL 7.0 - < 8.0%: ICD-10-PCS | Mod: CPTII,S$GLB,, | Performed by: DERMATOLOGY

## 2022-08-18 PROCEDURE — 88342 IMHCHEM/IMCYTCHM 1ST ANTB: CPT | Performed by: PATHOLOGY

## 2022-08-18 PROCEDURE — 1101F PR PT FALLS ASSESS DOC 0-1 FALLS W/OUT INJ PAST YR: ICD-10-PCS | Mod: CPTII,S$GLB,, | Performed by: DERMATOLOGY

## 2022-08-18 PROCEDURE — 3288F PR FALLS RISK ASSESSMENT DOCUMENTED: ICD-10-PCS | Mod: CPTII,S$GLB,, | Performed by: DERMATOLOGY

## 2022-08-18 PROCEDURE — 11102 TANGNTL BX SKIN SINGLE LES: CPT | Mod: S$GLB,,, | Performed by: DERMATOLOGY

## 2022-08-18 PROCEDURE — 17003 DESTRUCTION, PREMALIGNANT LESIONS; SECOND THROUGH 14 LESIONS: ICD-10-PCS | Mod: 59,S$GLB,, | Performed by: DERMATOLOGY

## 2022-08-18 PROCEDURE — 88305 TISSUE EXAM BY PATHOLOGIST: CPT | Mod: 26,,, | Performed by: PATHOLOGY

## 2022-08-18 PROCEDURE — 88342 IMHCHEM/IMCYTCHM 1ST ANTB: CPT | Mod: 26,,, | Performed by: PATHOLOGY

## 2022-08-18 PROCEDURE — 99999 PR PBB SHADOW E&M-EST. PATIENT-LVL III: ICD-10-PCS | Mod: PBBFAC,,, | Performed by: DERMATOLOGY

## 2022-08-18 PROCEDURE — 1126F AMNT PAIN NOTED NONE PRSNT: CPT | Mod: CPTII,S$GLB,, | Performed by: DERMATOLOGY

## 2022-08-18 PROCEDURE — 17000 PR DESTRUCTION(LASER SURGERY,CRYOSURGERY,CHEMOSURGERY),PREMALIGNANT LESIONS,FIRST LESION: ICD-10-PCS | Mod: 59,S$GLB,, | Performed by: DERMATOLOGY

## 2022-08-18 PROCEDURE — 88342 CHG IMMUNOCYTOCHEMISTRY: ICD-10-PCS | Mod: 26,,, | Performed by: PATHOLOGY

## 2022-08-18 PROCEDURE — 1101F PT FALLS ASSESS-DOCD LE1/YR: CPT | Mod: CPTII,S$GLB,, | Performed by: DERMATOLOGY

## 2022-08-18 PROCEDURE — 3051F HG A1C>EQUAL 7.0%<8.0%: CPT | Mod: CPTII,S$GLB,, | Performed by: DERMATOLOGY

## 2022-08-18 PROCEDURE — 3288F FALL RISK ASSESSMENT DOCD: CPT | Mod: CPTII,S$GLB,, | Performed by: DERMATOLOGY

## 2022-08-18 PROCEDURE — 1126F PR PAIN SEVERITY QUANTIFIED, NO PAIN PRESENT: ICD-10-PCS | Mod: CPTII,S$GLB,, | Performed by: DERMATOLOGY

## 2022-08-18 PROCEDURE — 17003 DESTRUCT PREMALG LES 2-14: CPT | Mod: 59,S$GLB,, | Performed by: DERMATOLOGY

## 2022-08-18 PROCEDURE — 17000 DESTRUCT PREMALG LESION: CPT | Mod: 59,S$GLB,, | Performed by: DERMATOLOGY

## 2022-08-18 PROCEDURE — 99203 PR OFFICE/OUTPT VISIT, NEW, LEVL III, 30-44 MIN: ICD-10-PCS | Mod: 25,S$GLB,, | Performed by: DERMATOLOGY

## 2022-08-18 PROCEDURE — 1159F MED LIST DOCD IN RCRD: CPT | Mod: CPTII,S$GLB,, | Performed by: DERMATOLOGY

## 2022-08-18 PROCEDURE — 4010F PR ACE/ARB THEARPY RXD/TAKEN: ICD-10-PCS | Mod: CPTII,S$GLB,, | Performed by: DERMATOLOGY

## 2022-08-18 PROCEDURE — 11102 PR TANGENTIAL BIOPSY, SKIN, SINGLE LESION: ICD-10-PCS | Mod: S$GLB,,, | Performed by: DERMATOLOGY

## 2022-08-18 PROCEDURE — 88305 TISSUE EXAM BY PATHOLOGIST: ICD-10-PCS | Mod: 26,,, | Performed by: PATHOLOGY

## 2022-08-18 PROCEDURE — 1159F PR MEDICATION LIST DOCUMENTED IN MEDICAL RECORD: ICD-10-PCS | Mod: CPTII,S$GLB,, | Performed by: DERMATOLOGY

## 2022-08-18 PROCEDURE — 88305 TISSUE EXAM BY PATHOLOGIST: CPT | Performed by: PATHOLOGY

## 2022-08-18 PROCEDURE — 99999 PR PBB SHADOW E&M-EST. PATIENT-LVL III: CPT | Mod: PBBFAC,,, | Performed by: DERMATOLOGY

## 2022-08-18 PROCEDURE — 4010F ACE/ARB THERAPY RXD/TAKEN: CPT | Mod: CPTII,S$GLB,, | Performed by: DERMATOLOGY

## 2022-08-18 NOTE — PATIENT INSTRUCTIONS
CRYOSURGERY      Your doctor has used a method called cryosurgery to treat your skin condition. Cryosurgery refers to the use of very cold substances to treat a variety of skin conditions such as warts, pre-skin cancers, molluscum contagiosum, sun spots, and several benign growths. The substance we use in cryosurgery is liquid nitrogen and is so cold (-195 degrees Celsius) that is burns when administered.     Following treatment in the office, the skin may immediately burn and become red. You may find the area around the lesion is affected as well. It is sometimes necessary to treat not only the lesion, but a small area of the surrounding normal skin to achieve a good response.     A blister, and even a blood filled blister, may form after treatment.   This is a normal response. If the blister is painful, it is acceptable to sterilize a needle and with rubbing alcohol and gently pop the blister. It is important that you gently wash the area with soap and warm water as the blister fluid may contain wart virus if a wart was treated. Do no remove the roof of the blister.     The area treated can take anywhere from 1-3 weeks to heal. Healing time depends on the kind skin lesion treated, the location, and how aggressively the lesion was treated. It is recommended that the areas treated are covered with Vaseline or bacitracin ointment and a band-aid. If a band-aid is not practical, just ointment applied several times per day will do. Keeping these areas moist will speed the healing time.    Treatment with liquid nitrogen can leave a scar. In dark skin, it may be a light or dark scar, in light skin it may be a white or pink scar. These will generally fade with time, but may never go away completely.     If you have any concerns after your treatment, please feel free to call the office.       [address] [place] [address]/ [phone] FAX/ [email or web address]  Shave Biopsy Wound Care    Your  doctor has performed a shave biopsy today.  A band aid and vaseline ointment has been placed over the site.  This should remain in place for NO LONGER THAN 48 hours.  It is fine to remove the bandaid after 24 hours, if the area is no longer bleeding. It is recommended that you keep the area dry (do not wet)) for the first 24 hours.  After 24 hours, wash the area with warm soap and water and apply Vaseline jelly.  Many patients prefer to use Neosporin or Bacitracin ointment.  This is acceptable; however, know that you can develop an allergy to this medication even if you have used it safely for years.  It is important to keep the area moist.  Letting it dry out and get air slows healing time, and will worsen the scar.        If you notice increasing redness, tenderness, pain, or yellow drainage at the biopsy site, please notify your doctor.  These are signs of an infection.    If your biopsy site is bleeding, apply firm pressure for 15 minutes straight.  Repeat for another 15 minutes, if it is still bleeding.   If the surgical site continues to bleed, then please contact your doctor.      For [facility] users:   You will receive your biopsy results in [facility] as soon as they are available. Please be assured that your physician/provider will review your results and will then determine what further treatment, evaluation, or planning is required. You should be contacted by your physician's/provider's office within 5 business days of receiving your results; If not, please reach out to directly. This is one more way [facility] is putting you first.     [facility] [place] [address]/ [phone] FAX/ [email or web address]

## 2022-08-18 NOTE — PROGRESS NOTES
Subjective:       Patient ID:  Mario Sanford is a 67 y.o. male who presents for   Chief Complaint   Patient presents with    Lesion     chest    Skin Tags     Shoulders, chest      Lesion - Initial  Affected locations: chest  Signs / symptoms: itching  Severity: mild  Timing: constant  Aggravated by: nothing  Relieving factors/Treatments tried: nothing    Skin Tags - Initial  Affected locations: left shoulder, right shoulder and chest  Signs / symptoms: asymptomatic  Severity: mild  Timing: constant  Aggravated by: nothing        Review of Systems   Skin: Positive for wears hat. Negative for sun sensitivity and daily sunscreen use.   Hematologic/Lymphatic: Does not bruise/bleed easily.        Objective:    Physical Exam   Constitutional: He appears well-developed and well-nourished. No distress.   Neurological: He is alert and oriented to person, place, and time. He is not disoriented.   Psychiatric: He has a normal mood and affect.   Skin:   Areas Examined (abnormalities noted in diagram):   Chest / Axilla Inspection Performed                   Diagram Legend     Erythematous scaling macule/papule c/w actinic keratosis       Vascular papule c/w angioma      Pigmented verrucoid papule/plaque c/w seborrheic keratosis      Yellow umbilicated papule c/w sebaceous hyperplasia      Irregularly shaped tan macule c/w lentigo     1-2 mm smooth white papules consistent with Milia      Movable subcutaneous cyst with punctum c/w epidermal inclusion cyst      Subcutaneous movable cyst c/w pilar cyst      Firm pink to brown papule c/w dermatofibroma      Pedunculated fleshy papule(s) c/w skin tag(s)      Evenly pigmented macule c/w junctional nevus     Mildly variegated pigmented, slightly irregular-bordered macule c/w mildly atypical nevus      Flesh colored to evenly pigmented papule c/w intradermal nevus       Pink pearly papule/plaque c/w basal cell carcinoma      Erythematous hyperkeratotic cursted plaque c/w SCC       Surgical scar with no sign of skin cancer recurrence      Open and closed comedones      Inflammatory papules and pustules      Verrucoid papule consistent consistent with wart     Erythematous eczematous patches and plaques     Dystrophic onycholytic nail with subungual debris c/w onychomycosis     Umbilicated papule    Erythematous-base heme-crusted tan verrucoid plaque consistent with inflamed seborrheic keratosis     Erythematous Silvery Scaling Plaque c/w Psoriasis     See annotation              Assessment / Plan:      Pathology Orders:     Normal Orders This Visit    Specimen to Pathology, Dermatology     Questions:    Procedure Type: Dermatology and skin neoplasms    Number of Specimens: 1    ------------------------: -------------------------    Spec 1 Procedure: Biopsy    Spec 1 Clinical Impression: r/o isk vs isk + melanoma    Spec 1 Source: mid lower neck    Release to patient: Immediate        Neoplasm of uncertain behavior of skin  -     Specimen to Pathology, Dermatology  Shave biopsy procedure note:    Shave biopsy performed after verbal consent including risk of infection, scar, recurrence, need for additional treatment of site. Area prepped with alcohol, anesthetized with approximately 1.0cc of 1% lidocaine with epinephrine. Lesional tissue shaved with razor blade. Hemostasis achieved with application of aluminum chloride followed by hyfrecation. No complications. Dressing applied. Wound care explained.      Cutaneous skin tags  -     Ambulatory referral/consult to Dermatology    Skin cancer screening  -     Ambulatory referral/consult to Dermatology    Cherry angioma  This is a benign vascular lesion. Reassurance given. No treatment required.     AK (actinic keratosis)  Cryosurgery Procedure Note    Verbal consent from the patient is obtained including, but not limited to, risk of hypopigmentation/hyperpigmentation, scar, recurrence of lesion. The patient is aware of the precancerous quality and  need for treatment of these lesions. Liquid nitrogen cryosurgery is applied to the 4 actinic keratoses, as detailed in the physical exam, to produce a freeze injury. The patient is aware that blisters may form and is instructed on wound care with gentle cleansing and use of vaseline ointment to keep moist until healed. The patient is supplied a handout on cryosurgery and is instructed to call if lesions do not completely resolve.    Screening exam for skin cancer  Patient instructed in importance in daily sun protection of at least spf 30. Sun avoidance and topical protection discussed.     Recommend  for daily use on face and neck.    Patient encouraged to wear hat for all outdoor exposure.     Also discussed sun protective clothing. [employer]ASC or [employer] are good brands, UPF 50 or above. Recommend long sleeves when out in the sun.     Sebaceous hyperplasia  This is a common condition representing benign enlargement of the sebaceous lobule. It typically occurs in adulthood. Reassurance given to patient.       Seborrheic keratoses  These are benign inherited growths without a malignant potential. Reassurance given to patient. No treatment is necessary.     EIC (epidermal inclusion cyst)  Reassurance given to patient. No treatment is necessary.   Discussed treatment options - excision vs observation. Cysts may recur with excision. Pt will defer treatment at this time.       F/u 6 mo ubse           No follow-ups on file.

## 2022-08-22 ENCOUNTER — PATIENT OUTREACH (OUTPATIENT)
Dept: ADMINISTRATIVE | Facility: HOSPITAL | Age: 67
End: 2022-08-22
Payer: MEDICARE

## 2022-08-25 LAB
FINAL PATHOLOGIC DIAGNOSIS: NORMAL
GROSS: NORMAL
Lab: NORMAL
MICROSCOPIC EXAM: NORMAL

## 2022-08-29 ENCOUNTER — PATIENT OUTREACH (OUTPATIENT)
Dept: ADMINISTRATIVE | Facility: HOSPITAL | Age: 67
End: 2022-08-29
Payer: MEDICARE

## 2022-09-01 RX ORDER — ATORVASTATIN CALCIUM 40 MG/1
TABLET, FILM COATED ORAL
Qty: 90 TABLET | Refills: 0 | Status: SHIPPED | OUTPATIENT
Start: 2022-09-01 | End: 2022-11-23

## 2022-09-02 RX ORDER — FENTANYL CITRATE 50 UG/ML
100 INJECTION, SOLUTION INTRAMUSCULAR; INTRAVENOUS ONCE
Status: CANCELLED | OUTPATIENT
Start: 2022-09-02 | End: 2022-09-02

## 2022-09-02 RX ORDER — MIDAZOLAM HYDROCHLORIDE 1 MG/ML
2 INJECTION INTRAMUSCULAR; INTRAVENOUS ONCE
Status: CANCELLED | OUTPATIENT
Start: 2022-09-02 | End: 2022-09-02

## 2022-09-02 NOTE — TELEPHONE ENCOUNTER
Refill Decision Note   Mario Jackpe  is requesting a refill authorization.  Brief Assessment and Rationale for Refill:  Approve     Medication Therapy Plan:       Medication Reconciliation Completed: No   Comments:     No Care Gaps recommended.     Note composed:10:06 PM 09/01/2022

## 2022-09-02 NOTE — TELEPHONE ENCOUNTER
No new care gaps identified.  [facility] Embedded Care Gaps. Reference number: [id number]. 9/01/2022   9:57:08 PM CDT

## 2022-09-06 ENCOUNTER — PATIENT MESSAGE (OUTPATIENT)
Dept: INTERVENTIONAL RADIOLOGY/VASCULAR | Facility: HOSPITAL | Age: 67
End: 2022-09-06
Payer: MEDICARE

## 2022-09-06 NOTE — NURSING
Pre-procedure call complete.  Pt instructed not to eat or drink anything after midnight the night before procedure.  Pt aware will need someone to provide transport home and monitor pt 8 hours post procedure.  No driving for at least 24 hours after procedure.   Medications reviewed.  Arrival time and location given.  Expected length of stay reviewed.  Covid screening completed.  Pt verbalized understanding.

## 2022-09-08 ENCOUNTER — HOSPITAL ENCOUNTER (OUTPATIENT)
Dept: INTERVENTIONAL RADIOLOGY/VASCULAR | Facility: HOSPITAL | Age: 67
Discharge: HOME | End: 2022-09-08
Attending: NURSE PRACTITIONER
Payer: MEDICARE

## 2022-09-08 VITALS
SYSTOLIC BLOOD PRESSURE: 143 MMHG | HEART RATE: 74 BPM | BODY MASS INDEX: 32.14 KG/M2 | OXYGEN SATURATION: 97 % | HEIGHT: 66 IN | TEMPERATURE: 98 F | WEIGHT: 200 LBS | DIASTOLIC BLOOD PRESSURE: 68 MMHG | RESPIRATION RATE: 17 BRPM

## 2022-09-08 DIAGNOSIS — R74.8 ELEVATED ALKALINE PHOSPHATASE LEVEL: Primary | ICD-10-CM

## 2022-09-08 DIAGNOSIS — K76.9 LIVER DISEASE, UNSPECIFIED: ICD-10-CM

## 2022-09-08 LAB
BASOPHILS # BLD AUTO: 0.06 K/UL (ref 0–0.2)
BASOPHILS NFR BLD: 0.7 % (ref 0–1.9)
DIFFERENTIAL METHOD: NORMAL
EOSINOPHIL # BLD AUTO: 0.2 K/UL (ref 0–0.5)
EOSINOPHIL NFR BLD: 2 % (ref 0–8)
ERYTHROCYTE [DISTWIDTH] IN BLOOD BY AUTOMATED COUNT: 13 % (ref 11.5–14.5)
HCT VFR BLD AUTO: 42.4 % (ref 40–54)
HGB BLD-MCNC: 14.7 G/DL (ref 14–18)
IMM GRANULOCYTES # BLD AUTO: 0.02 K/UL (ref 0–0.04)
IMM GRANULOCYTES NFR BLD AUTO: 0.2 % (ref 0–0.5)
INR PPP: 1.1 (ref 0.8–1.2)
LYMPHOCYTES # BLD AUTO: 2.7 K/UL (ref 1–4.8)
LYMPHOCYTES NFR BLD: 32.9 % (ref 18–48)
MCH RBC QN AUTO: 30.2 PG (ref 27–31)
MCHC RBC AUTO-ENTMCNC: 34.7 G/DL (ref 32–36)
MCV RBC AUTO: 87 FL (ref 82–98)
MONOCYTES # BLD AUTO: 0.8 K/UL (ref 0.3–1)
MONOCYTES NFR BLD: 9.8 % (ref 4–15)
NEUTROPHILS # BLD AUTO: 4.4 K/UL (ref 1.8–7.7)
NEUTROPHILS NFR BLD: 54.4 % (ref 38–73)
NRBC BLD-RTO: 0 /100 WBC
PLATELET # BLD AUTO: 258 K/UL (ref 150–450)
PMV BLD AUTO: 9.6 FL (ref 9.2–12.9)
POCT GLUCOSE: 174 MG/DL (ref 70–110)
PROTHROMBIN TIME: 10.9 SEC (ref 9–12.5)
RBC # BLD AUTO: 4.87 M/UL (ref 4.6–6.2)
WBC # BLD AUTO: 8.06 K/UL (ref 3.9–12.7)

## 2022-09-08 PROCEDURE — 88307 PR  SURG PATH,LEVEL V: ICD-10-PCS | Mod: 26,,, | Performed by: PATHOLOGY

## 2022-09-08 PROCEDURE — 99152 PR MOD CONSCIOUS SEDATION, SAME PHYS, 5+ YRS, FIRST 15 MIN: ICD-10-PCS | Mod: ,,, | Performed by: STUDENT IN AN ORGANIZED HEALTH CARE EDUCATION/TRAINING PROGRAM

## 2022-09-08 PROCEDURE — 76942 IR BIOPSY LIVER: ICD-10-PCS | Mod: 26,,, | Performed by: STUDENT IN AN ORGANIZED HEALTH CARE EDUCATION/TRAINING PROGRAM

## 2022-09-08 PROCEDURE — 47000 NEEDLE BIOPSY OF LIVER PERQ: CPT | Performed by: STUDENT IN AN ORGANIZED HEALTH CARE EDUCATION/TRAINING PROGRAM

## 2022-09-08 PROCEDURE — 88307 TISSUE EXAM BY PATHOLOGIST: CPT | Mod: 26,,, | Performed by: PATHOLOGY

## 2022-09-08 PROCEDURE — 63600175 PHARM REV CODE 636 W HCPCS: Performed by: STUDENT IN AN ORGANIZED HEALTH CARE EDUCATION/TRAINING PROGRAM

## 2022-09-08 PROCEDURE — 88313 SPECIAL STAINS GROUP 2: CPT | Mod: 59 | Performed by: PATHOLOGY

## 2022-09-08 PROCEDURE — 99152 MOD SED SAME PHYS/QHP 5/>YRS: CPT | Mod: ,,, | Performed by: STUDENT IN AN ORGANIZED HEALTH CARE EDUCATION/TRAINING PROGRAM

## 2022-09-08 PROCEDURE — 88307 TISSUE EXAM BY PATHOLOGIST: CPT | Performed by: PATHOLOGY

## 2022-09-08 PROCEDURE — 85025 COMPLETE CBC W/AUTO DIFF WBC: CPT | Performed by: NURSE PRACTITIONER

## 2022-09-08 PROCEDURE — 76942 ECHO GUIDE FOR BIOPSY: CPT | Mod: 26,,, | Performed by: STUDENT IN AN ORGANIZED HEALTH CARE EDUCATION/TRAINING PROGRAM

## 2022-09-08 PROCEDURE — 88313 SPECIAL STAINS GROUP 2: CPT | Mod: 26,,, | Performed by: PATHOLOGY

## 2022-09-08 PROCEDURE — 27200937 IR BIOPSY LIVER

## 2022-09-08 PROCEDURE — 85610 PROTHROMBIN TIME: CPT | Performed by: NURSE PRACTITIONER

## 2022-09-08 PROCEDURE — 47000 IR BIOPSY LIVER: ICD-10-PCS | Mod: ,,, | Performed by: STUDENT IN AN ORGANIZED HEALTH CARE EDUCATION/TRAINING PROGRAM

## 2022-09-08 PROCEDURE — 88313 PR  SPECIAL STAINS,GROUP II: ICD-10-PCS | Mod: 26,,, | Performed by: PATHOLOGY

## 2022-09-08 PROCEDURE — 99152 MOD SED SAME PHYS/QHP 5/>YRS: CPT | Performed by: STUDENT IN AN ORGANIZED HEALTH CARE EDUCATION/TRAINING PROGRAM

## 2022-09-08 RX ORDER — ONDANSETRON 2 MG/ML
4 INJECTION INTRAMUSCULAR; INTRAVENOUS EVERY 6 HOURS PRN
Status: DISCONTINUED | OUTPATIENT
Start: 2022-09-08 | End: 2022-09-09 | Stop reason: HOSPADM

## 2022-09-08 RX ORDER — FENTANYL CITRATE 50 UG/ML
INJECTION, SOLUTION INTRAMUSCULAR; INTRAVENOUS
Status: DISCONTINUED | OUTPATIENT
Start: 2022-09-08 | End: 2022-09-09 | Stop reason: HOSPADM

## 2022-09-08 RX ORDER — SODIUM CHLORIDE 9 MG/ML
INJECTION, SOLUTION INTRAVENOUS CONTINUOUS
Status: DISCONTINUED | OUTPATIENT
Start: 2022-09-08 | End: 2022-09-09 | Stop reason: HOSPADM

## 2022-09-08 RX ORDER — LIDOCAINE HYDROCHLORIDE 10 MG/ML
1 INJECTION, SOLUTION EPIDURAL; INFILTRATION; INTRACAUDAL; PERINEURAL ONCE
Status: DISCONTINUED | OUTPATIENT
Start: 2022-09-08 | End: 2022-09-09 | Stop reason: HOSPADM

## 2022-09-08 RX ORDER — MIDAZOLAM HYDROCHLORIDE 1 MG/ML
INJECTION INTRAMUSCULAR; INTRAVENOUS
Status: DISCONTINUED | OUTPATIENT
Start: 2022-09-08 | End: 2022-09-09 | Stop reason: HOSPADM

## 2022-09-08 RX ADMIN — FENTANYL CITRATE 25 MCG: 50 INJECTION, SOLUTION INTRAMUSCULAR; INTRAVENOUS at 01:09

## 2022-09-08 RX ADMIN — MIDAZOLAM HYDROCHLORIDE 0.5 MG: 1 INJECTION INTRAMUSCULAR; INTRAVENOUS at 01:09

## 2022-09-08 RX ADMIN — FENTANYL CITRATE 50 MCG: 50 INJECTION, SOLUTION INTRAMUSCULAR; INTRAVENOUS at 01:09

## 2022-09-08 RX ADMIN — MIDAZOLAM HYDROCHLORIDE 1 MG: 1 INJECTION INTRAMUSCULAR; INTRAVENOUS at 01:09

## 2022-09-08 NOTE — PLAN OF CARE
Pt arrived to IR room 190 for liver biopsy. Pt oriented to unit and staff. Plan of care reviewed with patient, patient verbalizes understanding. Comfort measures utilized. Fall risk reviewed with patient, fall risk interventions maintained. Blankets applied. Pt prepped and draped utilizing standard sterile technique. Patient placed on continuous monitoring, as required by sedation policy. Timeouts completed utilizing standard universal time-out, per department and facility policy. RN to remain at bedside, continuous monitoring maintained. Pt resting comfortably. Denies pain/discomfort. Will continue to monitor. See flow sheets for monitoring, medication administration, and updates.

## 2022-09-08 NOTE — NURSING
Pt received verbal and physical discharge instructions. VSS. Procedure site c/d/i and care instructions provided to the patient. Questions encouraged and answered. Pt steady on feet and discharging home with a caregiver.

## 2022-09-08 NOTE — H&P
Radiology History & Physical      SUBJECTIVE:     Chief Complaint: elevated LFTs and alk phos     History of Present Illness:  Mario Sanford Jr. is a 67 y.o. male with no prior history of liver disease who presents for liver biopsy due to elevated LFTs and alk phos.       Past Medical History:   Diagnosis Date    Arthritis     Diabetes mellitus     Dilated cbd, acquired     Duodenal diverticulum     Duodenal fistula     choledochoduodenal    Duodenal ulcer     Esophagitis     Hiatal hernia     Hyperlipidemia     Hypertension     Melena     Nodule of esophagus     Obesity (BMI 30-39.9)      Past Surgical History:   Procedure Laterality Date    CHOLECYSTECTOMY      COLONOSCOPY N/A 06/19/2018    Procedure: COLONOSCOPY;  Surgeon: Moris Whalen MD;  Location: [facility] ENDO (4TH FLR);  Service: Endoscopy;  Laterality: N/A;  Miralax prep, per Dr. Whalen    ENDOSCOPIC ULTRASOUND OF UPPER GASTROINTESTINAL TRACT N/A 08/10/2018    Procedure: ULTRASOUND, ENDOSCOPIC, UPPER GI TRACT;  Surgeon: Watson Glass MD;  Location: [facility] ENDO (2ND FLR);  Service: Endoscopy;  Laterality: N/A;  MRCP with narrowing at the CBD as well as duodenal diverticulum however LFTs have normalized and patient will therefore need EUS as outpatient.    ERCP N/A 07/20/2021    Procedure: ERCP (ENDOSCOPIC RETROGRADE CHOLANGIOPANCREATOGRAPHY);  Surgeon: Watson Glass MD;  Location: [facility] ENDO (2ND FLR);  Service: Endoscopy;  Laterality: N/A;    ERCP N/A 10/13/2021    Procedure: ERCP (ENDOSCOPIC RETROGRADE CHOLANGIOPANCREATOGRAPHY);  Surgeon: Andrews Lance MD;  Location: [facility] ENDO (2ND FLR);  Service: Endoscopy;  Laterality: N/A;  pre procedure covid swab 10/10 [place]m-Instructions mailed-MG    ESOPHAGOGASTRODUODENOSCOPY N/A 07/03/2018    Procedure: EGD (ESOPHAGOGASTRODUODENOSCOPY);  Surgeon: Newton Iglesias MD;  Location: [facility] ENDO (2ND FLR);  Service: Endoscopy;  Laterality: N/A;    ESOPHAGOGASTRODUODENOSCOPY N/A 07/06/2018    Procedure: EGD  (ESOPHAGOGASTRODUODENOSCOPY);  Surgeon: Newton Iglesias MD;  Location: [facility] (2ND FLR);  Service: Endoscopy;  Laterality: N/A;    ESOPHAGOGASTRODUODENOSCOPY N/A 08/10/2018    Procedure: EGD (ESOPHAGOGASTRODUODENOSCOPY);  Surgeon: Watson Glass MD;  Location: [facility] (2ND FLR);  Service: Endoscopy;  Laterality: N/A;  Inpatient EGD (7/6/18) remarkable for esophagitis and a duodenal ulcer.     LAPAROSCOPIC CHOLECYSTECTOMY N/A 07/21/2021    Procedure: CHOLECYSTECTOMY, LAPAROSCOPIC;  Surgeon: Garland Loja MD;  Location: [facility] OR 2ND FLR;  Service: General;  Laterality: N/A;    TONSILLECTOMY      TRANSURETHRAL RESECTION OF PROSTATE (TURP) WITHOUT USE OF LASER N/A 12/13/2018    Procedure: TURP, WITHOUT USING LASER;  Surgeon: Neil Thornton MD;  Location: [facility] OR 1ST FLR;  Service: Urology;  Laterality: N/A;    UPPER ENDOSCOPIC ULTRASOUND W/ FNA  08/10/2018    without FNA       Home Meds:   Prior to Admission medications    Medication Sig Start Date End Date Taking? Authorizing Provider   atorvastatin (LIPITOR) 40 MG tablet TAKE 1 TABLET BY MOUTH AT  NIGHT 9/1/22  Yes Bereket Rivera DO   buPROPion (WELLBUTRIN XL) 300 MG 24 hr tablet TAKE 1 TABLET BY MOUTH EVERY DAY 7/5/22  Yes Bereket Rivera DO   carvediloL (COREG) 3.125 MG tablet TAKE 1 TABLET BY MOUTH  TWICE DAILY 11/23/21  Yes Bereket Rivera DO   gabapentin (NEURONTIN) 300 MG capsule TAKE 1 CAPSULE BY MOUTH IN  THE EVENING 12/21/21  Yes Bereket Rivera DO   glimepiride (AMARYL) 2 MG tablet TAKE 1 TABLET BY MOUTH  BEFORE BREAKFAST 2/14/22  Yes Bereket Rivera DO   losartan (COZAAR) 50 MG tablet TAKE 1 TABLET BY MOUTH EVERY DAY 9/3/22  Yes Bereket Rivera DO   pantoprazole (PROTONIX) 40 MG tablet TAKE 1 TABLET BY MOUTH ONCE DAILY 8/5/22  Yes Bereket Rivera DO   blood sugar diagnostic Strp 1 strip by Misc.(Non-Drug; Combo Route) route once daily. Test strips for glucometer insurance covers to check blood sugar  twice daily. 2/28/20   Paddy Lopez MD   ciclopirox (PENLAC) 8 % Soln APPLY TO AFFECTED AREA AT NIGHT 10/5/21   Bereket Rivera DO   diazePAM (VALIUM) 2 MG tablet Take 1 tablet (2 mg total) by mouth once. Take 1 tablet by mouth 30-60 minutes prior to MRI. May repeat once as needed. for 1 dose 7/22/22 7/22/22  Bisi Mann NP   hydroxyzine HCL (ATARAX) 25 MG tablet 1/2-1 tab PO TID PRN anxiety 5/11/20   Bereket Rivera DO   k phos di & mono-sod phos mono (K-PHOS-NEUTRAL) 250 mg Tab Take 2 tablets by mouth daily with breakfast. for 7 days 7/22/21 7/30/21  Cherrie Mccann MD   lancets Misc 1 Device by Misc.(Non-Drug; Combo Route) route once daily. Lancets for glucometer insurance covers to check blood sugar twice daily. 2/28/20   Paddy Lopez MD   polyethylene glycol (GLYCOLAX) 17 gram PwPk Take 17 g by mouth once daily. 12/14/18   Simeon Moctezuma MD     Anticoagulants/Antiplatelets: no anticoagulation    Allergies:   Review of patient's allergies indicates:   Allergen Reactions    Aspirin Other (See Comments)     Bleeding ulcer    Ibuprofen      Sedation History:  no adverse reactions    Review of Systems:   Hematological: no known coagulopathies  Respiratory: no shortness of breath  Cardiovascular: no chest pain  Gastrointestinal: no abdominal pain  Genito-Urinary: no dysuria  Musculoskeletal: negative  Neurological: no TIA or stroke symptoms  negative         OBJECTIVE:     Vital Signs (Most Recent)       Physical Exam:  ASA: II  Mallampati: II    General: no acute distress  Mental Status: alert and oriented to person, place and time  HEENT: normocephalic, atraumatic  Chest: unlabored breathing  Heart: regular heart rate  Abdomen: nondistended  Extremity: moves all extremities    Laboratory  Lab Results   Component Value Date    INR 1.0 07/23/2021       Lab Results   Component Value Date    WBC 9.19 04/18/2022    HGB 14.5 04/18/2022    HCT 45.8 04/18/2022    MCV 93 04/18/2022    PLT  280 04/18/2022      Lab Results   Component Value Date    GLU 83 07/28/2022     07/28/2022    K 4.9 07/28/2022     07/28/2022    CO2 25 07/28/2022    BUN 20 07/28/2022    CREATININE 1.4 07/28/2022    CALCIUM 9.0 07/28/2022    MG 1.9 07/23/2021    ALT 51 (H) 07/28/2022    AST 31 07/28/2022    ALBUMIN 4.1 07/28/2022    BILITOT 0.6 07/28/2022       ASSESSMENT/PLAN:     Sedation Plan: Up to moderate   Patient will undergo percutaneous liver biopsy.    Tara Quezada MD  PGY-II  Diagnostic and Interventional Radiology  [facility]

## 2022-09-08 NOTE — PROCEDURES
"  Pre Op Diagnosis: Liver dysfunction  Post Op Diagnosis: Same    Procedure: US guided liver biopsy    Procedure performed by: López    Written Informed Consent Obtained: Yes  Specimen Removed: YES 3 18g cores  Estimated Blood Loss: Minimal    Findings:   Successful US guided liver biopsy. 3 cores removed. Gel foam pledgets used for tract embolization.    Patient tolerated procedure well.    Paddy Dawn MD ([place])  Interventional Radiology  [phone]      "

## 2022-09-08 NOTE — PLAN OF CARE
Received pt to floor from home accompanied by spouse.  AAO x 4. Denies pain or discomfort. Respirations even and unlabored. No distress noted. Pt stable.  Admit assessment complete. IV x 1 placed.  Pt oriented to room and call bell placed within reach.  Will continue to monitor.

## 2022-09-08 NOTE — DISCHARGE INSTRUCTIONS
For questions or concerns call: [facility] 8 AM- 5PM [phone]. After hours, for urgent concerns: radiology resident on call [phone].    For immediate concerns that are not emergent, you may call our radiology clinic at: [phone]    Unless instructed otherwise, you may remove the dressing over your procedure site the day after your procedure. You may shower the day after your procedure with the procedure site covered, then uncover it after your shower. When not showing, keep the procedure site clean, dry, and open to air. Do not submerge in water (bath, pool, hot tub, ect.) until the site is completely healed.

## 2022-09-14 LAB
COMMENT: NORMAL
FINAL PATHOLOGIC DIAGNOSIS: NORMAL
GROSS: NORMAL
Lab: NORMAL

## 2022-09-15 ENCOUNTER — TELEPHONE (OUTPATIENT)
Dept: HEPATOLOGY | Facility: CLINIC | Age: 67
End: 2022-09-15
Payer: MEDICARE

## 2022-09-15 NOTE — TELEPHONE ENCOUNTER
IR Liver Pathology Conference Note    Patient:  Mario Sanford  MRN:   [id number]  YOB: 1955  Date of Transplant:  N/A  Native Diagnosis:     Discussion/Plan:    Presenter: BONNIE - Theresa Armijo NP    Reason for presenting: diagnosis confirmation and elevated liver function    Concerns for Pathologists:   68 y/o M that was hospitalized last year with sepsis due to cholangitis.  His imaging at this time showed choledocholithiasis. He underwent ERCP with stone removal; biliary stents placed. Lap dayton done 7/21/21. Last ERCP 10/13/21 with biliary stent removed. 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      His alk phos remains elevated, >200 over 1 year later (improved from 455).    AMA negative.  MRI MRCP with normal liver/bile ducts  Fibroscan suggested fatty liver.    Liver biopsy with non-specific findings:  - Minimal nonspecific duct proliferation   - Minimal nonspecific patchy portal inflammation   - Mild steatosis     Consider trial of ursodiol?        Lab Results  WBC (K/uL)   Date Value   09/08/2022 8.06   04/18/2022 9.19   11/05/2021 8.25     PLT (K/uL)   Date Value   09/08/2022 258   04/18/2022 280   11/05/2021 279     INR (no units)   Date Value   09/08/2022 1.1   07/23/2021 1.0   07/22/2021 1.0     AST (U/L)   Date Value   07/28/2022 31     ALT (U/L)   Date Value   07/28/2022 51 (H)   04/18/2022 47 (H)   11/05/2021 68 (H)     BILITOT (mg/dL)   Date Value   07/28/2022 0.6   04/18/2022 0.7   11/05/2021 0.6     ALKPHOS (U/L)   Date Value   07/28/2022 245 (H)   04/18/2022 237 (H)   11/05/2021 310 (H)     CREATININE (mg/dL)   Date Value   07/28/2022 1.4   04/18/2022 1.2   11/05/2021 1.2

## 2022-09-16 ENCOUNTER — OFFICE VISIT (OUTPATIENT)
Dept: HEPATOLOGY | Facility: CLINIC | Age: 67
End: 2022-09-16
Payer: MEDICARE

## 2022-09-16 VITALS
TEMPERATURE: 98 F | OXYGEN SATURATION: 96 % | BODY MASS INDEX: 33.03 KG/M2 | DIASTOLIC BLOOD PRESSURE: 79 MMHG | HEART RATE: 93 BPM | HEIGHT: 66 IN | WEIGHT: 205.5 LBS | RESPIRATION RATE: 18 BRPM | SYSTOLIC BLOOD PRESSURE: 147 MMHG

## 2022-09-16 DIAGNOSIS — R74.8 ELEVATED ALKALINE PHOSPHATASE LEVEL: Primary | ICD-10-CM

## 2022-09-16 PROBLEM — K76.0 NAFLD (NONALCOHOLIC FATTY LIVER DISEASE): Status: RESOLVED | Noted: 2022-08-05 | Resolved: 2022-09-16

## 2022-09-16 PROCEDURE — 3077F PR MOST RECENT SYSTOLIC BLOOD PRESSURE >= 140 MM HG: ICD-10-PCS | Mod: CPTII,S$GLB,, | Performed by: NURSE PRACTITIONER

## 2022-09-16 PROCEDURE — 4010F ACE/ARB THERAPY RXD/TAKEN: CPT | Mod: CPTII,S$GLB,, | Performed by: NURSE PRACTITIONER

## 2022-09-16 PROCEDURE — 3078F DIAST BP <80 MM HG: CPT | Mod: CPTII,S$GLB,, | Performed by: NURSE PRACTITIONER

## 2022-09-16 PROCEDURE — 1126F AMNT PAIN NOTED NONE PRSNT: CPT | Mod: CPTII,S$GLB,, | Performed by: NURSE PRACTITIONER

## 2022-09-16 PROCEDURE — 3288F PR FALLS RISK ASSESSMENT DOCUMENTED: ICD-10-PCS | Mod: CPTII,S$GLB,, | Performed by: NURSE PRACTITIONER

## 2022-09-16 PROCEDURE — 3288F FALL RISK ASSESSMENT DOCD: CPT | Mod: CPTII,S$GLB,, | Performed by: NURSE PRACTITIONER

## 2022-09-16 PROCEDURE — 99214 PR OFFICE/OUTPT VISIT, EST, LEVL IV, 30-39 MIN: ICD-10-PCS | Mod: S$GLB,,, | Performed by: NURSE PRACTITIONER

## 2022-09-16 PROCEDURE — 1159F MED LIST DOCD IN RCRD: CPT | Mod: CPTII,S$GLB,, | Performed by: NURSE PRACTITIONER

## 2022-09-16 PROCEDURE — 1101F PR PT FALLS ASSESS DOC 0-1 FALLS W/OUT INJ PAST YR: ICD-10-PCS | Mod: CPTII,S$GLB,, | Performed by: NURSE PRACTITIONER

## 2022-09-16 PROCEDURE — 1126F PR PAIN SEVERITY QUANTIFIED, NO PAIN PRESENT: ICD-10-PCS | Mod: CPTII,S$GLB,, | Performed by: NURSE PRACTITIONER

## 2022-09-16 PROCEDURE — 3008F PR BODY MASS INDEX (BMI) DOCUMENTED: ICD-10-PCS | Mod: CPTII,S$GLB,, | Performed by: NURSE PRACTITIONER

## 2022-09-16 PROCEDURE — 99214 OFFICE O/P EST MOD 30 MIN: CPT | Mod: S$GLB,,, | Performed by: NURSE PRACTITIONER

## 2022-09-16 PROCEDURE — 1159F PR MEDICATION LIST DOCUMENTED IN MEDICAL RECORD: ICD-10-PCS | Mod: CPTII,S$GLB,, | Performed by: NURSE PRACTITIONER

## 2022-09-16 PROCEDURE — 99999 PR PBB SHADOW E&M-EST. PATIENT-LVL III: ICD-10-PCS | Mod: PBBFAC,,, | Performed by: NURSE PRACTITIONER

## 2022-09-16 PROCEDURE — 1101F PT FALLS ASSESS-DOCD LE1/YR: CPT | Mod: CPTII,S$GLB,, | Performed by: NURSE PRACTITIONER

## 2022-09-16 PROCEDURE — 3077F SYST BP >= 140 MM HG: CPT | Mod: CPTII,S$GLB,, | Performed by: NURSE PRACTITIONER

## 2022-09-16 PROCEDURE — 3008F BODY MASS INDEX DOCD: CPT | Mod: CPTII,S$GLB,, | Performed by: NURSE PRACTITIONER

## 2022-09-16 PROCEDURE — 4010F PR ACE/ARB THEARPY RXD/TAKEN: ICD-10-PCS | Mod: CPTII,S$GLB,, | Performed by: NURSE PRACTITIONER

## 2022-09-16 PROCEDURE — 99999 PR PBB SHADOW E&M-EST. PATIENT-LVL III: CPT | Mod: PBBFAC,,, | Performed by: NURSE PRACTITIONER

## 2022-09-16 PROCEDURE — 3078F PR MOST RECENT DIASTOLIC BLOOD PRESSURE < 80 MM HG: ICD-10-PCS | Mod: CPTII,S$GLB,, | Performed by: NURSE PRACTITIONER

## 2022-09-16 PROCEDURE — 3051F PR MOST RECENT HEMOGLOBIN A1C LEVEL 7.0 - < 8.0%: ICD-10-PCS | Mod: CPTII,S$GLB,, | Performed by: NURSE PRACTITIONER

## 2022-09-16 PROCEDURE — 3051F HG A1C>EQUAL 7.0%<8.0%: CPT | Mod: CPTII,S$GLB,, | Performed by: NURSE PRACTITIONER

## 2022-09-16 NOTE — PROGRESS NOTES
[facility] - Established Patient    Last Clinic Visit: 8/5/22    Chief Complaint: Follow-up for elevated liver enzymes       HISTORY     This is a 67 y.o. male with PMH noted below, here for follow-up of elevated liver enzymes, predominantly alk phos.     No known history of liver disease.     He was hospitalized last year with sepsis due to cholangitis. Blood cultures +vibrio species and E. Coli. His imaging at this time showed choledocholithiasis. He underwent ERCP with stone removal; biliary stents placed. Lap dayton done 7/21/21. Last ERCP 10/13/21 with biliary stent removed.      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 Alk phos subsequently normal from 7667-8034.    During the admission last year, alk phos up to 455. TBili up to 6.8. Transaminases elevated, 200s-300s.      His liver tests have significantly improved. Synthetic function now normal. Alk phos has improved though remains elevated, 200s. ALT mildly elevated, 40-50s.    Serologic workup has been negative for Lawson's, alpha-1 antitrypsin deficiency, hemochromatosis, autoimmune etiology, and viral hepatitis. AMA negative.     MRI MRCP updated 7/28/22 with normal liver, no suspicious liver masses (stable hemangioma noted), no biliary dilation, no retained stones or strictures     No h/o Crohn's or UC.    Fibroscan 8/5/22-  KPa 4.8, F0-F1  , S3 or >67% steatosis     Interval history:  Liver biopsy completed 9/8/22.  Here to review results.     Final Pathologic Diagnosis Liver (random biopsy):   - Minimal nonspecific duct proliferation   - Minimal nonspecific patchy portal inflammation   - Macrosteatosis, 5%   - Microsteatosis, 5%   -Trichrome stain:  No fibrosis   - Iron stain:  Minimal hepatocyte iron, 1+ out of 4+   - Iron and trichrome stains with appropriate controls      Comment Negative for significant plasma cell population.  No features of immune   mediated cholangiopathy.       Continues to feel  well. No s/s hepatic decompensation.         Past medical history, surgical history, problem list, family history, social history, allergies: Reviewed and updated in the appropriate section of the electronic medical record.      Current Outpatient Medications   Medication Sig Dispense Refill    atorvastatin (LIPITOR) 40 MG tablet TAKE 1 TABLET BY MOUTH AT  NIGHT 90 tablet 0    blood sugar diagnostic Strp 1 strip by Misc.(Non-Drug; Combo Route) route once daily. Test strips for glucometer insurance covers to check blood sugar twice daily. 200 strip 3    buPROPion (WELLBUTRIN XL) 300 MG 24 hr tablet TAKE 1 TABLET BY MOUTH EVERY DAY 90 tablet 3    carvediloL (COREG) 3.125 MG tablet TAKE 1 TABLET BY MOUTH  TWICE DAILY 180 tablet 3    ciclopirox (PENLAC) 8 % Soln APPLY TO AFFECTED AREA AT NIGHT 6.6 mL 11    gabapentin (NEURONTIN) 300 MG capsule TAKE 1 CAPSULE BY MOUTH IN  THE EVENING 90 capsule 3    glimepiride (AMARYL) 2 MG tablet TAKE 1 TABLET BY MOUTH  BEFORE BREAKFAST 90 tablet 3    lancets Misc 1 Device by Misc.(Non-Drug; Combo Route) route once daily. Lancets for glucometer insurance covers to check blood sugar twice daily. 200 each 3    losartan (COZAAR) 50 MG tablet TAKE 1 TABLET BY MOUTH EVERY DAY 90 tablet 3    pantoprazole (PROTONIX) 40 MG tablet TAKE 1 TABLET BY MOUTH ONCE DAILY 90 tablet 3    diazePAM (VALIUM) 2 MG tablet Take 1 tablet (2 mg total) by mouth once. Take 1 tablet by mouth 30-60 minutes prior to MRI. May repeat once as needed. for 1 dose 2 tablet 0    hydroxyzine HCL (ATARAX) 25 MG tablet 1/2-1 tab PO TID PRN anxiety (Patient not taking: Reported on 9/16/2022) 60 tablet 1    k phos di & mono-sod phos mono (K-PHOS-NEUTRAL) 250 mg Tab Take 2 tablets by mouth daily with breakfast. for 7 days 14 tablet 0    polyethylene glycol (GLYCOLAX) 17 gram PwPk Take 17 g by mouth once daily. (Patient not taking: Reported on 9/16/2022) 30 packet 0     No current facility-administered medications for this visit.  "    Medication list reviewed and updated.      Review of Systems   Constitutional: Negative for fatigue or unexpected weight change.   Respiratory: Negative for shortness of breath.    Cardiovascular: Negative for leg swelling.  Gastrointestinal: Negative for abdominal distention, abdominal pain, diarrhea, constipation, nausea, and vomiting. Negative for blood in stool, melena, or hematemesis.  Musculoskeletal: Negative for myalgias.    Skin: Negative for itching.  Neurological: Negative for dizziness or tremors. Negative for confusion, slowed mentation, or memory loss.   Hematological: Does not bruise/bleed easily.   Psychiatric: Negative for mood changes or sleep disturbance.      Physical Exam   Constitutional: Well-nourished. No distress. Alert and oriented.  Eyes: No scleral icterus.   Pulmonary/Chest: Respiratory effort normal. No respiratory distress.   Abdominal: No distension, no ascites appreciated.   Extremities: No edema.   Neurological: No tremor or asterixis. Gait normal.  Skin: No jaundice. No spider telangiectasias or palmar erythema.  Psychiatric: Normal mood and affect. Speech, behavior, and thought content normal. No depression or anxiety noted.       Vitals reviewed.  BP (!) 147/79 (BP Location: Right arm, Patient Position: Sitting, BP Method: Medium (Automatic))   Pulse 93   Temp 98.4 °F (36.9 °C) (Oral)   Resp 18   Ht 5' 6" (1.676 m)   Wt 93.2 kg (205 lb 7.5 oz)   SpO2 96%   BMI 33.16 kg/m²       LABS & DIAGNOSTIC STUDIES     I have personally reviewed pertinent laboratory findings:    Lab Results   Component Value Date    ALT 51 (H) 07/28/2022    AST 31 07/28/2022    ALKPHOS 245 (H) 07/28/2022    BILITOT 0.6 07/28/2022    ALBUMIN 4.1 07/28/2022    INR 1.1 09/08/2022       Lab Results   Component Value Date    WBC 8.06 09/08/2022    HGB 14.7 09/08/2022    HCT 42.4 09/08/2022    MCV 87 09/08/2022     09/08/2022       Lab Results   Component Value Date     07/28/2022    K 4.9 " 07/28/2022    BUN 20 07/28/2022    CREATININE 1.4 07/28/2022    ESTGFRAFRICA 58.8 (A) 07/28/2022    EGFRNONAA 50.9 (A) 07/28/2022       Lab Results   Component Value Date    SMOOTHMUSCAB Negative 1:40 07/18/2021    AMAIFA Negative 1:40 07/18/2021    IGGSERUM 712 07/18/2021    ANASCREEN Negative <1:80 07/18/2021    FERRITIN 985 (H) 07/02/2018    FESATURATED 24 07/02/2018    PETH Negative 07/18/2021    CERULOPLSM 26.0 07/18/2021    HEPBSAG Negative 07/18/2021    HEPBIGM Negative 07/18/2021    HEPCAB Negative 07/18/2021    HEPAIGM Negative 07/18/2021    YUQ68NEYI Negative 07/18/2021       No results found for: AFP    I have personally reviewed the following result reports:  Abdominal US - 7/19/21  CT - 7/18/21  MRI - 7/28/22  ERCP - 10/13/21      ASSESSMENT & PLAN     67 y.o. male with:    1. Elevated liver enzymes, mainly alk phos  -- Elevated liver enzymes/LFTs due to biliary obstruction and cholangitis last year  -- His liver tests have improved though alk phos has yet to normalize a year later. ALT also remains mildly elevated.   -- MRI MRCP without liver mass, biliary dilation, stones or strictures. No evidence of large duct PSC. Serologic workup unrevealing. AMA negative.  -- Liver biopsy reviewed. Minimal non-specific findings. Nothing to suggest small duct PSC or AMA negative PBC. Will plan to have his liver biopsy reviewed at our next pathology conference to confirm.   -- Suspect that this may be prolonged biliary issues from his hospitalization last year. Would consider trial of ursodiol and trending liver enzymes. Already has labs scheduled in 3 months.         Return to clinic in ~6 months.        Thank you for allowing me to participate in the care of Mario GILLIAN Kurtz Jr.P-C  Hepatology        Duration of encounter: 30 min  This includes face to face time and non-face to face time preparing to see the patient (eg, review of tests), obtaining and/or reviewing separately obtained  history, documenting clinical information in the electronic or other health record, independently interpreting results and communicating results to the patient/family/caregiver, or care coordination.

## 2022-09-22 ENCOUNTER — CONFERENCE (OUTPATIENT)
Dept: TRANSPLANT | Facility: CLINIC | Age: 67
End: 2022-09-22
Payer: MEDICARE

## 2022-09-22 NOTE — TELEPHONE ENCOUNTER
9/22/22 Liver Pathology Conference:    Liver Biopsy: no evidence of biliary disease, no cholestasis

## 2022-09-26 ENCOUNTER — TELEPHONE (OUTPATIENT)
Dept: HEPATOLOGY | Facility: CLINIC | Age: 67
End: 2022-09-26
Payer: MEDICARE

## 2022-09-26 NOTE — TELEPHONE ENCOUNTER
I discussed this patient's case and pertinent findings with Dr. Stevens during our weekly Patient Care Conference. We reviewed relevant medical history, labs, and imaging.     Discussion of current assessment and plan of care:  - Hospitalization last year with sepsis due to cholangitis. Had choledocholithiasis. Required ERCP with biliary stent. Last ERCP 10/2021 with stent removed.   - Alk phos has improved though remains elevated >1 year later (455 -- still over 200)  - AMA negative, MRCP with normal liver/bile ducts  - Liver biopsy with mild steatosis and non-specific findings  - Reviewed in path conference: no biliary disease or cholestasis    Will give trial of ursodiol. Rx sent.  Called patient to discuss recommendations. No answer, left VM.  Patient notified of recommendations via [facility] portal.

## 2022-09-27 ENCOUNTER — TELEPHONE (OUTPATIENT)
Dept: HEPATOLOGY | Facility: CLINIC | Age: 67
End: 2022-09-27
Payer: MEDICARE

## 2022-09-27 ENCOUNTER — PATIENT MESSAGE (OUTPATIENT)
Dept: HEPATOLOGY | Facility: CLINIC | Age: 67
End: 2022-09-27
Payer: MEDICARE

## 2022-09-27 DIAGNOSIS — Z59.86 PATIENT CANNOT AFFORD MEDICATIONS: ICD-10-CM

## 2022-09-27 DIAGNOSIS — R74.8 ELEVATED ALKALINE PHOSPHATASE LEVEL: Primary | ICD-10-CM

## 2022-09-27 RX ORDER — URSODIOL 300 MG/1
300 CAPSULE ORAL 2 TIMES DAILY
Qty: 60 CAPSULE | Refills: 5 | Status: SHIPPED | OUTPATIENT
Start: 2022-09-27 | End: 2023-10-12

## 2022-09-27 RX ORDER — URSODIOL 500 MG/1
500 TABLET, FILM COATED ORAL 2 TIMES DAILY
Qty: 60 TABLET | Refills: 5 | Status: SHIPPED | OUTPATIENT
Start: 2022-09-27 | End: 2023-03-07

## 2022-09-27 SDOH — SOCIAL DETERMINANTS OF HEALTH (SDOH): FINANCIAL INSECURITY: Z59.86

## 2022-09-27 NOTE — TELEPHONE ENCOUNTER
Patient reports ursodiol tablets are costly ($300). Will try sending capsules instead. Referral to pharmacy assistance.

## 2022-09-29 ENCOUNTER — OFFICE VISIT (OUTPATIENT)
Dept: PODIATRY | Facility: CLINIC | Age: 67
End: 2022-09-29
Payer: MEDICARE

## 2022-09-29 VITALS
HEIGHT: 66 IN | SYSTOLIC BLOOD PRESSURE: 135 MMHG | HEART RATE: 90 BPM | BODY MASS INDEX: 34.72 KG/M2 | DIASTOLIC BLOOD PRESSURE: 82 MMHG | WEIGHT: 216.06 LBS

## 2022-09-29 DIAGNOSIS — B35.1 ONYCHOMYCOSIS DUE TO DERMATOPHYTE: ICD-10-CM

## 2022-09-29 DIAGNOSIS — E11.51 DIABETES MELLITUS WITH PERIPHERAL VASCULAR DISEASE: Primary | ICD-10-CM

## 2022-09-29 PROCEDURE — 3051F HG A1C>EQUAL 7.0%<8.0%: CPT | Mod: CPTII,S$GLB,, | Performed by: PODIATRIST

## 2022-09-29 PROCEDURE — 1159F MED LIST DOCD IN RCRD: CPT | Mod: CPTII,S$GLB,, | Performed by: PODIATRIST

## 2022-09-29 PROCEDURE — 3075F SYST BP GE 130 - 139MM HG: CPT | Mod: CPTII,S$GLB,, | Performed by: PODIATRIST

## 2022-09-29 PROCEDURE — 3079F DIAST BP 80-89 MM HG: CPT | Mod: CPTII,S$GLB,, | Performed by: PODIATRIST

## 2022-09-29 PROCEDURE — 3079F PR MOST RECENT DIASTOLIC BLOOD PRESSURE 80-89 MM HG: ICD-10-PCS | Mod: CPTII,S$GLB,, | Performed by: PODIATRIST

## 2022-09-29 PROCEDURE — 4010F ACE/ARB THERAPY RXD/TAKEN: CPT | Mod: CPTII,S$GLB,, | Performed by: PODIATRIST

## 2022-09-29 PROCEDURE — 11721 DEBRIDE NAIL 6 OR MORE: CPT | Mod: Q8,S$GLB,, | Performed by: PODIATRIST

## 2022-09-29 PROCEDURE — 99999 PR PBB SHADOW E&M-EST. PATIENT-LVL III: ICD-10-PCS | Mod: PBBFAC,,, | Performed by: PODIATRIST

## 2022-09-29 PROCEDURE — 1126F AMNT PAIN NOTED NONE PRSNT: CPT | Mod: CPTII,S$GLB,, | Performed by: PODIATRIST

## 2022-09-29 PROCEDURE — 99499 UNLISTED E&M SERVICE: CPT | Mod: S$GLB,,, | Performed by: PODIATRIST

## 2022-09-29 PROCEDURE — 99499 NO LOS: ICD-10-PCS | Mod: S$GLB,,, | Performed by: PODIATRIST

## 2022-09-29 PROCEDURE — 11721 PR DEBRIDEMENT OF NAILS, 6 OR MORE: ICD-10-PCS | Mod: Q8,S$GLB,, | Performed by: PODIATRIST

## 2022-09-29 PROCEDURE — 3008F BODY MASS INDEX DOCD: CPT | Mod: CPTII,S$GLB,, | Performed by: PODIATRIST

## 2022-09-29 PROCEDURE — 4010F PR ACE/ARB THEARPY RXD/TAKEN: ICD-10-PCS | Mod: CPTII,S$GLB,, | Performed by: PODIATRIST

## 2022-09-29 PROCEDURE — 3075F PR MOST RECENT SYSTOLIC BLOOD PRESS GE 130-139MM HG: ICD-10-PCS | Mod: CPTII,S$GLB,, | Performed by: PODIATRIST

## 2022-09-29 PROCEDURE — 1159F PR MEDICATION LIST DOCUMENTED IN MEDICAL RECORD: ICD-10-PCS | Mod: CPTII,S$GLB,, | Performed by: PODIATRIST

## 2022-09-29 PROCEDURE — 3008F PR BODY MASS INDEX (BMI) DOCUMENTED: ICD-10-PCS | Mod: CPTII,S$GLB,, | Performed by: PODIATRIST

## 2022-09-29 PROCEDURE — 99999 PR PBB SHADOW E&M-EST. PATIENT-LVL III: CPT | Mod: PBBFAC,,, | Performed by: PODIATRIST

## 2022-09-29 PROCEDURE — 3051F PR MOST RECENT HEMOGLOBIN A1C LEVEL 7.0 - < 8.0%: ICD-10-PCS | Mod: CPTII,S$GLB,, | Performed by: PODIATRIST

## 2022-09-29 PROCEDURE — 1126F PR PAIN SEVERITY QUANTIFIED, NO PAIN PRESENT: ICD-10-PCS | Mod: CPTII,S$GLB,, | Performed by: PODIATRIST

## 2022-09-29 NOTE — PROGRESS NOTES
Subjective:      Patient ID: Mario Sanford Jr. is a 67 y.o. male.    Chief Complaint: Diabetes Mellitus and Nail Care    Mario is a 67 y.o. male who presents to the clinic for evaluation and treatment of high risk feet. Mario has a past medical history of Arthritis, Diabetes mellitus, Dilated cbd, acquired, Duodenal diverticulum, Duodenal fistula, Duodenal ulcer, Esophagitis, Hiatal hernia, Hyperlipidemia, Hypertension, Melena, Nodule of esophagus, and Obesity (BMI 30-39.9). The patient's chief complaint is long, thick toenails. This patient has documented high risk feet requiring routine maintenance secondary to peripheral vascular disease.    PCP: Bereket Rivera DO    Date Last Seen by PCP:   Chief Complaint   Patient presents with    Diabetes Mellitus    Nail Care         Current shoe gear:  Affected Foot: Tennis shoes     Unaffected Foot: Tennis shoes    Hemoglobin A1C   Date Value Ref Range Status   04/18/2022 7.2 (H) 4.0 - 5.6 % Final     Comment:     ADA Screening Guidelines:  5.7-6.4%  Consistent with prediabetes  >or=6.5%  Consistent with diabetes    High levels of fetal hemoglobin interfere with the HbA1C  assay. Heterozygous hemoglobin variants (HbS, HgC, etc)do  not significantly interfere with this assay.   However, presence of multiple variants may affect accuracy.     11/05/2021 7.3 (H) 4.0 - 5.6 % Final     Comment:     ADA Screening Guidelines:  5.7-6.4%  Consistent with prediabetes  >or=6.5%  Consistent with diabetes    High levels of fetal hemoglobin interfere with the HbA1C  assay. Heterozygous hemoglobin variants (HbS, HgC, etc)do  not significantly interfere with this assay.   However, presence of multiple variants may affect accuracy.     07/19/2021 6.2 (H) 4.0 - 5.6 % Final     Comment:     ADA Screening Guidelines:  5.7-6.4%  Consistent with prediabetes  >or=6.5%  Consistent with diabetes    High levels of fetal hemoglobin interfere with the HbA1C  assay. Heterozygous hemoglobin  variants (HbS, HgC, etc)do  not significantly interfere with this assay.   However, presence of multiple variants may affect accuracy.         Review of Systems   Constitutional: Negative for chills, fever and malaise/fatigue.   HENT:  Negative for hearing loss.    Cardiovascular:  Negative for claudication.   Respiratory:  Negative for shortness of breath.    Skin:  Positive for color change, nail changes and unusual hair distribution. Negative for flushing and rash.   Musculoskeletal:  Negative for joint pain and myalgias.   Gastrointestinal:  Negative for nausea and vomiting.   Neurological:  Negative for loss of balance, numbness, paresthesias and sensory change.   Psychiatric/Behavioral:  Negative for altered mental status.          Objective:      Physical Exam  Vitals reviewed.   Constitutional:       Appearance: He is well-developed.   Cardiovascular:      Pulses:           Dorsalis pedis pulses are 0 on the right side and 0 on the left side.        Posterior tibial pulses are 1+ on the right side and 1+ on the left side.   Musculoskeletal:      Right ankle: Decreased range of motion. Abnormal pulse.      Right Achilles Tendon: Ashraf's test negative.      Left ankle: Decreased range of motion. Abnormal pulse.      Left Achilles Tendon: Ashraf's test negative.      Right foot: Decreased range of motion.      Left foot: Decreased range of motion.   Feet:      Right foot:      Protective Sensation: 5 sites tested.  5 sites sensed.      Left foot:      Protective Sensation: 5 sites tested.  5 sites sensed.   Skin:     General: Skin is cool and dry.      Capillary Refill: Capillary refill takes more than 3 seconds.   Neurological:      Mental Status: He is alert and oriented to person, place, and time.      Comments: intact sensation noted to b/L lower extremities   Psychiatric:         Behavior: Behavior normal. Behavior is cooperative.       Nails x10 are elongated by  4-7mm's, thickened by 2-3 mm's,  dystrophic, and are yellowish in  coloration . Xerosis Bilaterally. No open lesions noted.             Assessment:       Encounter Diagnoses   Name Primary?    Diabetes mellitus with peripheral vascular disease Yes    Onychomycosis due to dermatophyte          Plan:       Mario was seen today for diabetes mellitus and nail care.    Diagnoses and all orders for this visit:    Diabetes mellitus with peripheral vascular disease    Onychomycosis due to dermatophyte    I counseled the patient on his conditions, their implications and medical management.        - Shoe inspection. Diabetic Foot Education. Patient reminded of the importance of good nutrition and blood sugar control to help prevent podiatric complications of diabetes. Patient instructed on proper foot hygeine. We discussed wearing proper shoe gear, daily foot inspections, never walking without protective shoe gear, never putting sharp instruments to feet, routine podiatric nail visits every 2-3 months.      - With patient's permission, nails were aggressively reduced and debrided x 10 to their soft tissue attachment mechanically and with electric , removing all offending nail and debris. Patient relates relief following the procedure. He will continue to monitor the areas daily, inspect his feet, wear protective shoe gear when ambulatory, moisturizer to maintain skin integrity and follow in this office in approximately 2-3 months, sooner p.r.n.

## 2022-10-14 ENCOUNTER — PATIENT MESSAGE (OUTPATIENT)
Dept: PODIATRY | Facility: CLINIC | Age: 67
End: 2022-10-14
Payer: MEDICARE

## 2022-10-14 ENCOUNTER — PATIENT MESSAGE (OUTPATIENT)
Dept: INTERNAL MEDICINE | Facility: CLINIC | Age: 67
End: 2022-10-14
Payer: MEDICARE

## 2022-10-19 ENCOUNTER — PATIENT MESSAGE (OUTPATIENT)
Dept: INTERNAL MEDICINE | Facility: CLINIC | Age: 67
End: 2022-10-19
Payer: MEDICARE

## 2022-12-02 ENCOUNTER — LAB VISIT (OUTPATIENT)
Dept: LAB | Facility: HOSPITAL | Age: 67
End: 2022-12-02
Attending: INTERNAL MEDICINE
Payer: MEDICARE

## 2022-12-02 DIAGNOSIS — E11.22 TYPE 2 DIABETES MELLITUS WITH STAGE 3 CHRONIC KIDNEY DISEASE, WITHOUT LONG-TERM CURRENT USE OF INSULIN, UNSPECIFIED WHETHER STAGE 3A OR 3B CKD: ICD-10-CM

## 2022-12-02 DIAGNOSIS — I15.2 HYPERTENSION ASSOCIATED WITH DIABETES: ICD-10-CM

## 2022-12-02 DIAGNOSIS — N18.30 TYPE 2 DIABETES MELLITUS WITH STAGE 3 CHRONIC KIDNEY DISEASE, WITHOUT LONG-TERM CURRENT USE OF INSULIN, UNSPECIFIED WHETHER STAGE 3A OR 3B CKD: ICD-10-CM

## 2022-12-02 DIAGNOSIS — E11.59 HYPERTENSION ASSOCIATED WITH DIABETES: ICD-10-CM

## 2022-12-02 DIAGNOSIS — E11.69 HYPERLIPIDEMIA ASSOCIATED WITH TYPE 2 DIABETES MELLITUS: ICD-10-CM

## 2022-12-02 DIAGNOSIS — E78.5 HYPERLIPIDEMIA ASSOCIATED WITH TYPE 2 DIABETES MELLITUS: ICD-10-CM

## 2022-12-02 DIAGNOSIS — Z00.00 ANNUAL PHYSICAL EXAM: ICD-10-CM

## 2022-12-02 LAB
ALBUMIN/CREAT UR: 333.6 UG/MG (ref 0–30)
CREAT UR-MCNC: 131 MG/DL (ref 23–375)
HYALINE CASTS UR QL AUTO: 1 /LPF
MICROALBUMIN UR DL<=1MG/L-MCNC: 437 UG/ML
MICROSCOPIC COMMENT: ABNORMAL
RBC #/AREA URNS AUTO: 24 /HPF (ref 0–4)
WBC #/AREA URNS AUTO: 19 /HPF (ref 0–5)

## 2022-12-02 PROCEDURE — 82043 UR ALBUMIN QUANTITATIVE: CPT | Performed by: INTERNAL MEDICINE

## 2022-12-02 PROCEDURE — 81001 URINALYSIS AUTO W/SCOPE: CPT | Performed by: INTERNAL MEDICINE

## 2022-12-02 PROCEDURE — 82570 ASSAY OF URINE CREATININE: CPT | Performed by: INTERNAL MEDICINE

## 2022-12-05 ENCOUNTER — TELEPHONE (OUTPATIENT)
Dept: HEPATOLOGY | Facility: CLINIC | Age: 67
End: 2022-12-05
Payer: MEDICARE

## 2022-12-05 ENCOUNTER — PATIENT MESSAGE (OUTPATIENT)
Dept: HEPATOLOGY | Facility: CLINIC | Age: 67
End: 2022-12-05
Payer: MEDICARE

## 2022-12-05 DIAGNOSIS — R74.8 ELEVATED ALKALINE PHOSPHATASE LEVEL: Primary | ICD-10-CM

## 2022-12-05 NOTE — TELEPHONE ENCOUNTER
Transaminases have normalized. Alk phos remains elevated though improving. Sent message to confirm if he is taking ursodiol. Will repeat labs in 3-6 months to trend liver enzymes.

## 2022-12-06 ENCOUNTER — TELEPHONE (OUTPATIENT)
Dept: HEPATOLOGY | Facility: CLINIC | Age: 67
End: 2022-12-06
Payer: MEDICARE

## 2022-12-08 ENCOUNTER — OFFICE VISIT (OUTPATIENT)
Dept: INTERNAL MEDICINE | Facility: CLINIC | Age: 67
End: 2022-12-08
Payer: MEDICARE

## 2022-12-08 VITALS
SYSTOLIC BLOOD PRESSURE: 136 MMHG | DIASTOLIC BLOOD PRESSURE: 70 MMHG | BODY MASS INDEX: 33.27 KG/M2 | WEIGHT: 207 LBS | OXYGEN SATURATION: 95 % | TEMPERATURE: 98 F | HEART RATE: 84 BPM | HEIGHT: 66 IN

## 2022-12-08 DIAGNOSIS — E11.69 OBESITY, DIABETES, AND HYPERTENSION SYNDROME: ICD-10-CM

## 2022-12-08 DIAGNOSIS — Z00.00 ANNUAL PHYSICAL EXAM: Primary | ICD-10-CM

## 2022-12-08 DIAGNOSIS — F41.9 ANXIETY: ICD-10-CM

## 2022-12-08 DIAGNOSIS — N18.31 STAGE 3A CHRONIC KIDNEY DISEASE: ICD-10-CM

## 2022-12-08 DIAGNOSIS — E66.9 OBESITY, DIABETES, AND HYPERTENSION SYNDROME: ICD-10-CM

## 2022-12-08 DIAGNOSIS — N40.0 BENIGN PROSTATIC HYPERPLASIA, UNSPECIFIED WHETHER LOWER URINARY TRACT SYMPTOMS PRESENT: ICD-10-CM

## 2022-12-08 DIAGNOSIS — N18.30 TYPE 2 DIABETES MELLITUS WITH STAGE 3 CHRONIC KIDNEY DISEASE, WITHOUT LONG-TERM CURRENT USE OF INSULIN, UNSPECIFIED WHETHER STAGE 3A OR 3B CKD: ICD-10-CM

## 2022-12-08 DIAGNOSIS — E66.01 MORBID (SEVERE) OBESITY DUE TO EXCESS CALORIES: ICD-10-CM

## 2022-12-08 DIAGNOSIS — E11.69 HYPERLIPIDEMIA ASSOCIATED WITH TYPE 2 DIABETES MELLITUS: ICD-10-CM

## 2022-12-08 DIAGNOSIS — R31.9 HEMATURIA, UNSPECIFIED TYPE: ICD-10-CM

## 2022-12-08 DIAGNOSIS — R74.8 ELEVATED ALKALINE PHOSPHATASE LEVEL: ICD-10-CM

## 2022-12-08 DIAGNOSIS — I15.2 OBESITY, DIABETES, AND HYPERTENSION SYNDROME: ICD-10-CM

## 2022-12-08 DIAGNOSIS — E11.59 OBESITY, DIABETES, AND HYPERTENSION SYNDROME: ICD-10-CM

## 2022-12-08 DIAGNOSIS — E11.22 TYPE 2 DIABETES MELLITUS WITH STAGE 3 CHRONIC KIDNEY DISEASE, WITHOUT LONG-TERM CURRENT USE OF INSULIN, UNSPECIFIED WHETHER STAGE 3A OR 3B CKD: ICD-10-CM

## 2022-12-08 DIAGNOSIS — E11.51 DIABETES MELLITUS WITH PERIPHERAL VASCULAR DISEASE: ICD-10-CM

## 2022-12-08 DIAGNOSIS — E78.5 HYPERLIPIDEMIA ASSOCIATED WITH TYPE 2 DIABETES MELLITUS: ICD-10-CM

## 2022-12-08 PROCEDURE — 3075F PR MOST RECENT SYSTOLIC BLOOD PRESS GE 130-139MM HG: ICD-10-PCS | Mod: CPTII,S$GLB,, | Performed by: INTERNAL MEDICINE

## 2022-12-08 PROCEDURE — 3075F SYST BP GE 130 - 139MM HG: CPT | Mod: CPTII,S$GLB,, | Performed by: INTERNAL MEDICINE

## 2022-12-08 PROCEDURE — 3072F LOW RISK FOR RETINOPATHY: CPT | Mod: CPTII,S$GLB,, | Performed by: INTERNAL MEDICINE

## 2022-12-08 PROCEDURE — 3008F PR BODY MASS INDEX (BMI) DOCUMENTED: ICD-10-PCS | Mod: CPTII,S$GLB,, | Performed by: INTERNAL MEDICINE

## 2022-12-08 PROCEDURE — 1101F PR PT FALLS ASSESS DOC 0-1 FALLS W/OUT INJ PAST YR: ICD-10-PCS | Mod: CPTII,S$GLB,, | Performed by: INTERNAL MEDICINE

## 2022-12-08 PROCEDURE — 3072F PR LOW RISK FOR RETINOPATHY: ICD-10-PCS | Mod: CPTII,S$GLB,, | Performed by: INTERNAL MEDICINE

## 2022-12-08 PROCEDURE — 3288F PR FALLS RISK ASSESSMENT DOCUMENTED: ICD-10-PCS | Mod: CPTII,S$GLB,, | Performed by: INTERNAL MEDICINE

## 2022-12-08 PROCEDURE — 3288F FALL RISK ASSESSMENT DOCD: CPT | Mod: CPTII,S$GLB,, | Performed by: INTERNAL MEDICINE

## 2022-12-08 PROCEDURE — 99499 RISK ADDL DX/OHS AUDIT: ICD-10-PCS | Mod: S$GLB,,, | Performed by: INTERNAL MEDICINE

## 2022-12-08 PROCEDURE — 3078F PR MOST RECENT DIASTOLIC BLOOD PRESSURE < 80 MM HG: ICD-10-PCS | Mod: CPTII,S$GLB,, | Performed by: INTERNAL MEDICINE

## 2022-12-08 PROCEDURE — 99215 OFFICE O/P EST HI 40 MIN: CPT | Mod: S$GLB,,, | Performed by: INTERNAL MEDICINE

## 2022-12-08 PROCEDURE — 1126F PR PAIN SEVERITY QUANTIFIED, NO PAIN PRESENT: ICD-10-PCS | Mod: CPTII,S$GLB,, | Performed by: INTERNAL MEDICINE

## 2022-12-08 PROCEDURE — 99499 UNLISTED E&M SERVICE: CPT | Mod: S$GLB,,, | Performed by: INTERNAL MEDICINE

## 2022-12-08 PROCEDURE — 3078F DIAST BP <80 MM HG: CPT | Mod: CPTII,S$GLB,, | Performed by: INTERNAL MEDICINE

## 2022-12-08 PROCEDURE — 3008F BODY MASS INDEX DOCD: CPT | Mod: CPTII,S$GLB,, | Performed by: INTERNAL MEDICINE

## 2022-12-08 PROCEDURE — 99215 PR OFFICE/OUTPT VISIT, EST, LEVL V, 40-54 MIN: ICD-10-PCS | Mod: S$GLB,,, | Performed by: INTERNAL MEDICINE

## 2022-12-08 PROCEDURE — 1101F PT FALLS ASSESS-DOCD LE1/YR: CPT | Mod: CPTII,S$GLB,, | Performed by: INTERNAL MEDICINE

## 2022-12-08 PROCEDURE — 1126F AMNT PAIN NOTED NONE PRSNT: CPT | Mod: CPTII,S$GLB,, | Performed by: INTERNAL MEDICINE

## 2022-12-08 PROCEDURE — 99999 PR PBB SHADOW E&M-EST. PATIENT-LVL IV: CPT | Mod: PBBFAC,,, | Performed by: INTERNAL MEDICINE

## 2022-12-08 PROCEDURE — 99999 PR PBB SHADOW E&M-EST. PATIENT-LVL IV: ICD-10-PCS | Mod: PBBFAC,,, | Performed by: INTERNAL MEDICINE

## 2022-12-08 RX ORDER — DAPAGLIFLOZIN 10 MG/1
10 TABLET, FILM COATED ORAL
Qty: 30 TABLET | Refills: 3 | Status: SHIPPED | OUTPATIENT
Start: 2022-12-08 | End: 2023-04-11

## 2022-12-08 RX ORDER — LOSARTAN POTASSIUM 100 MG/1
100 TABLET ORAL DAILY
Qty: 90 TABLET | Refills: 3 | Status: SHIPPED | OUTPATIENT
Start: 2022-12-08 | End: 2023-11-22

## 2022-12-08 NOTE — PROGRESS NOTES
Subjective:       Patient ID: Mario Sanford Jr. is a 67 y.o. male.    Chief Complaint: Annual Exam (Discuss vitamins )    HPI  67 y.o. Male here for annual exam.      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 Whalen  8/10/2018: ENDOSCOPIC ULTRASOUND OF UPPER GASTROINTESTINAL TRACT; N/A      Comment:  Procedure: ULTRASOUND, ENDOSCOPIC, UPPER GI TRACT;                 Surgeon: Watson Glass MD;  Location: [facility] ENDO (2ND               FLR);  Service: Endoscopy;  Laterality: N/A;  MRCP with                narrowing at the CBD as well as duodenal diverticulum                however LFTs have normalized and patient will therefore                need EUS as outpatient.  7/20/2021: ERCP; N/A      Comment:  Procedure: ERCP (ENDOSCOPIC RETROGRADE                CHOLANGIOPANCREATOGRAPHY);  Surgeon: Watson Glass MD;  Location: [facility] ENDO (2ND FLR);  Service: Endoscopy;                Laterality: N/A;  10/13/2021: ERCP; N/A      Comment:  Procedure: ERCP (ENDOSCOPIC RETROGRADE                CHOLANGIOPANCREATOGRAPHY);  Surgeon: Andrews Lance MD;                 Location: [facility] ENDO (2ND FLR);  Service: Endoscopy;                 Laterality: N/A;  pre  procedure covid swab 10/10                [facility]-Instructions mailed-MG  7/3/2018: ESOPHAGOGASTRODUODENOSCOPY; N/A      Comment:  Procedure: EGD (ESOPHAGOGASTRODUODENOSCOPY);  Surgeon:                Newton Iglesias MD;  Location: [facility] (2ND FLR);                 Service: Endoscopy;  Laterality: N/A;  7/6/2018: ESOPHAGOGASTRODUODENOSCOPY; N/A      Comment:  Procedure: EGD (ESOPHAGOGASTRODUODENOSCOPY);  Surgeon:                Newton Iglesias MD;  Location: [facility] (2ND FLR);                 Service: Endoscopy;  Laterality: N/A;  8/10/2018: ESOPHAGOGASTRODUODENOSCOPY; N/A      Comment:  Procedure: EGD (ESOPHAGOGASTRODUODENOSCOPY);  Surgeon:                Watson Glass MD;  [facility]                 Service: Endoscopy;  Laterality: N/A;  Inpatient EGD                (7/6/18) remarkable for esophagitis and a duodenal ulcer.  7/21/2021: LAPAROSCOPIC CHOLECYSTECTOMY; N/A      Comment:  Procedure: CHOLECYSTECTOMY, LAPAROSCOPIC;  Surgeon:                Garland Loja MD;  [facility];                 Service: General;  Laterality: N/A;  No date: TONSILLECTOMY  12/13/2018: TRANSURETHRAL RESECTION OF PROSTATE (TURP) WITHOUT USE OF   LASER; N/A      Comment:  Procedure: TURP, WITHOUT USING LASER;  Surgeon: Neil Thornton MD;  Location: [facility] OR 1ST FLR;  Service:                Urology;  Laterality: N/A;  08/10/2018: UPPER ENDOSCOPIC ULTRASOUND W/ FNA      Comment:  without FNA  Social History    Socioeconomic History      Marital status:       Number of children: 1    Occupational History      Occupation: Retired from sales     Tobacco Use      Smoking status: Never Smoker      Smokeless tobacco: Never Used    Substance and Sexual Activity      Alcohol use: No      Drug use: No      Sexual activity: Yes        Partners: Female     Social Determinants of Health  Financial Resource Strain: Medium Risk      Difficulty of Paying Living Expenses: Somewhat  hard  Food Insecurity: Food Insecurity Present      Worried About Running Out of Food in the Last Year: Sometimes true      Ran Out of Food in the Last Year: Sometimes true  Transportation Needs: No Transportation Needs      Lack of Transportation (Medical): No      Lack of Transportation (Non-Medical): No  Physical Activity: Inactive      Days of Exercise per Week: 0 days      Minutes of Exercise per Session: 0 min  Stress: No Stress Concern Present      Feeling of Stress : Only a little  Social Connections: Unknown      Frequency of Communication with Friends and Family: Twice a week      Frequency of Social Gatherings with Friends and Family: Once a week      Active Member of Clubs or Organizations: No      Attends Club or Organization Meetings: Patient refused      Marital Status:   Housing Stability: Low Risk       Unable to Pay for Housing in the Last Year: No      Number of Places Lived in the Last Year: 1      Unstable Housing in the Last Year: No  Review of patient's allergies indicates:   -- Aspirin -- Other (See Comments)    --  Bleeding ulcer   -- Ibuprofen   Review of Systems   Constitutional:  Negative for activity change, appetite change, chills, diaphoresis, fatigue, fever and unexpected weight change.   HENT:  Negative for nasal congestion, mouth sores, postnasal drip, rhinorrhea, sinus pressure/congestion, sneezing, sore throat, trouble swallowing and voice change.    Eyes:  Negative for discharge, itching and visual disturbance.   Respiratory:  Negative for cough, chest tightness, shortness of breath and wheezing.    Cardiovascular:  Negative for chest pain, palpitations and leg swelling.   Gastrointestinal:  Negative for abdominal pain, blood in stool, constipation, diarrhea, nausea and vomiting.   Endocrine: Negative for cold intolerance and heat intolerance.   Genitourinary:  Negative for difficulty urinating, dysuria, flank pain, hematuria and urgency.   Musculoskeletal:  Negative for  arthralgias, back pain, myalgias and neck pain.   Integumentary:  Negative for rash and wound.   Allergic/Immunologic: Negative for environmental allergies and food allergies.   Neurological:  Negative for dizziness, tremors, seizures, syncope, weakness and headaches.   Hematological:  Negative for adenopathy. Does not bruise/bleed easily.   Psychiatric/Behavioral:  Negative for confusion, self-injury, sleep disturbance and suicidal ideas. The patient is nervous/anxious.        Objective:      Physical Exam  Constitutional:       General: He is not in acute distress.     Appearance: Normal appearance. He is well-developed. He is not ill-appearing, toxic-appearing or diaphoretic.   HENT:      Head: Normocephalic and atraumatic.      Right Ear: External ear normal.      Left Ear: External ear normal.      Nose: Nose normal.      Mouth/Throat:      Pharynx: No oropharyngeal exudate.   Eyes:      General: No scleral icterus.        Right eye: No discharge.         Left eye: No discharge.      Extraocular Movements: Extraocular movements intact.      Conjunctiva/sclera: Conjunctivae normal.      Pupils: Pupils are equal, round, and reactive to light.   Neck:      Thyroid: No thyromegaly.      Vascular: No JVD.   Cardiovascular:      Rate and Rhythm: Normal rate and regular rhythm.      Pulses: Normal pulses.      Heart sounds: Normal heart sounds. No murmur heard.  Pulmonary:      Effort: Pulmonary effort is normal. No respiratory distress.      Breath sounds: Normal breath sounds. No wheezing or rales.   Abdominal:      General: Bowel sounds are normal. There is no distension.      Palpations: Abdomen is soft.      Tenderness: There is no abdominal tenderness. There is no right CVA tenderness, left CVA tenderness, guarding or rebound.   Musculoskeletal:      Cervical back: Normal range of motion and neck supple. No rigidity.      Right lower leg: No edema.      Left lower leg: No edema.   Lymphadenopathy:      Cervical:  No cervical adenopathy.   Skin:     General: Skin is warm and dry.      Capillary Refill: Capillary refill takes less than 2 seconds.      Coloration: Skin is not pale.      Findings: No rash.   Neurological:      General: No focal deficit present.      Mental Status: He is alert and oriented to person, place, and time. Mental status is at baseline.      Cranial Nerves: No cranial nerve deficit.      Sensory: No sensory deficit.      Motor: No weakness.      Coordination: Coordination normal.      Gait: Gait normal.      Deep Tendon Reflexes: Reflexes normal.   Psychiatric:         Mood and Affect: Mood normal.         Behavior: Behavior normal.         Thought Content: Thought content normal.         Judgment: Judgment normal.       Assessment:       Problem List Items Addressed This Visit          Psychiatric    Anxiety       Cardiac/Vascular    Hyperlipidemia associated with type 2 diabetes mellitus       Renal/    Stage 3a chronic kidney disease    BPH (benign prostatic hyperplasia)       Endocrine    Type 2 diabetes mellitus, without long-term current use of insulin    Relevant Medications    losartan (COZAAR) 100 MG tablet    dapagliflozin (FARXIGA) 10 mg tablet    Obesity, diabetes, and hypertension syndrome     Other Visit Diagnoses       Annual physical exam    -  Primary    Hematuria, unspecified type        Relevant Orders    Urinalysis    Urine culture            Plan:    Blood work reviewed with pt      HTN- controlled on Coreg 3.125 mg BID and Losartan 50 mg daily      T2DM- last HA1C of 7.5(12/22)<--7.2(4/22)<--7.3(11/21)<--6.1(9/20)<--6.3(3/20)<--6.2(9/19)      Change Amaryl to Farxiga      HLD- stable on Lipitor 40 mg daily      Diabetes, Obesity and HTN- pt advised on proper diet/exercise for weight loss      CKD III- stable      Anxiety- stable on Wellbutrin  mg qHS and Atarax 25 mg TID PRN      Elevated Alk Phos with hx of biliary obstruction 2/2 cholangitis(vibrio species and E. Coli)    S/p liver bx   Followed by Hepatology and remains on Actigall       Hematuria- repeat urine testing in 1 wk     F/u in 6 month     Over 1/2 of 40 minute visit spent reviewing pt's medical records, education/discussion of pt's medical conditions and medical management

## 2022-12-08 NOTE — PATIENT INSTRUCTIONS
"Low Carb Snacks & Diabetes friendly foods   Snacks can be an important part of a balanced, healthy meal plan.   They allow you to eat more frequently, feeling full and satisfied throughout the day.   Also, they allow you to spread carbohydrates evenly, which may stabilize blood sugars.  Plus, snacks are enjoyable!     The amount of carbohydrate needed at snacks varies. Generally, about 15-30 grams of carbohydrate per snack is recommended.    Below you will find some tasty treats.       0-5 gm carb  Crystal Light flavoring (I like fruit punch flavor!)  Vitamin Water Zero  Ayaka antioxidant drinks.   Herbal tea, unsweetened  2 tsp peanut butter on celery or carrots  1/2 cup sugar-free jell-o  1 sugar-free popsicle  ¼ cup blueberries or strawberries  8oz Blue Clau unsweetened almond milk (or there is sugar free vanilla flavored as well).  5 baby carrots & celery sticks, cucumbers, bell peppers dipped in ¼ cup salsa, 2Tbsp light ranch dressing or 2Tbsp plain Greek yogurt  10 Goldfish crackers  ½ oz low-fat cheese or string cheese  1 closed handful of nuts, unsalted (example-->almonds, pistachios, cashews, peanuts, etc).  1 Tbsp of sunflower seeds, unsalted  1 cup Smart Pop popcorn  1 whole grain brown rice cake   "Think Thin" protein bars - my personal favorite is peanut butter, chocolate brownie, and oreo  Quest bars (my favorite is birthday cake, and also cinnamon roll)    Premier protein shakes - sold at [employer], or other brand alternatives - usually 1 - 2 grams of carbs (strawberry, vanilla, chocolate flavors)  Scrambled eggs! Or a fried egg or boiled eggs - add sliced tomatoes, cilantro or some chopped green onions.        15 gm carb  1 small piece of fruit or ½ banana or 1/2 cup lite canned fruit  3 heydi cracker squares  3 cups Smart Pop popcorn, top spray butter, Chapa lite salt or cinnamon and Truvia  5 Vanilla Wafers  ½ cup low fat, no added sugar ice cream or frozen yogurt (Blue bell, Blue Bunny, Weight " "Watchers, Skinny Cow)  ½ turkey, ham, or chicken sandwich  ½ c fruit with ½ c Cottage cheese  4-6 unsalted wheat crackers with 1 oz low fat cheese or 1 tbsp peanut butter   30-45 goldfish crackers (depending on flavor)   7-8 Holiness mini brown rice cakes (caramel, apple cinnamon, chocolate)   12 Holiness mini brown rice cakes (cheddar, bbq, ranch)   1/3 cup hummus dip with raw veg  1/2 whole wheat saritha, 1Tbsp hummus  Mini Pizza (1/2 whole wheat English muffin, low-fat  cheese, tomato sauce)  100 calorie snack pack (Oreo, Chips Ahoy, Ritz Mix, Baked Cheetos)  4-6 oz. light or Greek Style yogurt (Chobani, Yoplait, Okios, [place]oneyfield)  ½ cup sugar-free pudding    6 in. wheat tortilla or saritha oven toasted chips (topped with spray butter flavoring, cinnamon, Truvia OR spray butter, garlic powder, chili powder)   18 BBQ Popchips (available at [employer], [employer], [employer] Market)  Mini bagel (small size) toasted - add fat free cream cheese or avocado and sliced tomato on top - yum!   1/2 cup Halo top icecream - birthday cake is my go-to flavor.     Tips and Tricks:   My favorite "secret" seasoning to make things extra yummy is cinnamon for sweet taste or adding   "everything but the bagel" seasoning (you can get on amazon.com or at  joes. I have seen at local groceries such as [employer] too!).     Dark colored fruits are your friend -- blueberries, strawberries, raspberries, blackberries. They have a lower sugar content. So will be the best choice for you.   Light colored fruits are NOT your friend - they tend to be higher in sugar and are not the best options for an ADA diet - examples are oranges, bananas, peaches, watermelon, -- you can eat these, but carefully and in moderation.     Other snack choices   Celery with peanut butter  Celery with tuna salad  Dill pickles and cheddar cheese (no kidding, it's a great combo)  Nuts (keep raw ones in the freezer if you think you'll overeat them)  Sunflower seeds (get them in the " "shell so it will take longer to eat them)  Other seeds (How to Toast Pumpkin or Squash Seeds)   Pistachios or almonds - will fill you up and are tasty!   Low-Carb Trail Mix  Jerky (beef or turkey -- try to find low-sugar varieties)  Salami slices (you can find in the deli section)  Cheese sticks, such as string cheese  Sugar-free Jello, alone or with cottage cheese and a sprinkling of nuts. Make sugar-free lime Jello with part coconut milk -- For a large package, dissolve the powder in a cup of boiling water, add a can of coconut milk, and then add the rest of the water. Stir well.  Pepperoni "chips" -- Zap the slices in the microwave  Cheese with a few apple slices  4-ounce plain or sugar-free yogurt with berries and flax seed meal  Smoked salmon and cream cheese on cucumber slices  Lettuce Roll-ups -- Roll luncheon meat, egg salad, tuna or other filling and veggies in lettuce leaves  Lunch Meat Roll-ups -- Roll cheese or veggies in lunch meat (read the labels for carbs on the lunch meat)  Spread bean dip, spinach dip, or other low-carb dip or spread on the lunch meat or lettuce and then roll it up  Raw veggies and spinach dip, or other low-carb dip  Pork rinds (Chicharrón), with or without dip  Ricotta cheese with fruit and/or nuts and/or flax seed meal  Mushrooms with cheese spread inside (or other spreads or dips)  Low-carb snack bars (watch out for sugar alcohols, especially maltitol)  Product Review: Atkins Advantage Bars  Pepperoni Chips -- Microwave pepperoni slices until crisp. Great with cheeses and dips  Garlic Parmesan Flax Seed Crackers  Parmesan Crisps -- Good when you want a crunchy snack.  Peanut Butter Protein Balls    "

## 2022-12-15 ENCOUNTER — LAB VISIT (OUTPATIENT)
Dept: LAB | Facility: HOSPITAL | Age: 67
End: 2022-12-15
Attending: INTERNAL MEDICINE
Payer: MEDICARE

## 2022-12-15 DIAGNOSIS — R31.9 HEMATURIA, UNSPECIFIED TYPE: ICD-10-CM

## 2022-12-15 LAB
BACTERIA #/AREA URNS AUTO: ABNORMAL /HPF
BILIRUB UR QL STRIP: NEGATIVE
CLARITY UR REFRACT.AUTO: CLEAR
COLOR UR AUTO: YELLOW
GLUCOSE UR QL STRIP: ABNORMAL
HGB UR QL STRIP: ABNORMAL
HYALINE CASTS UR QL AUTO: 0 /LPF
KETONES UR QL STRIP: NEGATIVE
LEUKOCYTE ESTERASE UR QL STRIP: NEGATIVE
MICROSCOPIC COMMENT: ABNORMAL
NITRITE UR QL STRIP: NEGATIVE
PH UR STRIP: 6 [PH] (ref 5–8)
PROT UR QL STRIP: ABNORMAL
RBC #/AREA URNS AUTO: 35 /HPF (ref 0–4)
SP GR UR STRIP: >1.03 (ref 1–1.03)
SQUAMOUS #/AREA URNS AUTO: 2 /HPF
URN SPEC COLLECT METH UR: ABNORMAL
WBC #/AREA URNS AUTO: 9 /HPF (ref 0–5)
YEAST UR QL AUTO: ABNORMAL

## 2022-12-15 PROCEDURE — 87086 URINE CULTURE/COLONY COUNT: CPT | Performed by: INTERNAL MEDICINE

## 2022-12-15 PROCEDURE — 81001 URINALYSIS AUTO W/SCOPE: CPT | Performed by: INTERNAL MEDICINE

## 2022-12-16 LAB — BACTERIA UR CULT: NO GROWTH

## 2022-12-19 ENCOUNTER — TELEPHONE (OUTPATIENT)
Dept: INTERNAL MEDICINE | Facility: CLINIC | Age: 67
End: 2022-12-19
Payer: MEDICARE

## 2022-12-19 ENCOUNTER — PATIENT MESSAGE (OUTPATIENT)
Dept: INTERNAL MEDICINE | Facility: CLINIC | Age: 67
End: 2022-12-19
Payer: MEDICARE

## 2022-12-19 DIAGNOSIS — R31.9 HEMATURIA, UNSPECIFIED TYPE: Primary | ICD-10-CM

## 2022-12-20 ENCOUNTER — PATIENT MESSAGE (OUTPATIENT)
Dept: INTERNAL MEDICINE | Facility: CLINIC | Age: 67
End: 2022-12-20
Payer: MEDICARE

## 2022-12-21 ENCOUNTER — OFFICE VISIT (OUTPATIENT)
Dept: PODIATRY | Facility: CLINIC | Age: 67
End: 2022-12-21
Payer: MEDICARE

## 2022-12-21 VITALS — WEIGHT: 207 LBS | BODY MASS INDEX: 33.27 KG/M2 | HEIGHT: 66 IN

## 2022-12-21 DIAGNOSIS — B35.1 ONYCHOMYCOSIS DUE TO DERMATOPHYTE: ICD-10-CM

## 2022-12-21 DIAGNOSIS — E11.51 DIABETES MELLITUS WITH PERIPHERAL VASCULAR DISEASE: Primary | ICD-10-CM

## 2022-12-21 PROCEDURE — 99499 NO LOS: ICD-10-PCS | Mod: S$GLB,,, | Performed by: PODIATRIST

## 2022-12-21 PROCEDURE — 99999 PR PBB SHADOW E&M-EST. PATIENT-LVL III: CPT | Mod: PBBFAC,,, | Performed by: PODIATRIST

## 2022-12-21 PROCEDURE — 3008F PR BODY MASS INDEX (BMI) DOCUMENTED: ICD-10-PCS | Mod: CPTII,S$GLB,, | Performed by: PODIATRIST

## 2022-12-21 PROCEDURE — 3072F LOW RISK FOR RETINOPATHY: CPT | Mod: CPTII,S$GLB,, | Performed by: PODIATRIST

## 2022-12-21 PROCEDURE — 3008F BODY MASS INDEX DOCD: CPT | Mod: CPTII,S$GLB,, | Performed by: PODIATRIST

## 2022-12-21 PROCEDURE — 1126F AMNT PAIN NOTED NONE PRSNT: CPT | Mod: CPTII,S$GLB,, | Performed by: PODIATRIST

## 2022-12-21 PROCEDURE — 11721 DEBRIDE NAIL 6 OR MORE: CPT | Mod: Q9,S$GLB,, | Performed by: PODIATRIST

## 2022-12-21 PROCEDURE — 99999 PR PBB SHADOW E&M-EST. PATIENT-LVL III: ICD-10-PCS | Mod: PBBFAC,,, | Performed by: PODIATRIST

## 2022-12-21 PROCEDURE — 11721 PR DEBRIDEMENT OF NAILS, 6 OR MORE: ICD-10-PCS | Mod: Q9,S$GLB,, | Performed by: PODIATRIST

## 2022-12-21 PROCEDURE — 99499 UNLISTED E&M SERVICE: CPT | Mod: S$GLB,,, | Performed by: PODIATRIST

## 2022-12-21 PROCEDURE — 1126F PR PAIN SEVERITY QUANTIFIED, NO PAIN PRESENT: ICD-10-PCS | Mod: CPTII,S$GLB,, | Performed by: PODIATRIST

## 2022-12-21 PROCEDURE — 3072F PR LOW RISK FOR RETINOPATHY: ICD-10-PCS | Mod: CPTII,S$GLB,, | Performed by: PODIATRIST

## 2023-01-04 ENCOUNTER — LAB VISIT (OUTPATIENT)
Dept: LAB | Facility: HOSPITAL | Age: 68
End: 2023-01-04
Payer: MEDICARE

## 2023-01-04 ENCOUNTER — OFFICE VISIT (OUTPATIENT)
Dept: UROLOGY | Facility: CLINIC | Age: 68
End: 2023-01-04
Payer: MEDICARE

## 2023-01-04 VITALS
BODY MASS INDEX: 33.27 KG/M2 | HEART RATE: 89 BPM | SYSTOLIC BLOOD PRESSURE: 143 MMHG | WEIGHT: 207 LBS | HEIGHT: 66 IN | DIASTOLIC BLOOD PRESSURE: 87 MMHG

## 2023-01-04 DIAGNOSIS — R31.9 HEMATURIA, UNSPECIFIED TYPE: ICD-10-CM

## 2023-01-04 LAB
BILIRUB SERPL-MCNC: NORMAL MG/DL
BLOOD URINE, POC: NORMAL
CLARITY, POC UA: CLEAR
COLOR, POC UA: NORMAL
CREAT SERPL-MCNC: 1.4 MG/DL (ref 0.5–1.4)
EST. GFR  (NO RACE VARIABLE): 54.4 ML/MIN/1.73 M^2
GLUCOSE UR QL STRIP: 500
KETONES UR QL STRIP: NORMAL
LEUKOCYTE ESTERASE URINE, POC: NORMAL
MICROSCOPIC COMMENT: NORMAL
NITRITE, POC UA: NORMAL
PH, POC UA: 5
POC RESIDUAL URINE VOLUME: 30 ML (ref 0–100)
PROTEIN, POC: NORMAL
RBC #/AREA URNS AUTO: 2 /HPF (ref 0–4)
SPECIFIC GRAVITY, POC UA: 1.01
UROBILINOGEN, POC UA: NORMAL
WBC #/AREA URNS AUTO: 2 /HPF (ref 0–5)

## 2023-01-04 PROCEDURE — 3079F DIAST BP 80-89 MM HG: CPT | Mod: CPTII,S$GLB,, | Performed by: NURSE PRACTITIONER

## 2023-01-04 PROCEDURE — 3288F FALL RISK ASSESSMENT DOCD: CPT | Mod: CPTII,S$GLB,, | Performed by: NURSE PRACTITIONER

## 2023-01-04 PROCEDURE — 88112 CYTOPATH CELL ENHANCE TECH: CPT | Performed by: PATHOLOGY

## 2023-01-04 PROCEDURE — 81002 URINALYSIS NONAUTO W/O SCOPE: CPT | Mod: S$GLB,,, | Performed by: NURSE PRACTITIONER

## 2023-01-04 PROCEDURE — 1159F PR MEDICATION LIST DOCUMENTED IN MEDICAL RECORD: ICD-10-PCS | Mod: CPTII,S$GLB,, | Performed by: NURSE PRACTITIONER

## 2023-01-04 PROCEDURE — 88112 PR  CYTOPATH, CELL ENHANCE TECH: ICD-10-PCS | Mod: 26,,, | Performed by: PATHOLOGY

## 2023-01-04 PROCEDURE — 51798 POCT BLADDER SCAN: ICD-10-PCS | Mod: S$GLB,,, | Performed by: NURSE PRACTITIONER

## 2023-01-04 PROCEDURE — 81001 URINALYSIS AUTO W/SCOPE: CPT | Performed by: NURSE PRACTITIONER

## 2023-01-04 PROCEDURE — 51798 US URINE CAPACITY MEASURE: CPT | Mod: S$GLB,,, | Performed by: NURSE PRACTITIONER

## 2023-01-04 PROCEDURE — 1160F PR REVIEW ALL MEDS BY PRESCRIBER/CLIN PHARMACIST DOCUMENTED: ICD-10-PCS | Mod: CPTII,S$GLB,, | Performed by: NURSE PRACTITIONER

## 2023-01-04 PROCEDURE — 88112 CYTOPATH CELL ENHANCE TECH: CPT | Mod: 26,,, | Performed by: PATHOLOGY

## 2023-01-04 PROCEDURE — 1101F PR PT FALLS ASSESS DOC 0-1 FALLS W/OUT INJ PAST YR: ICD-10-PCS | Mod: CPTII,S$GLB,, | Performed by: NURSE PRACTITIONER

## 2023-01-04 PROCEDURE — 99999 PR PBB SHADOW E&M-EST. PATIENT-LVL IV: CPT | Mod: PBBFAC,,, | Performed by: NURSE PRACTITIONER

## 2023-01-04 PROCEDURE — 1126F AMNT PAIN NOTED NONE PRSNT: CPT | Mod: CPTII,S$GLB,, | Performed by: NURSE PRACTITIONER

## 2023-01-04 PROCEDURE — 99203 OFFICE O/P NEW LOW 30 MIN: CPT | Mod: S$GLB,,, | Performed by: NURSE PRACTITIONER

## 2023-01-04 PROCEDURE — 3079F PR MOST RECENT DIASTOLIC BLOOD PRESSURE 80-89 MM HG: ICD-10-PCS | Mod: CPTII,S$GLB,, | Performed by: NURSE PRACTITIONER

## 2023-01-04 PROCEDURE — 3072F PR LOW RISK FOR RETINOPATHY: ICD-10-PCS | Mod: CPTII,S$GLB,, | Performed by: NURSE PRACTITIONER

## 2023-01-04 PROCEDURE — 3072F LOW RISK FOR RETINOPATHY: CPT | Mod: CPTII,S$GLB,, | Performed by: NURSE PRACTITIONER

## 2023-01-04 PROCEDURE — 1159F MED LIST DOCD IN RCRD: CPT | Mod: CPTII,S$GLB,, | Performed by: NURSE PRACTITIONER

## 2023-01-04 PROCEDURE — 1160F RVW MEDS BY RX/DR IN RCRD: CPT | Mod: CPTII,S$GLB,, | Performed by: NURSE PRACTITIONER

## 2023-01-04 PROCEDURE — 1126F PR PAIN SEVERITY QUANTIFIED, NO PAIN PRESENT: ICD-10-PCS | Mod: CPTII,S$GLB,, | Performed by: NURSE PRACTITIONER

## 2023-01-04 PROCEDURE — 3008F BODY MASS INDEX DOCD: CPT | Mod: CPTII,S$GLB,, | Performed by: NURSE PRACTITIONER

## 2023-01-04 PROCEDURE — 99999 PR PBB SHADOW E&M-EST. PATIENT-LVL IV: ICD-10-PCS | Mod: PBBFAC,,, | Performed by: NURSE PRACTITIONER

## 2023-01-04 PROCEDURE — 3008F PR BODY MASS INDEX (BMI) DOCUMENTED: ICD-10-PCS | Mod: CPTII,S$GLB,, | Performed by: NURSE PRACTITIONER

## 2023-01-04 PROCEDURE — 87086 URINE CULTURE/COLONY COUNT: CPT | Performed by: NURSE PRACTITIONER

## 2023-01-04 PROCEDURE — 81002 POCT URINE DIPSTICK WITHOUT MICROSCOPE: ICD-10-PCS | Mod: S$GLB,,, | Performed by: NURSE PRACTITIONER

## 2023-01-04 PROCEDURE — 99203 PR OFFICE/OUTPT VISIT, NEW, LEVL III, 30-44 MIN: ICD-10-PCS | Mod: S$GLB,,, | Performed by: NURSE PRACTITIONER

## 2023-01-04 PROCEDURE — 3077F SYST BP >= 140 MM HG: CPT | Mod: CPTII,S$GLB,, | Performed by: NURSE PRACTITIONER

## 2023-01-04 PROCEDURE — 3077F PR MOST RECENT SYSTOLIC BLOOD PRESSURE >= 140 MM HG: ICD-10-PCS | Mod: CPTII,S$GLB,, | Performed by: NURSE PRACTITIONER

## 2023-01-04 PROCEDURE — 36415 COLL VENOUS BLD VENIPUNCTURE: CPT | Performed by: NURSE PRACTITIONER

## 2023-01-04 PROCEDURE — 1101F PT FALLS ASSESS-DOCD LE1/YR: CPT | Mod: CPTII,S$GLB,, | Performed by: NURSE PRACTITIONER

## 2023-01-04 PROCEDURE — 3288F PR FALLS RISK ASSESSMENT DOCUMENTED: ICD-10-PCS | Mod: CPTII,S$GLB,, | Performed by: NURSE PRACTITIONER

## 2023-01-04 PROCEDURE — 82565 ASSAY OF CREATININE: CPT | Performed by: NURSE PRACTITIONER

## 2023-01-04 RX ORDER — LIDOCAINE HYDROCHLORIDE 20 MG/ML
JELLY TOPICAL ONCE
Status: CANCELLED | OUTPATIENT
Start: 2023-01-04 | End: 2023-01-04

## 2023-01-04 RX ORDER — CIPROFLOXACIN 500 MG/1
500 TABLET ORAL ONCE
Status: CANCELLED | OUTPATIENT
Start: 2023-01-04 | End: 2023-01-04

## 2023-01-04 NOTE — PROGRESS NOTES
"CHIEF COMPLAINT:    Mr. Sanford is a 67 y.o. male presenting for microscopic hematuria.      PRESENTING ILLNESS:    Mario Sanford Jr. is a 67 y.o. male with a PMH of anxiety, htn, hld, s/p turp, obesity, and diabetes type 2 who presents for microscopic hematuria.    Previous patient of Dr. Curiel, but last seen in 2018.  Hx of TURP 12/13/18 by Dr. Curiel.  Presents today due to microscopic hematuria found on routine physical exam.  Denies gross hematuria.    Previous/Current Smoker: no  Radiation therapy to pelvis: no  Chemotherapy: no  Personal/ family history of bladder/ kidney cancer: no  Exposure to harmful chemicals: no  History of kidney stones: no    He reports a good urinary stream and complete bladder emptying.  Nocturia 3-5 times per night.  Reports drinking "plenty' up until bedtime.  Drinks tea, occasional diet soft drink, and lemonade.  Urinary frequency and nocturia symptoms do not bother patient.    No family history of prostate cancer.  Last PSA 1.3 12/2/22.      REVIEW OF SYSTEMS:    Review of Systems   Constitutional:  Negative for chills and fever.   Respiratory:  Negative for shortness of breath.    Cardiovascular:  Negative for chest pain.   Gastrointestinal:  Negative for constipation and diarrhea.   Genitourinary:  Negative for dysuria, flank pain, frequency, hematuria and urgency.   Neurological:  Negative for dizziness and weakness.     PATIENT HISTORY:    Past Medical History:   Diagnosis Date    Arthritis     Diabetes mellitus     Dilated cbd, acquired     Duodenal diverticulum     Duodenal fistula     choledochoduodenal    Duodenal ulcer     Esophagitis     Hiatal hernia     Hyperlipidemia     Hypertension     Melena     Nodule of esophagus     Obesity (BMI 30-39.9)        Family History   Problem Relation Age of Onset    Heart disease Mother     Hypertension Mother     Hyperlipidemia Mother     Diabetes Father     Hypertension Father     Hyperlipidemia Sister     Cancer Neg Hx     Stroke " Neg Hx     Blindness Neg Hx     Amblyopia Neg Hx     Cataracts Neg Hx     Glaucoma Neg Hx     Macular degeneration Neg Hx     Retinal detachment Neg Hx     Strabismus Neg Hx     Thyroid disease Neg Hx     Cirrhosis Neg Hx        Allergies:  Aspirin and Ibuprofen    Medications:    Current Outpatient Medications:     atorvastatin (LIPITOR) 40 MG tablet, TAKE 1 TABLET BY MOUTH AT  NIGHT, Disp: 90 tablet, Rfl: 3    blood sugar diagnostic Strp, 1 strip by Misc.(Non-Drug; Combo Route) route once daily. Test strips for glucometer insurance covers to check blood sugar twice daily., Disp: 200 strip, Rfl: 3    buPROPion (WELLBUTRIN XL) 300 MG 24 hr tablet, TAKE 1 TABLET BY MOUTH EVERY DAY, Disp: 90 tablet, Rfl: 3    carvediloL (COREG) 3.125 MG tablet, TAKE 1 TABLET BY MOUTH  TWICE DAILY, Disp: 180 tablet, Rfl: 2    ciclopirox (PENLAC) 8 % Soln, APPLY TO AFFECTED AREA AT NIGHT, Disp: 6.6 mL, Rfl: 11    dapagliflozin (FARXIGA) 10 mg tablet, Take 1 tablet (10 mg total) by mouth before breakfast., Disp: 30 tablet, Rfl: 3    gabapentin (NEURONTIN) 300 MG capsule, TAKE 1 CAPSULE BY MOUTH IN  THE EVENING, Disp: 90 capsule, Rfl: 3    hydroxyzine HCL (ATARAX) 25 MG tablet, 1/2-1 tab PO TID PRN anxiety, Disp: 60 tablet, Rfl: 1    lancets Misc, 1 Device by Misc.(Non-Drug; Combo Route) route once daily. Lancets for glucometer insurance covers to check blood sugar twice daily., Disp: 200 each, Rfl: 3    losartan (COZAAR) 100 MG tablet, Take 1 tablet (100 mg total) by mouth once daily., Disp: 90 tablet, Rfl: 3    pantoprazole (PROTONIX) 40 MG tablet, TAKE 1 TABLET BY MOUTH ONCE DAILY, Disp: 90 tablet, Rfl: 3    polyethylene glycol (GLYCOLAX) 17 gram PwPk, Take 17 g by mouth once daily., Disp: 30 packet, Rfl: 0    ursodioL (ACTIGALL) 300 mg capsule, Take 1 capsule (300 mg total) by mouth 2 (two) times daily., Disp: 60 capsule, Rfl: 5    ursodioL (ACTIGALL) 500 MG tablet, Take 1 tablet (500 mg total) by mouth 2 (two) times daily., Disp: 60  tablet, Rfl: 5    diazePAM (VALIUM) 2 MG tablet, Take 1 tablet (2 mg total) by mouth once. Take 1 tablet by mouth 30-60 minutes prior to MRI. May repeat once as needed. for 1 dose, Disp: 2 tablet, Rfl: 0    k phos di & mono-sod phos mono (K-PHOS-NEUTRAL) 250 mg Tab, Take 2 tablets by mouth daily with breakfast. for 7 days, Disp: 14 tablet, Rfl: 0    PHYSICAL EXAMINATION:    Physical Exam  Vitals and nursing note reviewed.   Constitutional:       Appearance: Normal appearance. He is well-developed.   HENT:      Head: Normocephalic and atraumatic.   Eyes:      Pupils: Pupils are equal, round, and reactive to light.   Pulmonary:      Effort: Pulmonary effort is normal.   Genitourinary:     Penis: Normal and circumcised.       Testes: Normal.      Prostate: Not enlarged and not tender.      Rectum: External hemorrhoid present.      Comments: Prostate smooth, no nodules felt  Musculoskeletal:         General: Normal range of motion.      Cervical back: Normal range of motion.   Skin:     General: Skin is warm and dry.   Neurological:      Mental Status: He is alert and oriented to person, place, and time.   Psychiatric:         Behavior: Behavior normal.         LABS:    U/a performed in office today: yellow, ph 5, 1.010, trace bld, + 500 glucose  Bladder scan performed by Nurse Juana.  PVR 30 ml    Lab Results   Component Value Date    PSA 1.3 12/02/2022    PSA 1.3 09/22/2020    PSA 1.5 09/06/2019    PSA 1.6 05/25/2018    PSADIAG 2.0 11/05/2021       IMPRESSION:  Encounter Diagnoses   Name Primary?    Hematuria, unspecified type          PLAN:  Problem List Items Addressed This Visit    None  Visit Diagnoses       Hematuria, unspecified type        Relevant Orders    POCT URINE DIPSTICK WITHOUT MICROSCOPE (Completed)    POCT Bladder Scan (Completed)    CT Urogram Abd Pelvis W WO    Creatinine, serum    Cystoscopy    Cytology, Urine    Urine culture    Urinalysis Microscopic            1. Hematuria   I explained to the  patient that the causes of hematuria, whether it be gross hematuria or microhematuria, are many, including but not limited to  infections, kidney stones,  malignancy. Fortunately, for patients with microhematuria, the likelihood of finding an underlying  malignancy as the cause of the hematuria is very low at 1-2%. In patients with gross hematuria, the chances of an underlying  malignancy are higher but still low at 15-20%.    Nevertheless, I explained to the patient that the evaluation in both cases consists of upper tract imaging followed by flexible cystoscopy. I described the rationale and procedure for both and answered all questions.    Hematuria work up discussed in detail.  Will proceed with hematuria work up:  Creatinine prior to CT urogram to ensure adequate kidney function.   Cysto  CT urogram   Urine cytology  Urine culture    2. Bph with nocturia   S/p turp 2018   Symptoms not bothersome to him    3. Rtc for above appts    Dalila Phan NP    I spent 30 minutes with the patient. Over 50% of the visit was spent in counseling.

## 2023-01-05 ENCOUNTER — HOSPITAL ENCOUNTER (OUTPATIENT)
Dept: RADIOLOGY | Facility: HOSPITAL | Age: 68
Discharge: HOME | End: 2023-01-05
Attending: NURSE PRACTITIONER
Payer: MEDICARE

## 2023-01-05 DIAGNOSIS — R31.9 HEMATURIA, UNSPECIFIED TYPE: ICD-10-CM

## 2023-01-05 LAB — BACTERIA UR CULT: NO GROWTH

## 2023-01-05 PROCEDURE — 74178 CT UROGRAM ABD PELVIS W WO: ICD-10-PCS | Mod: 26,,, | Performed by: RADIOLOGY

## 2023-01-05 PROCEDURE — 74178 CT ABD&PLV WO CNTR FLWD CNTR: CPT | Mod: 26,,, | Performed by: RADIOLOGY

## 2023-01-05 PROCEDURE — 74178 CT ABD&PLV WO CNTR FLWD CNTR: CPT | Mod: TC

## 2023-01-05 PROCEDURE — 25500020 PHARM REV CODE 255: Performed by: NURSE PRACTITIONER

## 2023-01-05 RX ADMIN — IOHEXOL 100 ML: 350 INJECTION, SOLUTION INTRAVENOUS at 06:01

## 2023-01-09 LAB
FINAL PATHOLOGIC DIAGNOSIS: ABNORMAL
Lab: ABNORMAL

## 2023-01-10 ENCOUNTER — PES CALL (OUTPATIENT)
Dept: ADMINISTRATIVE | Facility: CLINIC | Age: 68
End: 2023-01-10
Payer: MEDICARE

## 2023-01-18 ENCOUNTER — TELEPHONE (OUTPATIENT)
Dept: UROLOGY | Facility: CLINIC | Age: 68
End: 2023-01-18
Payer: MEDICARE

## 2023-01-18 ENCOUNTER — PROCEDURE VISIT (OUTPATIENT)
Dept: UROLOGY | Facility: CLINIC | Age: 68
End: 2023-01-18
Payer: MEDICARE

## 2023-01-18 VITALS
DIASTOLIC BLOOD PRESSURE: 68 MMHG | RESPIRATION RATE: 18 BRPM | BODY MASS INDEX: 32.8 KG/M2 | SYSTOLIC BLOOD PRESSURE: 142 MMHG | WEIGHT: 203.19 LBS | TEMPERATURE: 98 F | HEART RATE: 92 BPM

## 2023-01-18 DIAGNOSIS — N28.89 RENAL MASS: Primary | ICD-10-CM

## 2023-01-18 DIAGNOSIS — R31.9 HEMATURIA, UNSPECIFIED TYPE: ICD-10-CM

## 2023-01-18 RX ORDER — LIDOCAINE HYDROCHLORIDE 20 MG/ML
JELLY TOPICAL
Status: COMPLETED | OUTPATIENT
Start: 2023-01-18 | End: 2023-01-18

## 2023-01-18 RX ADMIN — LIDOCAINE HYDROCHLORIDE: 20 JELLY TOPICAL at 01:01

## 2023-01-18 NOTE — PATIENT INSTRUCTIONS
What to Expect After a Cystoscopy  For the next 24-48 hours, you may feel a mild burning when you urinate. This burning is normal and expected. Drink plenty of water to dilute the urine to help relieve the burning sensation. You may also see a small amount of blood in your urine after the procedure.    Unless you are already taking antibiotics, you may be given an antibiotic after the test to prevent infection.    Signs and Symptoms to Report  Call the [facility] at [phone] if you develop any of the following:  Fever of 101 degrees or higher  Chills or persistent bleeding  Inability to urinate

## 2023-01-18 NOTE — PROCEDURES
Procedures  Procedures: Flexible cystourethroscopy    Pre Procedure Diagnosis:microscopic hematuria    Post Procedure Diagnosis:Normal cystoscopy    Surgeon: Charli Curiel MD    Anesthesia: 2% uro-jet lidocaine jelly for local analgesia    Flexible cysto-urethroscopy was performed after consent was obtained.  The risks and benefits were explained.    2% lidocaine urojet was used for local analgesia.  The genitalia was prepped and draped in the sterile fashion with betasept.    The flexible scope was advanced into the urethra.  No urethral strictures were noted.  The prostate showed Significant regrowth hypertrophy.  There was Significant median lobe present.  Bilateral ureteral orifices were evaluated and noted to be normal with clear efflux.  The bladder was completely surveyed in a systematic fashion.   No bladder tumors or lesions were seen.      The patient tolerated the procedure well without complication.  Urine cytology showed atypical cells. CT urogram from 1/5/23 was independently reviewed today and reveals no concerning  findings, non-specific subcentimeter likely renal cyst, but renal US recommended.  Will order renal US to evaluate the right renal lesion.  I will notify them of the results of the ultrasound when they are available.         They will follow up in 1 year(s) with Dalila Phan NP (pending renal US results which I will communicate to the patient).

## 2023-01-18 NOTE — TELEPHONE ENCOUNTER
1 Yr Recall placed for TALON Phan NP now per Curiel chart note.  Thank you.     1/19/2024     Expiration Date: 3/18/2024 Letter: [facility] RECALL LETTER [[id number]]   Generating Appointment: 1/18/23       Audit Trail: User: Time: Status:    Evon Elkins RN

## 2023-01-30 ENCOUNTER — HOSPITAL ENCOUNTER (OUTPATIENT)
Dept: RADIOLOGY | Facility: HOSPITAL | Age: 68
Discharge: HOME | End: 2023-01-30
Attending: UROLOGY
Payer: MEDICARE

## 2023-01-30 DIAGNOSIS — N28.89 RENAL MASS: ICD-10-CM

## 2023-01-30 PROCEDURE — 76770 US EXAM ABDO BACK WALL COMP: CPT | Mod: TC

## 2023-01-30 PROCEDURE — 76770 US EXAM ABDO BACK WALL COMP: CPT | Mod: 26,,, | Performed by: INTERNAL MEDICINE

## 2023-01-30 PROCEDURE — 76770 US KIDNEY: ICD-10-PCS | Mod: 26,,, | Performed by: INTERNAL MEDICINE

## 2023-02-01 ENCOUNTER — TELEPHONE (OUTPATIENT)
Dept: UROLOGY | Facility: CLINIC | Age: 68
End: 2023-02-01
Payer: MEDICARE

## 2023-02-01 NOTE — TELEPHONE ENCOUNTER
Spoke w/ wife Brittaney, F/U BONNIE in 1 yr.  Recall placed now.    ----- Message from Charli Curiel MD sent at 1/31/2023 10:42 AM CST -----  Ultrasound normal.  BONNIE follow-up 1 year

## 2023-02-20 NOTE — PROGRESS NOTES
Subjective:      Patient ID: Mario Sanford Jr. is a 67 y.o. male.    Chief Complaint: Diabetes Mellitus and Nail Care      Mario is a 67 y.o. male who presents to the clinic for evaluation and treatment of high risk feet. Mario has a past medical history of Arthritis, Diabetes mellitus, Dilated cbd, acquired, Duodenal diverticulum, Duodenal fistula, Duodenal ulcer, Esophagitis, Hiatal hernia, Hyperlipidemia, Hypertension, Melena, Nodule of esophagus, and Obesity (BMI 30-39.9). The patient's chief complaint is long, thick toenails. This patient has documented high risk feet requiring routine maintenance secondary to peripheral vascular disease.    PCP: Bereket Rivera DO    Date Last Seen by PCP:   Chief Complaint   Patient presents with    Diabetes Mellitus    Nail Care         Current shoe gear:  Affected Foot: Tennis shoes     Unaffected Foot: Tennis shoes    Hemoglobin A1C   Date Value Ref Range Status   12/02/2022 7.5 (H) 4.0 - 5.6 % Final     Comment:     ADA Screening Guidelines:  5.7-6.4%  Consistent with prediabetes  >or=6.5%  Consistent with diabetes    High levels of fetal hemoglobin interfere with the HbA1C  assay. Heterozygous hemoglobin variants (HbS, HgC, etc)do  not significantly interfere with this assay.   However, presence of multiple variants may affect accuracy.     04/18/2022 7.2 (H) 4.0 - 5.6 % Final     Comment:     ADA Screening Guidelines:  5.7-6.4%  Consistent with prediabetes  >or=6.5%  Consistent with diabetes    High levels of fetal hemoglobin interfere with the HbA1C  assay. Heterozygous hemoglobin variants (HbS, HgC, etc)do  not significantly interfere with this assay.   However, presence of multiple variants may affect accuracy.     11/05/2021 7.3 (H) 4.0 - 5.6 % Final     Comment:     ADA Screening Guidelines:  5.7-6.4%  Consistent with prediabetes  >or=6.5%  Consistent with diabetes    High levels of fetal hemoglobin interfere with the HbA1C  assay. Heterozygous  hemoglobin variants (HbS, HgC, etc)do  not significantly interfere with this assay.   However, presence of multiple variants may affect accuracy.         Review of Systems   Constitutional: Negative for chills, fever and malaise/fatigue.   HENT:  Negative for hearing loss.    Cardiovascular:  Negative for claudication.   Respiratory:  Negative for shortness of breath.    Skin:  Positive for color change, nail changes and unusual hair distribution. Negative for dry skin, flushing and rash.   Musculoskeletal:  Negative for joint pain and myalgias.   Gastrointestinal:  Negative for nausea and vomiting.   Neurological:  Negative for loss of balance, numbness, paresthesias and sensory change.   Psychiatric/Behavioral:  Negative for altered mental status.          Objective:      Physical Exam  Vitals reviewed.   Constitutional:       Appearance: He is well-developed.   Cardiovascular:      Pulses:           Dorsalis pedis pulses are 0 on the right side and 0 on the left side.        Posterior tibial pulses are 1+ on the right side and 1+ on the left side.   Musculoskeletal:      Right ankle: Decreased range of motion. Abnormal pulse.      Right Achilles Tendon: Ashraf's test negative.      Left ankle: Decreased range of motion. Abnormal pulse.      Left Achilles Tendon: Ashraf's test negative.      Right foot: Decreased range of motion.      Left foot: Decreased range of motion.   Feet:      Right foot:      Protective Sensation: 5 sites tested.  5 sites sensed.      Left foot:      Protective Sensation: 5 sites tested.  5 sites sensed.   Skin:     General: Skin is cool and dry.      Capillary Refill: Capillary refill takes more than 3 seconds.   Neurological:      Mental Status: He is alert and oriented to person, place, and time.      Comments: intact sensation noted to b/L lower extremities   Psychiatric:         Behavior: Behavior normal. Behavior is cooperative.       Nails x10 are elongated by  4-7mm's,  thickened by 2-3 mm's, dystrophic, and are yellowish in  coloration . Xerosis Bilaterally. No open lesions noted.             Assessment:       Encounter Diagnoses   Name Primary?    Diabetes mellitus with peripheral vascular disease Yes    Onychomycosis due to dermatophyte          Plan:       Mario was seen today for diabetes mellitus and nail care.    Diagnoses and all orders for this visit:    Diabetes mellitus with peripheral vascular disease    Onychomycosis due to dermatophyte    I counseled the patient on his conditions, their implications and medical management.        - Shoe inspection. Diabetic Foot Education. Patient reminded of the importance of good nutrition and blood sugar control to help prevent podiatric complications of diabetes. Patient instructed on proper foot hygeine. We discussed wearing proper shoe gear, daily foot inspections, never walking without protective shoe gear, never putting sharp instruments to feet, routine podiatric nail visits every 2-3 months.      - With patient's permission, nails were aggressively reduced and debrided x 10 to their soft tissue attachment mechanically and with electric , removing all offending nail and debris. Patient relates relief following the procedure. He will continue to monitor the areas daily, inspect his feet, wear protective shoe gear when ambulatory, moisturizer to maintain skin integrity and follow in this office in approximately 2-3 months, sooner p.r.n.

## 2023-02-24 ENCOUNTER — PES CALL (OUTPATIENT)
Dept: ADMINISTRATIVE | Facility: CLINIC | Age: 68
End: 2023-02-24
Payer: MEDICARE

## 2023-03-13 ENCOUNTER — PATIENT MESSAGE (OUTPATIENT)
Dept: INTERNAL MEDICINE | Facility: CLINIC | Age: 68
End: 2023-03-13
Payer: MEDICARE

## 2023-03-17 ENCOUNTER — PATIENT OUTREACH (OUTPATIENT)
Dept: ADMINISTRATIVE | Facility: HOSPITAL | Age: 68
End: 2023-03-17
Payer: MEDICARE

## 2023-03-29 ENCOUNTER — OFFICE VISIT (OUTPATIENT)
Dept: PODIATRY | Facility: CLINIC | Age: 68
End: 2023-03-29
Payer: MEDICARE

## 2023-03-29 VITALS
BODY MASS INDEX: 31.82 KG/M2 | HEIGHT: 66 IN | WEIGHT: 198 LBS | HEART RATE: 92 BPM | DIASTOLIC BLOOD PRESSURE: 84 MMHG | SYSTOLIC BLOOD PRESSURE: 137 MMHG

## 2023-03-29 DIAGNOSIS — E11.51 DIABETES MELLITUS WITH PERIPHERAL VASCULAR DISEASE: Primary | ICD-10-CM

## 2023-03-29 DIAGNOSIS — B35.1 ONYCHOMYCOSIS DUE TO DERMATOPHYTE: ICD-10-CM

## 2023-03-29 PROCEDURE — 1126F PR PAIN SEVERITY QUANTIFIED, NO PAIN PRESENT: ICD-10-PCS | Mod: CPTII,S$GLB,, | Performed by: PODIATRIST

## 2023-03-29 PROCEDURE — 1159F PR MEDICATION LIST DOCUMENTED IN MEDICAL RECORD: ICD-10-PCS | Mod: CPTII,S$GLB,, | Performed by: PODIATRIST

## 2023-03-29 PROCEDURE — 11721 PR DEBRIDEMENT OF NAILS, 6 OR MORE: ICD-10-PCS | Mod: Q8,S$GLB,, | Performed by: PODIATRIST

## 2023-03-29 PROCEDURE — 1159F MED LIST DOCD IN RCRD: CPT | Mod: CPTII,S$GLB,, | Performed by: PODIATRIST

## 2023-03-29 PROCEDURE — 3075F SYST BP GE 130 - 139MM HG: CPT | Mod: CPTII,S$GLB,, | Performed by: PODIATRIST

## 2023-03-29 PROCEDURE — 4010F ACE/ARB THERAPY RXD/TAKEN: CPT | Mod: CPTII,S$GLB,, | Performed by: PODIATRIST

## 2023-03-29 PROCEDURE — 3079F DIAST BP 80-89 MM HG: CPT | Mod: CPTII,S$GLB,, | Performed by: PODIATRIST

## 2023-03-29 PROCEDURE — 4010F PR ACE/ARB THEARPY RXD/TAKEN: ICD-10-PCS | Mod: CPTII,S$GLB,, | Performed by: PODIATRIST

## 2023-03-29 PROCEDURE — 99999 PR PBB SHADOW E&M-EST. PATIENT-LVL III: ICD-10-PCS | Mod: PBBFAC,,, | Performed by: PODIATRIST

## 2023-03-29 PROCEDURE — 3008F BODY MASS INDEX DOCD: CPT | Mod: CPTII,S$GLB,, | Performed by: PODIATRIST

## 2023-03-29 PROCEDURE — 1126F AMNT PAIN NOTED NONE PRSNT: CPT | Mod: CPTII,S$GLB,, | Performed by: PODIATRIST

## 2023-03-29 PROCEDURE — 3079F PR MOST RECENT DIASTOLIC BLOOD PRESSURE 80-89 MM HG: ICD-10-PCS | Mod: CPTII,S$GLB,, | Performed by: PODIATRIST

## 2023-03-29 PROCEDURE — 11721 DEBRIDE NAIL 6 OR MORE: CPT | Mod: Q8,S$GLB,, | Performed by: PODIATRIST

## 2023-03-29 PROCEDURE — 3072F PR LOW RISK FOR RETINOPATHY: ICD-10-PCS | Mod: CPTII,S$GLB,, | Performed by: PODIATRIST

## 2023-03-29 PROCEDURE — 99499 NO LOS: ICD-10-PCS | Mod: S$GLB,,, | Performed by: PODIATRIST

## 2023-03-29 PROCEDURE — 3008F PR BODY MASS INDEX (BMI) DOCUMENTED: ICD-10-PCS | Mod: CPTII,S$GLB,, | Performed by: PODIATRIST

## 2023-03-29 PROCEDURE — 99999 PR PBB SHADOW E&M-EST. PATIENT-LVL III: CPT | Mod: PBBFAC,,, | Performed by: PODIATRIST

## 2023-03-29 PROCEDURE — 3072F LOW RISK FOR RETINOPATHY: CPT | Mod: CPTII,S$GLB,, | Performed by: PODIATRIST

## 2023-03-29 PROCEDURE — 3075F PR MOST RECENT SYSTOLIC BLOOD PRESS GE 130-139MM HG: ICD-10-PCS | Mod: CPTII,S$GLB,, | Performed by: PODIATRIST

## 2023-03-29 PROCEDURE — 99499 UNLISTED E&M SERVICE: CPT | Mod: S$GLB,,, | Performed by: PODIATRIST

## 2023-03-29 NOTE — PROGRESS NOTES
Subjective:      Patient ID: Mario Sanford Jr. is a 67 y.o. male.    Chief Complaint: Peripheral Vascular Disease and Diabetes Mellitus      Mario is a 67 y.o. male who presents to the clinic for evaluation and treatment of high risk feet. Mario has a past medical history of Arthritis, Diabetes mellitus, Dilated cbd, acquired, Duodenal diverticulum, Duodenal fistula, Duodenal ulcer, Esophagitis, Hiatal hernia, Hyperlipidemia, Hypertension, Melena, Nodule of esophagus, and Obesity (BMI 30-39.9). The patient's chief complaint is long, thick toenails. This patient has documented high risk feet requiring routine maintenance secondary to peripheral vascular disease.    PCP: Bereket Rivera DO    Date Last Seen by PCP:   Chief Complaint   Patient presents with    Peripheral Vascular Disease    Diabetes Mellitus         Current shoe gear:  Affected Foot: Tennis shoes     Unaffected Foot: Tennis shoes    Hemoglobin A1C   Date Value Ref Range Status   12/02/2022 7.5 (H) 4.0 - 5.6 % Final     Comment:     ADA Screening Guidelines:  5.7-6.4%  Consistent with prediabetes  >or=6.5%  Consistent with diabetes    High levels of fetal hemoglobin interfere with the HbA1C  assay. Heterozygous hemoglobin variants (HbS, HgC, etc)do  not significantly interfere with this assay.   However, presence of multiple variants may affect accuracy.     04/18/2022 7.2 (H) 4.0 - 5.6 % Final     Comment:     ADA Screening Guidelines:  5.7-6.4%  Consistent with prediabetes  >or=6.5%  Consistent with diabetes    High levels of fetal hemoglobin interfere with the HbA1C  assay. Heterozygous hemoglobin variants (HbS, HgC, etc)do  not significantly interfere with this assay.   However, presence of multiple variants may affect accuracy.     11/05/2021 7.3 (H) 4.0 - 5.6 % Final     Comment:     ADA Screening Guidelines:  5.7-6.4%  Consistent with prediabetes  >or=6.5%  Consistent with diabetes    High levels of fetal hemoglobin interfere with the  HbA1C  assay. Heterozygous hemoglobin variants (HbS, HgC, etc)do  not significantly interfere with this assay.   However, presence of multiple variants may affect accuracy.         Review of Systems   Constitutional: Negative for chills, fever and malaise/fatigue.   HENT:  Negative for hearing loss.    Cardiovascular:  Negative for claudication.   Respiratory:  Negative for shortness of breath.    Skin:  Positive for color change, nail changes and unusual hair distribution. Negative for dry skin, flushing and rash.   Musculoskeletal:  Negative for joint pain and myalgias.   Gastrointestinal:  Negative for nausea and vomiting.   Neurological:  Negative for loss of balance, numbness, paresthesias and sensory change.   Psychiatric/Behavioral:  Negative for altered mental status.          Objective:      Physical Exam  Vitals reviewed.   Constitutional:       Appearance: He is well-developed.   Cardiovascular:      Pulses:           Dorsalis pedis pulses are 0 on the right side and 0 on the left side.        Posterior tibial pulses are 1+ on the right side and 1+ on the left side.   Musculoskeletal:      Right ankle: Decreased range of motion. Abnormal pulse.      Right Achilles Tendon: Ashraf's test negative.      Left ankle: Decreased range of motion. Abnormal pulse.      Left Achilles Tendon: Ashraf's test negative.      Right foot: Decreased range of motion.      Left foot: Decreased range of motion.   Feet:      Right foot:      Protective Sensation: 5 sites tested.  5 sites sensed.      Left foot:      Protective Sensation: 5 sites tested.  5 sites sensed.   Skin:     General: Skin is cool and dry.      Capillary Refill: Capillary refill takes more than 3 seconds.   Neurological:      Mental Status: He is alert and oriented to person, place, and time.      Comments: intact sensation noted to b/L lower extremities   Psychiatric:         Behavior: Behavior normal. Behavior is cooperative.       Nails x10 are  elongated by  4-6mm's, thickened by 2-3 mm's, dystrophic, and are yellowish in  coloration . Xerosis Bilaterally. No open lesions noted.             Assessment:       Encounter Diagnoses   Name Primary?    Diabetes mellitus with peripheral vascular disease Yes    Onychomycosis due to dermatophyte          Plan:       Mario was seen today for peripheral vascular disease and diabetes mellitus.    Diagnoses and all orders for this visit:    Diabetes mellitus with peripheral vascular disease    Onychomycosis due to dermatophyte    I counseled the patient on his conditions, their implications and medical management.        - Shoe inspection. Diabetic Foot Education. Patient reminded of the importance of good nutrition and blood sugar control to help prevent podiatric complications of diabetes. Patient instructed on proper foot hygeine. We discussed wearing proper shoe gear, daily foot inspections, never walking without protective shoe gear, never putting sharp instruments to feet, routine podiatric nail visits every 2-3 months.      - With patient's permission, nails were aggressively reduced and debrided x 10 to their soft tissue attachment mechanically and with electric , removing all offending nail and debris. Patient relates relief following the procedure. He will continue to monitor the areas daily, inspect his feet, wear protective shoe gear when ambulatory, moisturizer to maintain skin integrity and follow in this office in approximately 2-3 months, sooner p.r.n.

## 2023-04-05 ENCOUNTER — LAB VISIT (OUTPATIENT)
Dept: LAB | Facility: HOSPITAL | Age: 68
End: 2023-04-05
Attending: INTERNAL MEDICINE
Payer: MEDICARE

## 2023-04-05 DIAGNOSIS — R74.8 ELEVATED ALKALINE PHOSPHATASE LEVEL: ICD-10-CM

## 2023-04-05 LAB
ALBUMIN SERPL BCP-MCNC: 3.8 G/DL (ref 3.5–5.2)
ALP SERPL-CCNC: 150 U/L (ref 55–135)
ALT SERPL W/O P-5'-P-CCNC: 12 U/L (ref 10–44)
AST SERPL-CCNC: 15 U/L (ref 10–40)
BILIRUB DIRECT SERPL-MCNC: 0.2 MG/DL (ref 0.1–0.3)
BILIRUB SERPL-MCNC: 0.5 MG/DL (ref 0.1–1)
PROT SERPL-MCNC: 6.8 G/DL (ref 6–8.4)

## 2023-04-05 PROCEDURE — 80076 HEPATIC FUNCTION PANEL: CPT | Performed by: NURSE PRACTITIONER

## 2023-04-05 PROCEDURE — 36415 COLL VENOUS BLD VENIPUNCTURE: CPT | Mod: PO | Performed by: NURSE PRACTITIONER

## 2023-04-10 ENCOUNTER — TELEPHONE (OUTPATIENT)
Dept: HEPATOLOGY | Facility: CLINIC | Age: 68
End: 2023-04-10
Payer: MEDICARE

## 2023-04-10 DIAGNOSIS — R74.8 ELEVATED ALKALINE PHOSPHATASE LEVEL: Primary | ICD-10-CM

## 2023-04-10 NOTE — TELEPHONE ENCOUNTER
----- Message from Theresa Armijo NP sent at 4/10/2023  2:07 PM CDT -----  Please schedule hepatic panel and f/u visit in 6 months. Thanks!

## 2023-04-11 ENCOUNTER — PES CALL (OUTPATIENT)
Dept: ADMINISTRATIVE | Facility: CLINIC | Age: 68
End: 2023-04-11
Payer: MEDICARE

## 2023-04-11 DIAGNOSIS — N18.30 TYPE 2 DIABETES MELLITUS WITH STAGE 3 CHRONIC KIDNEY DISEASE, WITHOUT LONG-TERM CURRENT USE OF INSULIN, UNSPECIFIED WHETHER STAGE 3A OR 3B CKD: ICD-10-CM

## 2023-04-11 DIAGNOSIS — E11.22 TYPE 2 DIABETES MELLITUS WITH STAGE 3 CHRONIC KIDNEY DISEASE, WITHOUT LONG-TERM CURRENT USE OF INSULIN, UNSPECIFIED WHETHER STAGE 3A OR 3B CKD: ICD-10-CM

## 2023-04-11 RX ORDER — DAPAGLIFLOZIN 10 MG/1
TABLET, FILM COATED ORAL
Qty: 90 TABLET | Refills: 0 | Status: SHIPPED | OUTPATIENT
Start: 2023-04-11 | End: 2023-06-26

## 2023-04-11 NOTE — TELEPHONE ENCOUNTER
Refill Decision Note   Mario Jackpe  is requesting a refill authorization.  Brief Assessment and Rationale for Refill:  Approve     Medication Therapy Plan:       Medication Reconciliation Completed: No   Comments:     No Care Gaps recommended.     Note composed:10:04 AM 04/11/2023

## 2023-04-11 NOTE — TELEPHONE ENCOUNTER
Care Due:                  Date            Visit Type   Department     Provider  --------------------------------------------------------------------------------                                EP -                              PRIMARY      MET INTERNAL  Last Visit: 12-      CARE ([facility])   MEDICINE       Bereket Rivera                              EP -                              PRIMARY      [facility] INTERNAL  Next Visit: 06-      CARE ([facility])   [facility]       Bereket Rivera                                                            Last  Test          Frequency    Reason                     Performed    Due Date  --------------------------------------------------------------------------------    HBA1C.......  6 months...  dapagliflozin............  12- 05-    [facility] Embedded Care Gaps. Reference number: [id number]. 4/11/2023   12:18:00 AM CDT

## 2023-05-19 ENCOUNTER — OFFICE VISIT (OUTPATIENT)
Dept: OPTOMETRY | Facility: CLINIC | Age: 68
End: 2023-05-19
Payer: MEDICARE

## 2023-05-19 DIAGNOSIS — H52.203 MYOPIA WITH ASTIGMATISM AND PRESBYOPIA, BILATERAL: ICD-10-CM

## 2023-05-19 DIAGNOSIS — H25.13 NUCLEAR SCLEROSIS OF BOTH EYES: ICD-10-CM

## 2023-05-19 DIAGNOSIS — E11.22 TYPE 2 DIABETES MELLITUS WITH STAGE 3A CHRONIC KIDNEY DISEASE, WITHOUT LONG-TERM CURRENT USE OF INSULIN: Primary | ICD-10-CM

## 2023-05-19 DIAGNOSIS — H52.13 MYOPIA WITH ASTIGMATISM AND PRESBYOPIA, BILATERAL: ICD-10-CM

## 2023-05-19 DIAGNOSIS — E11.9 TYPE 2 DIABETES MELLITUS WITHOUT RETINOPATHY: ICD-10-CM

## 2023-05-19 DIAGNOSIS — H52.4 MYOPIA WITH ASTIGMATISM AND PRESBYOPIA, BILATERAL: ICD-10-CM

## 2023-05-19 DIAGNOSIS — N18.31 TYPE 2 DIABETES MELLITUS WITH STAGE 3A CHRONIC KIDNEY DISEASE, WITHOUT LONG-TERM CURRENT USE OF INSULIN: Primary | ICD-10-CM

## 2023-05-19 PROCEDURE — 1160F RVW MEDS BY RX/DR IN RCRD: CPT | Mod: CPTII,,, | Performed by: OPTOMETRIST

## 2023-05-19 PROCEDURE — 1126F PR PAIN SEVERITY QUANTIFIED, NO PAIN PRESENT: ICD-10-PCS | Mod: CPTII,,, | Performed by: OPTOMETRIST

## 2023-05-19 PROCEDURE — 2023F DILAT RTA XM W/O RTNOPTHY: CPT | Mod: CPTII,,, | Performed by: OPTOMETRIST

## 2023-05-19 PROCEDURE — 99999 PR PBB SHADOW E&M-EST. PATIENT-LVL III: ICD-10-PCS | Mod: PBBFAC,,, | Performed by: OPTOMETRIST

## 2023-05-19 PROCEDURE — 92014 COMPRE OPH EXAM EST PT 1/>: CPT | Mod: ,,, | Performed by: OPTOMETRIST

## 2023-05-19 PROCEDURE — 4010F ACE/ARB THERAPY RXD/TAKEN: CPT | Mod: CPTII,,, | Performed by: OPTOMETRIST

## 2023-05-19 PROCEDURE — 1126F AMNT PAIN NOTED NONE PRSNT: CPT | Mod: CPTII,,, | Performed by: OPTOMETRIST

## 2023-05-19 PROCEDURE — 3288F PR FALLS RISK ASSESSMENT DOCUMENTED: ICD-10-PCS | Mod: CPTII,,, | Performed by: OPTOMETRIST

## 2023-05-19 PROCEDURE — 1101F PT FALLS ASSESS-DOCD LE1/YR: CPT | Mod: CPTII,,, | Performed by: OPTOMETRIST

## 2023-05-19 PROCEDURE — 1159F PR MEDICATION LIST DOCUMENTED IN MEDICAL RECORD: ICD-10-PCS | Mod: CPTII,,, | Performed by: OPTOMETRIST

## 2023-05-19 PROCEDURE — 1159F MED LIST DOCD IN RCRD: CPT | Mod: CPTII,,, | Performed by: OPTOMETRIST

## 2023-05-19 PROCEDURE — 1101F PR PT FALLS ASSESS DOC 0-1 FALLS W/OUT INJ PAST YR: ICD-10-PCS | Mod: CPTII,,, | Performed by: OPTOMETRIST

## 2023-05-19 PROCEDURE — 2023F PR DILATED RETINAL EXAM W/O EVID OF RETINOPATHY: ICD-10-PCS | Mod: CPTII,,, | Performed by: OPTOMETRIST

## 2023-05-19 PROCEDURE — 92014 PR EYE EXAM, EST PATIENT,COMPREHESV: ICD-10-PCS | Mod: ,,, | Performed by: OPTOMETRIST

## 2023-05-19 PROCEDURE — 1160F PR REVIEW ALL MEDS BY PRESCRIBER/CLIN PHARMACIST DOCUMENTED: ICD-10-PCS | Mod: CPTII,,, | Performed by: OPTOMETRIST

## 2023-05-19 PROCEDURE — 99999 PR PBB SHADOW E&M-EST. PATIENT-LVL III: CPT | Mod: PBBFAC,,, | Performed by: OPTOMETRIST

## 2023-05-19 PROCEDURE — 99213 OFFICE O/P EST LOW 20 MIN: CPT | Mod: PO | Performed by: OPTOMETRIST

## 2023-05-19 PROCEDURE — 3288F FALL RISK ASSESSMENT DOCD: CPT | Mod: CPTII,,, | Performed by: OPTOMETRIST

## 2023-05-19 PROCEDURE — 4010F PR ACE/ARB THEARPY RXD/TAKEN: ICD-10-PCS | Mod: CPTII,,, | Performed by: OPTOMETRIST

## 2023-05-19 NOTE — PROGRESS NOTES
HPI    BARB: 03/22  Chief complaint (CC): Patient is here for annual eye exam today.  Patient   hasn't noticed any vision changes since the last exam.  Wears OTC readers   for near, seems to work fine.  Distance vision seems fine without glasses.  Glasses? +2.50 OTC  Contacts? -  H/o eye surgery, injections or laser: -  H/o eye injury: -  Known eye conditions? See above  Family h/o eye conditions? Father with glaucoma  Eye gtts? -      (-) Flashes (-)  Floaters (-) Mucous   (-)  Tearing (-) Itching (-) Burning   (-) Headaches (-) Eye Pain/discomfort (-) Irritation   (-)  Redness (-) Double vision (-) Blurry vision    Diabetic? +-140  A1c? Hemoglobin A1C       Date                     Value               Ref Range             Status                12/02/2022               7.5 (H)             4.0 - 5.6 %           Final                 04/18/2022               7.2 (H)             4.0 - 5.6 %           Final                11/05/2021               7.3 (H)             4.0 - 5.6 %           Final                    Last edited by Ana Maria Medrano on 5/19/2023  3:13 PM.            Assessment /Plan     For exam results, see Encounter Report.      Type 2 diabetes mellitus with stage 3a chronic kidney disease, without long-term current use of insulin  Type 2 diabetes mellitus without retinopathy  BS control. No signs of diabetic retinopathy. Monitor with annual exam. m     Nuclear sclerosis of both eyes  Nuclear sclerotic cataract - not visually significant. Observe.    Myopia with astigmatism and presbyopia, bilateral  Pt declined Mrx. Monitor.

## 2023-06-02 ENCOUNTER — LAB VISIT (OUTPATIENT)
Dept: LAB | Facility: HOSPITAL | Age: 68
End: 2023-06-02
Attending: INTERNAL MEDICINE
Payer: MEDICARE

## 2023-06-02 DIAGNOSIS — E11.22 TYPE 2 DIABETES MELLITUS WITH STAGE 3 CHRONIC KIDNEY DISEASE, WITHOUT LONG-TERM CURRENT USE OF INSULIN, UNSPECIFIED WHETHER STAGE 3A OR 3B CKD: ICD-10-CM

## 2023-06-02 DIAGNOSIS — N18.30 TYPE 2 DIABETES MELLITUS WITH STAGE 3 CHRONIC KIDNEY DISEASE, WITHOUT LONG-TERM CURRENT USE OF INSULIN, UNSPECIFIED WHETHER STAGE 3A OR 3B CKD: ICD-10-CM

## 2023-06-02 LAB
ALBUMIN SERPL BCP-MCNC: 3.7 G/DL (ref 3.5–5.2)
ALP SERPL-CCNC: 137 U/L (ref 55–135)
ALT SERPL W/O P-5'-P-CCNC: 16 U/L (ref 10–44)
ANION GAP SERPL CALC-SCNC: 10 MMOL/L (ref 8–16)
AST SERPL-CCNC: 17 U/L (ref 10–40)
BASOPHILS # BLD AUTO: 0.07 K/UL (ref 0–0.2)
BASOPHILS NFR BLD: 0.9 % (ref 0–1.9)
BILIRUB SERPL-MCNC: 0.8 MG/DL (ref 0.1–1)
BUN SERPL-MCNC: 20 MG/DL (ref 8–23)
CALCIUM SERPL-MCNC: 9.1 MG/DL (ref 8.7–10.5)
CHLORIDE SERPL-SCNC: 105 MMOL/L (ref 95–110)
CO2 SERPL-SCNC: 23 MMOL/L (ref 23–29)
CREAT SERPL-MCNC: 1.2 MG/DL (ref 0.5–1.4)
DIFFERENTIAL METHOD: NORMAL
EOSINOPHIL # BLD AUTO: 0.2 K/UL (ref 0–0.5)
EOSINOPHIL NFR BLD: 2 % (ref 0–8)
ERYTHROCYTE [DISTWIDTH] IN BLOOD BY AUTOMATED COUNT: 13.1 % (ref 11.5–14.5)
EST. GFR  (NO RACE VARIABLE): >60 ML/MIN/1.73 M^2
ESTIMATED AVG GLUCOSE: 174 MG/DL (ref 68–131)
GLUCOSE SERPL-MCNC: 126 MG/DL (ref 70–110)
HBA1C MFR BLD: 7.7 % (ref 4–5.6)
HCT VFR BLD AUTO: 45.8 % (ref 40–54)
HGB BLD-MCNC: 14.9 G/DL (ref 14–18)
IMM GRANULOCYTES # BLD AUTO: 0.01 K/UL (ref 0–0.04)
IMM GRANULOCYTES NFR BLD AUTO: 0.1 % (ref 0–0.5)
LYMPHOCYTES # BLD AUTO: 2.5 K/UL (ref 1–4.8)
LYMPHOCYTES NFR BLD: 31.9 % (ref 18–48)
MCH RBC QN AUTO: 30.3 PG (ref 27–31)
MCHC RBC AUTO-ENTMCNC: 32.5 G/DL (ref 32–36)
MCV RBC AUTO: 93 FL (ref 82–98)
MONOCYTES # BLD AUTO: 0.7 K/UL (ref 0.3–1)
MONOCYTES NFR BLD: 8.5 % (ref 4–15)
NEUTROPHILS # BLD AUTO: 4.4 K/UL (ref 1.8–7.7)
NEUTROPHILS NFR BLD: 56.6 % (ref 38–73)
NRBC BLD-RTO: 0 /100 WBC
PLATELET # BLD AUTO: 274 K/UL (ref 150–450)
PMV BLD AUTO: 10.6 FL (ref 9.2–12.9)
POTASSIUM SERPL-SCNC: 4.2 MMOL/L (ref 3.5–5.1)
PROT SERPL-MCNC: 6.7 G/DL (ref 6–8.4)
RBC # BLD AUTO: 4.92 M/UL (ref 4.6–6.2)
SODIUM SERPL-SCNC: 138 MMOL/L (ref 136–145)
WBC # BLD AUTO: 7.69 K/UL (ref 3.9–12.7)

## 2023-06-02 PROCEDURE — 36415 COLL VENOUS BLD VENIPUNCTURE: CPT | Mod: PO | Performed by: INTERNAL MEDICINE

## 2023-06-02 PROCEDURE — 83036 HEMOGLOBIN GLYCOSYLATED A1C: CPT | Performed by: INTERNAL MEDICINE

## 2023-06-02 PROCEDURE — 85025 COMPLETE CBC W/AUTO DIFF WBC: CPT | Performed by: INTERNAL MEDICINE

## 2023-06-02 PROCEDURE — 80053 COMPREHEN METABOLIC PANEL: CPT | Performed by: INTERNAL MEDICINE

## 2023-06-05 ENCOUNTER — PES CALL (OUTPATIENT)
Dept: ADMINISTRATIVE | Facility: CLINIC | Age: 68
End: 2023-06-05
Payer: MEDICARE

## 2023-06-08 ENCOUNTER — TELEPHONE (OUTPATIENT)
Dept: INTERNAL MEDICINE | Facility: CLINIC | Age: 68
End: 2023-06-08
Payer: MEDICARE

## 2023-06-08 ENCOUNTER — OFFICE VISIT (OUTPATIENT)
Dept: INTERNAL MEDICINE | Facility: CLINIC | Age: 68
End: 2023-06-08
Payer: MEDICARE

## 2023-06-08 ENCOUNTER — PATIENT MESSAGE (OUTPATIENT)
Dept: INTERNAL MEDICINE | Facility: CLINIC | Age: 68
End: 2023-06-08

## 2023-06-08 VITALS
DIASTOLIC BLOOD PRESSURE: 72 MMHG | SYSTOLIC BLOOD PRESSURE: 122 MMHG | WEIGHT: 192.69 LBS | HEART RATE: 81 BPM | HEIGHT: 66 IN | OXYGEN SATURATION: 98 % | TEMPERATURE: 97 F | RESPIRATION RATE: 20 BRPM | BODY MASS INDEX: 30.97 KG/M2

## 2023-06-08 DIAGNOSIS — N18.31 STAGE 3A CHRONIC KIDNEY DISEASE: ICD-10-CM

## 2023-06-08 DIAGNOSIS — E11.69 HYPERLIPIDEMIA ASSOCIATED WITH TYPE 2 DIABETES MELLITUS: ICD-10-CM

## 2023-06-08 DIAGNOSIS — N40.0 BENIGN PROSTATIC HYPERPLASIA, UNSPECIFIED WHETHER LOWER URINARY TRACT SYMPTOMS PRESENT: ICD-10-CM

## 2023-06-08 DIAGNOSIS — E11.59 HYPERTENSION ASSOCIATED WITH DIABETES: Primary | ICD-10-CM

## 2023-06-08 DIAGNOSIS — N18.30 TYPE 2 DIABETES MELLITUS WITH STAGE 3 CHRONIC KIDNEY DISEASE, WITHOUT LONG-TERM CURRENT USE OF INSULIN, UNSPECIFIED WHETHER STAGE 3A OR 3B CKD: ICD-10-CM

## 2023-06-08 DIAGNOSIS — E11.22 TYPE 2 DIABETES MELLITUS WITH STAGE 3 CHRONIC KIDNEY DISEASE, WITHOUT LONG-TERM CURRENT USE OF INSULIN, UNSPECIFIED WHETHER STAGE 3A OR 3B CKD: ICD-10-CM

## 2023-06-08 DIAGNOSIS — E66.9 OBESITY, DIABETES, AND HYPERTENSION SYNDROME: ICD-10-CM

## 2023-06-08 DIAGNOSIS — E78.5 HYPERLIPIDEMIA ASSOCIATED WITH TYPE 2 DIABETES MELLITUS: ICD-10-CM

## 2023-06-08 DIAGNOSIS — E11.59 OBESITY, DIABETES, AND HYPERTENSION SYNDROME: ICD-10-CM

## 2023-06-08 DIAGNOSIS — I15.2 HYPERTENSION ASSOCIATED WITH DIABETES: ICD-10-CM

## 2023-06-08 DIAGNOSIS — I15.2 OBESITY, DIABETES, AND HYPERTENSION SYNDROME: ICD-10-CM

## 2023-06-08 DIAGNOSIS — I15.2 HYPERTENSION ASSOCIATED WITH DIABETES: Primary | ICD-10-CM

## 2023-06-08 DIAGNOSIS — R74.8 ELEVATED ALKALINE PHOSPHATASE LEVEL: ICD-10-CM

## 2023-06-08 DIAGNOSIS — E11.69 OBESITY, DIABETES, AND HYPERTENSION SYNDROME: ICD-10-CM

## 2023-06-08 DIAGNOSIS — Z12.5 ENCOUNTER FOR SCREENING FOR MALIGNANT NEOPLASM OF PROSTATE: ICD-10-CM

## 2023-06-08 DIAGNOSIS — E11.59 HYPERTENSION ASSOCIATED WITH DIABETES: ICD-10-CM

## 2023-06-08 DIAGNOSIS — F41.9 ANXIETY: ICD-10-CM

## 2023-06-08 PROCEDURE — 3074F SYST BP LT 130 MM HG: CPT | Mod: CPTII,S$GLB,, | Performed by: INTERNAL MEDICINE

## 2023-06-08 PROCEDURE — 3051F PR MOST RECENT HEMOGLOBIN A1C LEVEL 7.0 - < 8.0%: ICD-10-PCS | Mod: CPTII,S$GLB,, | Performed by: INTERNAL MEDICINE

## 2023-06-08 PROCEDURE — 3078F PR MOST RECENT DIASTOLIC BLOOD PRESSURE < 80 MM HG: ICD-10-PCS | Mod: CPTII,S$GLB,, | Performed by: INTERNAL MEDICINE

## 2023-06-08 PROCEDURE — 1160F PR REVIEW ALL MEDS BY PRESCRIBER/CLIN PHARMACIST DOCUMENTED: ICD-10-PCS | Mod: CPTII,S$GLB,, | Performed by: INTERNAL MEDICINE

## 2023-06-08 PROCEDURE — 3078F DIAST BP <80 MM HG: CPT | Mod: CPTII,S$GLB,, | Performed by: INTERNAL MEDICINE

## 2023-06-08 PROCEDURE — 4010F ACE/ARB THERAPY RXD/TAKEN: CPT | Mod: CPTII,S$GLB,, | Performed by: INTERNAL MEDICINE

## 2023-06-08 PROCEDURE — 3008F PR BODY MASS INDEX (BMI) DOCUMENTED: ICD-10-PCS | Mod: CPTII,S$GLB,, | Performed by: INTERNAL MEDICINE

## 2023-06-08 PROCEDURE — 1126F PR PAIN SEVERITY QUANTIFIED, NO PAIN PRESENT: ICD-10-PCS | Mod: CPTII,S$GLB,, | Performed by: INTERNAL MEDICINE

## 2023-06-08 PROCEDURE — 3072F PR LOW RISK FOR RETINOPATHY: ICD-10-PCS | Mod: CPTII,S$GLB,, | Performed by: INTERNAL MEDICINE

## 2023-06-08 PROCEDURE — 3008F BODY MASS INDEX DOCD: CPT | Mod: CPTII,S$GLB,, | Performed by: INTERNAL MEDICINE

## 2023-06-08 PROCEDURE — 99999 PR PBB SHADOW E&M-EST. PATIENT-LVL V: ICD-10-PCS | Mod: PBBFAC,,, | Performed by: INTERNAL MEDICINE

## 2023-06-08 PROCEDURE — 3051F HG A1C>EQUAL 7.0%<8.0%: CPT | Mod: CPTII,S$GLB,, | Performed by: INTERNAL MEDICINE

## 2023-06-08 PROCEDURE — 99214 PR OFFICE/OUTPT VISIT, EST, LEVL IV, 30-39 MIN: ICD-10-PCS | Mod: S$GLB,,, | Performed by: INTERNAL MEDICINE

## 2023-06-08 PROCEDURE — 1159F MED LIST DOCD IN RCRD: CPT | Mod: CPTII,S$GLB,, | Performed by: INTERNAL MEDICINE

## 2023-06-08 PROCEDURE — 1159F PR MEDICATION LIST DOCUMENTED IN MEDICAL RECORD: ICD-10-PCS | Mod: CPTII,S$GLB,, | Performed by: INTERNAL MEDICINE

## 2023-06-08 PROCEDURE — 1101F PT FALLS ASSESS-DOCD LE1/YR: CPT | Mod: CPTII,S$GLB,, | Performed by: INTERNAL MEDICINE

## 2023-06-08 PROCEDURE — 99999 PR PBB SHADOW E&M-EST. PATIENT-LVL V: CPT | Mod: PBBFAC,,, | Performed by: INTERNAL MEDICINE

## 2023-06-08 PROCEDURE — 99214 OFFICE O/P EST MOD 30 MIN: CPT | Mod: S$GLB,,, | Performed by: INTERNAL MEDICINE

## 2023-06-08 PROCEDURE — 1126F AMNT PAIN NOTED NONE PRSNT: CPT | Mod: CPTII,S$GLB,, | Performed by: INTERNAL MEDICINE

## 2023-06-08 PROCEDURE — 3288F FALL RISK ASSESSMENT DOCD: CPT | Mod: CPTII,S$GLB,, | Performed by: INTERNAL MEDICINE

## 2023-06-08 PROCEDURE — 3288F PR FALLS RISK ASSESSMENT DOCUMENTED: ICD-10-PCS | Mod: CPTII,S$GLB,, | Performed by: INTERNAL MEDICINE

## 2023-06-08 PROCEDURE — 4010F PR ACE/ARB THEARPY RXD/TAKEN: ICD-10-PCS | Mod: CPTII,S$GLB,, | Performed by: INTERNAL MEDICINE

## 2023-06-08 PROCEDURE — 3072F LOW RISK FOR RETINOPATHY: CPT | Mod: CPTII,S$GLB,, | Performed by: INTERNAL MEDICINE

## 2023-06-08 PROCEDURE — 1101F PR PT FALLS ASSESS DOC 0-1 FALLS W/OUT INJ PAST YR: ICD-10-PCS | Mod: CPTII,S$GLB,, | Performed by: INTERNAL MEDICINE

## 2023-06-08 PROCEDURE — 1160F RVW MEDS BY RX/DR IN RCRD: CPT | Mod: CPTII,S$GLB,, | Performed by: INTERNAL MEDICINE

## 2023-06-08 PROCEDURE — 3074F PR MOST RECENT SYSTOLIC BLOOD PRESSURE < 130 MM HG: ICD-10-PCS | Mod: CPTII,S$GLB,, | Performed by: INTERNAL MEDICINE

## 2023-06-08 RX ORDER — LINAGLIPTIN 5 MG/1
5 TABLET, FILM COATED ORAL DAILY
Qty: 90 TABLET | Refills: 3 | Status: SHIPPED | OUTPATIENT
Start: 2023-06-08 | End: 2023-06-09 | Stop reason: SDUPTHER

## 2023-06-08 NOTE — PROGRESS NOTES
Subjective     Patient ID: Mario Sanford Jr. is a 68 y.o. male.    Chief Complaint: Follow-up    HPI  Pt with HTN, T2DM, HLD, Obesity, CKD III, Anxiety, Elevated Alk Phos is here for 6 month f/u. No acute complaints today.   Review of Systems   Constitutional:  Negative for activity change, appetite change, chills, diaphoresis, fatigue, fever and unexpected weight change.   HENT:  Negative for postnasal drip, rhinorrhea, sinus pressure/congestion, sneezing, sore throat, trouble swallowing and voice change.    Respiratory:  Negative for cough, shortness of breath and wheezing.    Cardiovascular:  Negative for chest pain, palpitations and leg swelling.   Gastrointestinal:  Negative for abdominal pain, blood in stool, constipation, diarrhea, nausea and vomiting.   Genitourinary:  Negative for dysuria.   Musculoskeletal:  Negative for arthralgias and myalgias.   Integumentary:  Negative for rash and wound.   Allergic/Immunologic: Negative for environmental allergies and food allergies.   Hematological:  Negative for adenopathy. Does not bruise/bleed easily.   Psychiatric/Behavioral:  Negative for self-injury and suicidal ideas. The patient is nervous/anxious.         Objective     Physical Exam  Constitutional:       General: He is not in acute distress.     Appearance: Normal appearance. He is well-developed. He is not diaphoretic.   HENT:      Head: Normocephalic and atraumatic.      Right Ear: External ear normal.      Left Ear: External ear normal.      Nose: Nose normal.      Mouth/Throat:      Pharynx: No oropharyngeal exudate.   Eyes:      General: No scleral icterus.        Right eye: No discharge.         Left eye: No discharge.      Conjunctiva/sclera: Conjunctivae normal.      Pupils: Pupils are equal, round, and reactive to light.   Neck:      Vascular: No JVD.   Cardiovascular:      Rate and Rhythm: Normal rate and regular rhythm.      Pulses: Normal pulses.      Heart sounds: Normal heart sounds. No  murmur heard.  Pulmonary:      Effort: Pulmonary effort is normal. No respiratory distress.      Breath sounds: Normal breath sounds. No wheezing or rales.   Abdominal:      General: Bowel sounds are normal.      Tenderness: There is no abdominal tenderness. There is no guarding or rebound.   Musculoskeletal:      Cervical back: Normal range of motion and neck supple.      Right lower leg: No edema.      Left lower leg: No edema.   Lymphadenopathy:      Cervical: No cervical adenopathy.   Skin:     General: Skin is warm and dry.      Capillary Refill: Capillary refill takes less than 2 seconds.      Coloration: Skin is not pale.      Findings: No rash.   Neurological:      Mental Status: He is alert and oriented to person, place, and time. Mental status is at baseline.      Cranial Nerves: No cranial nerve deficit.   Psychiatric:         Mood and Affect: Mood normal.         Behavior: Behavior normal.          Assessment and Plan     1. Hypertension associated with diabetes  -     linaGLIPtin (TRADJENTA) 5 mg Tab tablet; Take 1 tablet (5 mg total) by mouth once daily.  Dispense: 90 tablet; Refill: 3  -     Hemoglobin A1C; Future; Expected date: 09/08/2023    2. Elevated alkaline phosphatase level    3. Benign prostatic hyperplasia, unspecified whether lower urinary tract symptoms present    4. Stage 3a chronic kidney disease    5. Type 2 diabetes mellitus with stage 3 chronic kidney disease, without long-term current use of insulin, unspecified whether stage 3a or 3b CKD    6. Obesity, diabetes, and hypertension syndrome    7. Anxiety    8. Hyperlipidemia associated with type 2 diabetes mellitus         HTN- controlled on Coreg 3.125 mg BID and Losartan 50 mg daily      T2DM- last HA1C of 7.7(6/23)<--7.5(12/22)<--7.2(4/22)<--7.3(11/21)<--6.1(9/20)<--6.3(3/20)<--6.2(9/19)      - on Farxiga, will add Tradjenta       HLD- stable on Lipitor 40 mg daily      Diabetes, Obesity and HTN- pt advised on proper diet/exercise  for weight loss      CKD III- stable      Anxiety- stable on Wellbutrin  mg qHS and Atarax 25 mg TID PRN      Elevated Alk Phos with hx of biliary obstruction 2/2 cholangitis(vibrio species and E. Coli)   S/p liver bx   Followed by Hepatology and remains on Actigall       F/u in 6 month

## 2023-06-09 RX ORDER — LINAGLIPTIN 5 MG/1
5 TABLET, FILM COATED ORAL DAILY
Qty: 90 TABLET | Refills: 3 | Status: SHIPPED | OUTPATIENT
Start: 2023-06-09 | End: 2024-06-08

## 2023-06-09 NOTE — TELEPHONE ENCOUNTER
No care due was identified.  [facility] Embedded Care Due Messages. Reference number: [id number].   6/09/2023 8:38:10 AM CDT

## 2023-06-12 ENCOUNTER — PATIENT MESSAGE (OUTPATIENT)
Dept: INTERNAL MEDICINE | Facility: CLINIC | Age: 68
End: 2023-06-12
Payer: MEDICARE

## 2023-06-12 DIAGNOSIS — E11.9 TYPE 2 DIABETES MELLITUS WITHOUT COMPLICATION, WITHOUT LONG-TERM CURRENT USE OF INSULIN: Primary | ICD-10-CM

## 2023-06-21 ENCOUNTER — TELEPHONE (OUTPATIENT)
Dept: PHARMACY | Facility: CLINIC | Age: 68
End: 2023-06-21
Payer: MEDICARE

## 2023-06-21 NOTE — TELEPHONE ENCOUNTER
I have spoken with Mario Sanford Jr. and informed him of the [facility] application process for Tradjenta and what's required to apply.  Mario Sanford Jr. will provide the following documents: Proof of household Income( such as social security statement, 1099 form, pension statement or 3 consecutive pay stubs      I will follow up with the patient in 5 business days.

## 2023-06-25 RX ORDER — BUPROPION HYDROCHLORIDE 300 MG/1
TABLET ORAL
Qty: 90 TABLET | Refills: 3 | Status: SHIPPED | OUTPATIENT
Start: 2023-06-25

## 2023-06-25 NOTE — TELEPHONE ENCOUNTER
Refill Decision Note   Mario Jackpe  is requesting a refill authorization.  Brief Assessment and Rationale for Refill:  Approve     Medication Therapy Plan:       Medication Reconciliation Completed: No   Comments:     No Care Gaps recommended.     Note composed:9:54 AM 06/25/2023

## 2023-06-25 NOTE — TELEPHONE ENCOUNTER
No care due was identified.  [facility] Embedded Care Due Messages. Reference number: [id number].   6/25/2023 7:05:40 AM CDT

## 2023-06-26 DIAGNOSIS — E11.22 TYPE 2 DIABETES MELLITUS WITH STAGE 3 CHRONIC KIDNEY DISEASE, WITHOUT LONG-TERM CURRENT USE OF INSULIN, UNSPECIFIED WHETHER STAGE 3A OR 3B CKD: ICD-10-CM

## 2023-06-26 DIAGNOSIS — N18.30 TYPE 2 DIABETES MELLITUS WITH STAGE 3 CHRONIC KIDNEY DISEASE, WITHOUT LONG-TERM CURRENT USE OF INSULIN, UNSPECIFIED WHETHER STAGE 3A OR 3B CKD: ICD-10-CM

## 2023-06-26 RX ORDER — DAPAGLIFLOZIN 10 MG/1
TABLET, FILM COATED ORAL
Qty: 90 TABLET | Refills: 3 | Status: SHIPPED | OUTPATIENT
Start: 2023-06-26

## 2023-06-26 NOTE — TELEPHONE ENCOUNTER
No care due was identified.  [facility] Embedded Care Due Messages. Reference number: [id number].   6/26/2023 4:38:14 PM CDT

## 2023-06-26 NOTE — TELEPHONE ENCOUNTER
Refill Decision Note   Mario Jackpe  is requesting a refill authorization.  Brief Assessment and Rationale for Refill:  Approve     Medication Therapy Plan:       Medication Reconciliation Completed: No   Comments:     No Care Gaps recommended.     Note composed:5:57 PM 06/26/2023

## 2023-06-28 RX ORDER — URSODIOL 500 MG/1
TABLET, FILM COATED ORAL
Qty: 180 TABLET | Refills: 1 | Status: SHIPPED | OUTPATIENT
Start: 2023-06-28 | End: 2024-01-12

## 2023-07-08 RX ORDER — CARVEDILOL 3.12 MG/1
TABLET ORAL
Qty: 180 TABLET | Refills: 3 | Status: SHIPPED | OUTPATIENT
Start: 2023-07-08

## 2023-07-08 RX ORDER — PANTOPRAZOLE SODIUM 40 MG/1
TABLET, DELAYED RELEASE ORAL
Qty: 90 TABLET | Refills: 3 | Status: SHIPPED | OUTPATIENT
Start: 2023-07-08

## 2023-07-08 NOTE — TELEPHONE ENCOUNTER
No care due was identified.  [facility] Embedded Care Due Messages. Reference number: [id number].   7/07/2023 9:41:48 PM CDT

## 2023-07-08 NOTE — TELEPHONE ENCOUNTER
Refill Decision Note   Mario Jackpe  is requesting a refill authorization.  Brief Assessment and Rationale for Refill:  Approve     Medication Therapy Plan:       Medication Reconciliation Completed: No   Comments:     No Care Gaps recommended.     Note composed:10:17 AM 07/08/2023

## 2023-07-12 ENCOUNTER — PES CALL (OUTPATIENT)
Dept: ADMINISTRATIVE | Facility: CLINIC | Age: 68
End: 2023-07-12
Payer: MEDICARE

## 2023-07-13 NOTE — TELEPHONE ENCOUNTER
MD Estelle Win MA  Caller: Unspecified (Yesterday,  1:37 PM)  Well he has fatty liver so I would like him evaluated for that as well    The patients wife is calling in regards to a few medications thats making the patient sick. Patient symptoms are nausea, fatigue, vomiting and diarrhea. Patient would like a call back as soon as possible.  They are unaware of which medication is making the patient have these sympto

## 2023-07-25 ENCOUNTER — OFFICE VISIT (OUTPATIENT)
Dept: PODIATRY | Facility: CLINIC | Age: 68
End: 2023-07-25
Payer: MEDICARE

## 2023-07-25 VITALS
BODY MASS INDEX: 30.97 KG/M2 | WEIGHT: 192.69 LBS | HEART RATE: 87 BPM | DIASTOLIC BLOOD PRESSURE: 74 MMHG | HEIGHT: 66 IN | SYSTOLIC BLOOD PRESSURE: 154 MMHG

## 2023-07-25 DIAGNOSIS — E11.51 DIABETES MELLITUS WITH PERIPHERAL VASCULAR DISEASE: Primary | ICD-10-CM

## 2023-07-25 DIAGNOSIS — B47.9: ICD-10-CM

## 2023-07-25 DIAGNOSIS — B35.1 ONYCHOMYCOSIS DUE TO DERMATOPHYTE: ICD-10-CM

## 2023-07-25 PROCEDURE — 11721 DEBRIDE NAIL 6 OR MORE: CPT | Mod: Q8,S$GLB,, | Performed by: PODIATRIST

## 2023-07-25 PROCEDURE — 99214 OFFICE O/P EST MOD 30 MIN: CPT | Mod: 25,S$GLB,, | Performed by: PODIATRIST

## 2023-07-25 PROCEDURE — 3008F BODY MASS INDEX DOCD: CPT | Mod: CPTII,S$GLB,, | Performed by: PODIATRIST

## 2023-07-25 PROCEDURE — 3072F LOW RISK FOR RETINOPATHY: CPT | Mod: CPTII,S$GLB,, | Performed by: PODIATRIST

## 2023-07-25 PROCEDURE — 3008F PR BODY MASS INDEX (BMI) DOCUMENTED: ICD-10-PCS | Mod: CPTII,S$GLB,, | Performed by: PODIATRIST

## 2023-07-25 PROCEDURE — 3078F PR MOST RECENT DIASTOLIC BLOOD PRESSURE < 80 MM HG: ICD-10-PCS | Mod: CPTII,S$GLB,, | Performed by: PODIATRIST

## 2023-07-25 PROCEDURE — 3077F PR MOST RECENT SYSTOLIC BLOOD PRESSURE >= 140 MM HG: ICD-10-PCS | Mod: CPTII,S$GLB,, | Performed by: PODIATRIST

## 2023-07-25 PROCEDURE — 1126F AMNT PAIN NOTED NONE PRSNT: CPT | Mod: CPTII,S$GLB,, | Performed by: PODIATRIST

## 2023-07-25 PROCEDURE — 99214 PR OFFICE/OUTPT VISIT, EST, LEVL IV, 30-39 MIN: ICD-10-PCS | Mod: 25,S$GLB,, | Performed by: PODIATRIST

## 2023-07-25 PROCEDURE — 3051F HG A1C>EQUAL 7.0%<8.0%: CPT | Mod: CPTII,S$GLB,, | Performed by: PODIATRIST

## 2023-07-25 PROCEDURE — 3072F PR LOW RISK FOR RETINOPATHY: ICD-10-PCS | Mod: CPTII,S$GLB,, | Performed by: PODIATRIST

## 2023-07-25 PROCEDURE — 4010F PR ACE/ARB THEARPY RXD/TAKEN: ICD-10-PCS | Mod: CPTII,S$GLB,, | Performed by: PODIATRIST

## 2023-07-25 PROCEDURE — 3078F DIAST BP <80 MM HG: CPT | Mod: CPTII,S$GLB,, | Performed by: PODIATRIST

## 2023-07-25 PROCEDURE — 99999 PR PBB SHADOW E&M-EST. PATIENT-LVL III: ICD-10-PCS | Mod: PBBFAC,,, | Performed by: PODIATRIST

## 2023-07-25 PROCEDURE — 3051F PR MOST RECENT HEMOGLOBIN A1C LEVEL 7.0 - < 8.0%: ICD-10-PCS | Mod: CPTII,S$GLB,, | Performed by: PODIATRIST

## 2023-07-25 PROCEDURE — 4010F ACE/ARB THERAPY RXD/TAKEN: CPT | Mod: CPTII,S$GLB,, | Performed by: PODIATRIST

## 2023-07-25 PROCEDURE — 1159F MED LIST DOCD IN RCRD: CPT | Mod: CPTII,S$GLB,, | Performed by: PODIATRIST

## 2023-07-25 PROCEDURE — 1126F PR PAIN SEVERITY QUANTIFIED, NO PAIN PRESENT: ICD-10-PCS | Mod: CPTII,S$GLB,, | Performed by: PODIATRIST

## 2023-07-25 PROCEDURE — 1159F PR MEDICATION LIST DOCUMENTED IN MEDICAL RECORD: ICD-10-PCS | Mod: CPTII,S$GLB,, | Performed by: PODIATRIST

## 2023-07-25 PROCEDURE — 99999 PR PBB SHADOW E&M-EST. PATIENT-LVL III: CPT | Mod: PBBFAC,,, | Performed by: PODIATRIST

## 2023-07-25 PROCEDURE — 3077F SYST BP >= 140 MM HG: CPT | Mod: CPTII,S$GLB,, | Performed by: PODIATRIST

## 2023-07-25 PROCEDURE — 11721 PR DEBRIDEMENT OF NAILS, 6 OR MORE: ICD-10-PCS | Mod: Q8,S$GLB,, | Performed by: PODIATRIST

## 2023-07-26 NOTE — PROGRESS NOTES
Subjective:      Patient ID: Mario Sanford Jr. is a 68 y.o. male.    Chief Complaint: Diabetes Mellitus and Peripheral Vascular Disease      Mario is a 68 y.o. male who presents to the clinic for evaluation and treatment of high risk feet. Mario has a past medical history of Arthritis, Diabetes mellitus, Dilated cbd, acquired, Duodenal diverticulum, Duodenal fistula, Duodenal ulcer, Esophagitis, Hiatal hernia, Hyperlipidemia, Hypertension, Melena, Nodule of esophagus, and Obesity (BMI 30-39.9). The patient's chief complaint is long, thick toenails. This patient has documented high risk feet requiring routine maintenance secondary to peripheral vascular disease.    PCP: Bereket Rivera DO    Date Last Seen by PCP:   Chief Complaint   Patient presents with    Diabetes Mellitus    Peripheral Vascular Disease         Current shoe gear:  Affected Foot: Tennis shoes     Unaffected Foot: Tennis shoes    Hemoglobin A1C   Date Value Ref Range Status   06/02/2023 7.7 (H) 4.0 - 5.6 % Final     Comment:     ADA Screening Guidelines:  5.7-6.4%  Consistent with prediabetes  >or=6.5%  Consistent with diabetes    High levels of fetal hemoglobin interfere with the HbA1C  assay. Heterozygous hemoglobin variants (HbS, HgC, etc)do  not significantly interfere with this assay.   However, presence of multiple variants may affect accuracy.     12/02/2022 7.5 (H) 4.0 - 5.6 % Final     Comment:     ADA Screening Guidelines:  5.7-6.4%  Consistent with prediabetes  >or=6.5%  Consistent with diabetes    High levels of fetal hemoglobin interfere with the HbA1C  assay. Heterozygous hemoglobin variants (HbS, HgC, etc)do  not significantly interfere with this assay.   However, presence of multiple variants may affect accuracy.     04/18/2022 7.2 (H) 4.0 - 5.6 % Final     Comment:     ADA Screening Guidelines:  5.7-6.4%  Consistent with prediabetes  >or=6.5%  Consistent with diabetes    High levels of fetal hemoglobin interfere with the  HbA1C  assay. Heterozygous hemoglobin variants (HbS, HgC, etc)do  not significantly interfere with this assay.   However, presence of multiple variants may affect accuracy.         Review of Systems   Constitutional: Negative for chills, fever and malaise/fatigue.   HENT:  Negative for hearing loss.    Cardiovascular:  Negative for claudication and leg swelling.   Respiratory:  Negative for shortness of breath.    Skin:  Positive for color change, nail changes and unusual hair distribution. Negative for dry skin, flushing and rash.   Musculoskeletal:  Negative for joint pain and myalgias.   Gastrointestinal:  Negative for nausea and vomiting.   Neurological:  Negative for loss of balance, numbness, paresthesias and sensory change.   Psychiatric/Behavioral:  Negative for altered mental status.    Allergic/Immunologic: Negative for hives.         Objective:      Physical Exam  Vitals reviewed.   Constitutional:       Appearance: He is well-developed.   Cardiovascular:      Pulses:           Dorsalis pedis pulses are 0 on the right side and 0 on the left side.        Posterior tibial pulses are 1+ on the right side and 1+ on the left side.   Musculoskeletal:         General: No tenderness.      Right ankle: Decreased range of motion. Abnormal pulse.      Right Achilles Tendon: Ashraf's test negative.      Left ankle: Decreased range of motion. Abnormal pulse.      Left Achilles Tendon: Ashraf's test negative.      Right foot: Decreased range of motion.      Left foot: Decreased range of motion.   Feet:      Right foot:      Protective Sensation: 5 sites tested.  5 sites sensed.      Left foot:      Protective Sensation: 5 sites tested.  5 sites sensed.   Skin:     General: Skin is cool and dry.      Capillary Refill: Capillary refill takes more than 3 seconds.      Comments: Small collection of fluid noted under right hallux dystrophic nail   Neurological:      Mental Status: He is alert and oriented to person,  place, and time.      Comments: intact sensation noted to b/L lower extremities   Psychiatric:         Behavior: Behavior normal. Behavior is cooperative.       Nails x10 are elongated by  4-6mm's, thickened by 2-3 mm's, dystrophic, and are yellowish in  coloration . Xerosis Bilaterally. No open lesions noted.             Assessment:       Encounter Diagnoses   Name Primary?    Diabetes mellitus with peripheral vascular disease Yes    Onychomycosis due to dermatophyte     Mycetoma of foot          Plan:       Mario was seen today for diabetes mellitus and peripheral vascular disease.    Diagnoses and all orders for this visit:    Diabetes mellitus with peripheral vascular disease  -     DIABETIC SHOES FOR HOME USE    Onychomycosis due to dermatophyte    Mycetoma of foot      I counseled the patient on his conditions, their implications and medical management.  Procedure: evacuation of hematoma  Location right hallux  Area cleansed with betadine  Right hallux nail debrided with sterile nail nippers  Using firm pressure, all fluid evacuated from area  Pt tolerated procedure well  DSD applied, home instructions given    Labs: A1c reviewed  Medical records reviewed    Rx diabetic shoes      RTC if it doesn't heal in 7-10 days    - Shoe inspection. Diabetic Foot Education. Patient reminded of the importance of good nutrition and blood sugar control to help prevent podiatric complications of diabetes. Patient instructed on proper foot hygeine. We discussed wearing proper shoe gear, daily foot inspections, never walking without protective shoe gear, never putting sharp instruments to feet, routine podiatric nail visits every 2-3 months.      - With patient's permission, nails were aggressively reduced and debrided x 10 to their soft tissue attachment mechanically and with electric , removing all offending nail and debris. Patient relates relief following the procedure. He will continue to monitor the areas daily,  inspect his feet, wear protective shoe gear when ambulatory, moisturizer to maintain skin integrity and follow in this office in approximately 2-3 months, sooner p.r.n.

## 2023-08-21 ENCOUNTER — OFFICE VISIT (OUTPATIENT)
Dept: INTERNAL MEDICINE | Facility: CLINIC | Age: 68
End: 2023-08-21
Payer: MEDICARE

## 2023-08-21 VITALS
SYSTOLIC BLOOD PRESSURE: 138 MMHG | HEART RATE: 85 BPM | BODY MASS INDEX: 32.36 KG/M2 | DIASTOLIC BLOOD PRESSURE: 80 MMHG | RESPIRATION RATE: 14 BRPM | HEIGHT: 66 IN | WEIGHT: 201.38 LBS

## 2023-08-21 DIAGNOSIS — E11.9 DM TYPE 2 WITHOUT RETINOPATHY: ICD-10-CM

## 2023-08-21 DIAGNOSIS — E78.5 HYPERLIPIDEMIA ASSOCIATED WITH TYPE 2 DIABETES MELLITUS: ICD-10-CM

## 2023-08-21 DIAGNOSIS — E66.9 OBESITY, DIABETES, AND HYPERTENSION SYNDROME: ICD-10-CM

## 2023-08-21 DIAGNOSIS — D64.9 NORMOCYTIC ANEMIA: ICD-10-CM

## 2023-08-21 DIAGNOSIS — E66.09 CLASS 1 OBESITY DUE TO EXCESS CALORIES WITH SERIOUS COMORBIDITY AND BODY MASS INDEX (BMI) OF 32.0 TO 32.9 IN ADULT: ICD-10-CM

## 2023-08-21 DIAGNOSIS — E11.22 CKD STAGE 3 SECONDARY TO DIABETES: ICD-10-CM

## 2023-08-21 DIAGNOSIS — E11.59 OBESITY, DIABETES, AND HYPERTENSION SYNDROME: ICD-10-CM

## 2023-08-21 DIAGNOSIS — E11.9 TYPE 2 DIABETES MELLITUS WITHOUT COMPLICATION, WITHOUT LONG-TERM CURRENT USE OF INSULIN: ICD-10-CM

## 2023-08-21 DIAGNOSIS — I15.2 HYPERTENSION ASSOCIATED WITH DIABETES: ICD-10-CM

## 2023-08-21 DIAGNOSIS — E11.69 HYPERLIPIDEMIA ASSOCIATED WITH TYPE 2 DIABETES MELLITUS: ICD-10-CM

## 2023-08-21 DIAGNOSIS — N40.0 BENIGN PROSTATIC HYPERPLASIA, UNSPECIFIED WHETHER LOWER URINARY TRACT SYMPTOMS PRESENT: ICD-10-CM

## 2023-08-21 DIAGNOSIS — E11.69 OBESITY, DIABETES, AND HYPERTENSION SYNDROME: ICD-10-CM

## 2023-08-21 DIAGNOSIS — N18.30 CKD STAGE 3 SECONDARY TO DIABETES: ICD-10-CM

## 2023-08-21 DIAGNOSIS — K20.80 ESOPHAGITIS, LOS ANGELES GRADE D: ICD-10-CM

## 2023-08-21 DIAGNOSIS — Z00.00 ENCOUNTER FOR PREVENTIVE HEALTH EXAMINATION: Primary | ICD-10-CM

## 2023-08-21 DIAGNOSIS — I15.2 OBESITY, DIABETES, AND HYPERTENSION SYNDROME: ICD-10-CM

## 2023-08-21 DIAGNOSIS — E11.59 HYPERTENSION ASSOCIATED WITH DIABETES: ICD-10-CM

## 2023-08-21 PROCEDURE — 3079F DIAST BP 80-89 MM HG: CPT | Mod: CPTII,S$GLB,, | Performed by: NURSE PRACTITIONER

## 2023-08-21 PROCEDURE — G0439 PPPS, SUBSEQ VISIT: HCPCS | Mod: S$GLB,,, | Performed by: NURSE PRACTITIONER

## 2023-08-21 PROCEDURE — G0439 PR MEDICARE ANNUAL WELLNESS SUBSEQUENT VISIT: ICD-10-PCS | Mod: S$GLB,,, | Performed by: NURSE PRACTITIONER

## 2023-08-21 PROCEDURE — 3288F FALL RISK ASSESSMENT DOCD: CPT | Mod: CPTII,S$GLB,, | Performed by: NURSE PRACTITIONER

## 2023-08-21 PROCEDURE — 1170F FXNL STATUS ASSESSED: CPT | Mod: CPTII,S$GLB,, | Performed by: NURSE PRACTITIONER

## 2023-08-21 PROCEDURE — 3008F PR BODY MASS INDEX (BMI) DOCUMENTED: ICD-10-PCS | Mod: CPTII,S$GLB,, | Performed by: NURSE PRACTITIONER

## 2023-08-21 PROCEDURE — 3075F SYST BP GE 130 - 139MM HG: CPT | Mod: CPTII,S$GLB,, | Performed by: NURSE PRACTITIONER

## 2023-08-21 PROCEDURE — 1170F PR FUNCTIONAL STATUS ASSESSED: ICD-10-PCS | Mod: CPTII,S$GLB,, | Performed by: NURSE PRACTITIONER

## 2023-08-21 PROCEDURE — 1126F PR PAIN SEVERITY QUANTIFIED, NO PAIN PRESENT: ICD-10-PCS | Mod: CPTII,S$GLB,, | Performed by: NURSE PRACTITIONER

## 2023-08-21 PROCEDURE — 1101F PR PT FALLS ASSESS DOC 0-1 FALLS W/OUT INJ PAST YR: ICD-10-PCS | Mod: CPTII,S$GLB,, | Performed by: NURSE PRACTITIONER

## 2023-08-21 PROCEDURE — 3079F PR MOST RECENT DIASTOLIC BLOOD PRESSURE 80-89 MM HG: ICD-10-PCS | Mod: CPTII,S$GLB,, | Performed by: NURSE PRACTITIONER

## 2023-08-21 PROCEDURE — 1159F PR MEDICATION LIST DOCUMENTED IN MEDICAL RECORD: ICD-10-PCS | Mod: CPTII,S$GLB,, | Performed by: NURSE PRACTITIONER

## 2023-08-21 PROCEDURE — 1101F PT FALLS ASSESS-DOCD LE1/YR: CPT | Mod: CPTII,S$GLB,, | Performed by: NURSE PRACTITIONER

## 2023-08-21 PROCEDURE — 4010F ACE/ARB THERAPY RXD/TAKEN: CPT | Mod: CPTII,S$GLB,, | Performed by: NURSE PRACTITIONER

## 2023-08-21 PROCEDURE — 3075F PR MOST RECENT SYSTOLIC BLOOD PRESS GE 130-139MM HG: ICD-10-PCS | Mod: CPTII,S$GLB,, | Performed by: NURSE PRACTITIONER

## 2023-08-21 PROCEDURE — 4010F PR ACE/ARB THEARPY RXD/TAKEN: ICD-10-PCS | Mod: CPTII,S$GLB,, | Performed by: NURSE PRACTITIONER

## 2023-08-21 PROCEDURE — 99999 PR PBB SHADOW E&M-EST. PATIENT-LVL IV: CPT | Mod: PBBFAC,,, | Performed by: NURSE PRACTITIONER

## 2023-08-21 PROCEDURE — 1126F AMNT PAIN NOTED NONE PRSNT: CPT | Mod: CPTII,S$GLB,, | Performed by: NURSE PRACTITIONER

## 2023-08-21 PROCEDURE — 1159F MED LIST DOCD IN RCRD: CPT | Mod: CPTII,S$GLB,, | Performed by: NURSE PRACTITIONER

## 2023-08-21 PROCEDURE — 3051F HG A1C>EQUAL 7.0%<8.0%: CPT | Mod: CPTII,S$GLB,, | Performed by: NURSE PRACTITIONER

## 2023-08-21 PROCEDURE — 3008F BODY MASS INDEX DOCD: CPT | Mod: CPTII,S$GLB,, | Performed by: NURSE PRACTITIONER

## 2023-08-21 PROCEDURE — 3288F PR FALLS RISK ASSESSMENT DOCUMENTED: ICD-10-PCS | Mod: CPTII,S$GLB,, | Performed by: NURSE PRACTITIONER

## 2023-08-21 PROCEDURE — 99999 PR PBB SHADOW E&M-EST. PATIENT-LVL IV: ICD-10-PCS | Mod: PBBFAC,,, | Performed by: NURSE PRACTITIONER

## 2023-08-21 PROCEDURE — 3051F PR MOST RECENT HEMOGLOBIN A1C LEVEL 7.0 - < 8.0%: ICD-10-PCS | Mod: CPTII,S$GLB,, | Performed by: NURSE PRACTITIONER

## 2023-08-21 NOTE — PATIENT INSTRUCTIONS
Counseling and Referral of Other Preventative  (Italic type indicates deductible and co-insurance are waived)    Patient Name: Mario Sanford  Today's Date: 8/21/2023    Health Maintenance         Date Due Completion Date    Shingles Vaccine (1 of 2) Check w PCP   ---    COVID-19 Vaccine (2 - Booster for Ailyn series) Check w PCP 3/5/2021    Colorectal Cancer Screening Due- plans to discuss w PCP   6/19/2018    Influenza Vaccine (1) 09/01/2023 9/13/2019    Diabetes Urine Screening 12/02/2023 12/2/2022    Lipid Panel 12/02/2023 12/2/2022    Hemoglobin A1c 12/02/2023 6/2/2023    Foot Exam 03/29/2024 3/29/2023    Eye Exam 05/19/2024 5/19/2023    Low Dose Statin 06/08/2024 6/8/2023    TETANUS VACCINE    Lab & urine orders in epic- please schedule- orders are dated 6/8    Kidney ultrasound      Referral to nephrology- CKD evaluation      Continue gradual weight loss, diabetic low fat- no added salt    Abnormal whisper test-referral to outpt case mgt- hear aide costs       05/29/2028 1/31/2024 5/29/2018 1/31/2023          No orders of the defined types were placed in this encounter.      The following information is provided to all patients.  This information is to help you find resources for any of the problems found today that may be affecting your health:                Living healthy guide: www.[facility].[place].gov      Understanding Diabetes: www.diabetes.org      Eating healthy: www.cdc.gov/healthyweight      CDC home safety checklist: www.cdc.gov/steadi/patient.html      Agency on Aging: www.goea.[place].[facility]      Alcoholics anonymous (AA): www.aa.org      Physical Activity: www.db.nih.gov/oy6cmjj      Tobacco use: www.quitwithusla.org

## 2023-08-21 NOTE — PROGRESS NOTES
"Mario Sanford presented for a  Medicare AWV and comprehensive Health Risk Assessment today. The following components were reviewed and updated:    Medical history  Family History  Social history  Allergies and Current Medications  Health Risk Assessment  Health Maintenance  Care Team     ** See Completed Assessments for Annual Wellness Visit within the encounter summary.**       The following assessments were completed:  Living Situation  CAGE  Depression Screening  Timed Get Up and Go  Whisper Test- SN[facility]- can not afford aides  Cognitive Function Screening  Nutrition Screening  ADL Screening  PAQ Screening      Vitals:    08/21/23 1250   BP: 138/80   BP Location: Left arm   Patient Position: Sitting   Pulse: 85   Resp: 14   Weight: 91.3 kg (201 lb 6.2 oz)   Height: 5' 6" (1.676 m)     Body mass index is 32.51 kg/m².  Physical Exam  Constitutional:       Comments: Younger in appearance than age   HENT:      Right Ear: There is no impacted cerumen.      Left Ear: There is no impacted cerumen.   Eyes:      General: No scleral icterus.  Cardiovascular:      Rate and Rhythm: Normal rate and regular rhythm.   Pulmonary:      Effort: Pulmonary effort is normal.      Breath sounds: Normal breath sounds.   Abdominal:      Palpations: Abdomen is soft.      Comments: overweight   Musculoskeletal:         General: No swelling. Normal range of motion.   Skin:     General: Skin is warm and dry.   Neurological:      Mental Status: He is alert and oriented to person, place, and time.   Psychiatric:         Mood and Affect: Mood normal.         Behavior: Behavior normal.         Thought Content: Thought content normal.         Judgment: Judgment normal.            Medication review & Opioid screening performed during rooming section. No prescribed opioid medications noted.  Review for substance use disorder screening performed during rooming section. No substance abuse noted on screening.      Diagnoses and health risks identified " today and associated recommendations/orders:    1. Hyperlipidemia associated with type 2 diabetes mellitus  Stable on regimen & followed by PCP    2. Hypertension associated with diabetes  Stable on regimen & followed by PCP    3. Benign prostatic hyperplasia, unspecified whether lower urinary tract symptoms present  Stable on regimen & followed by urology    4. DM type 2 without retinopathy  Stable on regimen & followed by ophth    5. Type 2 diabetes mellitus without complication, without long-term current use of insulin  Stable on regimen & followed by PCP    6. Esophagitis, [place] grade D  Stable on regimen & followed by PCP    7. Obesity, diabetes, and hypertension syndrome  Stable on regimen & followed by PCP    8. Normocytic anemia  Stable on regimen & followed by PCP    9. Class 1 obesity due to excess calories with serious comorbidity and body mass index (BMI) of 32.0 to 32.9 in adult  Chronic . Followed by PCP.   Centers for Disease Control and Prevention (CDC)  weight recommendations for current BMI & ideal BMI range discussed with patient.  Recommended  gradual weight loss, Low fat  low cholesterol diet, diabetic diet , no added salt diet structured regular exercise x 1 hour every day.       10. CKD stage 3 secondary to diabetes  Stable on regimen & followed by PCP  - Ambulatory referral/consult to Nephrology; Future    11. Encounter for preventive health examination  Here for Health Risk Assessment/Annual Wellness Visit.  Health maintenance reviewed and updated. Follow up in one year.          Provided Mario with a 5-10 year written screening schedule and personal prevention plan. Recommendations were developed using the USPSTF age appropriate recommendations. Education, counseling, and referrals were provided as needed. After Visit Summary printed and given to patient which includes a list of additional screenings\tests needed. Cognitive function clock drawing has been labeled with the patient's  identification  & forwarded to medical records to be scanned into the patient's epic chart.    Follow up in about 1 year (around 8/21/2024) for HRA.    Angela Spicer NP

## 2023-08-22 ENCOUNTER — TELEPHONE (OUTPATIENT)
Dept: PHARMACY | Facility: CLINIC | Age: 68
End: 2023-08-22
Payer: MEDICARE

## 2023-08-22 NOTE — TELEPHONE ENCOUNTER
Attempted to contact patient someone picked up the phone but would not say anything I held the phone for several minutes repeating hello.

## 2023-08-25 ENCOUNTER — PATIENT MESSAGE (OUTPATIENT)
Dept: INTERNAL MEDICINE | Facility: CLINIC | Age: 68
End: 2023-08-25
Payer: MEDICARE

## 2023-10-04 ENCOUNTER — LAB VISIT (OUTPATIENT)
Dept: LAB | Facility: HOSPITAL | Age: 68
End: 2023-10-04
Payer: MEDICARE

## 2023-10-04 DIAGNOSIS — R74.8 ELEVATED ALKALINE PHOSPHATASE LEVEL: ICD-10-CM

## 2023-10-04 LAB
ALBUMIN SERPL BCP-MCNC: 3.7 G/DL (ref 3.5–5.2)
ALP SERPL-CCNC: 132 U/L (ref 55–135)
ALT SERPL W/O P-5'-P-CCNC: 17 U/L (ref 10–44)
AST SERPL-CCNC: 18 U/L (ref 10–40)
BILIRUB DIRECT SERPL-MCNC: 0.3 MG/DL (ref 0.1–0.3)
BILIRUB SERPL-MCNC: 0.6 MG/DL (ref 0.1–1)
PROT SERPL-MCNC: 6.7 G/DL (ref 6–8.4)

## 2023-10-04 PROCEDURE — 80076 HEPATIC FUNCTION PANEL: CPT | Performed by: NURSE PRACTITIONER

## 2023-10-04 PROCEDURE — 36415 COLL VENOUS BLD VENIPUNCTURE: CPT | Mod: PO | Performed by: NURSE PRACTITIONER

## 2023-10-11 RX ORDER — ATORVASTATIN CALCIUM 40 MG/1
TABLET, FILM COATED ORAL
Qty: 90 TABLET | Refills: 0 | Status: SHIPPED | OUTPATIENT
Start: 2023-10-11 | End: 2023-12-17

## 2023-10-12 ENCOUNTER — OFFICE VISIT (OUTPATIENT)
Dept: HEPATOLOGY | Facility: CLINIC | Age: 68
End: 2023-10-12
Payer: MEDICARE

## 2023-10-12 VITALS — HEIGHT: 66 IN | BODY MASS INDEX: 32.45 KG/M2 | WEIGHT: 201.94 LBS

## 2023-10-12 DIAGNOSIS — R74.8 ELEVATED ALKALINE PHOSPHATASE LEVEL: Primary | ICD-10-CM

## 2023-10-12 PROCEDURE — 1159F MED LIST DOCD IN RCRD: CPT | Mod: CPTII,S$GLB,, | Performed by: NURSE PRACTITIONER

## 2023-10-12 PROCEDURE — 1126F PR PAIN SEVERITY QUANTIFIED, NO PAIN PRESENT: ICD-10-PCS | Mod: CPTII,S$GLB,, | Performed by: NURSE PRACTITIONER

## 2023-10-12 PROCEDURE — 1159F PR MEDICATION LIST DOCUMENTED IN MEDICAL RECORD: ICD-10-PCS | Mod: CPTII,S$GLB,, | Performed by: NURSE PRACTITIONER

## 2023-10-12 PROCEDURE — 99214 PR OFFICE/OUTPT VISIT, EST, LEVL IV, 30-39 MIN: ICD-10-PCS | Mod: S$GLB,,, | Performed by: NURSE PRACTITIONER

## 2023-10-12 PROCEDURE — 3288F FALL RISK ASSESSMENT DOCD: CPT | Mod: CPTII,S$GLB,, | Performed by: NURSE PRACTITIONER

## 2023-10-12 PROCEDURE — 4010F ACE/ARB THERAPY RXD/TAKEN: CPT | Mod: CPTII,S$GLB,, | Performed by: NURSE PRACTITIONER

## 2023-10-12 PROCEDURE — 99999 PR PBB SHADOW E&M-EST. PATIENT-LVL III: CPT | Mod: PBBFAC,,, | Performed by: NURSE PRACTITIONER

## 2023-10-12 PROCEDURE — 3008F PR BODY MASS INDEX (BMI) DOCUMENTED: ICD-10-PCS | Mod: CPTII,S$GLB,, | Performed by: NURSE PRACTITIONER

## 2023-10-12 PROCEDURE — 3051F PR MOST RECENT HEMOGLOBIN A1C LEVEL 7.0 - < 8.0%: ICD-10-PCS | Mod: CPTII,S$GLB,, | Performed by: NURSE PRACTITIONER

## 2023-10-12 PROCEDURE — 4010F PR ACE/ARB THEARPY RXD/TAKEN: ICD-10-PCS | Mod: CPTII,S$GLB,, | Performed by: NURSE PRACTITIONER

## 2023-10-12 PROCEDURE — 1126F AMNT PAIN NOTED NONE PRSNT: CPT | Mod: CPTII,S$GLB,, | Performed by: NURSE PRACTITIONER

## 2023-10-12 PROCEDURE — 99214 OFFICE O/P EST MOD 30 MIN: CPT | Mod: S$GLB,,, | Performed by: NURSE PRACTITIONER

## 2023-10-12 PROCEDURE — 1101F PT FALLS ASSESS-DOCD LE1/YR: CPT | Mod: CPTII,S$GLB,, | Performed by: NURSE PRACTITIONER

## 2023-10-12 PROCEDURE — 3288F PR FALLS RISK ASSESSMENT DOCUMENTED: ICD-10-PCS | Mod: CPTII,S$GLB,, | Performed by: NURSE PRACTITIONER

## 2023-10-12 PROCEDURE — 99999 PR PBB SHADOW E&M-EST. PATIENT-LVL III: ICD-10-PCS | Mod: PBBFAC,,, | Performed by: NURSE PRACTITIONER

## 2023-10-12 PROCEDURE — 3051F HG A1C>EQUAL 7.0%<8.0%: CPT | Mod: CPTII,S$GLB,, | Performed by: NURSE PRACTITIONER

## 2023-10-12 PROCEDURE — 1101F PR PT FALLS ASSESS DOC 0-1 FALLS W/OUT INJ PAST YR: ICD-10-PCS | Mod: CPTII,S$GLB,, | Performed by: NURSE PRACTITIONER

## 2023-10-12 PROCEDURE — 3008F BODY MASS INDEX DOCD: CPT | Mod: CPTII,S$GLB,, | Performed by: NURSE PRACTITIONER

## 2023-10-12 NOTE — PROGRESS NOTES
[facility] - Established Patient    Last Clinic Visit: 9/16/22    Chief Complaint: Follow-up for elevated liver enzymes        HISTORY     This is a 68 y.o. male with PMH noted below, here for follow-up of elevated liver enzymes with cholestatic pattern.    No known history of liver disease.  No h/o Crohn's or UC.    He was hospitalized in 2021 with sepsis due to cholangitis. Blood cultures +vibrio species and E. Coli. His imaging at this time showed choledocholithiasis. He underwent ERCP with stone removal; biliary stents placed. Lap dayton done 7/21/21. Last ERCP 10/13/21 with biliary stent removed.       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 Alk phos subsequently normal from 9661-1329.     During the admission in 2021, alk phos up to 455. TBili up to 6.8. Transaminases elevated, 200s-300s.      Liver tests improved though alk phos remained elevated, ~200. ALT also mildly elevated, 40-50s.     Serologic workup has been negative for Lawson's, alpha-1 antitrypsin deficiency, hemochromatosis, autoimmune etiology (including PBC), and viral hepatitis.      MRI MRCP updated 7/28/22 with normal liver, no suspicious liver masses (stable hemangioma noted), no biliary dilation, no retained stones or strictures.     Fibroscan 8/5/22 = F0-F1, S3    Liver biopsy completed 9/8/22:   Final Pathologic Diagnosis Liver (random biopsy):   - Minimal nonspecific duct proliferation   - Minimal nonspecific patchy portal inflammation   - Macrosteatosis, 5%   - Microsteatosis, 5%   -Trichrome stain:  No fibrosis   - Iron stain:  Minimal hepatocyte iron, 1+ out of 4+   - Iron and trichrome stains with appropriate controls       Comment Negative for significant plasma cell population.  No features of immune mediated cholangiopathy.     Liver biopsy reviewed in path conference -- no biliary disease or cholestasis.     Interval history:  Ursodiol started shortly after biopsy last year. Taking 500  mg BID. His alk phos has normalized 1 year later, now 132. Transaminases and liver function also normal.    Doing well with ursodiol, no GI issues or side effects.    No itching.      Past medical history, surgical history, problem list, family history, social history, allergies: Reviewed and updated in the appropriate section of the electronic medical record.      Current Outpatient Medications   Medication Sig Dispense Refill    atorvastatin (LIPITOR) 40 MG tablet TAKE 1 TABLET BY MOUTH AT NIGHT 90 tablet 0    blood sugar diagnostic Strp 1 strip by Misc.(Non-Drug; Combo Route) route once daily. Test strips for glucometer insurance covers to check blood sugar twice daily. 200 strip 3    buPROPion (WELLBUTRIN XL) 300 MG 24 hr tablet TAKE 1 TABLET BY MOUTH EVERY DAY 90 tablet 3    carvediloL (COREG) 3.125 MG tablet TAKE 1 TABLET BY MOUTH TWICE  DAILY 180 tablet 3    ciclopirox (PENLAC) 8 % Soln APPLY TO AFFECTED AREA AT NIGHT 6.6 mL 11    gabapentin (NEURONTIN) 300 MG capsule TAKE 1 CAPSULE BY MOUTH IN  THE EVENING 90 capsule 3    hydroxyzine HCL (ATARAX) 25 MG tablet 1/2-1 tab PO TID PRN anxiety 60 tablet 1    lancets Misc 1 Device by Misc.(Non-Drug; Combo Route) route once daily. Lancets for glucometer insurance covers to check blood sugar twice daily. 200 each 3    losartan (COZAAR) 100 MG tablet Take 1 tablet (100 mg total) by mouth once daily. 90 tablet 3    pantoprazole (PROTONIX) 40 MG tablet TAKE 1 TABLET BY MOUTH ONCE DAILY 90 tablet 3    polyethylene glycol (GLYCOLAX) 17 gram PwPk Take 17 g by mouth once daily. 30 packet 0    ursodioL (ACTIGALL) 500 MG tablet TAKE 1 TABLET BY MOUTH TWICE A  tablet 1    diazePAM (VALIUM) 2 MG tablet Take 1 tablet (2 mg total) by mouth once. Take 1 tablet by mouth 30-60 minutes prior to MRI. May repeat once as needed. for 1 dose 2 tablet 0    FARXIGA 10 mg tablet TAKE 1 TABLET (10 MG TOTAL) BY MOUTH BEFORE BREAKFAST. (Patient not taking: Reported on 8/21/2023) 90  tablet 3    k phos di & mono-sod phos mono (K-PHOS-NEUTRAL) 250 mg Tab Take 2 tablets by mouth daily with breakfast. for 7 days 14 tablet 0    linaGLIPtin (TRADJENTA) 5 mg Tab tablet Take 1 tablet (5 mg total) by mouth once daily. (Patient not taking: Reported on 8/21/2023) 90 tablet 3     No current facility-administered medications for this visit.     Medication list reviewed and updated.      Review of Systems - as per HPI  Constitutional: Negative for fatigue or unexpected weight change.   Respiratory: Negative for shortness of breath.    Cardiovascular: Negative for leg swelling.  Gastrointestinal: Negative for abdominal distention or abdominal pain. Negative for melena or hematemesis.  Musculoskeletal: Negative for myalgias.    Skin: Negative for jaundice or itching.  Neurological: Negative for confusion or slowed mentation. Negative for tremors.   Hematological: Does not bruise/bleed easily.   Psychiatric: Negative for sleep disturbance.      Physical Exam   Constitutional: Well-nourished. No distress. Alert and oriented.  Eyes: No scleral icterus.   Pulmonary/Chest: Respiratory effort normal. No respiratory distress.   Abdominal: No distension, no ascites appreciated.   Extremities: No edema.   Neurological: No tremor.   Skin: No jaundice. No spider telangiectasias.  Psychiatric: Normal mood and affect. Speech, behavior, and thought content normal.         LABS & DIAGNOSTIC STUDIES     I have personally reviewed pertinent laboratory findings:    Lab Results   Component Value Date    ALT 17 10/04/2023    AST 18 10/04/2023    ALKPHOS 132 10/04/2023    BILITOT 0.6 10/04/2023    ALBUMIN 3.7 10/04/2023    INR 1.1 09/08/2022       Lab Results   Component Value Date    WBC 7.69 06/02/2023    HGB 14.9 06/02/2023    HCT 45.8 06/02/2023    MCV 93 06/02/2023     06/02/2023       Lab Results   Component Value Date     06/02/2023    K 4.2 06/02/2023    BUN 20 06/02/2023    CREATININE 1.2 06/02/2023     "ESTGFRAFRICA 58.8 (A) 07/28/2022    EGFRNONAA 50.9 (A) 07/28/2022       Lab Results   Component Value Date    SMOOTHMUSCAB Negative 1:40 07/18/2021    AMAIFA Negative 1:40 07/18/2021    IGGSERUM 712 07/18/2021    ANASCREEN Negative <1:80 07/18/2021    FERRITIN 985 (H) 07/02/2018    FESATURATED 24 07/02/2018    PETH Negative 07/18/2021    CERULOPLSM 26.0 07/18/2021    HEPBSAG Negative 07/18/2021    HEPBIGM Negative 07/18/2021    HEPCAB Negative 07/18/2021    HEPAIGM Negative 07/18/2021    ZDO45OWKT Negative 07/18/2021       No results found for: "AFP"    I have personally reviewed the following result reports:  Abdominal US - 7/19/21  CT - 7/18/21  MRI - 7/28/22  ERCP - 10/13/21  Liver biopsy - 9/8/22      ASSESSMENT & PLAN     68 y.o. male with:    1. Elevated liver enzymes, cholestatic  -- Initially had elevated liver enzymes/LFTs due to biliary obstruction and cholangitis in 2021. His liver tests improved though alk phos was still elevated 1 year later.  --Serologies and MRI MRCP unrevealing. Liver biopsy with minimal non-specific findings, no biliary disease or cholestasis to suggest PSC or PBC.  -- Suspect that he may have had prolonged biliary issues from his hospitalization which included ERCP and biliary stents  -- Ursodiol started 9/2022 and alk phos has steadily improved, now normal. Will continue for now but can discuss long-term plan with staff  -- Next labs in Dec, recommend monitoring every 6 months thereafter.      *See AVS for patient education and instructions.      Return to clinic pending above, likely 1 year.      Thank you for allowing me to participate in the care of ANGELICA Macedo Jr.-C  Hepatology        Duration of encounter: 30 min  This includes face to face time and non-face to face time preparing to see the patient (eg, review of tests), obtaining and/or reviewing separately obtained history, documenting clinical information in the electronic or other health " record, independently interpreting results and communicating results to the patient/family/caregiver, or Care coordination.

## 2023-10-12 NOTE — TELEPHONE ENCOUNTER
Care Due:                  Date            Visit Type   Department     Provider  --------------------------------------------------------------------------------                                EP -                              PRIMARY      [facility] INTERNAL  Last Visit: 06-      CARE ([facility])   MEDICINE       Bereket Rivera                              EP -                              PRIMARY      [facility] INTERNAL  Next Visit: 12-      CARE ([facility])   [facility]       Bereket Rivera                                                            Last  Test          Frequency    Reason                     Performed    Due Date  --------------------------------------------------------------------------------    HBA1C.......  6 months...  FARXIGA, linaGLIPtin.....  06- 11-    Lipid Panel.  12 months..  atorvastatin.............  12- 11-    Health [facility] Embedded Care Due Messages. Reference number: [id number].   10/11/2023 10:35:32 PM CDT

## 2023-10-12 NOTE — TELEPHONE ENCOUNTER
Refill Decision Note   Mario Jackpe  is requesting a refill authorization.  Brief Assessment and Rationale for Refill:  Approve     Medication Therapy Plan:  FLOS    Medication Reconciliation Completed: No   Comments:     No Care Gaps recommended.     Note composed:10:40 PM 10/11/2023

## 2023-10-17 NOTE — PATIENT INSTRUCTIONS
Continue ursodiol -- will discuss long-term plan for this with the team  Next labs in December, recommend monitoring ~6 months after

## 2023-10-29 NOTE — TELEPHONE ENCOUNTER
No care due was identified.  [facility] Embedded Care Due Messages. Reference number: [id number].   10/28/2023 10:01:13 PM CDT

## 2023-10-30 RX ORDER — GABAPENTIN 300 MG/1
CAPSULE ORAL
Qty: 90 CAPSULE | Refills: 3 | Status: SHIPPED | OUTPATIENT
Start: 2023-10-30

## 2023-11-20 PROBLEM — Z00.00 ENCOUNTER FOR PREVENTIVE HEALTH EXAMINATION: Chronic | Status: RESOLVED | Noted: 2023-08-21 | Resolved: 2023-11-20

## 2023-11-22 DIAGNOSIS — N18.30 TYPE 2 DIABETES MELLITUS WITH STAGE 3 CHRONIC KIDNEY DISEASE, WITHOUT LONG-TERM CURRENT USE OF INSULIN, UNSPECIFIED WHETHER STAGE 3A OR 3B CKD: ICD-10-CM

## 2023-11-22 DIAGNOSIS — E11.22 TYPE 2 DIABETES MELLITUS WITH STAGE 3 CHRONIC KIDNEY DISEASE, WITHOUT LONG-TERM CURRENT USE OF INSULIN, UNSPECIFIED WHETHER STAGE 3A OR 3B CKD: ICD-10-CM

## 2023-11-22 RX ORDER — LOSARTAN POTASSIUM 100 MG/1
100 TABLET ORAL
Qty: 90 TABLET | Refills: 1 | Status: SHIPPED | OUTPATIENT
Start: 2023-11-22

## 2023-11-22 NOTE — TELEPHONE ENCOUNTER
No care due was identified.  Health [facility] Embedded Care Due Messages. Reference number: [id number].   11/22/2023 2:00:34 PM CST

## 2023-11-22 NOTE — TELEPHONE ENCOUNTER
Refill Decision Note   Mario Sanford  is requesting a refill authorization.  Brief Assessment and Rationale for Refill:  Approve     Medication Therapy Plan:         Comments:     Note composed:2:37 PM 11/22/2023

## 2023-12-01 ENCOUNTER — LAB VISIT (OUTPATIENT)
Dept: LAB | Facility: HOSPITAL | Age: 68
End: 2023-12-01
Attending: INTERNAL MEDICINE
Payer: MEDICARE

## 2023-12-01 DIAGNOSIS — E11.22 TYPE 2 DIABETES MELLITUS WITH STAGE 3 CHRONIC KIDNEY DISEASE, WITHOUT LONG-TERM CURRENT USE OF INSULIN, UNSPECIFIED WHETHER STAGE 3A OR 3B CKD: ICD-10-CM

## 2023-12-01 DIAGNOSIS — E78.5 HYPERLIPIDEMIA ASSOCIATED WITH TYPE 2 DIABETES MELLITUS: ICD-10-CM

## 2023-12-01 DIAGNOSIS — E11.59 HYPERTENSION ASSOCIATED WITH DIABETES: ICD-10-CM

## 2023-12-01 DIAGNOSIS — N18.30 TYPE 2 DIABETES MELLITUS WITH STAGE 3 CHRONIC KIDNEY DISEASE, WITHOUT LONG-TERM CURRENT USE OF INSULIN, UNSPECIFIED WHETHER STAGE 3A OR 3B CKD: ICD-10-CM

## 2023-12-01 DIAGNOSIS — E11.69 HYPERLIPIDEMIA ASSOCIATED WITH TYPE 2 DIABETES MELLITUS: ICD-10-CM

## 2023-12-01 DIAGNOSIS — N18.31 STAGE 3A CHRONIC KIDNEY DISEASE: ICD-10-CM

## 2023-12-01 DIAGNOSIS — I15.2 HYPERTENSION ASSOCIATED WITH DIABETES: ICD-10-CM

## 2023-12-01 LAB
ALBUMIN/CREAT UR: 229.5 UG/MG (ref 0–30)
BACTERIA #/AREA URNS AUTO: ABNORMAL /HPF
BILIRUB UR QL STRIP: NEGATIVE
CLARITY UR REFRACT.AUTO: CLEAR
COLOR UR AUTO: YELLOW
CREAT UR-MCNC: 95 MG/DL (ref 23–375)
GLUCOSE UR QL STRIP: ABNORMAL
HGB UR QL STRIP: ABNORMAL
HYALINE CASTS UR QL AUTO: 0 /LPF
HYALINE CASTS UR QL AUTO: 1 /LPF
KETONES UR QL STRIP: NEGATIVE
LEUKOCYTE ESTERASE UR QL STRIP: NEGATIVE
MICROALBUMIN UR DL<=1MG/L-MCNC: 218 UG/ML
MICROSCOPIC COMMENT: ABNORMAL
MICROSCOPIC COMMENT: ABNORMAL
NITRITE UR QL STRIP: NEGATIVE
PH UR STRIP: 6 [PH] (ref 5–8)
PROT UR QL STRIP: ABNORMAL
RBC #/AREA URNS AUTO: >100 /HPF (ref 0–4)
RBC #/AREA URNS AUTO: >100 /HPF (ref 0–4)
SP GR UR STRIP: 1.03 (ref 1–1.03)
SQUAMOUS #/AREA URNS AUTO: 0 /HPF
SQUAMOUS #/AREA URNS AUTO: 0 /HPF
URN SPEC COLLECT METH UR: ABNORMAL
WBC #/AREA URNS AUTO: 17 /HPF (ref 0–5)
WBC #/AREA URNS AUTO: 18 /HPF (ref 0–5)
YEAST UR QL AUTO: ABNORMAL

## 2023-12-01 PROCEDURE — 82043 UR ALBUMIN QUANTITATIVE: CPT | Performed by: INTERNAL MEDICINE

## 2023-12-01 PROCEDURE — 81001 URINALYSIS AUTO W/SCOPE: CPT | Performed by: INTERNAL MEDICINE

## 2023-12-07 ENCOUNTER — TELEPHONE (OUTPATIENT)
Dept: HEPATOLOGY | Facility: CLINIC | Age: 68
End: 2023-12-07
Payer: MEDICARE

## 2023-12-07 DIAGNOSIS — R74.8 ELEVATED ALKALINE PHOSPHATASE LEVEL: Primary | ICD-10-CM

## 2023-12-07 NOTE — TELEPHONE ENCOUNTER
----- Message from Theresa Armijo NP sent at 12/7/2023 12:40 PM CST -----  Please schedule hepatic panel in 6 months, thanks.       Labs were schld for 6 mths    Ronel

## 2023-12-08 ENCOUNTER — OFFICE VISIT (OUTPATIENT)
Dept: INTERNAL MEDICINE | Facility: CLINIC | Age: 68
End: 2023-12-08
Payer: MEDICARE

## 2023-12-08 ENCOUNTER — LAB VISIT (OUTPATIENT)
Dept: LAB | Facility: HOSPITAL | Age: 68
End: 2023-12-08
Attending: INTERNAL MEDICINE
Payer: MEDICARE

## 2023-12-08 VITALS
RESPIRATION RATE: 18 BRPM | HEART RATE: 87 BPM | WEIGHT: 198.75 LBS | DIASTOLIC BLOOD PRESSURE: 74 MMHG | OXYGEN SATURATION: 97 % | SYSTOLIC BLOOD PRESSURE: 122 MMHG | BODY MASS INDEX: 31.94 KG/M2 | HEIGHT: 66 IN | TEMPERATURE: 97 F

## 2023-12-08 DIAGNOSIS — R74.8 ELEVATED ALKALINE PHOSPHATASE LEVEL: ICD-10-CM

## 2023-12-08 DIAGNOSIS — R31.9 HEMATURIA, UNSPECIFIED TYPE: ICD-10-CM

## 2023-12-08 DIAGNOSIS — I15.2 OBESITY, DIABETES, AND HYPERTENSION SYNDROME: ICD-10-CM

## 2023-12-08 DIAGNOSIS — E11.9 TYPE 2 DIABETES MELLITUS WITHOUT COMPLICATION, WITHOUT LONG-TERM CURRENT USE OF INSULIN: ICD-10-CM

## 2023-12-08 DIAGNOSIS — E11.59 HYPERTENSION ASSOCIATED WITH DIABETES: ICD-10-CM

## 2023-12-08 DIAGNOSIS — Z12.11 COLON CANCER SCREENING: ICD-10-CM

## 2023-12-08 DIAGNOSIS — F41.9 ANXIETY: ICD-10-CM

## 2023-12-08 DIAGNOSIS — E11.22 CKD STAGE 3 SECONDARY TO DIABETES: ICD-10-CM

## 2023-12-08 DIAGNOSIS — E11.69 OBESITY, DIABETES, AND HYPERTENSION SYNDROME: ICD-10-CM

## 2023-12-08 DIAGNOSIS — N40.0 BENIGN PROSTATIC HYPERPLASIA, UNSPECIFIED WHETHER LOWER URINARY TRACT SYMPTOMS PRESENT: ICD-10-CM

## 2023-12-08 DIAGNOSIS — E11.59 OBESITY, DIABETES, AND HYPERTENSION SYNDROME: ICD-10-CM

## 2023-12-08 DIAGNOSIS — E78.5 HYPERLIPIDEMIA ASSOCIATED WITH TYPE 2 DIABETES MELLITUS: ICD-10-CM

## 2023-12-08 DIAGNOSIS — I15.2 HYPERTENSION ASSOCIATED WITH DIABETES: ICD-10-CM

## 2023-12-08 DIAGNOSIS — E66.9 OBESITY, DIABETES, AND HYPERTENSION SYNDROME: ICD-10-CM

## 2023-12-08 DIAGNOSIS — N18.30 CKD STAGE 3 SECONDARY TO DIABETES: ICD-10-CM

## 2023-12-08 DIAGNOSIS — E11.69 HYPERLIPIDEMIA ASSOCIATED WITH TYPE 2 DIABETES MELLITUS: ICD-10-CM

## 2023-12-08 DIAGNOSIS — Z00.00 ANNUAL PHYSICAL EXAM: Primary | ICD-10-CM

## 2023-12-08 PROCEDURE — 3008F PR BODY MASS INDEX (BMI) DOCUMENTED: ICD-10-PCS | Mod: CPTII,S$GLB,, | Performed by: INTERNAL MEDICINE

## 2023-12-08 PROCEDURE — 3078F DIAST BP <80 MM HG: CPT | Mod: CPTII,S$GLB,, | Performed by: INTERNAL MEDICINE

## 2023-12-08 PROCEDURE — 4010F ACE/ARB THERAPY RXD/TAKEN: CPT | Mod: CPTII,S$GLB,, | Performed by: INTERNAL MEDICINE

## 2023-12-08 PROCEDURE — 3060F PR POS MICROALBUMINURIA RESULT DOCUMENTED/REVIEW: ICD-10-PCS | Mod: CPTII,S$GLB,, | Performed by: INTERNAL MEDICINE

## 2023-12-08 PROCEDURE — 3074F PR MOST RECENT SYSTOLIC BLOOD PRESSURE < 130 MM HG: ICD-10-PCS | Mod: CPTII,S$GLB,, | Performed by: INTERNAL MEDICINE

## 2023-12-08 PROCEDURE — 90677 PNEUMOCOCCAL CONJUGATE VACCINE 20-VALENT: ICD-10-PCS | Mod: S$GLB,,, | Performed by: INTERNAL MEDICINE

## 2023-12-08 PROCEDURE — 3051F HG A1C>EQUAL 7.0%<8.0%: CPT | Mod: CPTII,S$GLB,, | Performed by: INTERNAL MEDICINE

## 2023-12-08 PROCEDURE — 3060F POS MICROALBUMINURIA REV: CPT | Mod: CPTII,S$GLB,, | Performed by: INTERNAL MEDICINE

## 2023-12-08 PROCEDURE — 3051F PR MOST RECENT HEMOGLOBIN A1C LEVEL 7.0 - < 8.0%: ICD-10-PCS | Mod: CPTII,S$GLB,, | Performed by: INTERNAL MEDICINE

## 2023-12-08 PROCEDURE — 99999 PR PBB SHADOW E&M-EST. PATIENT-LVL V: ICD-10-PCS | Mod: PBBFAC,,, | Performed by: INTERNAL MEDICINE

## 2023-12-08 PROCEDURE — 90677 PCV20 VACCINE IM: CPT | Mod: S$GLB,,, | Performed by: INTERNAL MEDICINE

## 2023-12-08 PROCEDURE — 1159F PR MEDICATION LIST DOCUMENTED IN MEDICAL RECORD: ICD-10-PCS | Mod: CPTII,S$GLB,, | Performed by: INTERNAL MEDICINE

## 2023-12-08 PROCEDURE — 99215 OFFICE O/P EST HI 40 MIN: CPT | Mod: S$GLB,,, | Performed by: INTERNAL MEDICINE

## 2023-12-08 PROCEDURE — 99215 PR OFFICE/OUTPT VISIT, EST, LEVL V, 40-54 MIN: ICD-10-PCS | Mod: S$GLB,,, | Performed by: INTERNAL MEDICINE

## 2023-12-08 PROCEDURE — 1126F PR PAIN SEVERITY QUANTIFIED, NO PAIN PRESENT: ICD-10-PCS | Mod: CPTII,S$GLB,, | Performed by: INTERNAL MEDICINE

## 2023-12-08 PROCEDURE — G0009 ADMIN PNEUMOCOCCAL VACCINE: HCPCS | Mod: S$GLB,,, | Performed by: INTERNAL MEDICINE

## 2023-12-08 PROCEDURE — 1159F MED LIST DOCD IN RCRD: CPT | Mod: CPTII,S$GLB,, | Performed by: INTERNAL MEDICINE

## 2023-12-08 PROCEDURE — 3074F SYST BP LT 130 MM HG: CPT | Mod: CPTII,S$GLB,, | Performed by: INTERNAL MEDICINE

## 2023-12-08 PROCEDURE — 1126F AMNT PAIN NOTED NONE PRSNT: CPT | Mod: CPTII,S$GLB,, | Performed by: INTERNAL MEDICINE

## 2023-12-08 PROCEDURE — 3288F FALL RISK ASSESSMENT DOCD: CPT | Mod: CPTII,S$GLB,, | Performed by: INTERNAL MEDICINE

## 2023-12-08 PROCEDURE — 3078F PR MOST RECENT DIASTOLIC BLOOD PRESSURE < 80 MM HG: ICD-10-PCS | Mod: CPTII,S$GLB,, | Performed by: INTERNAL MEDICINE

## 2023-12-08 PROCEDURE — 3066F PR DOCUMENTATION OF TREATMENT FOR NEPHROPATHY: ICD-10-PCS | Mod: CPTII,S$GLB,, | Performed by: INTERNAL MEDICINE

## 2023-12-08 PROCEDURE — 1101F PT FALLS ASSESS-DOCD LE1/YR: CPT | Mod: CPTII,S$GLB,, | Performed by: INTERNAL MEDICINE

## 2023-12-08 PROCEDURE — 3066F NEPHROPATHY DOC TX: CPT | Mod: CPTII,S$GLB,, | Performed by: INTERNAL MEDICINE

## 2023-12-08 PROCEDURE — 1101F PR PT FALLS ASSESS DOC 0-1 FALLS W/OUT INJ PAST YR: ICD-10-PCS | Mod: CPTII,S$GLB,, | Performed by: INTERNAL MEDICINE

## 2023-12-08 PROCEDURE — 3288F PR FALLS RISK ASSESSMENT DOCUMENTED: ICD-10-PCS | Mod: CPTII,S$GLB,, | Performed by: INTERNAL MEDICINE

## 2023-12-08 PROCEDURE — 3008F BODY MASS INDEX DOCD: CPT | Mod: CPTII,S$GLB,, | Performed by: INTERNAL MEDICINE

## 2023-12-08 PROCEDURE — G0009 PNEUMOCOCCAL CONJUGATE VACCINE 20-VALENT: ICD-10-PCS | Mod: S$GLB,,, | Performed by: INTERNAL MEDICINE

## 2023-12-08 PROCEDURE — 87086 URINE CULTURE/COLONY COUNT: CPT | Performed by: INTERNAL MEDICINE

## 2023-12-08 PROCEDURE — 99999 PR PBB SHADOW E&M-EST. PATIENT-LVL V: CPT | Mod: PBBFAC,,, | Performed by: INTERNAL MEDICINE

## 2023-12-08 PROCEDURE — 4010F PR ACE/ARB THEARPY RXD/TAKEN: ICD-10-PCS | Mod: CPTII,S$GLB,, | Performed by: INTERNAL MEDICINE

## 2023-12-08 PROCEDURE — 81001 URINALYSIS AUTO W/SCOPE: CPT | Performed by: INTERNAL MEDICINE

## 2023-12-08 NOTE — PROGRESS NOTES
Subjective     Patient ID: Mario Sanford Jr. is a 68 y.o. male.    Chief Complaint: Annual Exam (Blood work/Urine Done ) and Discuss Rx (Tradjenta )    HPI  68 y.o. Male here for annual exam.      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 Whalen  8/10/2018: ENDOSCOPIC ULTRASOUND OF UPPER GASTROINTESTINAL TRACT; N/A      Comment:  Procedure: ULTRASOUND, ENDOSCOPIC, UPPER GI TRACT;                 Surgeon: Watson Glass MD;  Location: [facility] ENDO (2ND               FLR);  Service: Endoscopy;  Laterality: N/A;  MRCP with                narrowing at the CBD as well as duodenal diverticulum                however LFTs have normalized and patient will therefore                need EUS as outpatient.  7/20/2021: ERCP; N/A      Comment:  Procedure: ERCP (ENDOSCOPIC RETROGRADE                CHOLANGIOPANCREATOGRAPHY);  Surgeon: Watson Glass MD;  Location: [facility] ENDO (2ND FLR);  Service: Endoscopy;                Laterality: N/A;  10/13/2021: ERCP; N/A      Comment:  Procedure: ERCP (ENDOSCOPIC RETROGRADE                CHOLANGIOPANCREATOGRAPHY);  Surgeon: Andrews Lance MD;                 Location: [facility] ENDO (2ND FLR);  Service:  Endoscopy;                 Laterality: N/A;  pre procedure covid swab 10/10                [facility]-Instructions mailed-MG  7/3/2018: ESOPHAGOGASTRODUODENOSCOPY; N/A      Comment:  Procedure: EGD (ESOPHAGOGASTRODUODENOSCOPY);  Surgeon:                Newotn Iglesias MD;  Location: [facility] ENDO (2ND FLR);                 Service: Endoscopy;  Laterality: N/A;  7/6/2018: ESOPHAGOGASTRODUODENOSCOPY; N/A      Comment:  Procedure: EGD (ESOPHAGOGASTRODUODENOSCOPY);  Surgeon:                Newton Iglesias MD;  Location: [facility] ENDO (2ND FLR);                 Service: Endoscopy;  Laterality: N/A;  8/10/2018: ESOPHAGOGASTRODUODENOSCOPY; N/A      Comment:  Procedure: EGD (ESOPHAGOGASTRODUODENOSCOPY);  Surgeon:                Watson Glass MD;  Location: [facility] ENDO (2ND FLR);                 Service: Endoscopy;  Laterality: N/A;  Inpatient EGD                (7/6/18) remarkable for esophagitis and a duodenal ulcer.  7/21/2021: LAPAROSCOPIC CHOLECYSTECTOMY; N/A      Comment:  Procedure: CHOLECYSTECTOMY, LAPAROSCOPIC;  Surgeon:                Garland Loja MD;  Location: [facility] OR 2ND FLR;                 Service: General;  Laterality: N/A;  No date: TONSILLECTOMY  12/13/2018: TRANSURETHRAL RESECTION OF PROSTATE (TURP) WITHOUT USE OF   LASER; N/A      Comment:  Procedure: TURP, WITHOUT USING LASER;  Surgeon: Neil Thornton MD;  Location: [facility] OR 1ST FLR;  Service:                Urology;  Laterality: N/A;  08/10/2018: UPPER ENDOSCOPIC ULTRASOUND W/ FNA      Comment:  without FNA  Social History    Socioeconomic History      Marital status:       Number of children: 1    Occupational History      Occupation: Retired from sales     Tobacco Use      Smoking status: Never Smoker      Smokeless tobacco: Never Used    Substance and Sexual Activity      Alcohol use: No      Drug use: No      Sexual activity: Yes        Partners: Female     Social Determinants of Health  Financial Resource Strain: Medium Risk       Difficulty of Paying Living Expenses: Somewhat hard  Food Insecurity: Food Insecurity Present      Worried About Running Out of Food in the Last Year: Sometimes true      Ran Out of Food in the Last Year: Sometimes true  Transportation Needs: No Transportation Needs      Lack of Transportation (Medical): No      Lack of Transportation (Non-Medical): No  Physical Activity: Inactive      Days of Exercise per Week: 0 days      Minutes of Exercise per Session: 0 min  Stress: No Stress Concern Present      Feeling of Stress : Only a little  Social Connections: Unknown      Frequency of Communication with Friends and Family: Twice a week      Frequency of Social Gatherings with Friends and Family: Once a week      Active Member of Clubs or Organizations: No      Attends Club or Organization Meetings: Patient refused      Marital Status:   Housing Stability: Low Risk       Unable to Pay for Housing in the Last Year: No      Number of Places Lived in the Last Year: 1      Unstable Housing in the Last Year: No  Review of patient's allergies indicates:   -- Aspirin -- Other (See Comments)    --  Bleeding ulcer   -- Ibuprofen   Review of Systems   Constitutional:  Negative for activity change, appetite change, chills, diaphoresis, fatigue, fever and unexpected weight change.   HENT:  Negative for nasal congestion, mouth sores, postnasal drip, rhinorrhea, sinus pressure/congestion, sneezing, sore throat, trouble swallowing and voice change.    Eyes:  Negative for discharge, itching and visual disturbance.   Respiratory:  Negative for cough, chest tightness, shortness of breath and wheezing.    Cardiovascular:  Negative for chest pain, palpitations and leg swelling.   Gastrointestinal:  Negative for abdominal pain, blood in stool, constipation, diarrhea, nausea and vomiting.   Endocrine: Negative for cold intolerance and heat intolerance.   Genitourinary:  Negative for difficulty urinating, dysuria, flank pain, hematuria  and urgency.   Musculoskeletal:  Negative for arthralgias, back pain, myalgias and neck pain.   Integumentary:  Negative for rash and wound.   Allergic/Immunologic: Negative for environmental allergies and food allergies.   Neurological:  Negative for dizziness, tremors, seizures, syncope, weakness and headaches.   Hematological:  Negative for adenopathy. Does not bruise/bleed easily.   Psychiatric/Behavioral:  Negative for confusion, self-injury, sleep disturbance and suicidal ideas. The patient is nervous/anxious.           Objective     Physical Exam  Constitutional:       General: He is not in acute distress.     Appearance: Normal appearance. He is well-developed. He is not ill-appearing, toxic-appearing or diaphoretic.   HENT:      Head: Normocephalic and atraumatic.      Right Ear: External ear normal.      Left Ear: External ear normal.      Nose: Nose normal.      Mouth/Throat:      Pharynx: No oropharyngeal exudate.   Eyes:      General: No scleral icterus.        Right eye: No discharge.         Left eye: No discharge.      Extraocular Movements: Extraocular movements intact.      Conjunctiva/sclera: Conjunctivae normal.      Pupils: Pupils are equal, round, and reactive to light.   Neck:      Thyroid: No thyromegaly.      Vascular: No JVD.   Cardiovascular:      Rate and Rhythm: Normal rate and regular rhythm.      Pulses: Normal pulses.      Heart sounds: Normal heart sounds. No murmur heard.  Pulmonary:      Effort: Pulmonary effort is normal. No respiratory distress.      Breath sounds: Normal breath sounds. No wheezing or rales.   Abdominal:      General: Bowel sounds are normal. There is no distension.      Palpations: Abdomen is soft.      Tenderness: There is no abdominal tenderness. There is no right CVA tenderness, left CVA tenderness, guarding or rebound.   Musculoskeletal:      Cervical back: Normal range of motion and neck supple. No rigidity.      Right lower leg: No edema.      Left lower  leg: No edema.   Lymphadenopathy:      Cervical: No cervical adenopathy.   Skin:     General: Skin is warm and dry.      Capillary Refill: Capillary refill takes less than 2 seconds.      Coloration: Skin is not pale.      Findings: No rash.   Neurological:      General: No focal deficit present.      Mental Status: He is alert and oriented to person, place, and time. Mental status is at baseline.      Cranial Nerves: No cranial nerve deficit.      Sensory: No sensory deficit.      Motor: No weakness.      Coordination: Coordination normal.      Gait: Gait normal.      Deep Tendon Reflexes: Reflexes normal.   Psychiatric:         Mood and Affect: Mood normal.         Behavior: Behavior normal.         Thought Content: Thought content normal.         Judgment: Judgment normal.            Assessment and Plan     1. Annual physical exam  -     (In Office Administered) Pneumococcal Conjugate Vaccine (20 Valent) (IM) (Preferred)    2. Colon cancer screening  -     Ambulatory referral/consult to Endo Procedure ; Future; Expected date: 12/09/2023    3. Hematuria, unspecified type  -     Urinalysis; Future  -     Urine culture; Future; Expected date: 12/08/2023    4. Hyperlipidemia associated with type 2 diabetes mellitus    5. Hypertension associated with diabetes    6. CKD stage 3 secondary to diabetes    7. Benign prostatic hyperplasia, unspecified whether lower urinary tract symptoms present    8. Type 2 diabetes mellitus without complication, without long-term current use of insulin    9. Obesity, diabetes, and hypertension syndrome    10. Elevated alkaline phosphatase level    11. Anxiety         HTN- controlled on Coreg 3.125 mg BID and Losartan 50 mg daily      T2DM- last HA1C of 7.4(12/23)<--7.7(6/23)<--7.5(12/22)<--7.2(4/22)<--7.3(11/21)<--6.1(9/20)<--6.3(3/20)<--6.2(9/19)      - on Farxiga, awaiting approval for Tradjenta       HLD- stable on Lipitor 40 mg daily      Diabetes, Obesity and HTN- pt advised  on proper diet/exercise for weight loss      CKD III- stable      Anxiety- stable on Wellbutrin  mg qHS and Atarax 25 mg TID PRN      Elevated Alk Phos with hx of biliary obstruction 2/2 cholangitis(vibrio species and E. Coli)   S/p liver bx   Followed by Hepatology and remains on Actigall       Hematuria- repeat testing today   -neg w/u in Jan 2023     F/u in 6 month     Over 1/2 of 40 minute visit spent reviewing pt's medical records, education/discussion of pt's medical conditions and medical management

## 2023-12-09 LAB
BACTERIA #/AREA URNS AUTO: ABNORMAL /HPF
BILIRUB UR QL STRIP: NEGATIVE
CLARITY UR REFRACT.AUTO: CLEAR
COLOR UR AUTO: YELLOW
GLUCOSE UR QL STRIP: ABNORMAL
HGB UR QL STRIP: ABNORMAL
KETONES UR QL STRIP: NEGATIVE
LEUKOCYTE ESTERASE UR QL STRIP: NEGATIVE
MICROSCOPIC COMMENT: ABNORMAL
NITRITE UR QL STRIP: NEGATIVE
PH UR STRIP: 6 [PH] (ref 5–8)
PROT UR QL STRIP: ABNORMAL
RBC #/AREA URNS AUTO: 34 /HPF (ref 0–4)
SP GR UR STRIP: 1.02 (ref 1–1.03)
URN SPEC COLLECT METH UR: ABNORMAL
WBC #/AREA URNS AUTO: 14 /HPF (ref 0–5)
YEAST UR QL AUTO: ABNORMAL

## 2023-12-10 LAB — BACTERIA UR CULT: NO GROWTH

## 2023-12-11 ENCOUNTER — TELEPHONE (OUTPATIENT)
Dept: INTERNAL MEDICINE | Facility: CLINIC | Age: 68
End: 2023-12-11
Payer: MEDICARE

## 2023-12-11 DIAGNOSIS — R31.9 HEMATURIA, UNSPECIFIED TYPE: Primary | ICD-10-CM

## 2023-12-14 ENCOUNTER — CLINICAL SUPPORT (OUTPATIENT)
Dept: ENDOSCOPY | Facility: HOSPITAL | Age: 68
End: 2023-12-14
Attending: INTERNAL MEDICINE
Payer: MEDICARE

## 2023-12-14 ENCOUNTER — TELEPHONE (OUTPATIENT)
Dept: ENDOSCOPY | Facility: HOSPITAL | Age: 68
End: 2023-12-14

## 2023-12-14 DIAGNOSIS — Z12.11 COLON CANCER SCREENING: ICD-10-CM

## 2023-12-14 NOTE — PLAN OF CARE
Contacted patient to schedule a Colonoscopy and unable to schedule due to no availability at this time. Patient requested a call back. New PAT appt made and Dept number provided to pt [phone].

## 2023-12-14 NOTE — TELEPHONE ENCOUNTER
Contacted patient to schedule a Colonoscopy and unable to schedule due to no availability at this time. Patient requested a call back. New PAT appt made and Dept number provided to pt [phone].

## 2023-12-17 RX ORDER — ATORVASTATIN CALCIUM 40 MG/1
TABLET, FILM COATED ORAL
Qty: 90 TABLET | Refills: 3 | Status: SHIPPED | OUTPATIENT
Start: 2023-12-17

## 2023-12-17 NOTE — TELEPHONE ENCOUNTER
No care due was identified.  [facility] Embedded Care Due Messages. Reference number: [id number].   12/16/2023 9:07:44 PM CST

## 2023-12-17 NOTE — TELEPHONE ENCOUNTER
Refill Decision Note   Mario Sanford  is requesting a refill authorization.  Brief Assessment and Rationale for Refill:  Approve     Medication Therapy Plan:         Comments:     Note composed:4:19 AM 12/17/2023

## 2023-12-20 ENCOUNTER — TELEPHONE (OUTPATIENT)
Dept: ENDOSCOPY | Facility: HOSPITAL | Age: 68
End: 2023-12-20
Payer: MEDICARE

## 2023-12-20 DIAGNOSIS — Z12.11 COLON CANCER SCREENING: Primary | ICD-10-CM

## 2023-12-20 NOTE — TELEPHONE ENCOUNTER
Spoke to pt wife to schedule procedure(s) Colonoscopy       Physician to perform procedure(s) Dr. KANG Guadarrama  Date of Procedure (s) 04/15/24  Arrival Time 9:45 AM  Time of Procedure(s) 10:45 AM   Location of Procedure(s) [facility] Floor  Type of Rx Prep sent to patient: Miralax  Instructions provided to patient via [facility]    Patient was informed on the following information and verbalized understanding. Screening questionnaire reviewed with patient and complete. If procedure requires anesthesia, a responsible adult needs to be present to accompany the patient home, patient cannot drive after receiving anesthesia. Appointment details are tentative, especially check-in time. Patient will receive a prep-op call 7 days prior to confirm check-in time for procedure. If applicable the patient should contact their pharmacy to verify Rx for procedure prep is ready for pick-up. Patient was advised to call the scheduling department at [phone] if pharmacy states no Rx is available. Patient was advised to call the endoscopy scheduling department if any questions or concerns arise.      [facility] Scheduling Department

## 2023-12-21 ENCOUNTER — PATIENT MESSAGE (OUTPATIENT)
Dept: INTERNAL MEDICINE | Facility: CLINIC | Age: 68
End: 2023-12-21
Payer: MEDICARE

## 2023-12-27 ENCOUNTER — PATIENT MESSAGE (OUTPATIENT)
Dept: INTERNAL MEDICINE | Facility: CLINIC | Age: 68
End: 2023-12-27
Payer: MEDICARE

## 2023-12-27 DIAGNOSIS — E11.9 TYPE 2 DIABETES MELLITUS WITHOUT COMPLICATION, WITHOUT LONG-TERM CURRENT USE OF INSULIN: Primary | ICD-10-CM

## 2023-12-29 ENCOUNTER — CLINICAL SUPPORT (OUTPATIENT)
Dept: ENDOSCOPY | Facility: HOSPITAL | Age: 68
End: 2023-12-29
Attending: INTERNAL MEDICINE
Payer: MEDICARE

## 2023-12-29 DIAGNOSIS — Z12.11 COLON CANCER SCREENING: ICD-10-CM

## 2023-12-29 NOTE — PLAN OF CARE
Pt thought pat was for scheduling urology procedure. Explained to pt wife her  had urology appt 1/5/24. Pt wife verbalized understanding and thanked me

## 2024-01-05 ENCOUNTER — OFFICE VISIT (OUTPATIENT)
Dept: UROLOGY | Facility: CLINIC | Age: 69
End: 2024-01-05
Payer: MEDICARE

## 2024-01-05 VITALS
WEIGHT: 201.81 LBS | BODY MASS INDEX: 32.43 KG/M2 | DIASTOLIC BLOOD PRESSURE: 74 MMHG | HEIGHT: 66 IN | SYSTOLIC BLOOD PRESSURE: 109 MMHG | HEART RATE: 95 BPM

## 2024-01-05 DIAGNOSIS — R31.9 HEMATURIA, UNSPECIFIED TYPE: ICD-10-CM

## 2024-01-05 LAB
BILIRUB SERPL-MCNC: NORMAL MG/DL
BLOOD URINE, POC: NORMAL
CLARITY, POC UA: CLEAR
COLOR, POC UA: YELLOW
GLUCOSE UR QL STRIP: 500
KETONES UR QL STRIP: NORMAL
LEUKOCYTE ESTERASE URINE, POC: NORMAL
NITRITE, POC UA: NORMAL
PH, POC UA: 5
PROTEIN, POC: 30
SPECIFIC GRAVITY, POC UA: 1.01
UROBILINOGEN, POC UA: NORMAL

## 2024-01-05 PROCEDURE — 99214 OFFICE O/P EST MOD 30 MIN: CPT | Mod: S$GLB,,,

## 2024-01-05 PROCEDURE — 88112 CYTOPATH CELL ENHANCE TECH: CPT | Performed by: STUDENT IN AN ORGANIZED HEALTH CARE EDUCATION/TRAINING PROGRAM

## 2024-01-05 PROCEDURE — 88112 CYTOPATH CELL ENHANCE TECH: CPT | Mod: 26,,, | Performed by: STUDENT IN AN ORGANIZED HEALTH CARE EDUCATION/TRAINING PROGRAM

## 2024-01-05 PROCEDURE — 99999 PR PBB SHADOW E&M-EST. PATIENT-LVL IV: CPT | Mod: PBBFAC,,,

## 2024-01-05 PROCEDURE — 87086 URINE CULTURE/COLONY COUNT: CPT

## 2024-01-05 PROCEDURE — 81002 URINALYSIS NONAUTO W/O SCOPE: CPT | Mod: S$GLB,,,

## 2024-01-05 NOTE — PROGRESS NOTES
"CHIEF COMPLAINT:  Microscopic hematuria       HISTORY OF PRESENTING ILLINESS:  Mario Sanford Jr. is a 68 y.o. male established to urology. Presents today as a referral from Dr. Rivera for for microscopic hematuria. Micro UA 12/8/23 revealed 34 rbc/hpf and 14 wbc/hpf. Denies episodes of GH. Patient had a negative hematuria workup with Dr. Curiel 1/18/2023. Renal US 1/30/2023 normal. He was informed to follow up with urology in 1 year.     He reports a good urinary stream and complete bladder emptying. Nocturia 3-5 times per night.  Reports drinking "plenty' up until bedtime. Drinks tea, occasional diet soft drink, and lemonade. Urinary frequency and nocturia symptoms do not bother patient.     No family history of prostate cancer.  Last PSA 1.2 12/1/23.        REVIEW OF SYSTEMS:  Review of Systems   Constitutional:  Negative for chills and fever.   HENT:  Negative for congestion and sore throat.    Respiratory:  Negative for cough and shortness of breath.    Cardiovascular:  Negative for chest pain and palpitations.   Gastrointestinal:  Negative for nausea and vomiting.   Genitourinary:  Negative for dysuria, flank pain, frequency, hematuria and urgency.   Neurological:  Negative for dizziness and headaches.         PATIENT HISTORY:    Past Medical History:   Diagnosis Date    Arthritis     Diabetes mellitus     Dilated cbd, acquired     Duodenal diverticulum     Duodenal fistula     choledochoduodenal    Duodenal ulcer     Esophagitis     Hiatal hernia     Hyperlipidemia     Hypertension     Melena     Nodule of esophagus     Obesity (BMI 30-39.9)        Past Surgical History:   Procedure Laterality Date    CHOLECYSTECTOMY      COLONOSCOPY N/A 06/19/2018    Procedure: COLONOSCOPY;  Surgeon: Moris Whalen MD;  Location: [address];  Service: Endoscopy;  Laterality: N/A;  Miralax prep, per Dr. Whalen    ENDOSCOPIC ULTRASOUND OF UPPER GASTROINTESTINAL TRACT N/A 08/10/2018    Procedure: ULTRASOUND, ENDOSCOPIC, " UPPER GI TRACT;  Surgeon: Watson Glass MD;  Location: [facility] ENDO (2ND FLR);  Service: Endoscopy;  Laterality: N/A;  MRCP with narrowing at the CBD as well as duodenal diverticulum however LFTs have normalized and patient will therefore need EUS as outpatient.    ERCP N/A 07/20/2021    Procedure: ERCP (ENDOSCOPIC RETROGRADE CHOLANGIOPANCREATOGRAPHY);  Surgeon: Watson Glass MD;  Location: [facility] ENDO (2ND FLR);  Service: Endoscopy;  Laterality: N/A;    ERCP N/A 10/13/2021    Procedure: ERCP (ENDOSCOPIC RETROGRADE CHOLANGIOPANCREATOGRAPHY);  Surgeon: Andrews Lance MD;  Location: [facility] ENDO (2ND FLR);  Service: Endoscopy;  Laterality: N/A;  pre procedure covid swab 10/10 [facility]-Instructions mailed-MG    ESOPHAGOGASTRODUODENOSCOPY N/A 07/03/2018    Procedure: EGD (ESOPHAGOGASTRODUODENOSCOPY);  Surgeon: Newton Iglesias MD;  Location: [facility] ENDO (2ND FLR);  Service: Endoscopy;  Laterality: N/A;    ESOPHAGOGASTRODUODENOSCOPY N/A 07/06/2018    Procedure: EGD (ESOPHAGOGASTRODUODENOSCOPY);  Surgeon: Newton Iglesias MD;  Location: [facility] ENDO (2ND FLR);  Service: Endoscopy;  Laterality: N/A;    ESOPHAGOGASTRODUODENOSCOPY N/A 08/10/2018    Procedure: EGD (ESOPHAGOGASTRODUODENOSCOPY);  Surgeon: Watson Glass MD;  Location: [facility] ENDO (2ND FLR);  Service: Endoscopy;  Laterality: N/A;  Inpatient EGD (7/6/18) remarkable for esophagitis and a duodenal ulcer.     LAPAROSCOPIC CHOLECYSTECTOMY N/A 07/21/2021    Procedure: CHOLECYSTECTOMY, LAPAROSCOPIC;  Surgeon: Garland Loja MD;  Location: [facility] OR 2ND FLR;  Service: General;  Laterality: N/A;    TONSILLECTOMY      TRANSURETHRAL RESECTION OF PROSTATE (TURP) WITHOUT USE OF LASER N/A 12/13/2018    Procedure: TURP, WITHOUT USING LASER;  Surgeon: Neil Thornton MD;  Location: [facility] OR 1ST FLR;  Service: Urology;  Laterality: N/A;    UPPER ENDOSCOPIC ULTRASOUND W/ FNA  08/10/2018    without FNA       Family History   Problem Relation Age of Onset    Heart disease Mother      Hypertension Mother     Hyperlipidemia Mother     Diabetes Father     Hypertension Father     Hyperlipidemia Sister     Cancer Neg Hx     Stroke Neg Hx     Blindness Neg Hx     Amblyopia Neg Hx     Cataracts Neg Hx     Glaucoma Neg Hx     Macular degeneration Neg Hx     Retinal detachment Neg Hx     Strabismus Neg Hx     Thyroid disease Neg Hx     Cirrhosis Neg Hx        Social History     Socioeconomic History    Marital status:     Number of children: 1   Occupational History    Occupation: Retired from [employer]    Tobacco Use    Smoking status: Never    Smokeless tobacco: Never   Substance and Sexual Activity    Alcohol use: No    Drug use: No    Sexual activity: Yes     Partners: Female     Social Determinants of Health     Financial Resource Strain: Medium Risk (12/1/2023)    Overall Financial Resource Strain (CARDIA)     Difficulty of Paying Living Expenses: Somewhat hard   Food Insecurity: Food Insecurity Present (12/1/2023)    Hunger Vital Sign     Worried About Running Out of Food in the Last Year: Sometimes true     Ran Out of Food in the Last Year: Sometimes true   Transportation Needs: No Transportation Needs (12/1/2023)    PRAPARE - Transportation     Lack of Transportation (Medical): No     Lack of Transportation (Non-Medical): No   Physical Activity: Inactive (12/1/2023)    Exercise Vital Sign     Days of Exercise per Week: 0 days     Minutes of Exercise per Session: 0 min   Stress: No Stress Concern Present (12/1/2023)    [employer] [place] [employer] - Occupational Stress Questionnaire     Feeling of Stress : Only a little   Social Connections: Unknown (12/1/2023)    Social Connection and Isolation Panel [NHANES]     Frequency of Communication with Friends and Family: Three times a week     Frequency of Social Gatherings with Friends and Family: Twice a week     Active Member of Clubs or Organizations: No     Attends Club or Organization Meetings: Never     Marital Status:     Housing Stability: Low Risk  (12/1/2023)    Housing Stability Vital Sign     Unable to Pay for Housing in the Last Year: No     Number of Places Lived in the Last Year: 1     Unstable Housing in the Last Year: No       Allergies:  Aspirin and Ibuprofen    Medications:    Current Outpatient Medications:     atorvastatin (LIPITOR) 40 MG tablet, TAKE 1 TABLET BY MOUTH AT NIGHT, Disp: 90 tablet, Rfl: 3    blood sugar diagnostic Strp, 1 strip by Misc.(Non-Drug; Combo Route) route once daily. Test strips for glucometer insurance covers to check blood sugar twice daily., Disp: 200 strip, Rfl: 3    buPROPion (WELLBUTRIN XL) 300 MG 24 hr tablet, TAKE 1 TABLET BY MOUTH EVERY DAY, Disp: 90 tablet, Rfl: 3    carvediloL (COREG) 3.125 MG tablet, TAKE 1 TABLET BY MOUTH TWICE  DAILY, Disp: 180 tablet, Rfl: 3    ciclopirox (PENLAC) 8 % Soln, APPLY TO AFFECTED AREA AT NIGHT, Disp: 6.6 mL, Rfl: 11    FARXIGA 10 mg tablet, TAKE 1 TABLET (10 MG TOTAL) BY MOUTH BEFORE BREAKFAST., Disp: 90 tablet, Rfl: 3    gabapentin (NEURONTIN) 300 MG capsule, TAKE 1 CAPSULE BY MOUTH IN THE  EVENING, Disp: 90 capsule, Rfl: 3    hydroxyzine HCL (ATARAX) 25 MG tablet, 1/2-1 tab PO TID PRN anxiety, Disp: 60 tablet, Rfl: 1    lancets Misc, 1 Device by Misc.(Non-Drug; Combo Route) route once daily. Lancets for glucometer insurance covers to check blood sugar twice daily., Disp: 200 each, Rfl: 3    linaGLIPtin (TRADJENTA) 5 mg Tab tablet, Take 1 tablet (5 mg total) by mouth once daily., Disp: 90 tablet, Rfl: 3    losartan (COZAAR) 100 MG tablet, TAKE 1 TABLET BY MOUTH EVERY DAY, Disp: 90 tablet, Rfl: 1    pantoprazole (PROTONIX) 40 MG tablet, TAKE 1 TABLET BY MOUTH ONCE DAILY, Disp: 90 tablet, Rfl: 3    polyethylene glycol (GLYCOLAX) 17 gram PwPk, Take 17 g by mouth once daily., Disp: 30 packet, Rfl: 0    ursodioL (ACTIGALL) 500 MG tablet, TAKE 1 TABLET BY MOUTH TWICE A DAY, Disp: 180 tablet, Rfl: 1    diazePAM (VALIUM) 2 MG tablet, Take 1 tablet (2 mg  total) by mouth once. Take 1 tablet by mouth 30-60 minutes prior to MRI. May repeat once as needed. for 1 dose, Disp: 2 tablet, Rfl: 0    k phos di & mono-sod phos mono (K-PHOS-NEUTRAL) 250 mg Tab, Take 2 tablets by mouth daily with breakfast. for 7 days, Disp: 14 tablet, Rfl: 0    PHYSICAL EXAMINATION:  Physical Exam  Constitutional:       Appearance: Normal appearance.   HENT:      Head: Normocephalic and atraumatic.      Right Ear: External ear normal.      Left Ear: External ear normal.      Nose: Nose normal.      Mouth/Throat:      Mouth: Mucous membranes are moist.   Pulmonary:      Effort: Pulmonary effort is normal. No respiratory distress.   Skin:     General: Skin is warm and dry.   Neurological:      General: No focal deficit present.      Mental Status: He is alert and oriented to person, place, and time.   Psychiatric:         Mood and Affect: Mood normal.         Behavior: Behavior normal.           LABS:  UA 30 protein, 500 glucose, trace blood     Lab Results   Component Value Date    PSA 1.2 12/01/2023    PSA 1.3 12/02/2022    PSA 1.3 09/22/2020    PSADIAG 2.0 11/05/2021       Lab Results   Component Value Date    CREATININE 1.5 (H) 12/01/2023    EGFRNORACEVR 49.8 (A) 12/01/2023           IMPRESSION:  Encounter Diagnoses   Name Primary?    Hematuria, unspecified type          Assessment:       1. Hematuria, unspecified type        Plan:   - Urine sent for cytology and culture. Will inform patient of results.  - Renal US ordered and scheduled.     RTC 1 year or sooner for any  changes.    I spent 30 minutes with the patient of which more than half was spent in direct consultation with the patient in regards to our treatment and plan.  We addressed the office findings and recent labs.   Education and recommendations of today's plan of care including home remedies and needed follow up with PCP.   We discussed the chief complaint; reviewed the LUTS and the possible contributory factors.

## 2024-01-06 LAB — BACTERIA UR CULT: NORMAL

## 2024-01-10 ENCOUNTER — PATIENT MESSAGE (OUTPATIENT)
Dept: UROLOGY | Facility: CLINIC | Age: 69
End: 2024-01-10
Payer: MEDICARE

## 2024-01-10 DIAGNOSIS — B37.49 CANDIDA UTI: Primary | ICD-10-CM

## 2024-01-10 LAB
FINAL PATHOLOGIC DIAGNOSIS: NORMAL
Lab: NORMAL

## 2024-01-10 RX ORDER — FLUCONAZOLE 200 MG/1
200 TABLET ORAL DAILY
Qty: 7 TABLET | Refills: 0 | Status: SHIPPED | OUTPATIENT
Start: 2024-01-10 | End: 2024-01-17

## 2024-01-10 NOTE — PROGRESS NOTES
Urine cytology with fungal organisms consistent with candida. Will treat with 200 mg diflucan daily x 7 days instead of 14 days due to decreased eGFR and elevated Cr. Informed patient of findings, treatment and follow up in 1 year. Instructed against taking diflucan with Valium due to risk of respiratory depression/death etc. Pt verbalized understanding.

## 2024-01-12 RX ORDER — URSODIOL 500 MG/1
TABLET, FILM COATED ORAL
Qty: 180 TABLET | Refills: 1 | Status: SHIPPED | OUTPATIENT
Start: 2024-01-12

## 2024-02-08 ENCOUNTER — HOSPITAL ENCOUNTER (OUTPATIENT)
Dept: RADIOLOGY | Facility: HOSPITAL | Age: 69
Discharge: HOME | End: 2024-02-08
Payer: MEDICARE

## 2024-02-08 DIAGNOSIS — R31.9 HEMATURIA, UNSPECIFIED TYPE: ICD-10-CM

## 2024-02-08 PROCEDURE — 76770 US EXAM ABDO BACK WALL COMP: CPT | Mod: 26,,, | Performed by: RADIOLOGY

## 2024-02-08 PROCEDURE — 76770 US EXAM ABDO BACK WALL COMP: CPT | Mod: TC

## 2024-02-19 ENCOUNTER — PATIENT MESSAGE (OUTPATIENT)
Dept: UROLOGY | Facility: CLINIC | Age: 69
End: 2024-02-19
Payer: MEDICARE

## 2024-02-23 ENCOUNTER — PATIENT MESSAGE (OUTPATIENT)
Dept: HEPATOLOGY | Facility: CLINIC | Age: 69
End: 2024-02-23
Payer: MEDICARE

## 2024-03-25 ENCOUNTER — PATIENT MESSAGE (OUTPATIENT)
Dept: INTERNAL MEDICINE | Facility: CLINIC | Age: 69
End: 2024-03-25
Payer: MEDICARE

## 2024-03-25 DIAGNOSIS — N18.30 CKD STAGE 3 DUE TO TYPE 2 DIABETES MELLITUS: ICD-10-CM

## 2024-03-25 DIAGNOSIS — Z79.4 TYPE 2 DIABETES MELLITUS WITH DIABETIC POLYNEUROPATHY, WITH LONG-TERM CURRENT USE OF INSULIN: ICD-10-CM

## 2024-03-25 DIAGNOSIS — E11.42 TYPE 2 DIABETES MELLITUS WITH DIABETIC POLYNEUROPATHY, WITH LONG-TERM CURRENT USE OF INSULIN: ICD-10-CM

## 2024-03-25 DIAGNOSIS — E11.22 CKD STAGE 3 DUE TO TYPE 2 DIABETES MELLITUS: ICD-10-CM

## 2024-03-25 NOTE — TELEPHONE ENCOUNTER
Care Due:                  Date            Visit Type   Department     Provider  --------------------------------------------------------------------------------                                EP -                              PRIMARY      MET INTERNAL  Last Visit: 12-      CARE ([facility])   MEDICINE       Bereket Rivera                              EP -                              PRIMARY      [facility] INTERNAL  Next Visit: 06-      CARE ([facility])   MEDICINE       Bereket Rivera                                                            Last  Test          Frequency    Reason                     Performed    Due Date  --------------------------------------------------------------------------------    HBA1C.......  6 months...  HARRY linaGLIPtin.....  12- 05-    [facility] Embedded Care Due Messages. Reference number: [id number].   3/25/2024 3:32:17 PM CDT

## 2024-04-09 ENCOUNTER — TELEPHONE (OUTPATIENT)
Dept: ENDOSCOPY | Facility: HOSPITAL | Age: 69
End: 2024-04-09
Payer: MEDICARE

## 2024-04-12 ENCOUNTER — TELEPHONE (OUTPATIENT)
Dept: ENDOSCOPY | Facility: HOSPITAL | Age: 69
End: 2024-04-12
Payer: MEDICARE

## 2024-04-12 ENCOUNTER — PATIENT MESSAGE (OUTPATIENT)
Dept: PHARMACY | Facility: CLINIC | Age: 69
End: 2024-04-12
Payer: MEDICARE

## 2024-04-12 NOTE — TELEPHONE ENCOUNTER
Patient's wife calling to cancel patient's colonscopy scheduled on Monday 4/15/24 due to illness. Patient's wife states she will call back to reschedule.

## 2024-05-02 ENCOUNTER — PATIENT OUTREACH (OUTPATIENT)
Dept: ADMINISTRATIVE | Facility: HOSPITAL | Age: 69
End: 2024-05-02
Payer: MEDICARE

## 2024-05-02 ENCOUNTER — PATIENT MESSAGE (OUTPATIENT)
Dept: ADMINISTRATIVE | Facility: HOSPITAL | Age: 69
End: 2024-05-02
Payer: MEDICARE

## 2024-05-16 ENCOUNTER — TELEPHONE (OUTPATIENT)
Dept: PHARMACY | Facility: CLINIC | Age: 69
End: 2024-05-16
Payer: MEDICARE

## 2024-05-16 ENCOUNTER — PATIENT MESSAGE (OUTPATIENT)
Dept: PHARMACY | Facility: CLINIC | Age: 69
End: 2024-05-16
Payer: MEDICARE

## 2024-05-16 NOTE — TELEPHONE ENCOUNTER
We regret to inform you Mario Sanford Jr. has been denied enrollment by [facility] Patient Assistance Program.    Denial Date: 05/16/2024  Denial Reason:   The patient may be eligible Low Income Subsidy(LIS) or Extra Help Program.  LIS is a Medicare program to help people with limited income and resources pay  Medicare drug coverage (Part D) premiums, deductibles, coinsurance, and other costs. Apply by calling 1-823.808.7298 or online @ https://secure.[place].com.gov/i1020/start .  Once you receive a determination letter from Social Security please provide a copy of  the determination letter to the PAP Team ASAP for application re-submission      Next Steps:  Patient only submitted his proof of income.  Once, I submit his wife income he should be approved.      Patient has been notified of this decision via Portal message

## 2024-05-25 DIAGNOSIS — N18.30 TYPE 2 DIABETES MELLITUS WITH STAGE 3 CHRONIC KIDNEY DISEASE, WITHOUT LONG-TERM CURRENT USE OF INSULIN, UNSPECIFIED WHETHER STAGE 3A OR 3B CKD: ICD-10-CM

## 2024-05-25 DIAGNOSIS — E11.22 TYPE 2 DIABETES MELLITUS WITH STAGE 3 CHRONIC KIDNEY DISEASE, WITHOUT LONG-TERM CURRENT USE OF INSULIN, UNSPECIFIED WHETHER STAGE 3A OR 3B CKD: ICD-10-CM

## 2024-05-25 NOTE — TELEPHONE ENCOUNTER
Care Due:                  Date            Visit Type   Department     Provider  --------------------------------------------------------------------------------                                EP -                              PRIMARY      MET INTERNAL  Last Visit: 12-      CARE ([facility])   MEDICINE       Bereket Rivera                              EP -                              PRIMARY      [facility] INTERNAL  Next Visit: 06-      CARE ([facility])   MEDICINE       Bereket Rivera                                                            Last  Test          Frequency    Reason                     Performed    Due Date  --------------------------------------------------------------------------------    HBA1C.......  6 months...  FARALEYDA linaGLIPtin.....  12- 05-    [facility] Embedded Care Due Messages. Reference number: [id number].   5/25/2024 7:08:51 AM CDT

## 2024-05-26 NOTE — TELEPHONE ENCOUNTER
Refill Routing Note   Medication(s) are not appropriate for processing by [facility] for the following reason(s):        Required labs abnormal    [facility] action(s):  Defer   Requires labs : Yes             Appointments  past 12m or future 3m with PCP    Date Provider   Last Visit   12/8/2023 Bereket Rivera, DO   Next Visit   6/11/2024 Bereket Rivera, DO   ED visits in past 90 days: 0        Note composed:10:21 PM 05/25/2024

## 2024-05-27 RX ORDER — LOSARTAN POTASSIUM 100 MG/1
100 TABLET ORAL
Qty: 90 TABLET | Refills: 1 | Status: SHIPPED | OUTPATIENT
Start: 2024-05-27

## 2024-06-04 RX ORDER — CARVEDILOL 3.12 MG/1
TABLET ORAL
Qty: 180 TABLET | Refills: 1 | Status: SHIPPED | OUTPATIENT
Start: 2024-06-04

## 2024-06-04 RX ORDER — PANTOPRAZOLE SODIUM 40 MG/1
TABLET, DELAYED RELEASE ORAL
Qty: 90 TABLET | Refills: 1 | Status: SHIPPED | OUTPATIENT
Start: 2024-06-04

## 2024-06-04 NOTE — TELEPHONE ENCOUNTER
Refill Decision Note   Mario Sanford  is requesting a refill authorization.  Brief Assessment and Rationale for Refill:  Approve     Medication Therapy Plan:         Comments:     Note composed:6:28 AM 06/04/2024

## 2024-06-04 NOTE — TELEPHONE ENCOUNTER
No care due was identified.  [facility] Embedded Care Due Messages. Reference number: [id number].   6/03/2024 10:22:13 PM CDT

## 2024-06-05 ENCOUNTER — LAB VISIT (OUTPATIENT)
Dept: LAB | Facility: HOSPITAL | Age: 69
End: 2024-06-05
Payer: MEDICARE

## 2024-06-05 DIAGNOSIS — R74.8 ELEVATED ALKALINE PHOSPHATASE LEVEL: ICD-10-CM

## 2024-06-05 LAB
ALBUMIN SERPL BCP-MCNC: 3.6 G/DL (ref 3.5–5.2)
ALP SERPL-CCNC: 132 U/L (ref 55–135)
ALT SERPL W/O P-5'-P-CCNC: 16 U/L (ref 10–44)
AST SERPL-CCNC: 17 U/L (ref 10–40)
BILIRUB DIRECT SERPL-MCNC: 0.2 MG/DL (ref 0.1–0.3)
BILIRUB SERPL-MCNC: 0.5 MG/DL (ref 0.1–1)
PROT SERPL-MCNC: 6.6 G/DL (ref 6–8.4)

## 2024-06-05 PROCEDURE — 80076 HEPATIC FUNCTION PANEL: CPT | Performed by: NURSE PRACTITIONER

## 2024-06-05 PROCEDURE — 36415 COLL VENOUS BLD VENIPUNCTURE: CPT | Mod: PO | Performed by: NURSE PRACTITIONER

## 2024-06-11 ENCOUNTER — OFFICE VISIT (OUTPATIENT)
Dept: INTERNAL MEDICINE | Facility: CLINIC | Age: 69
End: 2024-06-11
Payer: MEDICARE

## 2024-06-11 ENCOUNTER — LAB VISIT (OUTPATIENT)
Dept: LAB | Facility: HOSPITAL | Age: 69
End: 2024-06-11
Attending: INTERNAL MEDICINE
Payer: MEDICARE

## 2024-06-11 VITALS
WEIGHT: 196.88 LBS | TEMPERATURE: 98 F | HEIGHT: 66 IN | OXYGEN SATURATION: 97 % | RESPIRATION RATE: 18 BRPM | SYSTOLIC BLOOD PRESSURE: 120 MMHG | HEART RATE: 88 BPM | BODY MASS INDEX: 31.64 KG/M2 | DIASTOLIC BLOOD PRESSURE: 74 MMHG

## 2024-06-11 DIAGNOSIS — E11.59 HYPERTENSION ASSOCIATED WITH DIABETES: ICD-10-CM

## 2024-06-11 DIAGNOSIS — I15.2 HYPERTENSION ASSOCIATED WITH DIABETES: ICD-10-CM

## 2024-06-11 DIAGNOSIS — E11.9 TYPE 2 DIABETES MELLITUS WITHOUT COMPLICATION, WITHOUT LONG-TERM CURRENT USE OF INSULIN: ICD-10-CM

## 2024-06-11 DIAGNOSIS — N18.31 STAGE 3A CHRONIC KIDNEY DISEASE: ICD-10-CM

## 2024-06-11 DIAGNOSIS — E11.51 DIABETES MELLITUS WITH PERIPHERAL VASCULAR DISEASE: ICD-10-CM

## 2024-06-11 DIAGNOSIS — E11.69 HYPERLIPIDEMIA ASSOCIATED WITH TYPE 2 DIABETES MELLITUS: ICD-10-CM

## 2024-06-11 DIAGNOSIS — I15.2 OBESITY, DIABETES, AND HYPERTENSION SYNDROME: ICD-10-CM

## 2024-06-11 DIAGNOSIS — E66.9 OBESITY, DIABETES, AND HYPERTENSION SYNDROME: ICD-10-CM

## 2024-06-11 DIAGNOSIS — F41.9 ANXIETY: Primary | ICD-10-CM

## 2024-06-11 DIAGNOSIS — H91.90 HEARING LOSS, UNSPECIFIED HEARING LOSS TYPE, UNSPECIFIED LATERALITY: ICD-10-CM

## 2024-06-11 DIAGNOSIS — N18.30 CKD STAGE 3 SECONDARY TO DIABETES: ICD-10-CM

## 2024-06-11 DIAGNOSIS — E11.22 CKD STAGE 3 SECONDARY TO DIABETES: ICD-10-CM

## 2024-06-11 DIAGNOSIS — E11.69 OBESITY, DIABETES, AND HYPERTENSION SYNDROME: ICD-10-CM

## 2024-06-11 DIAGNOSIS — Z90.79 S/P TURP: ICD-10-CM

## 2024-06-11 DIAGNOSIS — N40.0 BENIGN PROSTATIC HYPERPLASIA, UNSPECIFIED WHETHER LOWER URINARY TRACT SYMPTOMS PRESENT: ICD-10-CM

## 2024-06-11 DIAGNOSIS — E78.5 HYPERLIPIDEMIA ASSOCIATED WITH TYPE 2 DIABETES MELLITUS: ICD-10-CM

## 2024-06-11 DIAGNOSIS — R74.8 ELEVATED ALKALINE PHOSPHATASE LEVEL: ICD-10-CM

## 2024-06-11 DIAGNOSIS — E11.59 OBESITY, DIABETES, AND HYPERTENSION SYNDROME: ICD-10-CM

## 2024-06-11 LAB
ALBUMIN SERPL BCP-MCNC: 3.7 G/DL (ref 3.5–5.2)
ALP SERPL-CCNC: 135 U/L (ref 55–135)
ALT SERPL W/O P-5'-P-CCNC: 15 U/L (ref 10–44)
ANION GAP SERPL CALC-SCNC: 13 MMOL/L (ref 8–16)
AST SERPL-CCNC: 17 U/L (ref 10–40)
BASOPHILS # BLD AUTO: 0.09 K/UL (ref 0–0.2)
BASOPHILS NFR BLD: 0.9 % (ref 0–1.9)
BILIRUB SERPL-MCNC: 0.6 MG/DL (ref 0.1–1)
BUN SERPL-MCNC: 22 MG/DL (ref 8–23)
CALCIUM SERPL-MCNC: 9.2 MG/DL (ref 8.7–10.5)
CHLORIDE SERPL-SCNC: 108 MMOL/L (ref 95–110)
CO2 SERPL-SCNC: 20 MMOL/L (ref 23–29)
CREAT SERPL-MCNC: 1.3 MG/DL (ref 0.5–1.4)
DIFFERENTIAL METHOD BLD: ABNORMAL
EOSINOPHIL # BLD AUTO: 0.2 K/UL (ref 0–0.5)
EOSINOPHIL NFR BLD: 1.6 % (ref 0–8)
ERYTHROCYTE [DISTWIDTH] IN BLOOD BY AUTOMATED COUNT: 14.2 % (ref 11.5–14.5)
EST. GFR  (NO RACE VARIABLE): 58.7 ML/MIN/1.73 M^2
ESTIMATED AVG GLUCOSE: 171 MG/DL (ref 68–131)
GLUCOSE SERPL-MCNC: 145 MG/DL (ref 70–110)
HBA1C MFR BLD: 7.6 % (ref 4–5.6)
HCT VFR BLD AUTO: 48.3 % (ref 40–54)
HGB BLD-MCNC: 15.1 G/DL (ref 14–18)
IMM GRANULOCYTES # BLD AUTO: 0.04 K/UL (ref 0–0.04)
IMM GRANULOCYTES NFR BLD AUTO: 0.4 % (ref 0–0.5)
LYMPHOCYTES # BLD AUTO: 2.9 K/UL (ref 1–4.8)
LYMPHOCYTES NFR BLD: 28.6 % (ref 18–48)
MCH RBC QN AUTO: 28.6 PG (ref 27–31)
MCHC RBC AUTO-ENTMCNC: 31.3 G/DL (ref 32–36)
MCV RBC AUTO: 92 FL (ref 82–98)
MONOCYTES # BLD AUTO: 1 K/UL (ref 0.3–1)
MONOCYTES NFR BLD: 10.4 % (ref 4–15)
NEUTROPHILS # BLD AUTO: 5.8 K/UL (ref 1.8–7.7)
NEUTROPHILS NFR BLD: 58.1 % (ref 38–73)
NRBC BLD-RTO: 0 /100 WBC
PLATELET # BLD AUTO: 272 K/UL (ref 150–450)
PMV BLD AUTO: 10.3 FL (ref 9.2–12.9)
POTASSIUM SERPL-SCNC: 4.5 MMOL/L (ref 3.5–5.1)
PROT SERPL-MCNC: 7.1 G/DL (ref 6–8.4)
RBC # BLD AUTO: 5.28 M/UL (ref 4.6–6.2)
SODIUM SERPL-SCNC: 141 MMOL/L (ref 136–145)
WBC # BLD AUTO: 10.04 K/UL (ref 3.9–12.7)

## 2024-06-11 PROCEDURE — 4010F ACE/ARB THERAPY RXD/TAKEN: CPT | Mod: CPTII,S$GLB,, | Performed by: INTERNAL MEDICINE

## 2024-06-11 PROCEDURE — 99214 OFFICE O/P EST MOD 30 MIN: CPT | Mod: S$GLB,,, | Performed by: INTERNAL MEDICINE

## 2024-06-11 PROCEDURE — 3288F FALL RISK ASSESSMENT DOCD: CPT | Mod: CPTII,S$GLB,, | Performed by: INTERNAL MEDICINE

## 2024-06-11 PROCEDURE — 3078F DIAST BP <80 MM HG: CPT | Mod: CPTII,S$GLB,, | Performed by: INTERNAL MEDICINE

## 2024-06-11 PROCEDURE — 80053 COMPREHEN METABOLIC PANEL: CPT | Performed by: INTERNAL MEDICINE

## 2024-06-11 PROCEDURE — 36415 COLL VENOUS BLD VENIPUNCTURE: CPT | Mod: PO | Performed by: INTERNAL MEDICINE

## 2024-06-11 PROCEDURE — 1101F PT FALLS ASSESS-DOCD LE1/YR: CPT | Mod: CPTII,S$GLB,, | Performed by: INTERNAL MEDICINE

## 2024-06-11 PROCEDURE — 3074F SYST BP LT 130 MM HG: CPT | Mod: CPTII,S$GLB,, | Performed by: INTERNAL MEDICINE

## 2024-06-11 PROCEDURE — 83036 HEMOGLOBIN GLYCOSYLATED A1C: CPT | Performed by: INTERNAL MEDICINE

## 2024-06-11 PROCEDURE — 85025 COMPLETE CBC W/AUTO DIFF WBC: CPT | Performed by: INTERNAL MEDICINE

## 2024-06-11 PROCEDURE — 1125F AMNT PAIN NOTED PAIN PRSNT: CPT | Mod: CPTII,S$GLB,, | Performed by: INTERNAL MEDICINE

## 2024-06-11 PROCEDURE — 3051F HG A1C>EQUAL 7.0%<8.0%: CPT | Mod: CPTII,S$GLB,, | Performed by: INTERNAL MEDICINE

## 2024-06-11 PROCEDURE — 99999 PR PBB SHADOW E&M-EST. PATIENT-LVL V: CPT | Mod: PBBFAC,,, | Performed by: INTERNAL MEDICINE

## 2024-06-11 PROCEDURE — G2211 COMPLEX E/M VISIT ADD ON: HCPCS | Mod: S$GLB,,, | Performed by: INTERNAL MEDICINE

## 2024-06-11 PROCEDURE — 1159F MED LIST DOCD IN RCRD: CPT | Mod: CPTII,S$GLB,, | Performed by: INTERNAL MEDICINE

## 2024-06-11 PROCEDURE — 3008F BODY MASS INDEX DOCD: CPT | Mod: CPTII,S$GLB,, | Performed by: INTERNAL MEDICINE

## 2024-06-11 PROCEDURE — 3072F LOW RISK FOR RETINOPATHY: CPT | Mod: CPTII,S$GLB,, | Performed by: INTERNAL MEDICINE

## 2024-06-11 RX ORDER — LINAGLIPTIN 5 MG/1
5 TABLET, FILM COATED ORAL DAILY
Qty: 90 TABLET | Refills: 3 | Status: SHIPPED | OUTPATIENT
Start: 2024-06-11 | End: 2025-06-11

## 2024-06-11 NOTE — PROGRESS NOTES
Subjective     Patient ID: Mario Sanford Jr. is a 69 y.o. male.    Chief Complaint: Follow-up    HPI  Pt with HTN, T2DM, HLD, Obesity, CKD III, Anxiety, Elevated Alk Phos is here for 6 month f/u.   Review of Systems   Constitutional:  Negative for activity change, appetite change, chills, diaphoresis, fatigue, fever and unexpected weight change.   HENT:  Negative for postnasal drip, rhinorrhea, sinus pressure/congestion, sneezing, sore throat, trouble swallowing and voice change.    Respiratory:  Negative for cough, shortness of breath and wheezing.    Cardiovascular:  Negative for chest pain, palpitations and leg swelling.   Gastrointestinal:  Negative for abdominal pain, blood in stool, constipation, diarrhea, nausea and vomiting.   Genitourinary:  Negative for dysuria.   Musculoskeletal:  Negative for arthralgias and myalgias.   Integumentary:  Negative for rash and wound.   Allergic/Immunologic: Negative for environmental allergies and food allergies.   Hematological:  Negative for adenopathy. Does not bruise/bleed easily.   Psychiatric/Behavioral:  Negative for self-injury and suicidal ideas. The patient is nervous/anxious.           Objective     Physical Exam  Constitutional:       General: He is not in acute distress.     Appearance: Normal appearance. He is well-developed. He is not diaphoretic.   HENT:      Head: Normocephalic and atraumatic.      Right Ear: External ear normal.      Left Ear: External ear normal.      Nose: Nose normal.      Mouth/Throat:      Pharynx: No oropharyngeal exudate.   Eyes:      General: No scleral icterus.        Right eye: No discharge.         Left eye: No discharge.      Conjunctiva/sclera: Conjunctivae normal.      Pupils: Pupils are equal, round, and reactive to light.   Neck:      Vascular: No JVD.   Cardiovascular:      Rate and Rhythm: Normal rate and regular rhythm.      Pulses: Normal pulses.      Heart sounds: Normal heart sounds. No murmur heard.  Pulmonary:       Effort: Pulmonary effort is normal. No respiratory distress.      Breath sounds: Normal breath sounds. No wheezing or rales.   Abdominal:      General: Bowel sounds are normal.      Tenderness: There is no abdominal tenderness. There is no guarding or rebound.   Musculoskeletal:      Cervical back: Normal range of motion and neck supple.      Right lower leg: No edema.      Left lower leg: No edema.   Lymphadenopathy:      Cervical: No cervical adenopathy.   Skin:     General: Skin is warm and dry.      Capillary Refill: Capillary refill takes less than 2 seconds.      Coloration: Skin is not pale.      Findings: No rash.   Neurological:      Mental Status: He is alert and oriented to person, place, and time. Mental status is at baseline.      Cranial Nerves: No cranial nerve deficit.   Psychiatric:         Mood and Affect: Mood normal.         Behavior: Behavior normal.            Assessment and Plan     1. Anxiety    2. Hyperlipidemia associated with type 2 diabetes mellitus    3. Hypertension associated with diabetes  -     CBC Auto Differential; Future; Expected date: 06/11/2024  -     Comprehensive Metabolic Panel; Future; Expected date: 06/11/2024  -     Hemoglobin A1C; Future; Expected date: 06/11/2024  -     linaGLIPtin (TRADJENTA) 5 mg Tab tablet; Take 1 tablet (5 mg total) by mouth once daily.  Dispense: 90 tablet; Refill: 3    4. CKD stage 3 secondary to diabetes    5. Benign prostatic hyperplasia, unspecified whether lower urinary tract symptoms present    6. S/P TURP    7. Type 2 diabetes mellitus without complication, without long-term current use of insulin    8. Obesity, diabetes, and hypertension syndrome    9. Elevated alkaline phosphatase level    10. Stage 3a chronic kidney disease    11. Diabetes mellitus with peripheral vascular disease        HTN- controlled on Coreg 3.125 mg BID and Losartan 50 mg daily      T2DM- last HA1C of  7.4(12/23)<--7.7(6/23)<--7.5(12/22)<--7.2(4/22)<--7.3(11/21)<--6.1(9/20)<--6.3(3/20)<--6.2(9/19)      - on Farxiga/Tradjenta       HLD- stable on Lipitor 40 mg daily      Diabetes, Obesity and HTN- pt advised on proper diet/exercise for weight loss      CKD III- stable      Anxiety- stable on Wellbutrin  mg qHS and Atarax 25 mg TID PRN      Elevated Alk Phos with hx of biliary obstruction 2/2 cholangitis(vibrio species and E. Coli)   S/p liver bx   Followed by Hepatology and remains on Actigall       Hematuria- repeat testing today   -neg w/u in Jan 2023      F/u in 6 month for annual

## 2024-06-22 RX ORDER — BUPROPION HYDROCHLORIDE 300 MG/1
TABLET ORAL
Qty: 90 TABLET | Refills: 3 | Status: SHIPPED | OUTPATIENT
Start: 2024-06-22

## 2024-06-22 NOTE — TELEPHONE ENCOUNTER
Refill Decision Note   Mario Sanford  is requesting a refill authorization.  Brief Assessment and Rationale for Refill:  Approve     Medication Therapy Plan:         Comments:     Note composed:10:35 AM 06/22/2024

## 2024-06-22 NOTE — TELEPHONE ENCOUNTER
No care due was identified.  [facility] Embedded Care Due Messages. Reference number: [id number].   6/22/2024 7:01:07 AM CDT

## 2024-07-08 ENCOUNTER — TELEPHONE (OUTPATIENT)
Dept: INTERNAL MEDICINE | Facility: CLINIC | Age: 69
End: 2024-07-08
Payer: MEDICARE

## 2024-07-08 DIAGNOSIS — Z12.11 COLON CANCER SCREENING: Primary | ICD-10-CM

## 2024-07-08 NOTE — TELEPHONE ENCOUNTER
----- Message from Claudia Conroy LPN sent at 7/8/2024  1:34 PM CDT -----  Regarding: Colonoscopy  Please order colonoscopy.    Thanks

## 2024-07-16 ENCOUNTER — OFFICE VISIT (OUTPATIENT)
Dept: PODIATRY | Facility: CLINIC | Age: 69
End: 2024-07-16
Payer: MEDICARE

## 2024-07-16 VITALS
TEMPERATURE: 99 F | BODY MASS INDEX: 31.64 KG/M2 | HEIGHT: 66 IN | DIASTOLIC BLOOD PRESSURE: 76 MMHG | WEIGHT: 196.88 LBS | HEART RATE: 87 BPM | SYSTOLIC BLOOD PRESSURE: 120 MMHG

## 2024-07-16 DIAGNOSIS — M20.41 HAMMERTOES OF BOTH FEET: ICD-10-CM

## 2024-07-16 DIAGNOSIS — B35.1 TINEA UNGUIUM: ICD-10-CM

## 2024-07-16 DIAGNOSIS — M20.42 HAMMERTOES OF BOTH FEET: ICD-10-CM

## 2024-07-16 DIAGNOSIS — E11.42 TYPE 2 DIABETES MELLITUS WITH PERIPHERAL NEUROPATHY: Primary | ICD-10-CM

## 2024-07-16 PROCEDURE — 99214 OFFICE O/P EST MOD 30 MIN: CPT | Mod: 25,S$GLB,, | Performed by: STUDENT IN AN ORGANIZED HEALTH CARE EDUCATION/TRAINING PROGRAM

## 2024-07-16 PROCEDURE — 3288F FALL RISK ASSESSMENT DOCD: CPT | Mod: CPTII,S$GLB,, | Performed by: STUDENT IN AN ORGANIZED HEALTH CARE EDUCATION/TRAINING PROGRAM

## 2024-07-16 PROCEDURE — 3078F DIAST BP <80 MM HG: CPT | Mod: CPTII,S$GLB,, | Performed by: STUDENT IN AN ORGANIZED HEALTH CARE EDUCATION/TRAINING PROGRAM

## 2024-07-16 PROCEDURE — 4010F ACE/ARB THERAPY RXD/TAKEN: CPT | Mod: CPTII,S$GLB,, | Performed by: STUDENT IN AN ORGANIZED HEALTH CARE EDUCATION/TRAINING PROGRAM

## 2024-07-16 PROCEDURE — 1126F AMNT PAIN NOTED NONE PRSNT: CPT | Mod: CPTII,S$GLB,, | Performed by: STUDENT IN AN ORGANIZED HEALTH CARE EDUCATION/TRAINING PROGRAM

## 2024-07-16 PROCEDURE — 11721 DEBRIDE NAIL 6 OR MORE: CPT | Mod: Q9,S$GLB,, | Performed by: STUDENT IN AN ORGANIZED HEALTH CARE EDUCATION/TRAINING PROGRAM

## 2024-07-16 PROCEDURE — 1159F MED LIST DOCD IN RCRD: CPT | Mod: CPTII,S$GLB,, | Performed by: STUDENT IN AN ORGANIZED HEALTH CARE EDUCATION/TRAINING PROGRAM

## 2024-07-16 PROCEDURE — 3051F HG A1C>EQUAL 7.0%<8.0%: CPT | Mod: CPTII,S$GLB,, | Performed by: STUDENT IN AN ORGANIZED HEALTH CARE EDUCATION/TRAINING PROGRAM

## 2024-07-16 PROCEDURE — 1101F PT FALLS ASSESS-DOCD LE1/YR: CPT | Mod: CPTII,S$GLB,, | Performed by: STUDENT IN AN ORGANIZED HEALTH CARE EDUCATION/TRAINING PROGRAM

## 2024-07-16 PROCEDURE — 3074F SYST BP LT 130 MM HG: CPT | Mod: CPTII,S$GLB,, | Performed by: STUDENT IN AN ORGANIZED HEALTH CARE EDUCATION/TRAINING PROGRAM

## 2024-07-16 PROCEDURE — 3072F LOW RISK FOR RETINOPATHY: CPT | Mod: CPTII,S$GLB,, | Performed by: STUDENT IN AN ORGANIZED HEALTH CARE EDUCATION/TRAINING PROGRAM

## 2024-07-16 PROCEDURE — 99999 PR PBB SHADOW E&M-EST. PATIENT-LVL III: CPT | Mod: PBBFAC,,, | Performed by: STUDENT IN AN ORGANIZED HEALTH CARE EDUCATION/TRAINING PROGRAM

## 2024-07-16 PROCEDURE — 3008F BODY MASS INDEX DOCD: CPT | Mod: CPTII,S$GLB,, | Performed by: STUDENT IN AN ORGANIZED HEALTH CARE EDUCATION/TRAINING PROGRAM

## 2024-07-16 RX ORDER — TERBINAFINE HYDROCHLORIDE 250 MG/1
250 TABLET ORAL DAILY
Qty: 90 TABLET | Refills: 0 | Status: SHIPPED | OUTPATIENT
Start: 2024-07-16 | End: 2024-10-14

## 2024-07-16 NOTE — PROCEDURES
"Routine Foot Care    Date/Time: 7/16/2024 3:30 PM    Performed by: Sudheer Wolf DPM  Authorized by: Sudheer Wolf DPM    Time out: Immediately prior to procedure a "time out" was called to verify the correct patient, procedure, equipment, support staff and site/side marked as required.    Consent Done?:  Yes (Verbal)  Hyperkeratotic Skin Lesions?: No      Nail Care Type:  Debride  Location(s): All  (Left 1st Toe, Left 3rd Toe, Left 2nd Toe, Left 4th Toe, Left 5th Toe, Right 1st Toe, Right 2nd Toe, Right 3rd Toe, Right 4th Toe and Right 5th Toe)  Patient tolerance:  Patient tolerated the procedure well with no immediate complications    "

## 2024-07-16 NOTE — PROGRESS NOTES
Subjective:     Patient    Mario Sanford Jr. is a 69 y.o. male.    Problem    Presents for diabetic foot exam. Has thick discolored toenails not improved by prior topical treatment. Has numbness and tingling controlled on gabapentin.      Primary Care Provider    Primary Care Provider: Bereket Rivera DO   Last Seen: 6/11/2024     History    History obtained from patient and review of medical records.     Past Medical History:   Diagnosis Date    Arthritis     Diabetes mellitus     Dilated cbd, acquired     Duodenal diverticulum     Duodenal fistula     choledochoduodenal    Duodenal ulcer     Esophagitis     Hiatal hernia     Hyperlipidemia     Hypertension     Melena     Nodule of esophagus     Obesity (BMI 30-39.9)        Past Surgical History:   Procedure Laterality Date    CHOLECYSTECTOMY      COLONOSCOPY N/A 06/19/2018    Procedure: COLONOSCOPY;  Surgeon: Moris Whalen MD;  Location: [facility] ENDO (4TH FLR);  Service: Endoscopy;  Laterality: N/A;  Miralax prep, per Dr. Whalen    ENDOSCOPIC ULTRASOUND OF UPPER GASTROINTESTINAL TRACT N/A 08/10/2018    Procedure: ULTRASOUND, ENDOSCOPIC, UPPER GI TRACT;  Surgeon: Watson Glass MD;  Location: [facility] ENDO (2ND FLR);  Service: Endoscopy;  Laterality: N/A;  MRCP with narrowing at the CBD as well as duodenal diverticulum however LFTs have normalized and patient will therefore need EUS as outpatient.    ERCP N/A 07/20/2021    Procedure: ERCP (ENDOSCOPIC RETROGRADE CHOLANGIOPANCREATOGRAPHY);  Surgeon: Watson Glass MD;  Location: [facility] ENDO (2ND FLR);  Service: Endoscopy;  Laterality: N/A;    ERCP N/A 10/13/2021    Procedure: ERCP (ENDOSCOPIC RETROGRADE CHOLANGIOPANCREATOGRAPHY);  Surgeon: Andrews Lance MD;  Location: [facility] ENDO (2ND FLR);  Service: Endoscopy;  Laterality: N/A;  pre procedure covid swab 10/10 [facility]-Instructions mailed-MG    ESOPHAGOGASTRODUODENOSCOPY N/A 07/03/2018    Procedure: EGD (ESOPHAGOGASTRODUODENOSCOPY);  Surgeon: Newton Iglesias MD;   Location: [facility] (2ND FLR);  Service: Endoscopy;  Laterality: N/A;    ESOPHAGOGASTRODUODENOSCOPY N/A 2018    Procedure: EGD (ESOPHAGOGASTRODUODENOSCOPY);  Surgeon: Newton Iglesias MD;  Location: [facility] (2ND FLR);  Service: Endoscopy;  Laterality: N/A;    ESOPHAGOGASTRODUODENOSCOPY N/A 08/10/2018    Procedure: EGD (ESOPHAGOGASTRODUODENOSCOPY);  Surgeon: Watson Glass MD;  Location: [facility] (2ND FLR);  Service: Endoscopy;  Laterality: N/A;  Inpatient EGD (18) remarkable for esophagitis and a duodenal ulcer.     LAPAROSCOPIC CHOLECYSTECTOMY N/A 2021    Procedure: CHOLECYSTECTOMY, LAPAROSCOPIC;  Surgeon: Garland Loja MD;  Location: [facility] OR 2ND FLR;  Service: General;  Laterality: N/A;    TONSILLECTOMY      TRANSURETHRAL RESECTION OF PROSTATE (TURP) WITHOUT USE OF LASER N/A 2018    Procedure: TURP, WITHOUT USING LASER;  Surgeon: Neil Thornton MD;  Location: [facility] OR 1ST FLR;  Service: Urology;  Laterality: N/A;    UPPER ENDOSCOPIC ULTRASOUND W/ FNA  08/10/2018    without FNA        Objective:     Vitals  Wt Readings from Last 1 Encounters:   24 89.3 kg (196 lb 13.9 oz)     Temp Readings from Last 1 Encounters:   24 99.1 °F (37.3 °C) (Oral)     BP Readings from Last 1 Encounters:   24 120/76     Pulse Readings from Last 1 Encounters:   24 87       Dermatological Exam    Skin:  Pedal hair growth diminished and Pedal skin thin and shiny on right  Pedal hair growth diminished and Pedal skin thin and shiny on left     Nails:  10 nail(s) elongated, 10 nail(s) thickened, and 10 nail(s) discolored    Vascular Exam    Arteries:  Posterior tibial artery palpable on right  Dorsalis pedis artery palpable on right  Posterior tibial artery palpable on left  Dorsalis pedis artery palpable on left    Veins:  Superficial veins unremarkable on right  Superficial veins unremarkable on left    Swellin+ nonpitting on right  1+ nonpitting on left    Neurological  Exam    [place] touch test:  6/6 sites sensed, light touch intact     Musculoskeletal Exam    Footwear:  Casual on right  Casual on left    Gait Exam:   Ambulatory Status: Ambulatory  Gait: Normal  Assistive Devices: None    Foot Progression Angle:  Normal on right  Normal on left     Right Lower Extremity Additional Findings:  Hammertoes 2-5  Right foot and ankle function, strength, and range of motion unremarkable except as noted above.     Left Lower Extremity Additional Findings:  Hammertoes 2-5  Left foot and ankle function, strength, and range of motion unremarkable except as noted above.    Imaging and Other Tests    Imaging:  Independently reviewed and interpreted imaging, findings are as follows: N/A    Other Tests: The following A1c results were reviewed.   Hemoglobin A1C   Date Value Ref Range Status   06/11/2024 7.6 (H) 4.0 - 5.6 % Final     Comment:     ADA Screening Guidelines:  5.7-6.4%  Consistent with prediabetes  >or=6.5%  Consistent with diabetes    High levels of fetal hemoglobin interfere with the HbA1C  assay. Heterozygous hemoglobin variants (HbS, HgC, etc)do  not significantly interfere with this assay.   However, presence of multiple variants may affect accuracy.     12/01/2023 7.4 (H) 4.0 - 5.6 % Final     Comment:     ADA Screening Guidelines:  5.7-6.4%  Consistent with prediabetes  >or=6.5%  Consistent with diabetes    High levels of fetal hemoglobin interfere with the HbA1C  assay. Heterozygous hemoglobin variants (HbS, HgC, etc)do  not significantly interfere with this assay.   However, presence of multiple variants may affect accuracy.     12/01/2023 7.4 (H) 4.0 - 5.6 % Final     Comment:     ADA Screening Guidelines:  5.7-6.4%  Consistent with prediabetes  >or=6.5%  Consistent with diabetes    High levels of fetal hemoglobin interfere with the HbA1C  assay. Heterozygous hemoglobin variants (HbS, HgC, etc)do  not significantly interfere with this assay.   However, presence of multiple  variants may affect accuracy.           Assessment:     Encounter Diagnoses   Name Primary?    Type 2 diabetes mellitus with peripheral neuropathy Yes    Tinea unguium     Hammertoes of both feet         Plan:     I counseled the patient on his conditions, their implications and medical management.    Diabetic foot with peripheral neuropathy: chronic stable  -Diabetic foot exam performed.  -Performed shoe inspection and diabetic foot education. Reviewed importance of blood glucose control, proper nutrition, and foot hygiene to minimize risk of complications of diabetes. Recommended daily foot inspections, daily moisturizer to feet, avoiding sharp instruments to feet, appropriate footwear at all times when ambulating, and following up regularly for routine foot care.   -Diabetic foot risk: 2023 IWGDF very low ulcer risk.   -Next foot exam due July 2025.    Tinea unguium: chronic stable   -Discussed treatment options including (1) monitoring, (2) debridement, (3) topical antifungals, (4) oral antifungals. Discussed potential risks, benefits, alternatives to each. Patient opted for oral antifungals and debridement.  -Nails debrided x10, thickness and length reduced using sterile nail nippers, see procedure note.     -Reviewed most recent CMP/LFT, ordered 3 months PO terbinafine. Repeat LFT in 1.5 months.     Hammertoes: chronic stable  -Patient is under a comprehensive diabetes management plan and has foot deformity. Ordered diabetic shoes and inserts.          Return to clinic in 3 months for routine foot care, call sooner PRN.

## 2024-07-29 RX ORDER — URSODIOL 500 MG/1
TABLET, FILM COATED ORAL
Qty: 180 TABLET | Refills: 0 | Status: SHIPPED | OUTPATIENT
Start: 2024-07-29

## 2024-08-20 ENCOUNTER — LAB VISIT (OUTPATIENT)
Dept: LAB | Facility: HOSPITAL | Age: 69
End: 2024-08-20
Attending: STUDENT IN AN ORGANIZED HEALTH CARE EDUCATION/TRAINING PROGRAM
Payer: MEDICARE

## 2024-08-20 DIAGNOSIS — B35.1 TINEA UNGUIUM: ICD-10-CM

## 2024-08-20 PROCEDURE — 80076 HEPATIC FUNCTION PANEL: CPT | Performed by: STUDENT IN AN ORGANIZED HEALTH CARE EDUCATION/TRAINING PROGRAM

## 2024-08-20 PROCEDURE — 36415 COLL VENOUS BLD VENIPUNCTURE: CPT | Performed by: STUDENT IN AN ORGANIZED HEALTH CARE EDUCATION/TRAINING PROGRAM

## 2024-08-21 ENCOUNTER — TELEPHONE (OUTPATIENT)
Dept: INTERNAL MEDICINE | Facility: CLINIC | Age: 69
End: 2024-08-21
Payer: MEDICARE

## 2024-08-21 DIAGNOSIS — E11.59 HYPERTENSION ASSOCIATED WITH DIABETES: ICD-10-CM

## 2024-08-21 DIAGNOSIS — Z12.5 ENCOUNTER FOR SCREENING FOR MALIGNANT NEOPLASM OF PROSTATE: ICD-10-CM

## 2024-08-21 DIAGNOSIS — E11.69 HYPERLIPIDEMIA ASSOCIATED WITH TYPE 2 DIABETES MELLITUS: Primary | ICD-10-CM

## 2024-08-21 DIAGNOSIS — I15.2 HYPERTENSION ASSOCIATED WITH DIABETES: ICD-10-CM

## 2024-08-21 DIAGNOSIS — N18.31 STAGE 3A CHRONIC KIDNEY DISEASE: ICD-10-CM

## 2024-08-21 DIAGNOSIS — E78.5 HYPERLIPIDEMIA ASSOCIATED WITH TYPE 2 DIABETES MELLITUS: Primary | ICD-10-CM

## 2024-08-21 LAB
ALBUMIN SERPL BCP-MCNC: 3.8 G/DL (ref 3.5–5.2)
ALP SERPL-CCNC: 137 U/L (ref 55–135)
ALT SERPL W/O P-5'-P-CCNC: 16 U/L (ref 10–44)
AST SERPL-CCNC: 15 U/L (ref 10–40)
BILIRUB DIRECT SERPL-MCNC: 0.2 MG/DL (ref 0.1–0.3)
BILIRUB SERPL-MCNC: 0.4 MG/DL (ref 0.1–1)
PROT SERPL-MCNC: 6.9 G/DL (ref 6–8.4)

## 2024-08-26 DIAGNOSIS — N18.30 TYPE 2 DIABETES MELLITUS WITH STAGE 3 CHRONIC KIDNEY DISEASE, WITHOUT LONG-TERM CURRENT USE OF INSULIN, UNSPECIFIED WHETHER STAGE 3A OR 3B CKD: ICD-10-CM

## 2024-08-26 DIAGNOSIS — E11.22 TYPE 2 DIABETES MELLITUS WITH STAGE 3 CHRONIC KIDNEY DISEASE, WITHOUT LONG-TERM CURRENT USE OF INSULIN, UNSPECIFIED WHETHER STAGE 3A OR 3B CKD: ICD-10-CM

## 2024-08-26 RX ORDER — LOSARTAN POTASSIUM 100 MG/1
100 TABLET ORAL
Qty: 90 TABLET | Refills: 3 | Status: SHIPPED | OUTPATIENT
Start: 2024-08-26

## 2024-08-26 NOTE — TELEPHONE ENCOUNTER
No care due was identified.  [facility] Embedded Care Due Messages. Reference number: [id number].   8/26/2024 2:15:31 PM CDT

## 2024-08-27 NOTE — TELEPHONE ENCOUNTER
Refill Decision Note   Mario Sanford  is requesting a refill authorization.  Brief Assessment and Rationale for Refill:  Approve     Medication Therapy Plan:         Comments:     Note composed:11:31 PM 08/26/2024

## 2024-09-07 NOTE — TELEPHONE ENCOUNTER
Care Due:                  Date            Visit Type   Department     Provider  --------------------------------------------------------------------------------                                EP -                              PRIMARY      MET INTERNAL  Last Visit: 06-      CARE ([facility])   MEDICINE       Bereket Rivera                              EP -                              PRIMARY      [facility] INTERNAL  Next Visit: 12-      CARE ([facility])   MEDICINE       Bereket Rivera                                                            Last  Test          Frequency    Reason                     Performed    Due Date  --------------------------------------------------------------------------------    Lipid Panel.  12 months..  atorvastatin.............  12- 11-    Health [facility] Embedded Care Due Messages. Reference number: [id number].   9/06/2024 9:39:46 PM CDT

## 2024-09-09 RX ORDER — GABAPENTIN 300 MG/1
CAPSULE ORAL
Qty: 90 CAPSULE | Refills: 3 | Status: SHIPPED | OUTPATIENT
Start: 2024-09-09

## 2024-09-09 NOTE — TELEPHONE ENCOUNTER
Refill Routing Note   Medication(s) are not appropriate for processing by [facility] for the following reason(s):        Outside of protocol    [facility] action(s):  Route   Requires labs : Yes             Appointments  past 12m or future 3m with PCP    Date Provider   Last Visit   6/11/2024 Bereket Rivera, DO   Next Visit   12/11/2024 Bereket Rivera, DO   ED visits in past 90 days: 0        Note composed:8:28 AM 09/09/2024

## 2024-09-17 RX ORDER — URSODIOL 500 MG/1
TABLET, FILM COATED ORAL
Qty: 180 TABLET | Refills: 0 | Status: SHIPPED | OUTPATIENT
Start: 2024-09-17

## 2024-09-21 ENCOUNTER — PATIENT MESSAGE (OUTPATIENT)
Dept: HEPATOLOGY | Facility: CLINIC | Age: 69
End: 2024-09-21
Payer: MEDICARE

## 2024-09-23 ENCOUNTER — PATIENT MESSAGE (OUTPATIENT)
Dept: INTERNAL MEDICINE | Facility: CLINIC | Age: 69
End: 2024-09-23
Payer: MEDICARE

## 2024-09-23 ENCOUNTER — PATIENT MESSAGE (OUTPATIENT)
Dept: ADMINISTRATIVE | Facility: HOSPITAL | Age: 69
End: 2024-09-23
Payer: MEDICARE

## 2024-09-23 DIAGNOSIS — N18.30 TYPE 2 DIABETES MELLITUS WITH STAGE 3 CHRONIC KIDNEY DISEASE, WITHOUT LONG-TERM CURRENT USE OF INSULIN, UNSPECIFIED WHETHER STAGE 3A OR 3B CKD: ICD-10-CM

## 2024-09-23 DIAGNOSIS — E11.22 TYPE 2 DIABETES MELLITUS WITH STAGE 3 CHRONIC KIDNEY DISEASE, WITHOUT LONG-TERM CURRENT USE OF INSULIN, UNSPECIFIED WHETHER STAGE 3A OR 3B CKD: ICD-10-CM

## 2024-09-23 RX ORDER — DAPAGLIFLOZIN 10 MG/1
TABLET, FILM COATED ORAL
Qty: 90 TABLET | Refills: 3 | Status: SHIPPED | OUTPATIENT
Start: 2024-09-23 | End: 2024-09-24

## 2024-09-23 NOTE — TELEPHONE ENCOUNTER
Care Due:                  Date            Visit Type   Department     Provider  --------------------------------------------------------------------------------                                EP -                              PRIMARY      MET INTERNAL  Last Visit: 06-      CARE ([facility])   MEDICINE       Bereket Rivera                              EP -                              PRIMARY      [facility] INTERNAL  Next Visit: 12-      CARE ([facility])   MEDICINE       Bereket Rivera                                                            Last  Test          Frequency    Reason                     Performed    Due Date  --------------------------------------------------------------------------------    HBA1C.......  6 months...  FARXIGA, linaGLIPtin.....  06- 12-    [facility] Embedded Care Due Messages. Reference number: [id number].   9/23/2024 4:44:21 PM CDT

## 2024-09-24 RX ORDER — DAPAGLIFLOZIN 10 MG/1
TABLET, FILM COATED ORAL
Qty: 90 TABLET | Refills: 3 | Status: SHIPPED | OUTPATIENT
Start: 2024-09-24

## 2024-09-27 ENCOUNTER — PATIENT OUTREACH (OUTPATIENT)
Dept: ADMINISTRATIVE | Facility: HOSPITAL | Age: 69
End: 2024-09-27
Payer: MEDICARE

## 2024-10-01 ENCOUNTER — PATIENT MESSAGE (OUTPATIENT)
Dept: INTERNAL MEDICINE | Facility: CLINIC | Age: 69
End: 2024-10-01
Payer: MEDICARE

## 2024-10-04 ENCOUNTER — OFFICE VISIT (OUTPATIENT)
Dept: HEPATOLOGY | Facility: CLINIC | Age: 69
End: 2024-10-04
Payer: MEDICARE

## 2024-10-04 VITALS — WEIGHT: 202.81 LBS | HEIGHT: 66 IN | BODY MASS INDEX: 32.59 KG/M2

## 2024-10-04 DIAGNOSIS — R74.8 ELEVATED ALKALINE PHOSPHATASE LEVEL: Primary | ICD-10-CM

## 2024-10-04 PROCEDURE — 1101F PT FALLS ASSESS-DOCD LE1/YR: CPT | Mod: CPTII,S$GLB,, | Performed by: NURSE PRACTITIONER

## 2024-10-04 PROCEDURE — 99214 OFFICE O/P EST MOD 30 MIN: CPT | Mod: S$GLB,,, | Performed by: NURSE PRACTITIONER

## 2024-10-04 PROCEDURE — 3288F FALL RISK ASSESSMENT DOCD: CPT | Mod: CPTII,S$GLB,, | Performed by: NURSE PRACTITIONER

## 2024-10-04 PROCEDURE — 3051F HG A1C>EQUAL 7.0%<8.0%: CPT | Mod: CPTII,S$GLB,, | Performed by: NURSE PRACTITIONER

## 2024-10-04 PROCEDURE — 99999 PR PBB SHADOW E&M-EST. PATIENT-LVL III: CPT | Mod: PBBFAC,,, | Performed by: NURSE PRACTITIONER

## 2024-10-04 PROCEDURE — 1126F AMNT PAIN NOTED NONE PRSNT: CPT | Mod: CPTII,S$GLB,, | Performed by: NURSE PRACTITIONER

## 2024-10-04 PROCEDURE — 3008F BODY MASS INDEX DOCD: CPT | Mod: CPTII,S$GLB,, | Performed by: NURSE PRACTITIONER

## 2024-10-04 PROCEDURE — 4010F ACE/ARB THERAPY RXD/TAKEN: CPT | Mod: CPTII,S$GLB,, | Performed by: NURSE PRACTITIONER

## 2024-10-04 PROCEDURE — 3072F LOW RISK FOR RETINOPATHY: CPT | Mod: CPTII,S$GLB,, | Performed by: NURSE PRACTITIONER

## 2024-10-04 RX ORDER — URSODIOL 500 MG/1
500 TABLET, FILM COATED ORAL DAILY
Start: 2024-10-04

## 2024-10-04 NOTE — PATIENT INSTRUCTIONS
Decrease ursodiol to 500 mg daily (1 tablet)  Labs in December with primary care- will get copy of results

## 2024-10-04 NOTE — PROGRESS NOTES
[facility] - Established Patient    Last Clinic Visit: 9/16/22    Chief Complaint: Follow-up for elevated liver enzymes        HISTORY     This is a 69 y.o. male with PMH noted below, here for follow-up of elevated liver enzymes with cholestatic pattern.    No known history of liver disease.  No h/o Crohn's or UC.    He was hospitalized in 2021 with sepsis due to cholangitis. Blood cultures +vibrio species and E. Coli. His imaging at this time showed choledocholithiasis. He underwent ERCP with stone removal; biliary stents placed. Lap dayton done 7/21/21. Last ERCP 10/13/21 with biliary stent removed.       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 Alk phos subsequently normal from 1573-3764.     During the admission in 2021, alk phos up to 455. TBili up to 6.8. Transaminases elevated, 200s-300s.      Liver tests improved though alk phos remained elevated, ~200. ALT also mildly elevated, 40-50s.     Serologic workup has been negative for Lawson's, alpha-1 antitrypsin deficiency, hemochromatosis, autoimmune etiology (including PBC), and viral hepatitis.      MRI MRCP updated 7/28/22 with normal liver, no suspicious liver masses (stable hemangioma noted), no biliary dilation, no retained stones or strictures.     Fibroscan 8/5/22 = F0-F1, S3    Liver biopsy completed 9/8/22:   Final Pathologic Diagnosis Liver (random biopsy):   - Minimal nonspecific duct proliferation   - Minimal nonspecific patchy portal inflammation   - Macrosteatosis, 5%   - Microsteatosis, 5%   -Trichrome stain:  No fibrosis   - Iron stain:  Minimal hepatocyte iron, 1+ out of 4+   - Iron and trichrome stains with appropriate controls       Comment Negative for significant plasma cell population.  No features of immune mediated cholangiopathy.     Liver biopsy reviewed in path conference -- no biliary disease or cholestasis.     We started ursodiol 10/2022 and alk phos improved, 200s -- 130s.     Interval  history:  Remains on miya 500 mg BID. Has 1-2 months left though cost has increased as pharmacy assistance has . He will not be able to afford the new price.     He has been on lamisil for ~2 months; plan to continue for 3 months total.    Alk phos minimally elevated, 130s.  Transaminases and liver function are normal.    No itching.  No symptoms of hepatic decompensation including jaundice, ascites, cognitive problems to suggest hepatic encephalopathy, or GI bleeding.         Past medical history, surgical history, problem list, family history, social history, allergies: Reviewed and updated in the appropriate section of the electronic medical record.      Current Outpatient Medications   Medication Sig Dispense Refill    atorvastatin (LIPITOR) 40 MG tablet TAKE 1 TABLET BY MOUTH AT NIGHT 90 tablet 3    blood sugar diagnostic Strp 1 strip by Misc.(Non-Drug; Combo Route) route once daily. Test strips for glucometer insurance covers to check blood sugar twice daily. 200 strip 3    buPROPion (WELLBUTRIN XL) 300 MG 24 hr tablet TAKE 1 TABLET BY MOUTH EVERY DAY 90 tablet 3    carvediloL (COREG) 3.125 MG tablet TAKE 1 TABLET BY MOUTH TWICE  DAILY 180 tablet 1    ciclopirox (PENLAC) 8 % Soln APPLY TO AFFECTED AREA AT NIGHT 6.6 mL 11    dapagliflozin propanediol (FARXIGA) 10 mg tablet TAKE 1 TABLET (10 MG TOTAL) BY MOUTH BEFORE BREAKFAST. 90 tablet 3    gabapentin (NEURONTIN) 300 MG capsule TAKE 1 CAPSULE BY MOUTH IN THE  EVENING 90 capsule 3    hydroxyzine HCL (ATARAX) 25 MG tablet 1/2-1 tab PO TID PRN anxiety 60 tablet 1    lancets Misc 1 Device by Misc.(Non-Drug; Combo Route) route once daily. Lancets for glucometer insurance covers to check blood sugar twice daily. 200 each 3    linaGLIPtin (TRADJENTA) 5 mg Tab tablet Take 1 tablet (5 mg total) by mouth once daily. 90 tablet 3    losartan (COZAAR) 100 MG tablet TAKE 1 TABLET BY MOUTH EVERY DAY 90 tablet 3    pantoprazole (PROTONIX) 40 MG tablet TAKE 1 TABLET BY  MOUTH ONCE  DAILY 90 tablet 1    polyethylene glycol (GLYCOLAX) 17 gram PwPk Take 17 g by mouth once daily. 30 packet 0    terbinafine HCL (LAMISIL) 250 mg tablet Take 1 tablet (250 mg total) by mouth once daily. 90 tablet 0    ursodioL (ACTIGALL) 500 MG tablet TAKE 1 TABLET BY MOUTH TWICE A  tablet 0    k phos di & mono-sod phos mono (K-PHOS-NEUTRAL) 250 mg Tab Take 2 tablets by mouth daily with breakfast. for 7 days 14 tablet 0     No current facility-administered medications for this visit.     Medication list reviewed and updated.      Review of Systems - as per HPI  Constitutional: Negative for unexpected weight change.   Respiratory: Negative for shortness of breath.    Cardiovascular: Negative for leg swelling.  Gastrointestinal: Negative for abdominal distention or abdominal pain. Negative for melena or hematemesis.  Musculoskeletal: Negative for myalgias.    Skin: Negative for jaundice or itching.  Neurological: Negative for confusion or slowed mentation. Negative for tremors.   Hematological: Does not bruise/bleed easily.       Physical Exam   Constitutional: No distress. Alert and oriented.  Eyes: No scleral icterus.   Pulmonary/Chest: Respiratory effort normal. No respiratory distress.   Abdominal: No distension, no ascites appreciated.   Extremities: No edema.   Neurological: No tremor.   Skin: No jaundice.   Psychiatric: Normal mood and affect. Speech, behavior, and thought content normal.         LABS & DIAGNOSTIC STUDIES     I have personally reviewed pertinent laboratory findings:    Lab Results   Component Value Date    ALT 16 08/20/2024    AST 15 08/20/2024    ALKPHOS 137 (H) 08/20/2024    BILITOT 0.4 08/20/2024    ALBUMIN 3.8 08/20/2024    INR 1.1 09/08/2022       Lab Results   Component Value Date    WBC 10.04 06/11/2024    HGB 15.1 06/11/2024    HCT 48.3 06/11/2024    MCV 92 06/11/2024     06/11/2024       Lab Results   Component Value Date     06/11/2024    K 4.5 06/11/2024  "   BUN 22 06/11/2024    CREATININE 1.3 06/11/2024    ESTGFRAFRICA 58.8 (A) 07/28/2022    EGFRNONAA 50.9 (A) 07/28/2022       Lab Results   Component Value Date    SMOOTHMUSCAB Negative 1:40 07/18/2021    AMAIFA Negative 1:40 07/18/2021    IGGSERUM 712 07/18/2021    ANASCREEN Negative <1:80 07/18/2021    FERRITIN 985 (H) 07/02/2018    FESATURATED 24 07/02/2018    PETH Negative 07/18/2021    CERULOPLSM 26.0 07/18/2021    HEPBSAG Negative 07/18/2021    HEPBIGM Negative 07/18/2021    HEPCAB Negative 07/18/2021    HEPAIGM Negative 07/18/2021    TZN29QQSX Negative 07/18/2021       No results found for: "AFP"    I have personally reviewed the following result reports:  Abdominal US - 7/19/21  CT - 7/18/21  MRI - 7/28/22  ERCP - 10/13/21  Liver biopsy - 9/8/22      ASSESSMENT & PLAN     69 y.o. male with:    1. Elevated alkaline phosphatase level      He initially had elevated liver enzymes/LFTs due to biliary obstruction and cholangitis in 2021. His liver tests improved though alk phos was still elevated 1 year later. Serologies and MRI MRCP unrevealing. Liver biopsy with minimal non-specific findings, no biliary disease or cholestasis to suggest PSC or PBC. Suspect that he may have had prolonged biliary issues from his hospitalization which included ERCP and biliary stents.    Ursodiol started 9/2022 and alk phos has steadily improved, now normal-minimally elevated.     Biopsy with minimal steatosis though he does have risk factors for fatty liver. Blood sugar has been higher over the last year.     Recommendations:   Continue ursodiol though can decrease to 500 daily to extend prescription length.  Labs every 3-6 months to monitor liver enzymes; will repeat in December with PCP  If ALP unchanged on lower dose, may try stopping once his prescription is out. If this trends up, can reach out to pharmacy patient assistance for options to help with cost.  Consider repeat Fibroscan next year to reassess for steatosis and " fibrosis       Return to clinic pending lab results, no f/u scheduled at this time.      Thank you for allowing me to participate in the care of ANGELICA Macedo Jr.-C  Hepatology        Duration of encounter: 30 min  This includes face to face time and non-face to face time preparing to see the patient (eg, review of tests), obtaining and/or reviewing separately obtained history, documenting clinical information in the electronic or other health record, independently interpreting results and communicating results to the patient/family/caregiver, or Care coordination.

## 2024-10-07 ENCOUNTER — CLINICAL SUPPORT (OUTPATIENT)
Dept: AUDIOLOGY | Facility: CLINIC | Age: 69
End: 2024-10-07
Payer: MEDICARE

## 2024-10-07 ENCOUNTER — OFFICE VISIT (OUTPATIENT)
Dept: OTOLARYNGOLOGY | Facility: CLINIC | Age: 69
End: 2024-10-07
Payer: MEDICARE

## 2024-10-07 DIAGNOSIS — H91.90 MILD TO MODERATE HEARING LOSS: ICD-10-CM

## 2024-10-07 DIAGNOSIS — H61.22 CERUMEN DEBRIS ON TYMPANIC MEMBRANE, LEFT: Primary | ICD-10-CM

## 2024-10-07 DIAGNOSIS — H90.3 SENSORINEURAL HEARING LOSS (SNHL) OF BOTH EARS: ICD-10-CM

## 2024-10-07 PROCEDURE — 99203 OFFICE O/P NEW LOW 30 MIN: CPT | Mod: 25,S$GLB,, | Performed by: OTOLARYNGOLOGY

## 2024-10-07 PROCEDURE — 4010F ACE/ARB THERAPY RXD/TAKEN: CPT | Mod: CPTII,S$GLB,, | Performed by: OTOLARYNGOLOGY

## 2024-10-07 PROCEDURE — 69210 REMOVE IMPACTED EAR WAX UNI: CPT | Mod: S$GLB,,, | Performed by: OTOLARYNGOLOGY

## 2024-10-07 PROCEDURE — 3288F FALL RISK ASSESSMENT DOCD: CPT | Mod: CPTII,S$GLB,, | Performed by: OTOLARYNGOLOGY

## 2024-10-07 PROCEDURE — 99999 PR PBB SHADOW E&M-EST. PATIENT-LVL IV: CPT | Mod: PBBFAC,,, | Performed by: OTOLARYNGOLOGY

## 2024-10-07 PROCEDURE — 3051F HG A1C>EQUAL 7.0%<8.0%: CPT | Mod: CPTII,S$GLB,, | Performed by: OTOLARYNGOLOGY

## 2024-10-07 PROCEDURE — 1101F PT FALLS ASSESS-DOCD LE1/YR: CPT | Mod: CPTII,S$GLB,, | Performed by: OTOLARYNGOLOGY

## 2024-10-07 PROCEDURE — 92557 COMPREHENSIVE HEARING TEST: CPT | Mod: S$GLB,,, | Performed by: AUDIOLOGIST

## 2024-10-07 PROCEDURE — 1159F MED LIST DOCD IN RCRD: CPT | Mod: CPTII,S$GLB,, | Performed by: OTOLARYNGOLOGY

## 2024-10-07 PROCEDURE — 1160F RVW MEDS BY RX/DR IN RCRD: CPT | Mod: CPTII,S$GLB,, | Performed by: OTOLARYNGOLOGY

## 2024-10-07 PROCEDURE — 99999 PR PBB SHADOW E&M-EST. PATIENT-LVL I: CPT | Mod: PBBFAC,,, | Performed by: AUDIOLOGIST

## 2024-10-07 PROCEDURE — 92567 TYMPANOMETRY: CPT | Mod: S$GLB,,, | Performed by: AUDIOLOGIST

## 2024-10-07 PROCEDURE — 3072F LOW RISK FOR RETINOPATHY: CPT | Mod: CPTII,S$GLB,, | Performed by: OTOLARYNGOLOGY

## 2024-10-07 NOTE — PROCEDURES
EAR DEBRIDEMENT / CERUMEN DISIMPACTION, 98751     Indication: Excessive cerumen with inability to examine ears    Side: Left    Findings: See note for details.     Procedure: Ear canal(s) cleared completely using alligators with microscopy.  Wax was not hydrolyzed with peroxide.  Topical medication was not applied.    Complication: none

## 2024-10-07 NOTE — PROGRESS NOTES
Audiologic Evaluation 10/7/2024:       Mario Sanford Jr., a 69 y.o. male, was seen today in the clinic for an audiologic evaluation for hearing loss.  Mr. Sanford reported difficulty understanding soft speech and increased television volume. He denied otalgia, dizziness, and tinnitus. 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       Tympanometry revealed Type A tympanogram in the right ear and Type A tympanogram in the left ear. Audiogram results revealed mild to moderate sensorineural hearing loss in the right ear and slight sloping to moderately severe sensorineural hearing loss in the left ear.  Speech reception thresholds were noted at 40 dB in the right ear and 40 dB in the left ear.  Speech discrimination scores were 92% in the right ear and 88% in the left ear.    Recommendations:  Otologic evaluation  Hearing aid consultation  Annual audiogram  Hearing protection when in noise

## 2024-10-07 NOTE — PROGRESS NOTES
[facility]    Subjective:      Patient: Mario Sanford Jr. Patient PCP: Bereket Rivera DO         :  1955     Sex:  male      MRN:  [id number]          Date of Visit: 10/07/2024      Chief Complaint: Hearing Loss and Tinnitus      Patient ID: Mario Sanford Jr. is a 69 y.o. male     Patient is a  lifelong NON-smoker with a past medical history of HLD, diabetes mellitus, CKD 3A, HTN referred to me by Dr. Bereket Rivera in consultation for hearing loss.  Uses Q-tips.  No recurrent infections or any outer ear disease issues.  No history of prior ear trauma or surgery.        Labs:  WBC   Date Value Ref Range Status   2024 10.04 3.90 - 12.70 K/uL Final     Hemoglobin   Date Value Ref Range Status   2024 15.1 14.0 - 18.0 g/dL Final     Platelets   Date Value Ref Range Status   2024 272 150 - 450 K/uL Final     Creatinine   Date Value Ref Range Status   2024 1.3 0.5 - 1.4 mg/dL Final     TSH   Date Value Ref Range Status   2023 0.948 0.400 - 4.000 uIU/mL Final     Hemoglobin A1C   Date Value Ref Range Status   2024 7.6 (H) 4.0 - 5.6 % Final     Comment:     ADA Screening Guidelines:  5.7-6.4%  Consistent with prediabetes  >or=6.5%  Consistent with diabetes    High levels of fetal hemoglobin interfere with the HbA1C  assay. Heterozygous hemoglobin variants (HbS, HgC, etc)do  not significantly interfere with this assay.   However, presence of multiple variants may affect accuracy.         Past Medical History  He has a past medical history of Arthritis, Diabetes mellitus, Dilated cbd, acquired, Duodenal diverticulum, Duodenal fistula, Duodenal ulcer, Esophagitis, Hiatal hernia, Hyperlipidemia, Hypertension, Melena, Nodule of esophagus, and Obesity (BMI 30-39.9).    Family / Surgical / Social History  His family history includes Diabetes in his father; Heart disease in his mother; Hyperlipidemia in his mother and sister; Hypertension in his father and  mother.    Past Surgical History:   Procedure Laterality Date    CHOLECYSTECTOMY      COLONOSCOPY N/A 06/19/2018    Procedure: COLONOSCOPY;  Surgeon: Moris Whalen MD;  Location: [facility] ENDO (4TH FLR);  Service: Endoscopy;  Laterality: N/A;  Miralax prep, per Dr. Whalen    ENDOSCOPIC ULTRASOUND OF UPPER GASTROINTESTINAL TRACT N/A 08/10/2018    Procedure: ULTRASOUND, ENDOSCOPIC, UPPER GI TRACT;  Surgeon: Watson Glass MD;  Location: [facility] ENDO (2ND FLR);  Service: Endoscopy;  Laterality: N/A;  MRCP with narrowing at the CBD as well as duodenal diverticulum however LFTs have normalized and patient will therefore need EUS as outpatient.    ERCP N/A 07/20/2021    Procedure: ERCP (ENDOSCOPIC RETROGRADE CHOLANGIOPANCREATOGRAPHY);  Surgeon: Watson Glass MD;  Location: [facility] ENDO (2ND FLR);  Service: Endoscopy;  Laterality: N/A;    ERCP N/A 10/13/2021    Procedure: ERCP (ENDOSCOPIC RETROGRADE CHOLANGIOPANCREATOGRAPHY);  Surgeon: Andrews Lance MD;  Location: [facility] (2ND FLR);  Service: Endoscopy;  Laterality: N/A;  pre procedure covid swab 10/10 [facility]-Instructions mailed-MG    ESOPHAGOGASTRODUODENOSCOPY N/A 07/03/2018    Procedure: EGD (ESOPHAGOGASTRODUODENOSCOPY);  Surgeon: Newton Iglesias MD;  Location: [facility] ENDO (2ND FLR);  Service: Endoscopy;  Laterality: N/A;    ESOPHAGOGASTRODUODENOSCOPY N/A 07/06/2018    Procedure: EGD (ESOPHAGOGASTRODUODENOSCOPY);  Surgeon: eNwton Iglesias MD;  Location: [facility] ENDO (2ND FLR);  Service: Endoscopy;  Laterality: N/A;    ESOPHAGOGASTRODUODENOSCOPY N/A 08/10/2018    Procedure: EGD (ESOPHAGOGASTRODUODENOSCOPY);  Surgeon: Watson Glass MD;  Location: [facility] ENDO (2ND FLR);  Service: Endoscopy;  Laterality: N/A;  Inpatient EGD (7/6/18) remarkable for esophagitis and a duodenal ulcer.     LAPAROSCOPIC CHOLECYSTECTOMY N/A 07/21/2021    Procedure: CHOLECYSTECTOMY, LAPAROSCOPIC;  Surgeon: Garland Loja MD;  Location: [facility] OR 2ND FLR;  Service: General;  Laterality: N/A;    TONSILLECTOMY   "    TRANSURETHRAL RESECTION OF PROSTATE (TURP) WITHOUT USE OF LASER N/A 12/13/2018    Procedure: TURP, WITHOUT USING LASER;  Surgeon: Neil Thornton MD;  Location: [facility] OR [address] [place] [address];  Service: Urology;  Laterality: N/A;    UPPER ENDOSCOPIC ULTRASOUND W/ FNA  08/10/2018    without FNA       Social History     Tobacco Use    Smoking status: Never    Smokeless tobacco: Never   Substance and Sexual Activity    Alcohol use: No    Drug use: No    Sexual activity: Yes     Partners: Female       Medications  He has a current medication list which includes the following prescription(s): atorvastatin, blood sugar diagnostic, bupropion, carvedilol, ciclopirox, dapagliflozin propanediol, gabapentin, hydroxyzine hcl, k phos di & mono-sod phos mono, lancets, tradjenta, losartan, pantoprazole, polyethylene glycol, terbinafine hcl, and ursodiol.      Allergies  Review of patient's allergies indicates:   Allergen Reactions    Aspirin Other (See Comments)     Bleeding ulcer    Ibuprofen        All medications, allergies, and past history have been reviewed.    Objective:      Vitals:      6/11/2024    11:09 AM 7/16/2024     2:32 PM 10/4/2024    10:58 AM   Vitals - 1 value per visit   SYSTOLIC 120 120    DIASTOLIC 74 76    Pulse 88 87    Temp 97.6 °F (36.4 °C) 99.1 °F (37.3 °C)    Resp 18     SPO2 97 %     Weight (lb) 196.87 196.87 202.82   Weight (kg) 89.3 89.3 92   Height 5' 6" (1.676 m) 5' 6" (1.676 m) 5' 6" (1.676 m)   BMI (Calculated) 31.8 31.8 32.8   Pain Score Two Zero Zero       There is no height or weight on file to calculate BSA.    Physical Exam:    GENERAL  APPEARANCE -  alert, appears stated age, and cooperative  BARRIER(S) TO COMMUNICATION -  none VOICE - appropriate for age and gender    INTEGUMENTARY  no suspicious head and neck lesions    HEENT  HEAD: Normocephalic, without obvious abnormality, atraumatic  FACE: INSPECTION - Symmetric, no signs of trauma, no suspicious lesion(s)      STRENGTH - facial symmetry " intact     EYES  Normal occular alignment and mobility with no visible nystagmus at rest    EARS/NOSE/MOUTH/THROAT  EARS  PINNAE AND EXTERNAL EARS - no suspicious lesion OTOSCOPIC EXAM (surgical microscopy was used for visualization/instrumentation): EAR EXAM - Normal ear canals, tympanic membranes and mobility, and middle ear spaces bilaterally. After clearing wax from medial canal and superior TM on the left with alligators  HEARING - grossly intact to voice/finger rub    NOSE AND SINUSES  EXTERNAL NOSE - Grossly normal for age/sex   MUCOSA - no drainage     CHEST AND LUNG   INSPECTION & AUSCULTATION - normal effort, no stridor      NEUROLOGIC  MENTAL STATUS - alert, interactive CRANIAL NERVES - normal        Procedure(s):  Cerumen removal performed.  See procedure note.          Assessment:      Problem List Items Addressed This Visit    None  Visit Diagnoses       Cerumen debris on tympanic membrane, left    -  Primary    Mild to moderate hearing loss                     Plan:      Wax cleared off the left tympanic membrane.  Routine ear care instructions.  Encouraged to work with the his insurance company and pursue hearing aids.  Cleared for amplification.  Benefits of hearing aids including potential benefits in preventing cognitive decline in dementia discussed with the patient in his wife.  Informational handouts provided.    Follow up as needed          Voice recognition software was used in the creation of this note/communication and any sound-alike errors which may have occurred from its use should be taken in context when interpreting.  If such errors prevent a clear understanding of the note/communication, please contact the office for clarification.

## 2024-10-17 ENCOUNTER — OFFICE VISIT (OUTPATIENT)
Dept: PODIATRY | Facility: CLINIC | Age: 69
End: 2024-10-17
Payer: MEDICARE

## 2024-10-17 VITALS
WEIGHT: 202.81 LBS | HEIGHT: 66 IN | TEMPERATURE: 96 F | DIASTOLIC BLOOD PRESSURE: 77 MMHG | HEART RATE: 78 BPM | BODY MASS INDEX: 32.59 KG/M2 | SYSTOLIC BLOOD PRESSURE: 120 MMHG

## 2024-10-17 DIAGNOSIS — B35.1 TINEA UNGUIUM: Primary | ICD-10-CM

## 2024-10-17 DIAGNOSIS — E11.42 TYPE 2 DIABETES MELLITUS WITH PERIPHERAL NEUROPATHY: ICD-10-CM

## 2024-10-17 PROCEDURE — 99999 PR PBB SHADOW E&M-EST. PATIENT-LVL IV: CPT | Mod: PBBFAC,,, | Performed by: STUDENT IN AN ORGANIZED HEALTH CARE EDUCATION/TRAINING PROGRAM

## 2024-10-17 NOTE — PROCEDURES
"Returns for routine footcare; tinea unguium in setting of neuropathy, see prior notes. Has had significant improvement with the PO terbinafine so far.     Routine Foot Care    Date/Time: 10/17/2024 10:30 AM    Performed by: Sudheer Wolf DPM  Authorized by: Sudheer Wolf DPM    Time out: Immediately prior to procedure a "time out" was called to verify the correct patient, procedure, equipment, support staff and site/side marked as required.    Consent Done?:  Yes (Verbal)  Hyperkeratotic Skin Lesions?: No      Nail Care Type:  Debride  Location(s): All  (Left 1st Toe, Left 3rd Toe, Left 2nd Toe, Left 4th Toe, Left 5th Toe, Right 1st Toe, Right 2nd Toe, Right 3rd Toe, Right 4th Toe and Right 5th Toe)  Patient tolerance:  Patient tolerated the procedure well with no immediate complications      RTC in 3 months for routine foot care  "

## 2024-10-24 RX ORDER — ATORVASTATIN CALCIUM 40 MG/1
TABLET, FILM COATED ORAL
Qty: 90 TABLET | Refills: 0 | Status: SHIPPED | OUTPATIENT
Start: 2024-10-24

## 2024-10-24 NOTE — TELEPHONE ENCOUNTER
No care due was identified.  [facility] Embedded Care Due Messages. Reference number: [id number].   10/23/2024 9:41:27 PM CDT

## 2024-10-25 NOTE — TELEPHONE ENCOUNTER
Refill Decision Note   Mario Sanford  is requesting a refill authorization.  Brief Assessment and Rationale for Refill:  Approve     Medication Therapy Plan:         Comments:     Note composed:10:22 PM 10/24/2024

## 2024-10-30 RX ORDER — CARVEDILOL 3.12 MG/1
TABLET ORAL
Qty: 180 TABLET | Refills: 2 | Status: SHIPPED | OUTPATIENT
Start: 2024-10-30

## 2024-10-30 RX ORDER — PANTOPRAZOLE SODIUM 40 MG/1
TABLET, DELAYED RELEASE ORAL
Qty: 90 TABLET | Refills: 2 | Status: SHIPPED | OUTPATIENT
Start: 2024-10-30

## 2024-11-19 ENCOUNTER — OFFICE VISIT (OUTPATIENT)
Dept: OPTOMETRY | Facility: CLINIC | Age: 69
End: 2024-11-19
Payer: MEDICARE

## 2024-11-19 DIAGNOSIS — H25.13 NUCLEAR SCLEROSIS OF BOTH EYES: ICD-10-CM

## 2024-11-19 DIAGNOSIS — E11.9 TYPE 2 DIABETES MELLITUS WITHOUT RETINOPATHY: Primary | ICD-10-CM

## 2024-11-19 PROCEDURE — 99999 PR PBB SHADOW E&M-EST. PATIENT-LVL II: CPT | Mod: PBBFAC,,, | Performed by: OPTOMETRIST

## 2024-11-19 PROCEDURE — 92014 COMPRE OPH EXAM EST PT 1/>: CPT | Mod: S$GLB,,, | Performed by: OPTOMETRIST

## 2024-11-19 PROCEDURE — 2023F DILAT RTA XM W/O RTNOPTHY: CPT | Mod: CPTII,S$GLB,, | Performed by: OPTOMETRIST

## 2024-11-19 PROCEDURE — 4010F ACE/ARB THERAPY RXD/TAKEN: CPT | Mod: CPTII,S$GLB,, | Performed by: OPTOMETRIST

## 2024-11-19 PROCEDURE — 1126F AMNT PAIN NOTED NONE PRSNT: CPT | Mod: CPTII,S$GLB,, | Performed by: OPTOMETRIST

## 2024-11-19 PROCEDURE — 3051F HG A1C>EQUAL 7.0%<8.0%: CPT | Mod: CPTII,S$GLB,, | Performed by: OPTOMETRIST

## 2024-11-19 PROCEDURE — 1159F MED LIST DOCD IN RCRD: CPT | Mod: CPTII,S$GLB,, | Performed by: OPTOMETRIST

## 2024-11-19 PROCEDURE — 1160F RVW MEDS BY RX/DR IN RCRD: CPT | Mod: CPTII,S$GLB,, | Performed by: OPTOMETRIST

## 2024-11-19 NOTE — PROGRESS NOTES
RHIANNA    BARB: 5/19/2023, Dr. Wells  Chief complaint (CC): 70 yo M presents today for diabetic eye exam. Pt   reports no vision complaints. Wear OTC readers for near. Pt denies eye   pain, floaters and flashes.  Glasses? OTC readers  Contacts? -  H/o eye surgery, injections or laser: -  H/o eye injury: -  Known eye conditions?    Nuclear sclerosis of both eyes    Myopia with astigmatism and presbyopia, bilateral  Family h/o eye conditions? -  Eye gtts? -      (-) Flashes (-)  Floaters (-) Mucous   (-)  Tearing (-) Itching (-) Burning   (-) Headaches (-) Eye Pain/discomfort (-) Irritation   (-)  Redness (-) Double vision (-) Blurry vision    Diabetic? +  A1c? Hemoglobin A1C       Date                     Value               Ref Range             Status                06/11/2024               7.6 (H)             4.0 - 5.6 %           Final                   12/01/2023               7.4 (H)             4.0 - 5.6 %           Final                   12/01/2023               7.4 (H)             4.0 - 5.6 %           Final                 Last edited by Annamaria Falcon MA on 11/19/2024  1:09 PM.            Assessment /Plan     For exam results, see Encounter Report.    Type 2 diabetes mellitus without retinopathy    Nuclear sclerosis of both eyes      No diabetic retinopathy, no csme. Return in 1 year for dilated eye exam.  2. Causing blur, Refer to [facility] for cataract evaluation and possible removal.

## 2024-12-06 ENCOUNTER — LAB VISIT (OUTPATIENT)
Dept: LAB | Facility: HOSPITAL | Age: 69
End: 2024-12-06
Attending: INTERNAL MEDICINE
Payer: MEDICARE

## 2024-12-06 DIAGNOSIS — E78.5 HYPERLIPIDEMIA ASSOCIATED WITH TYPE 2 DIABETES MELLITUS: ICD-10-CM

## 2024-12-06 DIAGNOSIS — E11.59 HYPERTENSION ASSOCIATED WITH DIABETES: ICD-10-CM

## 2024-12-06 DIAGNOSIS — Z12.5 ENCOUNTER FOR SCREENING FOR MALIGNANT NEOPLASM OF PROSTATE: ICD-10-CM

## 2024-12-06 DIAGNOSIS — N18.31 STAGE 3A CHRONIC KIDNEY DISEASE: ICD-10-CM

## 2024-12-06 DIAGNOSIS — E11.69 HYPERLIPIDEMIA ASSOCIATED WITH TYPE 2 DIABETES MELLITUS: ICD-10-CM

## 2024-12-06 DIAGNOSIS — I15.2 HYPERTENSION ASSOCIATED WITH DIABETES: ICD-10-CM

## 2024-12-06 LAB
ALBUMIN SERPL BCP-MCNC: 3.9 G/DL (ref 3.5–5.2)
ALP SERPL-CCNC: 131 U/L (ref 40–150)
ALT SERPL W/O P-5'-P-CCNC: 18 U/L (ref 10–44)
ANION GAP SERPL CALC-SCNC: 11 MMOL/L (ref 8–16)
AST SERPL-CCNC: 19 U/L (ref 10–40)
BILIRUB SERPL-MCNC: 0.8 MG/DL (ref 0.1–1)
BUN SERPL-MCNC: 18 MG/DL (ref 8–23)
CALCIUM SERPL-MCNC: 9.2 MG/DL (ref 8.7–10.5)
CHLORIDE SERPL-SCNC: 108 MMOL/L (ref 95–110)
CHOLEST SERPL-MCNC: 134 MG/DL (ref 120–199)
CHOLEST/HDLC SERPL: 3.1 {RATIO} (ref 2–5)
CO2 SERPL-SCNC: 20 MMOL/L (ref 23–29)
COMPLEXED PSA SERPL-MCNC: 1.4 NG/ML (ref 0–4)
CREAT SERPL-MCNC: 1.3 MG/DL (ref 0.5–1.4)
EST. GFR  (NO RACE VARIABLE): 58.7 ML/MIN/1.73 M^2
ESTIMATED AVG GLUCOSE: 174 MG/DL (ref 68–131)
GLUCOSE SERPL-MCNC: 106 MG/DL (ref 70–110)
HBA1C MFR BLD: 7.7 % (ref 4–5.6)
HDLC SERPL-MCNC: 43 MG/DL (ref 40–75)
HDLC SERPL: 32.1 % (ref 20–50)
LDLC SERPL CALC-MCNC: 70.6 MG/DL (ref 63–159)
NONHDLC SERPL-MCNC: 91 MG/DL
POTASSIUM SERPL-SCNC: 4.3 MMOL/L (ref 3.5–5.1)
PROT SERPL-MCNC: 6.9 G/DL (ref 6–8.4)
SODIUM SERPL-SCNC: 139 MMOL/L (ref 136–145)
TRIGL SERPL-MCNC: 102 MG/DL (ref 30–150)
TSH SERPL DL<=0.005 MIU/L-ACNC: 0.87 UIU/ML (ref 0.4–4)

## 2024-12-06 PROCEDURE — 84443 ASSAY THYROID STIM HORMONE: CPT | Performed by: INTERNAL MEDICINE

## 2024-12-06 PROCEDURE — 83036 HEMOGLOBIN GLYCOSYLATED A1C: CPT | Performed by: INTERNAL MEDICINE

## 2024-12-06 PROCEDURE — 84153 ASSAY OF PSA TOTAL: CPT | Performed by: INTERNAL MEDICINE

## 2024-12-06 PROCEDURE — 80061 LIPID PANEL: CPT | Performed by: INTERNAL MEDICINE

## 2024-12-06 PROCEDURE — 80053 COMPREHEN METABOLIC PANEL: CPT | Performed by: INTERNAL MEDICINE

## 2024-12-09 ENCOUNTER — TELEPHONE (OUTPATIENT)
Facility: CLINIC | Age: 69
End: 2024-12-09
Payer: MEDICARE

## 2024-12-09 DIAGNOSIS — R74.8 ELEVATED ALKALINE PHOSPHATASE LEVEL: Primary | ICD-10-CM

## 2024-12-09 NOTE — TELEPHONE ENCOUNTER
Pt's 6 month lab appt was faahd Rodney    ----- Message from Theresa Armijo NP sent at 12/9/2024  3:03 PM CST -----  Please schedule hepatic panel in 6 months, thanks.

## 2024-12-11 ENCOUNTER — OFFICE VISIT (OUTPATIENT)
Dept: INTERNAL MEDICINE | Facility: CLINIC | Age: 69
End: 2024-12-11
Payer: MEDICARE

## 2024-12-11 VITALS
HEIGHT: 66 IN | WEIGHT: 203.38 LBS | RESPIRATION RATE: 16 BRPM | SYSTOLIC BLOOD PRESSURE: 130 MMHG | BODY MASS INDEX: 32.68 KG/M2 | DIASTOLIC BLOOD PRESSURE: 78 MMHG | HEART RATE: 94 BPM | OXYGEN SATURATION: 97 % | TEMPERATURE: 98 F

## 2024-12-11 DIAGNOSIS — E11.59 OBESITY, DIABETES, AND HYPERTENSION SYNDROME: ICD-10-CM

## 2024-12-11 DIAGNOSIS — E66.09 CLASS 1 OBESITY DUE TO EXCESS CALORIES WITH SERIOUS COMORBIDITY AND BODY MASS INDEX (BMI) OF 32.0 TO 32.9 IN ADULT: ICD-10-CM

## 2024-12-11 DIAGNOSIS — E66.9 OBESITY, DIABETES, AND HYPERTENSION SYNDROME: ICD-10-CM

## 2024-12-11 DIAGNOSIS — N18.31 STAGE 3A CHRONIC KIDNEY DISEASE: ICD-10-CM

## 2024-12-11 DIAGNOSIS — E11.69 OBESITY, DIABETES, AND HYPERTENSION SYNDROME: ICD-10-CM

## 2024-12-11 DIAGNOSIS — E11.51 DIABETES MELLITUS WITH PERIPHERAL VASCULAR DISEASE: ICD-10-CM

## 2024-12-11 DIAGNOSIS — E66.811 CLASS 1 OBESITY DUE TO EXCESS CALORIES WITH SERIOUS COMORBIDITY AND BODY MASS INDEX (BMI) OF 32.0 TO 32.9 IN ADULT: ICD-10-CM

## 2024-12-11 DIAGNOSIS — E11.59 HYPERTENSION ASSOCIATED WITH DIABETES: ICD-10-CM

## 2024-12-11 DIAGNOSIS — R31.0 GROSS HEMATURIA: ICD-10-CM

## 2024-12-11 DIAGNOSIS — E11.69 HYPERLIPIDEMIA ASSOCIATED WITH TYPE 2 DIABETES MELLITUS: ICD-10-CM

## 2024-12-11 DIAGNOSIS — R74.8 ELEVATED ALKALINE PHOSPHATASE LEVEL: ICD-10-CM

## 2024-12-11 DIAGNOSIS — I15.2 HYPERTENSION ASSOCIATED WITH DIABETES: ICD-10-CM

## 2024-12-11 DIAGNOSIS — E78.5 HYPERLIPIDEMIA ASSOCIATED WITH TYPE 2 DIABETES MELLITUS: ICD-10-CM

## 2024-12-11 DIAGNOSIS — Z00.00 ANNUAL PHYSICAL EXAM: Primary | ICD-10-CM

## 2024-12-11 DIAGNOSIS — I15.2 OBESITY, DIABETES, AND HYPERTENSION SYNDROME: ICD-10-CM

## 2024-12-11 PROCEDURE — 99215 OFFICE O/P EST HI 40 MIN: CPT | Mod: S$GLB,,, | Performed by: INTERNAL MEDICINE

## 2024-12-11 PROCEDURE — 1125F AMNT PAIN NOTED PAIN PRSNT: CPT | Mod: CPTII,S$GLB,, | Performed by: INTERNAL MEDICINE

## 2024-12-11 PROCEDURE — 1159F MED LIST DOCD IN RCRD: CPT | Mod: CPTII,S$GLB,, | Performed by: INTERNAL MEDICINE

## 2024-12-11 PROCEDURE — 3078F DIAST BP <80 MM HG: CPT | Mod: CPTII,S$GLB,, | Performed by: INTERNAL MEDICINE

## 2024-12-11 PROCEDURE — 3066F NEPHROPATHY DOC TX: CPT | Mod: CPTII,S$GLB,, | Performed by: INTERNAL MEDICINE

## 2024-12-11 PROCEDURE — 3051F HG A1C>EQUAL 7.0%<8.0%: CPT | Mod: CPTII,S$GLB,, | Performed by: INTERNAL MEDICINE

## 2024-12-11 PROCEDURE — 3288F FALL RISK ASSESSMENT DOCD: CPT | Mod: CPTII,S$GLB,, | Performed by: INTERNAL MEDICINE

## 2024-12-11 PROCEDURE — 4010F ACE/ARB THERAPY RXD/TAKEN: CPT | Mod: CPTII,S$GLB,, | Performed by: INTERNAL MEDICINE

## 2024-12-11 PROCEDURE — 3008F BODY MASS INDEX DOCD: CPT | Mod: CPTII,S$GLB,, | Performed by: INTERNAL MEDICINE

## 2024-12-11 PROCEDURE — G2211 COMPLEX E/M VISIT ADD ON: HCPCS | Mod: S$GLB,,, | Performed by: INTERNAL MEDICINE

## 2024-12-11 PROCEDURE — 3062F POS MACROALBUMINURIA REV: CPT | Mod: CPTII,S$GLB,, | Performed by: INTERNAL MEDICINE

## 2024-12-11 PROCEDURE — 1101F PT FALLS ASSESS-DOCD LE1/YR: CPT | Mod: CPTII,S$GLB,, | Performed by: INTERNAL MEDICINE

## 2024-12-11 PROCEDURE — 1160F RVW MEDS BY RX/DR IN RCRD: CPT | Mod: CPTII,S$GLB,, | Performed by: INTERNAL MEDICINE

## 2024-12-11 PROCEDURE — 3075F SYST BP GE 130 - 139MM HG: CPT | Mod: CPTII,S$GLB,, | Performed by: INTERNAL MEDICINE

## 2024-12-11 PROCEDURE — 99999 PR PBB SHADOW E&M-EST. PATIENT-LVL IV: CPT | Mod: PBBFAC,,, | Performed by: INTERNAL MEDICINE

## 2024-12-11 NOTE — PROGRESS NOTES
Subjective     Patient ID: Mario Sanford Jr. is a 69 y.o. male.    Chief Complaint: Annual Exam    HPI  69 y.o. Male here for annual exam.      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 ENDO (4TH FLR);  Service: Endoscopy;                 Laterality: N/A;  Miralax prep, per Dr. Whalen  8/10/2018: ENDOSCOPIC ULTRASOUND OF UPPER GASTROINTESTINAL TRACT; N/A      Comment:  Procedure: ULTRASOUND, ENDOSCOPIC, UPPER GI TRACT;                 Surgeon: Watson Glass MD;  Location: [facility] ENDO (2ND               FLR);  Service: Endoscopy;  Laterality: N/A;  MRCP with                narrowing at the CBD as well as duodenal diverticulum                however LFTs have normalized and patient will therefore                need EUS as outpatient.  7/20/2021: ERCP; N/A      Comment:  Procedure: ERCP (ENDOSCOPIC RETROGRADE                CHOLANGIOPANCREATOGRAPHY);  Surgeon: Watson Glass MD;  Location: [facility] ENDO (2ND FLR);  Service: Endoscopy;                Laterality: N/A;  10/13/2021: ERCP; N/A      Comment:  Procedure: ERCP (ENDOSCOPIC RETROGRADE                CHOLANGIOPANCREATOGRAPHY);  Surgeon: Andrews Lance MD;                 Location: [facility] ENDO (2ND FLR);  Service: Endoscopy;                 Laterality: N/A;  pre procedure  covid swab 10/10                [facility]-Instructions mailed-MG  7/3/2018: ESOPHAGOGASTRODUODENOSCOPY; N/A      Comment:  Procedure: EGD (ESOPHAGOGASTRODUODENOSCOPY);  Surgeon:                Newton Iglesias MD;  Location: [facility] (2ND FLR);                 Service: Endoscopy;  Laterality: N/A;  7/6/2018: ESOPHAGOGASTRODUODENOSCOPY; N/A      Comment:  Procedure: EGD (ESOPHAGOGASTRODUODENOSCOPY);  Surgeon:                Newton Iglesias MD;  Location: [facility] (2ND FLR);                 Service: Endoscopy;  Laterality: N/A;  8/10/2018: ESOPHAGOGASTRODUODENOSCOPY; N/A      Comment:  Procedure: EGD (ESOPHAGOGASTRODUODENOSCOPY);  Surgeon:                Watson Glass MD;  [facility]                 Service: Endoscopy;  Laterality: N/A;  Inpatient EGD                (7/6/18) remarkable for esophagitis and a duodenal ulcer.  7/21/2021: LAPAROSCOPIC CHOLECYSTECTOMY; N/A      Comment:  Procedure: CHOLECYSTECTOMY, LAPAROSCOPIC;  Surgeon:                Garland Loja MD;  [facility];                 Service: General;  Laterality: N/A;  No date: TONSILLECTOMY  12/13/2018: TRANSURETHRAL RESECTION OF PROSTATE (TURP) WITHOUT USE OF   LASER; N/A      Comment:  Procedure: TURP, WITHOUT USING LASER;  Surgeon: Neil Thornton MD;  Location: [facility] OR 1ST FLR;  Service:                Urology;  Laterality: N/A;  08/10/2018: UPPER ENDOSCOPIC ULTRASOUND W/ FNA      Comment:  without FNA  Social History    Socioeconomic History      Marital status:       Number of children: 1    Occupational History      Occupation: Retired from sales     Tobacco Use      Smoking status: Never Smoker      Smokeless tobacco: Never Used    Substance and Sexual Activity      Alcohol use: No      Drug use: No      Sexual activity: Yes        Partners: Female     Social Determinants of Health  Financial Resource Strain: Medium Risk      Difficulty of Paying Living Expenses: Somewhat hard  Food  Insecurity: Food Insecurity Present      Worried About Running Out of Food in the Last Year: Sometimes true      Ran Out of Food in the Last Year: Sometimes true  Transportation Needs: No Transportation Needs      Lack of Transportation (Medical): No      Lack of Transportation (Non-Medical): No  Physical Activity: Inactive      Days of Exercise per Week: 0 days      Minutes of Exercise per Session: 0 min  Stress: No Stress Concern Present      Feeling of Stress : Only a little  Social Connections: Unknown      Frequency of Communication with Friends and Family: Twice a week      Frequency of Social Gatherings with Friends and Family: Once a week      Active Member of Clubs or Organizations: No      Attends Club or Organization Meetings: Patient refused      Marital Status:   Housing Stability: Low Risk       Unable to Pay for Housing in the Last Year: No      Number of Places Lived in the Last Year: 1      Unstable Housing in the Last Year: No  Review of patient's allergies indicates:   -- Aspirin -- Other (See Comments)    --  Bleeding ulcer   -- Ibuprofen   Review of Systems   Constitutional:  Negative for activity change, appetite change, chills, diaphoresis, fatigue, fever and unexpected weight change.   HENT:  Negative for nasal congestion, mouth sores, postnasal drip, rhinorrhea, sinus pressure/congestion, sneezing, sore throat, trouble swallowing and voice change.    Eyes:  Negative for discharge, itching and visual disturbance.   Respiratory:  Negative for cough, chest tightness, shortness of breath and wheezing.    Cardiovascular:  Negative for chest pain, palpitations and leg swelling.   Gastrointestinal:  Negative for abdominal pain, blood in stool, constipation, diarrhea, nausea and vomiting.   Endocrine: Negative for cold intolerance and heat intolerance.   Genitourinary:  Negative for difficulty urinating, dysuria, flank pain, hematuria and urgency.   Musculoskeletal:  Negative for arthralgias,  back pain, myalgias and neck pain.   Integumentary:  Negative for rash and wound.   Allergic/Immunologic: Negative for environmental allergies and food allergies.   Neurological:  Negative for dizziness, tremors, seizures, syncope, weakness and headaches.   Hematological:  Negative for adenopathy. Does not bruise/bleed easily.   Psychiatric/Behavioral:  Negative for confusion, self-injury, sleep disturbance and suicidal ideas. The patient is nervous/anxious.           Objective     Physical Exam  Constitutional:       General: He is not in acute distress.     Appearance: Normal appearance. He is well-developed. He is not ill-appearing, toxic-appearing or diaphoretic.   HENT:      Head: Normocephalic and atraumatic.      Right Ear: External ear normal.      Left Ear: External ear normal.      Nose: Nose normal.      Mouth/Throat:      Pharynx: No oropharyngeal exudate.   Eyes:      General: No scleral icterus.        Right eye: No discharge.         Left eye: No discharge.      Extraocular Movements: Extraocular movements intact.      Conjunctiva/sclera: Conjunctivae normal.      Pupils: Pupils are equal, round, and reactive to light.   Neck:      Thyroid: No thyromegaly.      Vascular: No JVD.   Cardiovascular:      Rate and Rhythm: Normal rate and regular rhythm.      Pulses: Normal pulses.      Heart sounds: Normal heart sounds. No murmur heard.  Pulmonary:      Effort: Pulmonary effort is normal. No respiratory distress.      Breath sounds: Normal breath sounds. No wheezing or rales.   Abdominal:      General: Bowel sounds are normal. There is no distension.      Palpations: Abdomen is soft.      Tenderness: There is no abdominal tenderness. There is no right CVA tenderness, left CVA tenderness, guarding or rebound.   Musculoskeletal:      Cervical back: Normal range of motion and neck supple. No rigidity.      Right lower leg: No edema.      Left lower leg: No edema.   Lymphadenopathy:      Cervical: No cervical  adenopathy.   Skin:     General: Skin is warm and dry.      Capillary Refill: Capillary refill takes less than 2 seconds.      Coloration: Skin is not pale.      Findings: No rash.   Neurological:      General: No focal deficit present.      Mental Status: He is alert and oriented to person, place, and time. Mental status is at baseline.      Cranial Nerves: No cranial nerve deficit.      Sensory: No sensory deficit.      Motor: No weakness.      Coordination: Coordination normal.      Gait: Gait normal.      Deep Tendon Reflexes: Reflexes normal.   Psychiatric:         Mood and Affect: Mood normal.         Behavior: Behavior normal.         Thought Content: Thought content normal.         Judgment: Judgment normal.            Assessment and Plan     1. Annual physical exam    2. Hyperlipidemia associated with type 2 diabetes mellitus    3. Hypertension associated with diabetes    4. Stage 3a chronic kidney disease    5. Obesity, diabetes, and hypertension syndrome    6. Diabetes mellitus with peripheral vascular disease    7. Class 1 obesity due to excess calories with serious comorbidity and body mass index (BMI) of 32.0 to 32.9 in adult    8. Elevated alkaline phosphatase level    9. Gross hematuria  -     Ambulatory referral/consult to Urology; Future; Expected date: 12/18/2024        Blood work reviewed with pt    HTN- controlled on Coreg 3.125 mg BID and Losartan 50 mg daily      T2DM- last HA1C of 7.7(12/240<--7.4(12/23)<--7.7(6/23)<--7.5(12/22)<--7.2(4/22)<--7.3(11/21)      - continue Farxiga/Tradjenta       HLD- stable on Lipitor 40 mg daily      Diabetes, Obesity and HTN- pt advised on proper diet/exercise for weight loss      CKD III- stable      Anxiety- stable on Wellbutrin  mg qHS and Atarax 25 mg TID PRN      Elevated Alk Phos with hx of biliary obstruction 2/2 cholangitis(vibrio species and E. Coli)   S/p liver bx   Followed by Hepatology and remains on Actigall       Gross Hematuria- referral  back to Urology   -neg w/u in Jan 2023      F/u in 6 months    Over 1/2 of 40 minute visit spent reviewing pt's medical records, education/discussion of pt's medical conditions and medical management

## 2024-12-12 ENCOUNTER — TELEPHONE (OUTPATIENT)
Dept: INTERNAL MEDICINE | Facility: CLINIC | Age: 69
End: 2024-12-12
Payer: MEDICARE

## 2024-12-12 DIAGNOSIS — E78.5 HYPERLIPIDEMIA ASSOCIATED WITH TYPE 2 DIABETES MELLITUS: Primary | ICD-10-CM

## 2024-12-12 DIAGNOSIS — E11.69 HYPERLIPIDEMIA ASSOCIATED WITH TYPE 2 DIABETES MELLITUS: Primary | ICD-10-CM

## 2024-12-18 ENCOUNTER — TELEPHONE (OUTPATIENT)
Dept: INTERNAL MEDICINE | Facility: CLINIC | Age: 69
End: 2024-12-18
Payer: MEDICARE

## 2024-12-18 NOTE — TELEPHONE ENCOUNTER
----- Message from Viktoria sent at 2024  2:22 PM CST -----  Pt needs a new referral to schedule colonoscopy previous order .    Thanks

## 2024-12-26 RX ORDER — ATORVASTATIN CALCIUM 40 MG/1
TABLET, FILM COATED ORAL
Qty: 90 TABLET | Refills: 3 | Status: SHIPPED | OUTPATIENT
Start: 2024-12-26

## 2024-12-26 NOTE — TELEPHONE ENCOUNTER
Refill Decision Note   Mario Jackpe  is requesting a refill authorization.  Brief Assessment and Rationale for Refill:  Approve     Medication Therapy Plan:       Medication Reconciliation Completed: No   Comments:     No Care Gaps recommended.     Note composed:11:05 AM 12/26/2024

## 2024-12-26 NOTE — TELEPHONE ENCOUNTER
No care due was identified.  [facility] Embedded Care Due Messages. Reference number: [id number].   12/25/2024 9:31:34 PM CST

## 2025-01-07 ENCOUNTER — PATIENT MESSAGE (OUTPATIENT)
Dept: INTERNAL MEDICINE | Facility: CLINIC | Age: 70
End: 2025-01-07
Payer: MEDICARE

## 2025-01-07 DIAGNOSIS — N18.30 CKD STAGE 3 DUE TO TYPE 2 DIABETES MELLITUS: ICD-10-CM

## 2025-01-07 DIAGNOSIS — Z79.4 TYPE 2 DIABETES MELLITUS WITH DIABETIC POLYNEUROPATHY, WITH LONG-TERM CURRENT USE OF INSULIN: ICD-10-CM

## 2025-01-07 DIAGNOSIS — E11.22 CKD STAGE 3 DUE TO TYPE 2 DIABETES MELLITUS: ICD-10-CM

## 2025-01-07 DIAGNOSIS — E11.42 TYPE 2 DIABETES MELLITUS WITH DIABETIC POLYNEUROPATHY, WITH LONG-TERM CURRENT USE OF INSULIN: ICD-10-CM

## 2025-01-08 ENCOUNTER — TELEPHONE (OUTPATIENT)
Dept: PHARMACY | Facility: CLINIC | Age: 70
End: 2025-01-08
Payer: MEDICARE

## 2025-01-08 ENCOUNTER — PATIENT MESSAGE (OUTPATIENT)
Dept: INTERNAL MEDICINE | Facility: CLINIC | Age: 70
End: 2025-01-08
Payer: MEDICARE

## 2025-01-08 NOTE — TELEPHONE ENCOUNTER
----- Message from Pharmacist Loretta sent at 1/8/2025  1:37 PM CST -----  Patient needs assistance in obtaining Farxiga and Tradjenta.  This maybe renrollment request for 2025.  Provider is Dr. Bereket Rivera

## 2025-01-09 ENCOUNTER — TELEPHONE (OUTPATIENT)
Dept: PHARMACY | Facility: CLINIC | Age: 70
End: 2025-01-09
Payer: MEDICARE

## 2025-01-09 RX ORDER — LANCETS
1 EACH MISCELLANEOUS DAILY
Qty: 200 EACH | Refills: 3 | Status: SHIPPED | OUTPATIENT
Start: 2025-01-09

## 2025-01-09 RX ORDER — INSULIN PUMP SYRINGE, 3 ML
EACH MISCELLANEOUS
Qty: 1 EACH | Refills: 0 | Status: SHIPPED | OUTPATIENT
Start: 2025-01-09 | End: 2026-01-09

## 2025-01-09 NOTE — TELEPHONE ENCOUNTER
No care due was identified.  [facility] Embedded Care Due Messages. Reference number: [id number].   1/09/2025 8:13:25 AM CST

## 2025-01-09 NOTE — TELEPHONE ENCOUNTER
Pt requested pended supplies be sent to [facility]     LOV with Bereket Rivera DO , 12/11/2024

## 2025-01-09 NOTE — LETTER
"     January 10, 2025    Mario Sanford Jr.  [address] [place] [address]      Dear Mario Sanford Jr.    My Name is Radha Camron. I am a Pharmacy Technician reaching out on behalf of [facility]s Pharmacy Patient Assistance Team to inform you of the [employer] re-enrollment process.  We currently don't need any information from you at this time. However, to continue receiving Farxiga and Tradjenta  free of charge, the [employer] and [employer] may require the following documentation.     Proof of household Income for 2025 (such as social security statement, 099 form, pension statement)   Copy of 2025 Insurance cards( front and back)  [employer] Authorization & Intake Forms  "LIS"/Medicare Extra Help Denial Letter     I will work with your Provider to get your re-enrollment application signed and submitted to the program for review. If you have received a re-enrollment letter and/or application from [employer] you may disregard.  If you have stopped taking Farxiga and Tradjenta  or if your dosage has changed please let me know ASAP.       If you have any questions or concerns regarding your 2025 re-enrollment process, please give me a call. Thank you for choosing [facility] your [facility] needs.        Sincerely  Radha PEDROZA @[phone]   Pharmacy Patient Assistance Team  [address] [place] [address]  [address] [place] [address]  Fax: [phone]  Email: [email or web address]  "

## 2025-01-09 NOTE — TELEPHONE ENCOUNTER
FARXIGA/ TRADJENTA Assistance Application for Mario Sanford JrGarcía - MRN  [id number] was faxed to your office @[phone]. Please review the application to ensure the prescription is correct. If correct, sign and fax the application back to the Pharmacy Patient Assistance Team @[phone]. Do not fax to the Manufacture Program    Please do not write any corrections on this application.  If changes are needed,let me know ASAP and the corrected application will be re-faxed to your office for Provider signature.          Pharmacy Patient Assistance  [address] [place] [address]  [id number] [place] [address]  Fax: [phone]  Email: [email or web address]

## 2025-01-17 ENCOUNTER — OFFICE VISIT (OUTPATIENT)
Dept: PODIATRY | Facility: CLINIC | Age: 70
End: 2025-01-17
Payer: MEDICARE

## 2025-01-17 ENCOUNTER — PATIENT MESSAGE (OUTPATIENT)
Dept: INTERNAL MEDICINE | Facility: CLINIC | Age: 70
End: 2025-01-17
Payer: MEDICARE

## 2025-01-17 VITALS
BODY MASS INDEX: 32.85 KG/M2 | HEART RATE: 106 BPM | HEIGHT: 66 IN | WEIGHT: 204.38 LBS | DIASTOLIC BLOOD PRESSURE: 73 MMHG | SYSTOLIC BLOOD PRESSURE: 145 MMHG

## 2025-01-17 DIAGNOSIS — E11.42 TYPE 2 DIABETES MELLITUS WITH PERIPHERAL NEUROPATHY: ICD-10-CM

## 2025-01-17 DIAGNOSIS — B35.1 TINEA UNGUIUM: Primary | ICD-10-CM

## 2025-01-17 PROCEDURE — 99999 PR PBB SHADOW E&M-EST. PATIENT-LVL III: CPT | Mod: PBBFAC,,, | Performed by: STUDENT IN AN ORGANIZED HEALTH CARE EDUCATION/TRAINING PROGRAM

## 2025-01-17 PROCEDURE — 11721 DEBRIDE NAIL 6 OR MORE: CPT | Mod: Q9,S$GLB,, | Performed by: STUDENT IN AN ORGANIZED HEALTH CARE EDUCATION/TRAINING PROGRAM

## 2025-01-17 PROCEDURE — 99499 UNLISTED E&M SERVICE: CPT | Mod: S$GLB,,, | Performed by: STUDENT IN AN ORGANIZED HEALTH CARE EDUCATION/TRAINING PROGRAM

## 2025-01-17 NOTE — PROCEDURES
"Returns for routine footcare; tinea unguium in setting of neuropathy, see prior notes.    Routine Foot Care    Date/Time: 1/17/2025 10:00 AM    Performed by: Sudheer Wolf DPM  Authorized by: Sudheer Wolf DPM    Time out: Immediately prior to procedure a "time out" was called to verify the correct patient, procedure, equipment, support staff and site/side marked as required.    Consent Done?:  Yes (Verbal)  Hyperkeratotic Skin Lesions?: No      Nail Care Type:  Debride  Location(s): All  (Left 1st Toe, Left 3rd Toe, Left 2nd Toe, Left 4th Toe, Left 5th Toe, Right 1st Toe, Right 2nd Toe, Right 3rd Toe, Right 4th Toe and Right 5th Toe)  Patient tolerance:  Patient tolerated the procedure well with no immediate complications      RTC in 3 months for routine foot care  "

## 2025-01-24 ENCOUNTER — PATIENT MESSAGE (OUTPATIENT)
Dept: PHARMACY | Facility: CLINIC | Age: 70
End: 2025-01-24
Payer: MEDICARE

## 2025-02-04 ENCOUNTER — LAB VISIT (OUTPATIENT)
Dept: LAB | Facility: HOSPITAL | Age: 70
End: 2025-02-04
Attending: INTERNAL MEDICINE
Payer: MEDICARE

## 2025-02-04 ENCOUNTER — OFFICE VISIT (OUTPATIENT)
Dept: UROLOGY | Facility: CLINIC | Age: 70
End: 2025-02-04
Payer: MEDICARE

## 2025-02-04 VITALS
SYSTOLIC BLOOD PRESSURE: 122 MMHG | WEIGHT: 198.88 LBS | DIASTOLIC BLOOD PRESSURE: 79 MMHG | HEART RATE: 94 BPM | BODY MASS INDEX: 32.1 KG/M2

## 2025-02-04 DIAGNOSIS — R31.29 MICROSCOPIC HEMATURIA: Primary | ICD-10-CM

## 2025-02-04 DIAGNOSIS — R31.29 MICROSCOPIC HEMATURIA: ICD-10-CM

## 2025-02-04 LAB
CREAT SERPL-MCNC: 1.5 MG/DL (ref 0.5–1.4)
EST. GFR  (NO RACE VARIABLE): 49.5 ML/MIN/1.73 M^2

## 2025-02-04 PROCEDURE — 1126F AMNT PAIN NOTED NONE PRSNT: CPT | Mod: CPTII,S$GLB,,

## 2025-02-04 PROCEDURE — 1101F PT FALLS ASSESS-DOCD LE1/YR: CPT | Mod: CPTII,S$GLB,,

## 2025-02-04 PROCEDURE — 3074F SYST BP LT 130 MM HG: CPT | Mod: CPTII,S$GLB,,

## 2025-02-04 PROCEDURE — 3008F BODY MASS INDEX DOCD: CPT | Mod: CPTII,S$GLB,,

## 2025-02-04 PROCEDURE — 87086 URINE CULTURE/COLONY COUNT: CPT

## 2025-02-04 PROCEDURE — 3288F FALL RISK ASSESSMENT DOCD: CPT | Mod: CPTII,S$GLB,,

## 2025-02-04 PROCEDURE — 1159F MED LIST DOCD IN RCRD: CPT | Mod: CPTII,S$GLB,,

## 2025-02-04 PROCEDURE — 1160F RVW MEDS BY RX/DR IN RCRD: CPT | Mod: CPTII,S$GLB,,

## 2025-02-04 PROCEDURE — 99999 PR PBB SHADOW E&M-EST. PATIENT-LVL IV: CPT | Mod: PBBFAC,,,

## 2025-02-04 PROCEDURE — 3072F LOW RISK FOR RETINOPATHY: CPT | Mod: CPTII,S$GLB,,

## 2025-02-04 PROCEDURE — 82565 ASSAY OF CREATININE: CPT

## 2025-02-04 PROCEDURE — 36415 COLL VENOUS BLD VENIPUNCTURE: CPT

## 2025-02-04 PROCEDURE — 99215 OFFICE O/P EST HI 40 MIN: CPT | Mod: S$GLB,,,

## 2025-02-04 PROCEDURE — 3078F DIAST BP <80 MM HG: CPT | Mod: CPTII,S$GLB,,

## 2025-02-04 NOTE — PROGRESS NOTES
[facility]      Hematuria New Note    2/4/2025    Referred by:  Bereket Rivera, *    HPI: Mario Sanford Jr. is a very pleasant 69 y.o. male who is an established patient in our department referred for evaluation of history of Microscopic Hematuria. He reports no bladder storage symptoms including daytime frequency, nocturia, urinary urgency, bladder pain and incontinence. . He reports no voiding symptoms including dysuria, weak stream, splayed stream, intermittency, hesitancy, straining to empty, post-void dribbling or history of elevated post-void residuals. Bladder scan PVR was 0 mL.  He denies a recent history of trauma. Patient has findings of significant microscopic hematuria on previous microscopic urinalysis.      He reports no history of urolithiasis, urethral strictures, recurrent UTI, or use of anticoagulants.  He reports no additional risk factors for urothelial cancer including irritative LUTS, prior pelvic radiation, cyclophosphamide/ifosphamide therapy, family history, occupational exposures, or chronic indwelling foreign bodies in the urinary tract.     He had a TURBT and TURP in December 2018. No malignancies identified with either specimen sent to path.     Prior Evaluations:  Cystoscopy (year: 2023) -  prostate showed significant regrowth s/p TURP. There was significant median lobe present.   CT Urogram (1/5/2023) - enlarged prostate, single subcentimeter hypodense lesion seen at the upper pole of each kidney posteriorly, subsequent u/s was normal  Renal U/S (2/8/24) - Prostate is enlarged with a volume of 97.8 mL.    PSA 12/6/24 was 1.4 ng/mL and consistent with baseline.     A review of 10+ systems was conducted with pertinent positive and negative findings documented in HPI with all other systems reviewed and negative.    Past medical, family, surgical and social history reviewed as documented in chart with pertinent positive medical, family, surgical and social history detailed in  HPI.    Exam Findings:    Const: no acute distress, conversant and alert  Eyes: anicteric, extraocular muscles intact  ENMT: normocephalic, Nl oral membranes  Cardio: no cyanosis, nl cap refill  Pulm: no tachypnea; no resp distress  Abd: soft, no tenderness  Musc: no laceration, no tenderness  Neuro: alert; oriented x 3  Skin: warm, dry; no petichiae  Psych: no anxiety; normal speech     UA 1.015, 5 pH, tr protein, 1000 glucose, 250 blood, otherwise negative    Assessment/Plan:    Microscopic Hematuria (new, addt'l workup):  We discussed evaluation of his hematuria, including upper and lower urinary tract studies. This patient is High-Risk because the patient is over age 60 years and has >25 RBC/HPF on a single urinalysis. Based on this risk level, I have recommended upper tract imaging with CT Urography given no contraindications along with cystoscopy in the office with local anesthesia.     Scheduled CT Urogram for evaluation of upper urinary tracts.  Scheduled office cystourethroscopy    I spent a total of 43 minutes on the day of the visit.This includes face to face time and non-face to face time preparing to see the patient (eg, review of tests), obtaining and/or reviewing separately obtained history, documenting clinical information in the electronic or other health record, independently interpreting results and communicating results to the patient/family/caregiver, or care coordinator.

## 2025-02-05 LAB — BACTERIA UR CULT: NO GROWTH

## 2025-02-07 ENCOUNTER — HOSPITAL ENCOUNTER (OUTPATIENT)
Dept: RADIOLOGY | Facility: HOSPITAL | Age: 70
Discharge: HOME | End: 2025-02-07
Payer: MEDICARE

## 2025-02-07 DIAGNOSIS — R31.29 MICROSCOPIC HEMATURIA: ICD-10-CM

## 2025-02-07 PROCEDURE — 74178 CT ABD&PLV WO CNTR FLWD CNTR: CPT | Mod: TC

## 2025-02-07 PROCEDURE — 25500020 PHARM REV CODE 255

## 2025-02-07 PROCEDURE — 74178 CT ABD&PLV WO CNTR FLWD CNTR: CPT | Mod: 26,,, | Performed by: RADIOLOGY

## 2025-02-07 RX ADMIN — IOHEXOL 100 ML: 350 INJECTION, SOLUTION INTRAVENOUS at 05:02

## 2025-03-11 ENCOUNTER — PROCEDURE VISIT (OUTPATIENT)
Facility: CLINIC | Age: 70
End: 2025-03-11
Payer: MEDICARE

## 2025-03-11 ENCOUNTER — RESULTS FOLLOW-UP (OUTPATIENT)
Dept: UROLOGY | Facility: CLINIC | Age: 70
End: 2025-03-11

## 2025-03-11 VITALS
DIASTOLIC BLOOD PRESSURE: 87 MMHG | BODY MASS INDEX: 32.06 KG/M2 | RESPIRATION RATE: 17 BRPM | TEMPERATURE: 98 F | SYSTOLIC BLOOD PRESSURE: 151 MMHG | HEART RATE: 88 BPM | WEIGHT: 198.63 LBS

## 2025-03-11 DIAGNOSIS — R31.29 MICROSCOPIC HEMATURIA: Primary | ICD-10-CM

## 2025-03-11 DIAGNOSIS — N28.89 OTHER SPECIFIED DISORDERS OF KIDNEY AND URETER: ICD-10-CM

## 2025-03-11 PROCEDURE — 52000 CYSTOURETHROSCOPY: CPT | Mod: S$GLB,,, | Performed by: UROLOGY

## 2025-03-11 PROCEDURE — 99214 OFFICE O/P EST MOD 30 MIN: CPT | Mod: 25,S$GLB,, | Performed by: UROLOGY

## 2025-03-11 RX ORDER — LIDOCAINE HYDROCHLORIDE 20 MG/ML
JELLY TOPICAL
Status: COMPLETED | OUTPATIENT
Start: 2025-03-11 | End: 2025-03-11

## 2025-03-11 RX ORDER — DIAZEPAM 5 MG/1
5 TABLET ORAL ONCE
Qty: 1 TABLET | Refills: 0 | Status: SHIPPED | OUTPATIENT
Start: 2025-03-11 | End: 2025-03-11

## 2025-03-11 RX ADMIN — LIDOCAINE HYDROCHLORIDE: 20 JELLY TOPICAL at 10:03

## 2025-03-11 NOTE — PROCEDURES
Office Cystourethroscopy Note    Date: 3/11/2025   Referring Provider: Fina Rehman NP     Reason for Cystoscopy: Gross Hematuria    Upper Tract Evaluation:   CT Urogram: Non-simple partial exophytic 1.2 cm lesion at the right kidney, not significantly changed from 2023 and may demonstrate degree of postcontrast enhancement. MRI ordered as this was not imaged on prior U/S follow up of previous imaging     Procedure Details: Informed consent was obtained and he was sterily prepped and 1% lidocaine jelly was injected per urethra. A flexible cystoscope was inserted into the bladder via the urethra. There was no evidence of stricture, stenosis, lesions, other obstruction, or diverticulum. Significant findings included trilobar prostatic hypertrophy suggestive of obstruction. Patient with a very prominent median lobe with friable vessels. Likely etiology of the hematuria but also likely contributing to his LUTS. Cystoscopic examination of the bladder revealed orthotopically positioned, normal bilateral ureteral orifices with clear yellow urine effluxing from each orifice. All mucosal surfaces were examined with no apparent stones, tumors, foreign bodies, erythema, trabeculation, diverticula, or ulcers. The procedure was concluded without complications. The patient was not administered a post-procedure antibiotic.     Specimens: No Specimens    Findings:  Prominent, obstructing median lobe    Other Findings:  PVR - 200 mL    Pelvic Exam:  No Pelvic Exam Repeated Today     Assesment and Plan:   Gross Hematuria:  Patient with a very prominent median lobe with friable vessels. Likely etiology of the hematuria. Ball-valve effect of the median lobe likely contributing to his bothersome frequency, urgency, nocturia and weak stream. Would like for him to meet with Dr. Paniagua to assess if HOLEP would be advisable.       New Overactive Bladder (OAB)    3/11/2025    Referred by:  Fina Rehman NP    HPI: Mario Sanford   is a very pleasant 69 y.o. male who has not previously been seen by an [facility] specialist in our department referred for evaluation of symptoms suggestive of Overactive Bladder (OAB).      He reports bother is associated with urinary daytime frequency (5-6x daily), with nocturia (3-4x per night) and with urgency that does not result in urinary incontinence in urinary incontinence. He does not reports symptoms suggestive of stress incontinence.  He does not require daily pad use. He reports urinary incontinence is equally bothersome during the day and night. He has decreased overall fluid intake.       He denies symptoms of irritative voiding including dysuria. He reports weak stream. Bladder scan PVR today is 200 mL.  He also has a history of a Non-simple partial exophytic 1.2 cm lesion at the right kidney, not significantly changed from 2023 and may demonstrate degree of postcontrast enhancement. MRI ordered as this was not imaged on prior U/S follow up of previous imaging     A review of 10+ systems was conducted with pertinent positive and negative findings documented in HPI with all other systems reviewed and negative.    Past medical, family, surgical and social history reviewed as documented in chart with pertinent positive medical, family, surgical and social history detailed in HPI.      Assessment/Plan:    Overactive Bladder without Incontinence (new, addt'l workup): Cystoscopy today shows a large, ball-valve type median lobe and bilateral lateral lobe obstruction that would likely be obstructive and contributing to his symptoms. The median lobe may be the source of his hematuria; no other etiology was identified on cystoscopy or CT Urogram. Would like for him to meet with Dr. Paniagua to see if may be appropriate to consider HOLEP. Alpha blockade would have no impact on median lobe obstruction seen today.     In addition to the procedural service provided today, the patients condition required a  significant, separately identified E/M service above and beyond the service provided including the usual preoperative and postoperative care associated with the procedure that was performed. This E/M service was associated with a separate diagnosis, specifically Overactive bladder (OAB).

## 2025-03-11 NOTE — PROGRESS NOTES
Hi, Dr. Paniagua,     Dr. Osborne would like for you to see Mr. Sanford to evaluate whether or not HOLEP would be advisable given today's cystoscopy findings. Thanks for your time!      -Fina

## 2025-03-11 NOTE — PATIENT INSTRUCTIONS
What to Expect After a Cystoscopy  For the next 24-48 hours, you may feel a mild burning when you urinate. This burning is normal and expected. Drink plenty of water to dilute the urine to help relieve the burning sensation. You may also see a small amount of blood in your urine after the procedure.    Unless you are already taking antibiotics, you may be given an antibiotic after the test to prevent infection.    Signs and Symptoms to Report  Call the [facility] at [phone] if you develop any of the following:  Fever of 101 degrees or higher  Chills or persistent bleeding  Inability to urinate

## 2025-03-13 ENCOUNTER — HOSPITAL ENCOUNTER (OUTPATIENT)
Dept: RADIOLOGY | Facility: HOSPITAL | Age: 70
Discharge: HOME | End: 2025-03-13
Attending: UROLOGY
Payer: MEDICARE

## 2025-03-13 DIAGNOSIS — N28.89 OTHER SPECIFIED DISORDERS OF KIDNEY AND URETER: ICD-10-CM

## 2025-03-13 LAB
CREAT SERPL-MCNC: 1.4 MG/DL (ref 0.5–1.4)
SAMPLE: NORMAL

## 2025-03-13 PROCEDURE — 74183 MRI ABD W/O CNTR FLWD CNTR: CPT | Mod: 26,,, | Performed by: RADIOLOGY

## 2025-03-13 PROCEDURE — 74183 MRI ABD W/O CNTR FLWD CNTR: CPT | Mod: TC

## 2025-03-13 PROCEDURE — A9585 GADOBUTROL INJECTION: HCPCS | Performed by: UROLOGY

## 2025-03-13 PROCEDURE — 25500020 PHARM REV CODE 255: Performed by: UROLOGY

## 2025-03-13 RX ORDER — GADOBUTROL 604.72 MG/ML
10 INJECTION INTRAVENOUS
Status: COMPLETED | OUTPATIENT
Start: 2025-03-13 | End: 2025-03-13

## 2025-03-13 RX ADMIN — GADOBUTROL 10 ML: 604.72 INJECTION INTRAVENOUS at 03:03

## 2025-03-14 ENCOUNTER — PATIENT MESSAGE (OUTPATIENT)
Dept: UROLOGY | Facility: CLINIC | Age: 70
End: 2025-03-14
Payer: MEDICARE

## 2025-03-31 ENCOUNTER — OFFICE VISIT (OUTPATIENT)
Dept: UROLOGY | Facility: CLINIC | Age: 70
End: 2025-03-31
Payer: MEDICARE

## 2025-03-31 VITALS
HEART RATE: 94 BPM | DIASTOLIC BLOOD PRESSURE: 81 MMHG | WEIGHT: 199.19 LBS | SYSTOLIC BLOOD PRESSURE: 131 MMHG | HEIGHT: 66 IN | BODY MASS INDEX: 32.01 KG/M2

## 2025-03-31 DIAGNOSIS — N40.1 BPH WITH OBSTRUCTION/LOWER URINARY TRACT SYMPTOMS: ICD-10-CM

## 2025-03-31 DIAGNOSIS — N13.8 BPH WITH OBSTRUCTION/LOWER URINARY TRACT SYMPTOMS: ICD-10-CM

## 2025-03-31 DIAGNOSIS — N28.89 RENAL MASS: ICD-10-CM

## 2025-03-31 DIAGNOSIS — N28.89 OTHER SPECIFIED DISORDERS OF KIDNEY AND URETER: ICD-10-CM

## 2025-03-31 DIAGNOSIS — R31.29 MICROSCOPIC HEMATURIA: Primary | ICD-10-CM

## 2025-03-31 PROCEDURE — 99214 OFFICE O/P EST MOD 30 MIN: CPT | Mod: S$GLB,,, | Performed by: UROLOGY

## 2025-03-31 PROCEDURE — 1126F AMNT PAIN NOTED NONE PRSNT: CPT | Mod: CPTII,S$GLB,, | Performed by: UROLOGY

## 2025-03-31 PROCEDURE — 3008F BODY MASS INDEX DOCD: CPT | Mod: CPTII,S$GLB,, | Performed by: UROLOGY

## 2025-03-31 PROCEDURE — 3075F SYST BP GE 130 - 139MM HG: CPT | Mod: CPTII,S$GLB,, | Performed by: UROLOGY

## 2025-03-31 PROCEDURE — 99999 PR PBB SHADOW E&M-EST. PATIENT-LVL IV: CPT | Mod: PBBFAC,,, | Performed by: UROLOGY

## 2025-03-31 PROCEDURE — 3079F DIAST BP 80-89 MM HG: CPT | Mod: CPTII,S$GLB,, | Performed by: UROLOGY

## 2025-03-31 PROCEDURE — 1160F RVW MEDS BY RX/DR IN RCRD: CPT | Mod: CPTII,S$GLB,, | Performed by: UROLOGY

## 2025-03-31 PROCEDURE — 1159F MED LIST DOCD IN RCRD: CPT | Mod: CPTII,S$GLB,, | Performed by: UROLOGY

## 2025-03-31 PROCEDURE — 4010F ACE/ARB THERAPY RXD/TAKEN: CPT | Mod: CPTII,S$GLB,, | Performed by: UROLOGY

## 2025-03-31 PROCEDURE — 3072F LOW RISK FOR RETINOPATHY: CPT | Mod: CPTII,S$GLB,, | Performed by: UROLOGY

## 2025-03-31 PROCEDURE — 3288F FALL RISK ASSESSMENT DOCD: CPT | Mod: CPTII,S$GLB,, | Performed by: UROLOGY

## 2025-03-31 PROCEDURE — 1101F PT FALLS ASSESS-DOCD LE1/YR: CPT | Mod: CPTII,S$GLB,, | Performed by: UROLOGY

## 2025-03-31 NOTE — Clinical Note
Please schedule in 6 months for MRI and follow up after with me with a flow and PVR as well at that time

## 2025-03-31 NOTE — PROGRESS NOTES
[place] - Urology   Clinic Note    SUBJECTIVE:     Chief Complaint   Patient presents with    Consult     Holep       Referral from: No ref. provider found.    History of Present Illness:  Mario Sanford Jr. is a 69 y.o. male who presents to clinic for BPH with obstruction and LUTS.    Underwent gross hematuria workup with Dr. Osborne, significant for prominent, obstructing median lobe with friable vessels.  Also had an MRI revealing a 0.9 cm right renal mass concerning for neoplasm.  He reports today that he has very mild lower urinary tract symptoms including urgency frequency and nocturia.  He does not take any medications for prostate health.  At this time he denies any significant bother.    Postvoid residual 220 cc.    IPSS 8/1    Patient endorses no additional complaints at this time.    Past Medical History:   Diagnosis Date    Arthritis     Diabetes mellitus     Dilated cbd, acquired     Duodenal diverticulum     Duodenal fistula     choledochoduodenal    Duodenal ulcer     Esophagitis     Hiatal hernia     Hyperlipidemia     Hypertension     Melena     Nodule of esophagus     Obesity (BMI 30-39.9)        Past Surgical History:   Procedure Laterality Date    CHOLECYSTECTOMY      COLONOSCOPY N/A 06/19/2018    Procedure: COLONOSCOPY;  Surgeon: Moris Whalen MD;  Location: [facility] (4TH FLR);  Service: Endoscopy;  Laterality: N/A;  Miralax prep, per Dr. Whalen    ENDOSCOPIC ULTRASOUND OF UPPER GASTROINTESTINAL TRACT N/A 08/10/2018    Procedure: ULTRASOUND, ENDOSCOPIC, UPPER GI TRACT;  Surgeon: Watson Glass MD;  Location: [facility] (2ND FLR);  Service: Endoscopy;  Laterality: N/A;  MRCP with narrowing at the CBD as well as duodenal diverticulum however LFTs have normalized and patient will therefore need EUS as outpatient.    ERCP N/A 07/20/2021    Procedure: ERCP (ENDOSCOPIC RETROGRADE CHOLANGIOPANCREATOGRAPHY);  Surgeon: Watson Glass MD;  Location: [facility]O (2ND FLR);  Service: Endoscopy;   Laterality: N/A;    ERCP N/A 10/13/2021    Procedure: ERCP (ENDOSCOPIC RETROGRADE CHOLANGIOPANCREATOGRAPHY);  Surgeon: Andrews Lance MD;  [facility]  Service: Endoscopy;  Laterality: N/A;  pre procedure covid swab 10/10 [facility]-Instructions mailed-MG    ESOPHAGOGASTRODUODENOSCOPY N/A 07/03/2018    Procedure: EGD (ESOPHAGOGASTRODUODENOSCOPY);  Surgeon: Newton Iglesias MD;  [facility]  Service: Endoscopy;  Laterality: N/A;    ESOPHAGOGASTRODUODENOSCOPY N/A 07/06/2018    Procedure: EGD (ESOPHAGOGASTRODUODENOSCOPY);  Surgeon: Newton Iglesias MD;  [facility]  Service: Endoscopy;  Laterality: N/A;    ESOPHAGOGASTRODUODENOSCOPY N/A 08/10/2018    Procedure: EGD (ESOPHAGOGASTRODUODENOSCOPY);  Surgeon: Watson Glass MD;  Location: [facility] ([address] [place] [address]);  Service: Endoscopy;  Laterality: N/A;  Inpatient EGD (7/6/18) remarkable for esophagitis and a duodenal ulcer.     LAPAROSCOPIC CHOLECYSTECTOMY N/A 07/21/2021    Procedure: CHOLECYSTECTOMY, LAPAROSCOPIC;  Surgeon: Garland Loja MD;  Location: [facility] OR [address] [place] [address];  Service: General;  Laterality: N/A;    TONSILLECTOMY      TRANSURETHRAL RESECTION OF PROSTATE (TURP) WITHOUT USE OF LASER N/A 12/13/2018    Procedure: TURP, WITHOUT USING LASER;  Surgeon: Neil Thornton MD;  Location: [facility] OR [address] [place] [address];  Service: Urology;  Laterality: N/A;    UPPER ENDOSCOPIC ULTRASOUND W/ FNA  08/10/2018    without FNA       Family History   Problem Relation Name Age of Onset    Heart disease Mother      Hypertension Mother      Hyperlipidemia Mother      Diabetes Father      Hypertension Father      Hyperlipidemia Sister      Cancer Neg Hx      Stroke Neg Hx      Blindness Neg Hx      Amblyopia Neg Hx      Cataracts Neg Hx      Glaucoma Neg Hx      Macular degeneration Neg Hx      Retinal detachment Neg Hx      Strabismus Neg Hx      Thyroid disease Neg Hx      Cirrhosis Neg Hx         Social History[1]    Medications Ordered Prior to  "Encounter[2]    Review of patient's allergies indicates:   Allergen Reactions    Aspirin Other (See Comments)     Bleeding ulcer    Ibuprofen        Review of Systems:  A review of 10+ systems was conducted with pertinent positive and negative findings documented in HPI with all other systems reviewed and negative.    OBJECTIVE:     Estimated body mass index is 32.15 kg/m² as calculated from the following:    Height as of this encounter: 5' 6" (1.676 m).    Weight as of this encounter: 90.4 kg (199 lb 3 oz).    Vital Signs (Most Recent)  Vitals:    03/31/25 1319   BP: 131/81   Pulse: 94       Physical Exam:  GENERAL: patient sitting comfortably  HEENT: normocephalic  NECK: supple, no JVD  PULM: normal chest rise, no increased WOB  HEART: non-diaphoretic  ABDO: soft, nondistended, nontender  BACK: no CVA tenderness bilaterally  SKIN: warm, dry, well perfused  EXT: no bruising or edema  NEURO: grossly normal with no focal deficits  PSYCH: appropriate mood and affect    Genitourinary Exam:  deferred    Lab Results   Component Value Date    BUN 18 12/06/2024    CREATININE 1.5 (H) 02/04/2025    WBC 10.04 06/11/2024    HGB 15.1 06/11/2024    HCT 48.3 06/11/2024     06/11/2024    AST 19 12/06/2024    ALT 18 12/06/2024    ALKPHOS 131 12/06/2024    ALBUMIN 3.9 12/06/2024    HGBA1C 7.7 (H) 12/06/2024    INR 1.1 09/08/2022        Imaging:  I have personally reviewed all relevant imaging studies.    Results for orders placed or performed during the hospital encounter of 02/07/25 (from the past 2160 hours)   CT Urogram Abd Pelvis W WO    Narrative    EXAMINATION:  CT UROGRAM ABD PELVIS W WO    CLINICAL HISTORY:  Hematuria, microscopic, increased risk for urinary tract malignancy; Other microscopic hematuria    TECHNIQUE:  Low dose axial, sagittal and coronal reformations were obtained from the lung bases to the pubic symphysis before and following the IV administration of 100 mL of Omnipaque 350.  Timing was optimized for " nephrogram and excretory renal phases.    COMPARISON:  Ultrasound 02/08/2024, CT 01/05/2023, MRI 07/28/2022    FINDINGS:  Limited images through the lower chest demonstrate lower lobe bandlike opacities, possible atelectasis or scarring.  Small hiatal hernia.    Abdomen and pelvis:    Liver: Normal in size and attenuation.  Heterogeneous hypoattenuating lesion in the inferior right hepatic lobe measuring 1.5 cm, previously characterized as a benign hemangioma on MRI and similar by CT.    Gallbladder: Surgically absent.    Bile Ducts: Mild pneumobilia in the left hepatic lobe, similar.  No intra or extrahepatic biliary ductal dilation.    Pancreas: No pancreatic mass lesion or peripancreatic inflammatory change.    Spleen: Unremarkable    Adrenals: Unremarkable    Genitourinary: Kidneys appear symmetric in size without attenuating ureteral stone or hydronephrosis.  There is tiny hyperdense focus at the right upper pole suggesting hyperdense cyst.  Additional non-simple partial exophytic lesion posteriorly at the right upper pole measuring 1.2 cm (series 2, image 58), similar in size to comparison 2023 exam and may demonstrates some postcontrast enhancement.  Subcentimeter hypodense focus at the right inferior pole and left renal cortex, too small to characterize.    Incomplete contrast filling of the urinary bladder with similar mildly thickened wall.  Prostate is enlarged with central gland protrusion.    GI tract/Mesentery: GI tract is normal in caliber.  Appendix is unremarkable.    Peritoneum/retroperitoneum: No free air or free fluid.    Retroperitoneum: No pathologically enlarged lymph nodes.    Abdominal wall: Small left fat containing inguinal hernia.    Vasculature: Abdominal aorta is normal in caliber with mild calcific atherosclerosis.    Bones: Hip and multilevel spine DJD with DISH-like change.  No aggressive osseous lesion.      Impression    No evidence for attenuating ureteral stone or  hydronephrosis.    Non-simple partial exophytic 1.2 cm lesion at the right kidney, not significantly changed from 2023 and may demonstrate degree of postcontrast enhancement.  Recommend initial correlation with dedicated renal ultrasound to assess for solid lesion.  Additional small renal lesions which remain difficult to fully characterize.    Similar mild bladder wall thickening, potentially on the basis of chronic partial outlet obstruction in the setting of enlarged prostate.    Additional findings as above.    Electronically signed by resident: Nazario Ruiz  Date:    02/08/2025  Time:    12:28    Electronically signed by: Tulio Thayer  Date:    02/08/2025  Time:    13:57     Results for orders placed or performed during the hospital encounter of 03/13/25 (from the past 2160 hours)   MRI Abdomen W WO Contrast    Impression    1. 0.9 cm exophytic lesion arising from the superior pole of the right kidney with suggested heterogeneous internal enhancement for which neoplasm is a concern.  No lymphadenopathy within the abdomen.  2. Multiple filling defects within the common bile duct suggestive of choledocholithiasis.  No upstream intrahepatic biliary dilatation.  3. Additional findings as described.    Electronically signed by resident: Fidencio Arias  Date:    03/13/2025  Time:    16:00    Electronically signed by: Michael Momin MD  Date:    03/13/2025  Time:    17:02     MRI Abdomen W WO Contrast  Narrative: EXAMINATION:  MRI ABDOMEN W WO CONTRAST    CLINICAL HISTORY:  Renal mass;  Other specified disorders of kidney and ureter    TECHNIQUE:  Multiplanar multisequence images of the abdomen before and after administration of 10 mL Gadavist intravenous contrast.    COMPARISON:  CT urogram 02/07/2025, retroperitoneal ultrasound 02/08/2024    FINDINGS:  Inferior Thorax: Unremarkable.    Liver: Normal size and background parenchymal signal.  1.1 cm T2 hyperintense lesion in the inferior right hepatic lobe  which demonstrates nodular and somewhat progressive enhancement favored to reflect a hemangioma.  Hepatic and portal veins are patent.    Gallbladder: Surgically absent.    Bile Ducts: Multiple filling defects within the common bile duct the largest measuring 0.9 cm which concerning for choledocholithiasis.  No upstream dilatation.    Pancreas: No mass or ductal dilatation.    Spleen: 0.8 cm enhancing lesion in the inferior aspect of the spleen (sequence [id number] image 41), persisting into the hepatic venous phase and likely reflecting a small hemangioma.    Adrenals: No focal lesions.    Kidneys/Ureters: 0.9 cm T2 and T1 intermediate lesion in the superior pole of right kidney with suggested heterogeneous internal enhancement (sequence [id number] image 98).  Few additional bilateral punctate T1 hyperintensities which are likely hemorrhagic/proteinaceous cysts.    GI Tract/Mesentery: Small hiatal hernia.  No evidence of bowel obstruction or inflammation.    Peritoneal Space: No ascites.    Retroperitoneum: No pathologically enlarged nodes.    Abdominal wall: Unremarkable.    Vasculature: Abdominal aorta normal caliber.  Renal veins and IVC are patent.    Bones: No marrow replacing process.  Impression: 1. 0.9 cm exophytic lesion arising from the superior pole of the right kidney with suggested heterogeneous internal enhancement for which neoplasm is a concern.  No lymphadenopathy within the abdomen.  2. Multiple filling defects within the common bile duct suggestive of choledocholithiasis.  No upstream intrahepatic biliary dilatation.  3. Additional findings as described.    Electronically signed by resident: Fidencio Arias  Date:    03/13/2025  Time:    16:00    Electronically signed by: Michael Momin MD  Date:    03/13/2025  Time:    17:02       PSA:  Lab Results   Component Value Date    PSA 1.4 12/06/2024    PSA 1.2 12/01/2023    PSA 1.3 12/02/2022    PSA 1.3 09/22/2020    PSA 1.5 09/06/2019    PSA 1.6 05/25/2018  "   PSADIAG 2.0 11/05/2021       Testosterone:  No results found for: "TOTALTESTOST", "TESTOSTERONE"     ASSESSMENT     1. Microscopic hematuria    2. BPH with obstruction/lower urinary tract symptoms    3. Renal mass    4. Other specified disorders of kidney and ureter        PLAN:     BPH with urinary obstruction and LUTS    - The patient's prostate has been measured using CT scan at an estimated volume of 86 cc.    - The risks, benefits, and technical details of HoLEP were discussed in detail today. We also discussed alternative treatment modalities for BPH with urinary obstruction.     - After reviewing the relative merits of alternative treatment modalities we specifically reviewed potential complications related HoLEP itself. These include bleeding and infection, ureteral or bladder injury, urethral stricture, bladder neck contracture, failure to improve voiding dysfunction, persistent urinary retention, stress urinary incontinence, erectile dysfunction, retrograde ejaculation, and need for any further intervention or management.    - I offered HoLEP however patient's symptoms are not significantly bothersome.  If things worsen he will consider proceeding with a HoLEP.  He was counseled of the risks of this and understands.  I will see him back in 6 months with a flow PVR.    2. Right renal mass    - we discussed options including active surveillance, biopsy and ablation, or surgical management.  Patient would like for active surveillance at this time.  We will follow up with an MRI in 6 months.    Tuan Paniagua MD  Urology  [facility]    Disclaimer: This note has been generated using voice-recognition software. There may be typographical errors that have been missed during proof-reading.           [1]   Social History  Tobacco Use    Smoking status: Never    Smokeless tobacco: Never   Substance Use Topics    Alcohol use: No    Drug use: No   [2]   Current Outpatient Medications on File " Prior to Visit   Medication Sig Dispense Refill    atorvastatin (LIPITOR) 40 MG tablet TAKE 1 TABLET BY MOUTH AT NIGHT 90 tablet 3    blood sugar diagnostic (TRUE METRIX GLUCOSE TEST STRIP) Strp 1 strip by Misc.(Non-Drug; Combo Route) route 2 (two) times a day. 200 strip 3    blood sugar diagnostic Strp 1 strip by Misc.(Non-Drug; Combo Route) route once daily. Test strips for glucometer insurance covers to check blood sugar twice daily. 200 strip 3    blood-glucose meter (TRUE METRIX GLUCOSE METER) kit Use as instructed 1 each 0    buPROPion (WELLBUTRIN XL) 300 MG 24 hr tablet TAKE 1 TABLET BY MOUTH EVERY DAY 90 tablet 3    carvediloL (COREG) 3.125 MG tablet TAKE 1 TABLET BY MOUTH TWICE  DAILY 180 tablet 2    ciclopirox (PENLAC) 8 % Soln APPLY TO AFFECTED AREA AT NIGHT 6.6 mL 11    dapagliflozin propanediol (FARXIGA) 10 mg tablet TAKE 1 TABLET (10 MG TOTAL) BY MOUTH BEFORE BREAKFAST. 90 tablet 3    gabapentin (NEURONTIN) 300 MG capsule TAKE 1 CAPSULE BY MOUTH IN THE  EVENING 90 capsule 3    hydroxyzine HCL (ATARAX) 25 MG tablet 1/2-1 tab PO TID PRN anxiety 60 tablet 1    lancets Misc 1 Device by Misc.(Non-Drug; Combo Route) route once daily. Lancets for glucometer insurance covers to check blood sugar twice daily. 200 each 3    linaGLIPtin (TRADJENTA) 5 mg Tab tablet Take 1 tablet (5 mg total) by mouth once daily. 90 tablet 3    losartan (COZAAR) 100 MG tablet TAKE 1 TABLET BY MOUTH EVERY DAY 90 tablet 3    pantoprazole (PROTONIX) 40 MG tablet TAKE 1 TABLET BY MOUTH ONCE  DAILY 90 tablet 2    polyethylene glycol (GLYCOLAX) 17 gram PwPk Take 17 g by mouth once daily. 30 packet 0    ursodioL (ACTIGALL) 500 MG tablet Take 1 tablet (500 mg total) by mouth once daily.      diazePAM (VALIUM) 5 MG tablet Take 1 tablet (5 mg total) by mouth once. for 1 dose 1 tablet 0    k phos di & mono-sod phos mono (K-PHOS-NEUTRAL) 250 mg Tab Take 2 tablets by mouth daily with breakfast. for 7 days 14 tablet 0     No current  facility-administered medications on file prior to visit.

## 2025-03-31 NOTE — PATIENT INSTRUCTIONS
Holmium laser enucleation of the prostate (HoLEP): procedure-specific information    What is the evidence base for this information?  This leaflet includes advice from the [employer] guidelines in the management of benign prostate enlargement (BPH). It is therefore reflective of best practice in the care of this condition.  It is intended to supplement any advice you may already have been given by your urologist or other healthcare professionals. Alternative treatments are outlined below and can be discussed in more detail with your Urologist or Nurse Practitioner.    What does the procedure involve?  This operation involves the telescopic removal of obstructing prostate tissue using a laser and temporary insertion of a catheter.    What are the alternatives to this procedure?  Drugs, use of a catheter/stent, observation, conventional transurethral resection, other lasers that vaporize the prostate tissue or an open or robotic/laparoscopic operation.          What should I expect before the procedure?  If you are taking Clopidogrel or other new generation platelet inhibitors such as Pradaxa or Elaquis, on a regular basis, you must stop 10 days before your admission. This drug can cause increased bleeding after prostate surgery. Please inform your urologist if you have been instructed to not stop blood-thinning medications for any reason or if you have had cardiac stent placement within the last year. Treatment can be re-started safely about 10 days after you get home. If you are taking Warfarin to thin your blood, you should ensure that the Urology staff are aware of this well in advance of your admission.    If you live locally, you will usually be able to be discharged home on the day of your surgery.  If you have travelled from outside the local area for surgery, typically you will be kept in hospital overnight one night. If you are admitted to hospital after surgery, you will be seen by  members of the medical team, which include your surgeon, the urology residents or house officer and your nurse.    You will be asked not to eat or drink any solid food for 8 hours before surgery and, immediately before the operation, you may be given a pre-medication by the anesthetist which will make you dry-mouthed and pleasantly sleepy.    Please be sure to inform your Urologist in advance of your surgery if you have any of the following:    an artificial heart valve  a coronary artery stent  a heart pacemaker or defibrillator  an artificial joint placed less than 2 years prior to your HoLEP  an artificial blood vessel graft  a neurosurgical shunt  any other implanted foreign body  a prescription for Warfarin, Aspirin or Clopidogrel (Plavix®) or other blood thinners  a previous or current MRSA infection    What happens during the procedure?  A general anesthetic (where you will be asleep throughout the procedure) will be used most often. All methods minimize pain; your anesthetist will explain the pros and cons of each type of anesthetic to you. The operation, on average, takes  minutes, depending on the size of your prostate.    You will usually be given an injectable antibiotic before the procedure after checking for any drug allergies.    The laser is used to separate the obstructing prostate tissue from its surrounding capsule and to push it in large chunks into the bladder. An instrument (morcellator) is then used through the telescope to remove the prostate tissue from the bladder. A catheter is normally left to drain the bladder at the end of the procedure.                        What happens immediately after the procedure?  There is always some bleeding from the prostate area after the operation. The urine is usually clear of blood within 36-72 hours, although some patients lose more blood for longer. It is very unusual to require a blood transfusion after laser surgery.    It is useful to drink as  more fluid than normal in the first week after the operation because this helps the urine clear of any blood more quickly. Sometimes, fluid is flushed through the catheter to clear the urine of blood.    You will be able to eat and drink on the same day as the operation when you feel able to.    The catheter is generally removed after surgery, the date of removal varies and is dependent on several different factors. At first, it may be painful to pass your urine and it may come more frequently or urgently than normal. Any initial discomfort and frequency of urination usually improves steadily within a few days. Some of your symptoms, especially frequency, urgency and getting up at night to pass urine, may not improve for several months because these are often due to bladder over activity (which takes time to resolve after prostate surgery) rather than prostate blockage. Since a large portion of prostate tissue is removing with the laser technique, there may be some temporary loss of urinary control until your pelvic floor muscles strengthen and recover. Pelvic floor exercises before and after surgery help to decrease the chance of any temporary loss of urinary control (incontinence).    It is not unusual for your urine to turn bloody again and blood may be visible in the urine even up to 6 weeks after surgery but this is not usually a problem. Some patients, particularly those with small prostate glands, are unable to pass urine at all after the operation due to temporary swelling of the prostate area.  If this should happen, we would pass a catheter again to allow the swelling to resolve and the bladder to regain its function. Usually, patients who require re-catheterization go home with a catheter in place and then return within a week for a second catheter removal, which is successful in almost all cases.    If you travel from out of town or for medical reasons are admitted to the hospital after surgery, the  expected hospital stay is 1 night. It is safe for almost all patients to be discharged the afternoon after surgery.    Are there any side-effects?  Most procedures have a potential for side-effects. You should be reassured that, although all these complications are well-recognized, the majority of patients do not suffer any problems after a urological procedure.    Please use the check boxes to tick off individual items when you are happy that they have been discussed to your satisfaction:    Common (greater than 1 in 10)  Temporary mild burning, bleeding and frequency of urination after the procedure  No semen is produced during an orgasm in approximately 75% If the prostate is fully enucleated  Treatment may not relieve all the urinary symptoms  Infection of the bladder, testes or kidney requiring antibiotics  Loss of urinary control (incontinence) which usually resolves within 6 weeks (10%); this can usually be improved with pelvic floor exercises  Permanent Incontinence that is bothersome (1%)  Initial failure to pass urine after surgery requiring a new catheter for less than 1 week (10%)    Occasional (between 1 in 10 and 1 in 50)  Weakened erections or impotence (less than 2%)  Injury to the urethra causing delayed scar formation (stricture) in 1%  Finding unsuspected cancer in the removed tissue which may need further treatment (8%)    Rare (less than 1 in 50)  Retained tissue fragments floating in the bladder which may require a second telescopic procedure for their removal  Very rarely, perforation of the bladder requiring a temporary urinary catheter or open surgical repair  Persistent loss of urinary control which may require a further operation (0.5%)  Possible need to repeat treatment later due to re-obstruction (less than 2%)  May need self-catheterization to empty bladder fully If bladder weak (1%)  Bleeding requiring return to theatre and/or blood transfusion (less than 1%)    Hospital-acquired  infection (overall risk for [facility])  Colonization with MRSA (0.02%, 1 in 5,000)  Clostridium difficile bowel infection (0.04%; 1 in 2,500)  MRSA bloodstream infection (0.01%; 1 in 10,000)    (These rates may be greater in high-risk patients e.g. with long- term drainage tubes, after removal of the bladder for cancer, after previous infections, after prolonged hospitalization or after multiple admissions)    What should I expect when I get home?  When you leave hospital, you will be given a after visit summary of your admission. This holds important information about your inpatient stay and your operation. If, in the first few weeks after your discharge, you need to call your PCP for any reason or to attend another hospital, please take this summary with you to allow the doctors to see details of your treatment. This is particularly important if you need to consult another doctor within a few days of your discharge.    Most patients feel pretty much back to normal within a week. Apart from some burning on urination you should not be in any pain.  You may notice that you pass very small flecks of tissue in the urine at times within the first month as the prostate area heals. This does not usually interfere with the urinary stream or cause discomfort. It is normal to pass some blood in the urine (usually intermittently) for up to 6 weeks after surgery.    What else should I look out for?  If you experience increasing frequency, burning or difficulty on passing urine or worrying bleeding, contact your urologist.    In the event of severe bleeding, passage of clots or sudden difficulty in passing urine, you should contact your urologist immediately since it may be necessary for you to be re-admitted to hospital.    Are there any other important points?  Removal of your prostate should not adversely affect your sex life provided you are getting normal erections before the surgery. Sexual activity can be resumed as soon as  you are comfortable, usually after 3-4 weeks.    It is often helpful to continue with pelvic floor exercises as soon as possible after the operation since this can improve your control when you get home. The symptoms of an overactive bladder may take 3 months to resolve whereas the flow is improved immediately.    If you need any specific information on these exercises, please contact the [facility] staff or the Specialist Nurses. The symptoms of an overactive bladder may take 3 months to resolve whereas the flow is improved immediately.    The results of any tissue removed will be available after 14 - 21 days and will be reviewed at your post-operative visit, typically 2-3 weeks after surgery.    Around 3 months after surgery you will be reviewed in the outpatient clinic and several tests repeated (including a flow rate, bladder scan & symptom score) to help assess the effects of the surgery. Please come to your clinic appointment prepared to pass urine for a flow test.    Most patients require a recovery period of 1-2 weeks at home before they feel ready for work. You should avoid any heavy lifting or physical straining during this time. You should not drive until you feel fully recovered; 1 week is the minimum period that most patients require before resuming driving, but you may drive sooner if you feel recovered.    Driving after surgery  It is your responsibility to ensure that you are fit to drive following your surgery. You do not normally need to notify the DMV unless you have a medical condition that will last for longer than 3 months after your surgery and may affect your ability to drive. You should, however, check with your insurance company before returning to driving. Your doctors will be happy to provide you with advice on request.    Who can I contact for more help or information?  Urology Clinic: [phone]    What should I do with this form?  Thank you for taking the trouble to read this information  sheet. Please retain a copy for your own records, please do so below.    If you would like a copy of this form to be filed in your hospital records for future reference, please let your Urologist or Specialist Nurse know.  If you do, however, decide to proceed with the scheduled procedure, you will be asked to sign a separate consent form which will be filed in your hospital notes and you will, in addition, be provided with a copy of the form if you wish.

## 2025-04-09 DIAGNOSIS — E11.59 HYPERTENSION ASSOCIATED WITH DIABETES: ICD-10-CM

## 2025-04-09 DIAGNOSIS — I15.2 HYPERTENSION ASSOCIATED WITH DIABETES: ICD-10-CM

## 2025-04-09 RX ORDER — LINAGLIPTIN 5 MG/1
5 TABLET, FILM COATED ORAL
Qty: 90 TABLET | Refills: 0 | Status: SHIPPED | OUTPATIENT
Start: 2025-04-09

## 2025-04-09 NOTE — TELEPHONE ENCOUNTER
Care Due:                  Date            Visit Type   Department     Provider  --------------------------------------------------------------------------------                                EP -                              PRIMARY      [facility] INTERNAL  Last Visit: 12-      CARE ([facility])   MEDICINE       Bereket Rivera                              EP -                              PRIMARY      [facility] INTERNAL  Next Visit: 06-      CARE ([facility])   [facility]       Bereket Rivera                                                            Last  Test          Frequency    Reason                     Performed    Due Date  --------------------------------------------------------------------------------    HBA1C.......  6 months...  dapagliflozin,             12- 06-                             linaGLIPtin..............    [facility] Embedded Care Due Messages. Reference number: [id number].   4/09/2025 10:12:44 AM CDT

## 2025-04-09 NOTE — TELEPHONE ENCOUNTER
Provider Staff:  Action required for this patient    Requires labs      Please see care gap opportunities below in Care Due Message.    Thanks!  [facility]     Appointments      Date Provider   Last Visit   12/11/2024 Bereket Rivera, DO   Next Visit   6/12/2025 Bereket Rivera, DO     Refill Decision Note   aMrio Sanford  is requesting a refill authorization.    Brief Assessment and Rationale for Refill:  Approve       Medication Therapy Plan:         Comments:     Note composed:12:37 PM 04/09/2025

## 2025-04-15 ENCOUNTER — PATIENT MESSAGE (OUTPATIENT)
Dept: OPTOMETRY | Facility: CLINIC | Age: 70
End: 2025-04-15
Payer: MEDICARE

## 2025-04-17 ENCOUNTER — OFFICE VISIT (OUTPATIENT)
Dept: PODIATRY | Facility: CLINIC | Age: 70
End: 2025-04-17
Payer: MEDICARE

## 2025-04-17 VITALS
HEIGHT: 66 IN | DIASTOLIC BLOOD PRESSURE: 76 MMHG | SYSTOLIC BLOOD PRESSURE: 142 MMHG | BODY MASS INDEX: 31.99 KG/M2 | WEIGHT: 199.06 LBS | HEART RATE: 84 BPM

## 2025-04-17 DIAGNOSIS — B35.1 TINEA UNGUIUM: Primary | ICD-10-CM

## 2025-04-17 DIAGNOSIS — E11.42 TYPE 2 DIABETES MELLITUS WITH PERIPHERAL NEUROPATHY: ICD-10-CM

## 2025-04-17 PROCEDURE — 99999 PR PBB SHADOW E&M-EST. PATIENT-LVL III: CPT | Mod: PBBFAC,,, | Performed by: STUDENT IN AN ORGANIZED HEALTH CARE EDUCATION/TRAINING PROGRAM

## 2025-04-17 NOTE — PROCEDURES
"Returns for routine foot care; tinea unguium in setting of neuropathy, see prior notes. No new concerns    Routine Foot Care    Date/Time: 4/17/2025 9:15 AM    Performed by: Sudheer Wolf DPM  Authorized by: Sudheer Wolf DPM    Time out: Immediately prior to procedure a "time out" was called to verify the correct patient, procedure, equipment, support staff and site/side marked as required.    Consent Done?:  Yes (Verbal)  Hyperkeratotic Skin Lesions?: No      Nail Care Type:  Debride  Location(s): All  (Left 1st Toe, Left 3rd Toe, Left 2nd Toe, Left 4th Toe, Left 5th Toe, Right 1st Toe, Right 2nd Toe, Right 3rd Toe, Right 4th Toe and Right 5th Toe)  Patient tolerance:  Patient tolerated the procedure well with no immediate complications      RTC in 3 months for routine foot care  "

## 2025-06-12 ENCOUNTER — OFFICE VISIT (OUTPATIENT)
Dept: INTERNAL MEDICINE | Facility: CLINIC | Age: 70
End: 2025-06-12
Payer: MEDICARE

## 2025-06-12 ENCOUNTER — RESULTS FOLLOW-UP (OUTPATIENT)
Facility: CLINIC | Age: 70
End: 2025-06-12

## 2025-06-12 ENCOUNTER — LAB VISIT (OUTPATIENT)
Dept: LAB | Facility: HOSPITAL | Age: 70
End: 2025-06-12
Attending: NURSE PRACTITIONER
Payer: MEDICARE

## 2025-06-12 VITALS
HEIGHT: 66 IN | RESPIRATION RATE: 18 BRPM | BODY MASS INDEX: 32.13 KG/M2 | HEART RATE: 84 BPM | DIASTOLIC BLOOD PRESSURE: 82 MMHG | WEIGHT: 199.94 LBS | SYSTOLIC BLOOD PRESSURE: 136 MMHG | OXYGEN SATURATION: 98 % | TEMPERATURE: 97 F

## 2025-06-12 DIAGNOSIS — E78.5 HYPERLIPIDEMIA ASSOCIATED WITH TYPE 2 DIABETES MELLITUS: Primary | ICD-10-CM

## 2025-06-12 DIAGNOSIS — N18.31 STAGE 3A CHRONIC KIDNEY DISEASE: ICD-10-CM

## 2025-06-12 DIAGNOSIS — E66.9 OBESITY, DIABETES, AND HYPERTENSION SYNDROME: ICD-10-CM

## 2025-06-12 DIAGNOSIS — E11.51 DIABETES MELLITUS WITH PERIPHERAL VASCULAR DISEASE: ICD-10-CM

## 2025-06-12 DIAGNOSIS — E11.59 OBESITY, DIABETES, AND HYPERTENSION SYNDROME: ICD-10-CM

## 2025-06-12 DIAGNOSIS — I15.2 OBESITY, DIABETES, AND HYPERTENSION SYNDROME: ICD-10-CM

## 2025-06-12 DIAGNOSIS — F41.9 ANXIETY: ICD-10-CM

## 2025-06-12 DIAGNOSIS — R35.1 NOCTURIA: ICD-10-CM

## 2025-06-12 DIAGNOSIS — R31.0 GROSS HEMATURIA: ICD-10-CM

## 2025-06-12 DIAGNOSIS — R74.8 ELEVATED ALKALINE PHOSPHATASE LEVEL: ICD-10-CM

## 2025-06-12 DIAGNOSIS — E11.59 HYPERTENSION ASSOCIATED WITH DIABETES: ICD-10-CM

## 2025-06-12 DIAGNOSIS — E11.69 OBESITY, DIABETES, AND HYPERTENSION SYNDROME: ICD-10-CM

## 2025-06-12 DIAGNOSIS — E11.69 HYPERLIPIDEMIA ASSOCIATED WITH TYPE 2 DIABETES MELLITUS: Primary | ICD-10-CM

## 2025-06-12 DIAGNOSIS — I15.2 HYPERTENSION ASSOCIATED WITH DIABETES: ICD-10-CM

## 2025-06-12 DIAGNOSIS — N40.0 BENIGN PROSTATIC HYPERPLASIA, UNSPECIFIED WHETHER LOWER URINARY TRACT SYMPTOMS PRESENT: ICD-10-CM

## 2025-06-12 LAB
ALBUMIN SERPL BCP-MCNC: 3.8 G/DL (ref 3.5–5.2)
ALP SERPL-CCNC: 125 UNIT/L (ref 40–150)
ALT SERPL W/O P-5'-P-CCNC: 17 UNIT/L (ref 10–44)
AST SERPL-CCNC: 16 UNIT/L (ref 11–45)
BILIRUB DIRECT SERPL-MCNC: 0.3 MG/DL (ref 0.1–0.3)
BILIRUB SERPL-MCNC: 0.8 MG/DL (ref 0.1–1)
PROT SERPL-MCNC: 6.7 GM/DL (ref 6–8.4)

## 2025-06-12 PROCEDURE — 3079F DIAST BP 80-89 MM HG: CPT | Mod: CPTII,S$GLB,, | Performed by: INTERNAL MEDICINE

## 2025-06-12 PROCEDURE — 99999 PR PBB SHADOW E&M-EST. PATIENT-LVL IV: CPT | Mod: PBBFAC,,, | Performed by: INTERNAL MEDICINE

## 2025-06-12 PROCEDURE — 1101F PT FALLS ASSESS-DOCD LE1/YR: CPT | Mod: CPTII,S$GLB,, | Performed by: INTERNAL MEDICINE

## 2025-06-12 PROCEDURE — 1125F AMNT PAIN NOTED PAIN PRSNT: CPT | Mod: CPTII,S$GLB,, | Performed by: INTERNAL MEDICINE

## 2025-06-12 PROCEDURE — G2211 COMPLEX E/M VISIT ADD ON: HCPCS | Mod: S$GLB,,, | Performed by: INTERNAL MEDICINE

## 2025-06-12 PROCEDURE — 82040 ASSAY OF SERUM ALBUMIN: CPT

## 2025-06-12 PROCEDURE — 3075F SYST BP GE 130 - 139MM HG: CPT | Mod: CPTII,S$GLB,, | Performed by: INTERNAL MEDICINE

## 2025-06-12 PROCEDURE — 99214 OFFICE O/P EST MOD 30 MIN: CPT | Mod: S$GLB,,, | Performed by: INTERNAL MEDICINE

## 2025-06-12 PROCEDURE — 36415 COLL VENOUS BLD VENIPUNCTURE: CPT | Mod: PO

## 2025-06-12 PROCEDURE — 3008F BODY MASS INDEX DOCD: CPT | Mod: CPTII,S$GLB,, | Performed by: INTERNAL MEDICINE

## 2025-06-12 PROCEDURE — 4010F ACE/ARB THERAPY RXD/TAKEN: CPT | Mod: CPTII,S$GLB,, | Performed by: INTERNAL MEDICINE

## 2025-06-12 PROCEDURE — 3051F HG A1C>EQUAL 7.0%<8.0%: CPT | Mod: CPTII,S$GLB,, | Performed by: INTERNAL MEDICINE

## 2025-06-12 PROCEDURE — 3288F FALL RISK ASSESSMENT DOCD: CPT | Mod: CPTII,S$GLB,, | Performed by: INTERNAL MEDICINE

## 2025-06-13 ENCOUNTER — RESULTS FOLLOW-UP (OUTPATIENT)
Dept: INTERNAL MEDICINE | Facility: CLINIC | Age: 70
End: 2025-06-13

## 2025-06-14 RX ORDER — BUPROPION HYDROCHLORIDE 300 MG/1
300 TABLET ORAL
Qty: 90 TABLET | Refills: 3 | Status: SHIPPED | OUTPATIENT
Start: 2025-06-14

## 2025-06-14 NOTE — TELEPHONE ENCOUNTER
Refill Decision Note   Mario Sanford  is requesting a refill authorization.  Brief Assessment and Rationale for Refill:  Approve     Medication Therapy Plan:         Comments:     Note composed:12:37 PM 06/14/2025

## 2025-06-14 NOTE — TELEPHONE ENCOUNTER
No care due was identified.  [facility] Embedded Care Due Messages. Reference number: [id number].   6/14/2025 7:07:11 AM CDT

## 2025-07-07 RX ORDER — CARVEDILOL 3.12 MG/1
3.12 TABLET ORAL 2 TIMES DAILY
Qty: 180 TABLET | Refills: 3 | Status: SHIPPED | OUTPATIENT
Start: 2025-07-07

## 2025-07-07 RX ORDER — PANTOPRAZOLE SODIUM 40 MG/1
40 TABLET, DELAYED RELEASE ORAL
Qty: 90 TABLET | Refills: 3 | Status: SHIPPED | OUTPATIENT
Start: 2025-07-07

## 2025-07-07 NOTE — TELEPHONE ENCOUNTER
No care due was identified.  [facility] Embedded Care Due Messages. Reference number: [id number].   7/06/2025 9:17:33 PM CDT

## 2025-07-08 NOTE — TELEPHONE ENCOUNTER
Refill Decision Note   Mario Sanford  is requesting a refill authorization.  Brief Assessment and Rationale for Refill:  Approve     Medication Therapy Plan:        Comments:     Note composed:8:03 PM 07/07/2025

## 2025-07-22 ENCOUNTER — OFFICE VISIT (OUTPATIENT)
Dept: OPHTHALMOLOGY | Facility: CLINIC | Age: 70
End: 2025-07-22
Payer: MEDICARE

## 2025-07-22 DIAGNOSIS — H25.13 NUCLEAR AGE-RELATED CATARACT, BOTH EYES: Primary | ICD-10-CM

## 2025-07-22 PROCEDURE — 99999 PR PBB SHADOW E&M-EST. PATIENT-LVL III: CPT | Mod: PBBFAC,,,

## 2025-07-22 RX ORDER — FENTANYL CITRATE 50 UG/ML
25 INJECTION, SOLUTION INTRAMUSCULAR; INTRAVENOUS
Refills: 0 | OUTPATIENT
Start: 2025-07-22

## 2025-07-22 RX ORDER — CYCLOP/TROP/PROPA/PHEN/KET/WAT 1-1-0.1%
1 DROPS (EA) OPHTHALMIC (EYE)
OUTPATIENT
Start: 2025-07-22 | End: 2025-07-22

## 2025-07-22 RX ORDER — MIDAZOLAM HYDROCHLORIDE 1 MG/ML
1 INJECTION, SOLUTION INTRAMUSCULAR; INTRAVENOUS
OUTPATIENT
Start: 2025-07-22

## 2025-07-22 RX ORDER — PREDNISOLONE ACETATE-GATIFLOXACIN-BROMFENAC .75; 5; 1 MG/ML; MG/ML; MG/ML
1 SUSPENSION/ DROPS OPHTHALMIC 3 TIMES DAILY
Qty: 8 ML | Refills: 3 | Status: SHIPPED | OUTPATIENT
Start: 2025-07-22

## 2025-07-22 RX ORDER — MOXIFLOXACIN 5 MG/ML
1 SOLUTION/ DROPS OPHTHALMIC
OUTPATIENT
Start: 2025-07-22 | End: 2025-07-22

## 2025-07-22 RX ORDER — PHENYLEPHRINE HYDROCHLORIDE 100 MG/ML
1 SOLUTION/ DROPS OPHTHALMIC
OUTPATIENT
Start: 2025-07-22

## 2025-07-22 NOTE — PROGRESS NOTES
HPI    71 y/o male is here for cataract pre op measurements.  He has been   avoiding driving at night and he has halo's around lights.  Vision has   decreased since his last visit with Dr Samano in November 2024.    No eye drops  No eye sx  Last edited by Helene Owen MA on 7/22/2025  9:16 AM.            Assessment /Plan     For exam results, see Encounter Report.    Nuclear age-related cataract, both eyes        Visually Significant Cataract: Patient reports decreased vision consistent with the clinical amount of lenticular opacity, which reaches the level of visual significance and affects activities of daily living.     Risks, benefits, and alternatives to cataract surgery were discussed and the consent reviewed. IOL options were discussed, including ATIOLs and the associated side effects and additional patient cost associated with them.    Patient educational materials including eye models, videos, and brochures were utilized to explain that Traditional Cataract Surgery does not include techniques and technologies to reduce dependence on glasses or contacts, whereas Advanced Technology Cataract Surgery (ATCS) does include advanced pre operative work up and intraoperative/post operative techniques and technologies to reduced dependence on glasses/contacts. I explained to the patient that ATCS is an elective choice and has additional out of pocket costs, not covered by insurance, and that results cannot be guaranteed; thus some patients will still need glasses or contacts for some tasks.  Side effects of full range IOLs were specifically discussed including haloes and starbursts around lights at night.      Patient preferences at this time include:    ATCS cataract surgery with distance target; Most interested in not having to wear glasses at all so consider Odyssey MFIOL    LEFT eye done first

## 2025-07-23 ENCOUNTER — TELEPHONE (OUTPATIENT)
Dept: OPHTHALMOLOGY | Facility: CLINIC | Age: 70
End: 2025-07-23
Payer: MEDICARE

## 2025-07-23 DIAGNOSIS — H25.12 NUCLEAR SCLEROTIC CATARACT OF LEFT EYE: Primary | ICD-10-CM

## 2025-07-24 ENCOUNTER — OFFICE VISIT (OUTPATIENT)
Dept: PODIATRY | Facility: CLINIC | Age: 70
End: 2025-07-24
Payer: MEDICARE

## 2025-07-24 VITALS
DIASTOLIC BLOOD PRESSURE: 76 MMHG | BODY MASS INDEX: 32.27 KG/M2 | HEIGHT: 66 IN | SYSTOLIC BLOOD PRESSURE: 122 MMHG | HEART RATE: 89 BPM

## 2025-07-24 DIAGNOSIS — E11.42 TYPE 2 DIABETES MELLITUS WITH PERIPHERAL NEUROPATHY: ICD-10-CM

## 2025-07-24 DIAGNOSIS — M79.675 CHRONIC PAIN OF TOES OF BOTH FEET: Primary | ICD-10-CM

## 2025-07-24 DIAGNOSIS — M79.674 CHRONIC PAIN OF TOES OF BOTH FEET: Primary | ICD-10-CM

## 2025-07-24 DIAGNOSIS — B35.1 TINEA UNGUIUM: ICD-10-CM

## 2025-07-24 DIAGNOSIS — G89.29 CHRONIC PAIN OF TOES OF BOTH FEET: Primary | ICD-10-CM

## 2025-07-24 PROCEDURE — 99999 PR PBB SHADOW E&M-EST. PATIENT-LVL IV: CPT | Mod: PBBFAC,,, | Performed by: STUDENT IN AN ORGANIZED HEALTH CARE EDUCATION/TRAINING PROGRAM

## 2025-07-24 RX ORDER — LIDOCAINE AND PRILOCAINE 25; 25 MG/G; MG/G
CREAM TOPICAL DAILY
Qty: 5 G | Refills: 2 | Status: SHIPPED | OUTPATIENT
Start: 2025-07-24

## 2025-07-24 NOTE — PROGRESS NOTES
Subjective:     Patient    Mario Sanford Jr. is a 70 y.o. male.    Problem    07/16/24: Presents for diabetic foot exam. Has thick discolored toenails not improved by prior topical treatment. Has numbness and tingling controlled on gabapentin.      07/24/25: Returns for routine foot care. Also with occasional pain along edges of nails for which he currently uses neosporin without much improvement. OK on gabapentin. No new concerns.     Primary Care Provider    Primary Care Provider: Bereket Rivera DO   Last Seen: 6/12/2025     History    History obtained from patient and review of medical records.     Past Medical History:   Diagnosis Date    Arthritis     Diabetes mellitus     Dilated cbd, acquired     Duodenal diverticulum     Duodenal fistula     choledochoduodenal    Duodenal ulcer     Esophagitis     Hiatal hernia     Hyperlipidemia     Hypertension     Melena     Nodule of esophagus     Obesity (BMI 30-39.9)        Past Surgical History:   Procedure Laterality Date    CHOLECYSTECTOMY      COLONOSCOPY N/A 06/19/2018    Procedure: COLONOSCOPY;  Surgeon: Moris Whalen MD;  Location: [facility] (4TH FLR);  Service: Endoscopy;  Laterality: N/A;  Miralax prep, per Dr. Whalen    ENDOSCOPIC ULTRASOUND OF UPPER GASTROINTESTINAL TRACT N/A 08/10/2018    Procedure: ULTRASOUND, ENDOSCOPIC, UPPER GI TRACT;  Surgeon: Watson Glass MD;  Location: [facility] (2ND FLR);  Service: Endoscopy;  Laterality: N/A;  MRCP with narrowing at the CBD as well as duodenal diverticulum however LFTs have normalized and patient will therefore need EUS as outpatient.    ERCP N/A 07/20/2021    Procedure: ERCP (ENDOSCOPIC RETROGRADE CHOLANGIOPANCREATOGRAPHY);  Surgeon: Watson Glass MD;  Location: [facility] (2ND FLR);  Service: Endoscopy;  Laterality: N/A;    ERCP N/A 10/13/2021    Procedure: ERCP (ENDOSCOPIC RETROGRADE CHOLANGIOPANCREATOGRAPHY);  Surgeon: Andrews Lance MD;  Location: [facility] (2ND FLR);  Service: Endoscopy;   Laterality: N/A;  pre procedure covid swab 10/10 [facility]-Instructions mailed-MG    ESOPHAGOGASTRODUODENOSCOPY N/A 07/03/2018    Procedure: EGD (ESOPHAGOGASTRODUODENOSCOPY);  Surgeon: Newton Iglesias MD;  [facility]  Service: Endoscopy;  Laterality: N/A;    ESOPHAGOGASTRODUODENOSCOPY N/A 07/06/2018    Procedure: EGD (ESOPHAGOGASTRODUODENOSCOPY);  Surgeon: Newton Iglesias MD;  [facility]  Service: Endoscopy;  Laterality: N/A;    ESOPHAGOGASTRODUODENOSCOPY N/A 08/10/2018    Procedure: EGD (ESOPHAGOGASTRODUODENOSCOPY);  Surgeon: Watson Glass MD;  [facility]  Service: Endoscopy;  Laterality: N/A;  Inpatient EGD (7/6/18) remarkable for esophagitis and a duodenal ulcer.     LAPAROSCOPIC CHOLECYSTECTOMY N/A 07/21/2021    Procedure: CHOLECYSTECTOMY, LAPAROSCOPIC;  Surgeon: Garland Loja MD;  Location: [facility] OR [address] [place] [address];  Service: General;  Laterality: N/A;    TONSILLECTOMY      TRANSURETHRAL RESECTION OF PROSTATE (TURP) WITHOUT USE OF LASER N/A 12/13/2018    Procedure: TURP, WITHOUT USING LASER;  Surgeon: Neil Thornton MD;  [facility];  Service: Urology;  Laterality: N/A;    UPPER ENDOSCOPIC ULTRASOUND W/ FNA  08/10/2018    without FNA        Objective:     Vitals  Wt Readings from Last 1 Encounters:   06/12/25 90.7 kg (199 lb 15.3 oz)     Temp Readings from Last 1 Encounters:   06/12/25 97.3 °F (36.3 °C) (Temporal)     BP Readings from Last 1 Encounters:   07/24/25 122/76     Pulse Readings from Last 1 Encounters:   07/24/25 89       Dermatological Exam    Skin:  Pedal hair growth diminished and Pedal skin thin and shiny on right  Pedal hair growth diminished and Pedal skin thin and shiny on left     Nails:  10 nail(s) elongated, 10 nail(s) thickened, and 10 nail(s) discolored; tender edges of 1st toenails; minor clearing of nails proximally    Vascular Exam    Arteries:  Posterior tibial artery palpable on right  Dorsalis pedis artery  palpable on right  Posterior tibial artery palpable on left  Dorsalis pedis artery palpable on left    Veins:  Superficial veins unremarkable on right  Superficial veins unremarkable on left    [place]+ nonpitting on right  1+ nonpitting on left    Neurological Exam    [place] touch test:  6/6 sites sensed, light touch intact     Musculoskeletal Exam    Footwear:  Casual on right  Casual on left    Gait Exam:   Ambulatory Status: Ambulatory  Gait: Normal  Assistive Devices: None    Foot Progression Angle:  Normal on right  Normal on left     Right Lower Extremity Additional Findings:  Hammertoes 2-5  Right foot and ankle function, strength, and range of motion unremarkable except as noted above.     Left Lower Extremity Additional Findings:  Hammertoes 2-5  Left foot and ankle function, strength, and range of motion unremarkable except as noted above.    Imaging and Other Tests    Imaging:  Independently reviewed and interpreted imaging, findings are as follows: N/A    Other Tests: The following A1c results were reviewed.   Hemoglobin A1C   Date Value Ref Range Status   2024 7.7 (H) 4.0 - 5.6 % Final     Comment:     ADA Screening Guidelines:  5.7-6.4%  Consistent with prediabetes  >or=6.5%  Consistent with diabetes    High levels of fetal hemoglobin interfere with the HbA1C  assay. Heterozygous hemoglobin variants (HbS, HgC, etc)do  not significantly interfere with this assay.   However, presence of multiple variants may affect accuracy.     2024 7.6 (H) 4.0 - 5.6 % Final     Comment:     ADA Screening Guidelines:  5.7-6.4%  Consistent with prediabetes  >or=6.5%  Consistent with diabetes    High levels of fetal hemoglobin interfere with the HbA1C  assay. Heterozygous hemoglobin variants (HbS, HgC, etc)do  not significantly interfere with this assay.   However, presence of multiple variants may affect accuracy.     2023 7.4 (H) 4.0 - 5.6 % Final     Comment:     ADA Screening  Guidelines:  5.7-6.4%  Consistent with prediabetes  >or=6.5%  Consistent with diabetes    High levels of fetal hemoglobin interfere with the HbA1C  assay. Heterozygous hemoglobin variants (HbS, HgC, etc)do  not significantly interfere with this assay.   However, presence of multiple variants may affect accuracy.     12/01/2023 7.4 (H) 4.0 - 5.6 % Final     Comment:     ADA Screening Guidelines:  5.7-6.4%  Consistent with prediabetes  >or=6.5%  Consistent with diabetes    High levels of fetal hemoglobin interfere with the HbA1C  assay. Heterozygous hemoglobin variants (HbS, HgC, etc)do  not significantly interfere with this assay.   However, presence of multiple variants may affect accuracy.       Hemoglobin A1c   Date Value Ref Range Status   06/12/2025 7.4 (H) 4.0 - 5.6 % Final     Comment:     ADA Screening Guidelines:  5.7-6.4%  Consistent with prediabetes  >=6.5%  Consistent with diabetes    High levels of fetal hemoglobin interfere with the HbA1C  assay. Heterozygous hemoglobin variants (HbS, HgC, etc)do  not significantly interfere with this assay.   However, presence of multiple variants may affect accuracy.         Assessment:     Encounter Diagnoses   Name Primary?    Chronic pain of toes of both feet Yes    Tinea unguium     Type 2 diabetes mellitus with peripheral neuropathy         Plan:     I counseled the patient on his conditions, their implications and medical management.    Diabetic foot with peripheral neuropathy: chronic stable   -Performed shoe inspection and diabetic foot education. Reviewed importance of blood glucose control, proper nutrition, and foot hygiene to minimize risk of complications of diabetes. Recommended daily foot inspections, daily moisturizer to feet, avoiding sharp instruments to feet, appropriate footwear at all times when ambulating, and following up regularly for routine foot care.   -Diabetic foot risk: 2023 IWGDF very low ulcer risk.      Tinea unguium: chronic stable    -Monitoring s/p 3 month course PO terbinafine. Nails not fully cleared.   -Nails debrided x10, thickness and length reduced using sterile nail nippers, see procedure note.     -Reviewed most recent CMP/LFT, ordered 3 months PO terbinafine. Repeat LFT in 1.5 months.     Hammertoes: chronic stable  -Diabetic shoes and inserts.     Bilateral toe pain: chronic stable  -EMLA cream BID PRN.       Return to clinic in 3 months for routine foot care, call sooner PRN.

## 2025-07-24 NOTE — PROCEDURES
"Routine Foot Care    Date/Time: 7/24/2025 10:30 AM    Performed by: Sudheer Wolf DPM  Authorized by: Sudheer Wolf DPM    Time out: Immediately prior to procedure a "time out" was called to verify the correct patient, procedure, equipment, support staff and site/side marked as required.    Hyperkeratotic Skin Lesions?: No      Nail Care Type:  Debride  Location(s): All  (Left 1st Toe, Left 3rd Toe, Left 2nd Toe, Left 4th Toe, Left 5th Toe, Right 1st Toe, Right 2nd Toe, Right 3rd Toe, Right 4th Toe and Right 5th Toe)  Patient tolerance:  Patient tolerated the procedure well with no immediate complications    "

## 2025-07-28 DIAGNOSIS — Z00.00 ENCOUNTER FOR MEDICARE ANNUAL WELLNESS EXAM: ICD-10-CM

## 2025-08-14 RX ORDER — GABAPENTIN 300 MG/1
300 CAPSULE ORAL
Qty: 90 CAPSULE | Refills: 3 | Status: SHIPPED | OUTPATIENT
Start: 2025-08-14

## 2025-08-20 ENCOUNTER — OFFICE VISIT (OUTPATIENT)
Dept: OPHTHALMOLOGY | Facility: CLINIC | Age: 70
End: 2025-08-20
Payer: MEDICARE

## 2025-08-20 DIAGNOSIS — H25.13 NUCLEAR SCLEROSIS, BILATERAL: Primary | ICD-10-CM

## 2025-08-20 PROCEDURE — 99999 PR PBB SHADOW E&M-EST. PATIENT-LVL III: CPT | Mod: PBBFAC,,, | Performed by: OPHTHALMOLOGY

## 2025-08-27 ENCOUNTER — TELEPHONE (OUTPATIENT)
Dept: OPHTHALMOLOGY | Facility: CLINIC | Age: 70
End: 2025-08-27
Payer: MEDICARE

## 2025-08-27 DIAGNOSIS — H25.11 NUCLEAR SCLEROTIC CATARACT OF RIGHT EYE: Primary | ICD-10-CM

## (undated) DEVICE — HEMOSTAT SURGICEL 2X3IN

## (undated) DEVICE — SEE L#95700

## (undated) DEVICE — SYR 10CC LUER LOCK

## (undated) DEVICE — NDL HYPO REG 25G X 1 1/2

## (undated) DEVICE — GOWN SMARTGOWN LVL4 X-LONG XL

## (undated) DEVICE — KIT ANTIFOG

## (undated) DEVICE — DRAPE CORETEMP FLD WRM 56X62IN

## (undated) DEVICE — CATH STATLOCK MULTI-PURPOS

## (undated) DEVICE — TRAY MINOR GEN SURG

## (undated) DEVICE — SET BASIN 48X48IN 6000ML RING

## (undated) DEVICE — ELECTRODE LOOP CUTTING BIPOLAR

## (undated) DEVICE — SOL NACL IRR 1000ML BTL

## (undated) DEVICE — SOL NS 1000CC

## (undated) DEVICE — BAG TISS RETRV MONARCH 10MM

## (undated) DEVICE — SCISSOR 5MMX35CM DIRECT DRIVE

## (undated) DEVICE — ELECTRODE REM PLYHSV RETURN 9

## (undated) DEVICE — POWDER ARISTA AH 3G

## (undated) DEVICE — TROCAR ENDOPATH XCEL 5MM 7.5CM

## (undated) DEVICE — BLADE SURG CARBON STEEL SZ11

## (undated) DEVICE — IRRIGATOR ENDOSCOPY DISP.

## (undated) DEVICE — CLIP HEMO-LOK ML

## (undated) DEVICE — SUT VICRYL PLUS 4-0 P3 18IN

## (undated) DEVICE — DRAPE ABDOMINAL TIBURON 14X11

## (undated) DEVICE — TRAY CYSTO BASIN

## (undated) DEVICE — SUT 0 VICRYL / UR6 (J603)

## (undated) DEVICE — ADHESIVE DERMABOND ADVANCED

## (undated) DEVICE — IRRIGATION SET Y-TYPE TUR/BLAD

## (undated) DEVICE — PACK CYSTO

## (undated) DEVICE — GLOVE BIOGEL 7.5

## (undated) DEVICE — TUBING HF INSUFFLATION W/ FLTR

## (undated) DEVICE — SYR 50ML CATH TIP

## (undated) DEVICE — SOL IRR NACL .9% 3000ML

## (undated) DEVICE — TROCAR SPACEMAKER BLUNT 10MM